# Patient Record
Sex: FEMALE | Race: WHITE | NOT HISPANIC OR LATINO | URBAN - METROPOLITAN AREA
[De-identification: names, ages, dates, MRNs, and addresses within clinical notes are randomized per-mention and may not be internally consistent; named-entity substitution may affect disease eponyms.]

---

## 2017-05-29 ENCOUNTER — INPATIENT (INPATIENT)
Facility: HOSPITAL | Age: 82
LOS: 4 days | Discharge: HOME HEALTH | End: 2017-06-03
Attending: SURGERY | Admitting: SURGERY
Payer: MEDICARE

## 2017-05-29 VITALS
SYSTOLIC BLOOD PRESSURE: 144 MMHG | HEART RATE: 85 BPM | TEMPERATURE: 98 F | OXYGEN SATURATION: 97 % | DIASTOLIC BLOOD PRESSURE: 51 MMHG | RESPIRATION RATE: 20 BRPM

## 2017-05-29 DIAGNOSIS — Z98.890 OTHER SPECIFIED POSTPROCEDURAL STATES: Chronic | ICD-10-CM

## 2017-05-29 LAB
ALBUMIN SERPL ELPH-MCNC: 3 G/DL — LOW (ref 3.3–5)
ALP SERPL-CCNC: 59 U/L — SIGNIFICANT CHANGE UP (ref 40–120)
ALT FLD-CCNC: 22 U/L — SIGNIFICANT CHANGE UP (ref 4–33)
AST SERPL-CCNC: 45 U/L — HIGH (ref 4–32)
BASE EXCESS BLDV CALC-SCNC: 2.3 MMOL/L — SIGNIFICANT CHANGE UP
BASOPHILS # BLD AUTO: 0.02 K/UL — SIGNIFICANT CHANGE UP (ref 0–0.2)
BASOPHILS NFR BLD AUTO: 0.3 % — SIGNIFICANT CHANGE UP (ref 0–2)
BILIRUB SERPL-MCNC: 0.4 MG/DL — SIGNIFICANT CHANGE UP (ref 0.2–1.2)
BLOOD GAS VENOUS - CREATININE: 0.91 MG/DL — SIGNIFICANT CHANGE UP (ref 0.5–1.3)
BUN SERPL-MCNC: 24 MG/DL — HIGH (ref 7–23)
CALCIUM SERPL-MCNC: 9.9 MG/DL — SIGNIFICANT CHANGE UP (ref 8.4–10.5)
CHLORIDE BLDV-SCNC: 101 MMOL/L — SIGNIFICANT CHANGE UP (ref 96–108)
CHLORIDE SERPL-SCNC: 95 MMOL/L — LOW (ref 98–107)
CO2 SERPL-SCNC: 18 MMOL/L — LOW (ref 22–31)
CREAT SERPL-MCNC: 0.91 MG/DL — SIGNIFICANT CHANGE UP (ref 0.5–1.3)
EOSINOPHIL # BLD AUTO: 0.07 K/UL — SIGNIFICANT CHANGE UP (ref 0–0.5)
EOSINOPHIL NFR BLD AUTO: 0.9 % — SIGNIFICANT CHANGE UP (ref 0–6)
GAS PNL BLDV: 130 MMOL/L — LOW (ref 136–146)
GLUCOSE BLDV-MCNC: 165 — HIGH (ref 70–99)
GLUCOSE SERPL-MCNC: 169 MG/DL — HIGH (ref 70–99)
HCO3 BLDV-SCNC: 26 MMOL/L — SIGNIFICANT CHANGE UP (ref 20–27)
HCT VFR BLD CALC: 33.4 % — LOW (ref 34.5–45)
HCT VFR BLDV CALC: 35.1 % — SIGNIFICANT CHANGE UP (ref 34.5–45)
HGB BLD-MCNC: 10.9 G/DL — LOW (ref 11.5–15.5)
HGB BLDV-MCNC: 11.4 G/DL — LOW (ref 11.5–15.5)
IMM GRANULOCYTES NFR BLD AUTO: 0.3 % — SIGNIFICANT CHANGE UP (ref 0–1.5)
LACTATE BLDV-MCNC: 1.6 MMOL/L — SIGNIFICANT CHANGE UP (ref 0.5–2)
LIDOCAIN IGE QN: 19.1 U/L — SIGNIFICANT CHANGE UP (ref 7–60)
LYMPHOCYTES # BLD AUTO: 1.9 K/UL — SIGNIFICANT CHANGE UP (ref 1–3.3)
LYMPHOCYTES # BLD AUTO: 23.9 % — SIGNIFICANT CHANGE UP (ref 13–44)
MCHC RBC-ENTMCNC: 29 PG — SIGNIFICANT CHANGE UP (ref 27–34)
MCHC RBC-ENTMCNC: 32.6 % — SIGNIFICANT CHANGE UP (ref 32–36)
MCV RBC AUTO: 88.8 FL — SIGNIFICANT CHANGE UP (ref 80–100)
MONOCYTES # BLD AUTO: 0.6 K/UL — SIGNIFICANT CHANGE UP (ref 0–0.9)
MONOCYTES NFR BLD AUTO: 7.5 % — SIGNIFICANT CHANGE UP (ref 2–14)
NEUTROPHILS # BLD AUTO: 5.34 K/UL — SIGNIFICANT CHANGE UP (ref 1.8–7.4)
NEUTROPHILS NFR BLD AUTO: 67.1 % — SIGNIFICANT CHANGE UP (ref 43–77)
PCO2 BLDV: 42 MMHG — SIGNIFICANT CHANGE UP (ref 41–51)
PH BLDV: 7.41 PH — SIGNIFICANT CHANGE UP (ref 7.32–7.43)
PLATELET # BLD AUTO: 307 K/UL — SIGNIFICANT CHANGE UP (ref 150–400)
PMV BLD: 10.2 FL — SIGNIFICANT CHANGE UP (ref 7–13)
PO2 BLDV: 53 MMHG — HIGH (ref 35–40)
POTASSIUM BLDV-SCNC: 4.2 MMOL/L — SIGNIFICANT CHANGE UP (ref 3.4–4.5)
POTASSIUM SERPL-MCNC: SIGNIFICANT CHANGE UP MMOL/L (ref 3.5–5.3)
POTASSIUM SERPL-SCNC: SIGNIFICANT CHANGE UP MMOL/L (ref 3.5–5.3)
PROT SERPL-MCNC: 6.4 G/DL — SIGNIFICANT CHANGE UP (ref 6–8.3)
RBC # BLD: 3.76 M/UL — LOW (ref 3.8–5.2)
RBC # FLD: 13.1 % — SIGNIFICANT CHANGE UP (ref 10.3–14.5)
SAO2 % BLDV: 85.4 % — HIGH (ref 60–85)
SODIUM SERPL-SCNC: 132 MMOL/L — LOW (ref 135–145)
WBC # BLD: 7.95 K/UL — SIGNIFICANT CHANGE UP (ref 3.8–10.5)
WBC # FLD AUTO: 7.95 K/UL — SIGNIFICANT CHANGE UP (ref 3.8–10.5)

## 2017-05-29 PROCEDURE — 74177 CT ABD & PELVIS W/CONTRAST: CPT | Mod: 26

## 2017-05-29 RX ORDER — SODIUM CHLORIDE 9 MG/ML
1000 INJECTION INTRAMUSCULAR; INTRAVENOUS; SUBCUTANEOUS ONCE
Qty: 0 | Refills: 0 | Status: COMPLETED | OUTPATIENT
Start: 2017-05-29 | End: 2017-05-29

## 2017-05-29 RX ORDER — MORPHINE SULFATE 50 MG/1
6 CAPSULE, EXTENDED RELEASE ORAL ONCE
Qty: 0 | Refills: 0 | Status: DISCONTINUED | OUTPATIENT
Start: 2017-05-29 | End: 2017-05-29

## 2017-05-29 RX ORDER — ONDANSETRON 8 MG/1
4 TABLET, FILM COATED ORAL ONCE
Qty: 0 | Refills: 0 | Status: COMPLETED | OUTPATIENT
Start: 2017-05-29 | End: 2017-05-29

## 2017-05-29 RX ADMIN — SODIUM CHLORIDE 1000 MILLILITER(S): 9 INJECTION INTRAMUSCULAR; INTRAVENOUS; SUBCUTANEOUS at 21:55

## 2017-05-29 RX ADMIN — MORPHINE SULFATE 6 MILLIGRAM(S): 50 CAPSULE, EXTENDED RELEASE ORAL at 21:55

## 2017-05-29 RX ADMIN — MORPHINE SULFATE 6 MILLIGRAM(S): 50 CAPSULE, EXTENDED RELEASE ORAL at 22:00

## 2017-05-29 RX ADMIN — ONDANSETRON 4 MILLIGRAM(S): 8 TABLET, FILM COATED ORAL at 21:55

## 2017-05-29 NOTE — ED PROVIDER NOTE - ABDOMINAL EXAM
BENJA drain in place with some sanguinous fluid; surgical site clean, no erythema/exudate; diminished bowel sounds/soft

## 2017-05-29 NOTE — ED ADULT TRIAGE NOTE - CHIEF COMPLAINT QUOTE
Pt c/o abdominal pain s/p hernial repair approx 8 days ago.  Pt states has not moved bowels for approx 2 days.  BENJA drain in place.  Pt endorses nausea but no vomiting.  Pt appears uncomfortable.  PMHx:  DM, HTN, CHF, bowel obstruction, hernia

## 2017-05-29 NOTE — ED PROVIDER NOTE - OBJECTIVE STATEMENT
82F with recent abd surgery for infection 8 days ago with BENJA in place p/w abd pain and nausea. Reports generalized abd pain with nausea, no BM x 2 days. Has not had this before. Denies vomiting, diarrhea, CP, SOB, fever.

## 2017-05-29 NOTE — ED PROVIDER NOTE - ATTENDING CONTRIBUTION TO CARE
Rod: 82 yof with double hernia surgery 8 days ago after 1 week hospitalization due to infection, discharged from outside hospital but now arrives with worsening abd pain, nausea, vomiting, constipation and lower ext edema. no fever, no cp, no sob.  pain 7/10 now over entire abdomen. On exam, pt is ill appearing appears uncomfortable, clear lungs, normal cardiac, abd large drain site on left mid abd with blood in drain, no surrounding erythema or edema or pus.  Incision site also appears clean, decreased BS, diffuse tenderness, 1-2+ pitting edema mainly over feet.  Labs, CT.  pain meds.

## 2017-05-29 NOTE — ED PROVIDER NOTE - MEDICAL DECISION MAKING DETAILS
Pt with recent surgery now with abd pain and nausea, decreased bowel sounds. Concern for SBO. Will obtain labs, pain meds, antiemetics, CT r/o SBO

## 2017-05-29 NOTE — ED ADULT NURSE NOTE - OBJECTIVE STATEMENT
pt received to room 2, AAOx3, [place] speaking with family at bedside to translate. pt is s/p hernia repair x8 days ago, c/o abdominal pain and nausea. BENJA drain in place to surgical site. pt has not had a BM in 2 days. VS as noted, pt in NAD, MD at bedside for eval, will continue to monitor pt.

## 2017-05-30 DIAGNOSIS — K56.69 OTHER INTESTINAL OBSTRUCTION: ICD-10-CM

## 2017-05-30 DIAGNOSIS — Z95.5 PRESENCE OF CORONARY ANGIOPLASTY IMPLANT AND GRAFT: Chronic | ICD-10-CM

## 2017-05-30 DIAGNOSIS — Z92.89 PERSONAL HISTORY OF OTHER MEDICAL TREATMENT: Chronic | ICD-10-CM

## 2017-05-30 LAB
BUN SERPL-MCNC: 22 MG/DL — SIGNIFICANT CHANGE UP (ref 7–23)
CALCIUM SERPL-MCNC: 9.4 MG/DL — SIGNIFICANT CHANGE UP (ref 8.4–10.5)
CHLORIDE SERPL-SCNC: 100 MMOL/L — SIGNIFICANT CHANGE UP (ref 98–107)
CO2 SERPL-SCNC: 24 MMOL/L — SIGNIFICANT CHANGE UP (ref 22–31)
CREAT SERPL-MCNC: 0.92 MG/DL — SIGNIFICANT CHANGE UP (ref 0.5–1.3)
GLUCOSE SERPL-MCNC: 67 MG/DL — LOW (ref 70–99)
HCT VFR BLD CALC: 34 % — LOW (ref 34.5–45)
HGB BLD-MCNC: 11.1 G/DL — LOW (ref 11.5–15.5)
MAGNESIUM SERPL-MCNC: 0.9 MG/DL — CRITICAL LOW (ref 1.6–2.6)
MCHC RBC-ENTMCNC: 29.1 PG — SIGNIFICANT CHANGE UP (ref 27–34)
MCHC RBC-ENTMCNC: 32.6 % — SIGNIFICANT CHANGE UP (ref 32–36)
MCV RBC AUTO: 89.2 FL — SIGNIFICANT CHANGE UP (ref 80–100)
PLATELET # BLD AUTO: 256 K/UL — SIGNIFICANT CHANGE UP (ref 150–400)
PMV BLD: 9.9 FL — SIGNIFICANT CHANGE UP (ref 7–13)
POTASSIUM SERPL-MCNC: 3.9 MMOL/L — SIGNIFICANT CHANGE UP (ref 3.5–5.3)
POTASSIUM SERPL-SCNC: 3.9 MMOL/L — SIGNIFICANT CHANGE UP (ref 3.5–5.3)
RBC # BLD: 3.81 M/UL — SIGNIFICANT CHANGE UP (ref 3.8–5.2)
RBC # FLD: 13.4 % — SIGNIFICANT CHANGE UP (ref 10.3–14.5)
SODIUM SERPL-SCNC: 139 MMOL/L — SIGNIFICANT CHANGE UP (ref 135–145)
WBC # BLD: 5.93 K/UL — SIGNIFICANT CHANGE UP (ref 3.8–10.5)
WBC # FLD AUTO: 5.93 K/UL — SIGNIFICANT CHANGE UP (ref 3.8–10.5)

## 2017-05-30 PROCEDURE — 99222 1ST HOSP IP/OBS MODERATE 55: CPT

## 2017-05-30 RX ORDER — INSULIN LISPRO 100/ML
VIAL (ML) SUBCUTANEOUS EVERY 6 HOURS
Qty: 0 | Refills: 0 | Status: DISCONTINUED | OUTPATIENT
Start: 2017-05-30 | End: 2017-06-03

## 2017-05-30 RX ORDER — MAGNESIUM SULFATE 500 MG/ML
2 VIAL (ML) INJECTION
Qty: 0 | Refills: 0 | Status: COMPLETED | OUTPATIENT
Start: 2017-05-30 | End: 2017-05-30

## 2017-05-30 RX ORDER — POTASSIUM CHLORIDE 20 MEQ
10 PACKET (EA) ORAL ONCE
Qty: 0 | Refills: 0 | Status: COMPLETED | OUTPATIENT
Start: 2017-05-30 | End: 2017-05-30

## 2017-05-30 RX ORDER — ENOXAPARIN SODIUM 100 MG/ML
40 INJECTION SUBCUTANEOUS EVERY 24 HOURS
Qty: 0 | Refills: 0 | Status: DISCONTINUED | OUTPATIENT
Start: 2017-05-30 | End: 2017-06-03

## 2017-05-30 RX ORDER — METOPROLOL TARTRATE 50 MG
5 TABLET ORAL EVERY 6 HOURS
Qty: 0 | Refills: 0 | Status: DISCONTINUED | OUTPATIENT
Start: 2017-05-30 | End: 2017-06-01

## 2017-05-30 RX ORDER — ISOSORBIDE MONONITRATE 60 MG/1
30 TABLET, EXTENDED RELEASE ORAL DAILY
Qty: 0 | Refills: 0 | Status: DISCONTINUED | OUTPATIENT
Start: 2017-05-30 | End: 2017-05-30

## 2017-05-30 RX ORDER — SODIUM CHLORIDE 9 MG/ML
1000 INJECTION INTRAMUSCULAR; INTRAVENOUS; SUBCUTANEOUS
Qty: 0 | Refills: 0 | Status: DISCONTINUED | OUTPATIENT
Start: 2017-05-30 | End: 2017-05-30

## 2017-05-30 RX ORDER — DEXTROSE MONOHYDRATE, SODIUM CHLORIDE, AND POTASSIUM CHLORIDE 50; .745; 4.5 G/1000ML; G/1000ML; G/1000ML
1000 INJECTION, SOLUTION INTRAVENOUS
Qty: 0 | Refills: 0 | Status: DISCONTINUED | OUTPATIENT
Start: 2017-05-30 | End: 2017-06-02

## 2017-05-30 RX ADMIN — Medication 5 MILLIGRAM(S): at 12:54

## 2017-05-30 RX ADMIN — Medication 1.25 MILLIGRAM(S): at 17:50

## 2017-05-30 RX ADMIN — Medication 50 GRAM(S): at 11:19

## 2017-05-30 RX ADMIN — Medication 100 MILLIEQUIVALENT(S): at 12:51

## 2017-05-30 RX ADMIN — DEXTROSE MONOHYDRATE, SODIUM CHLORIDE, AND POTASSIUM CHLORIDE 100 MILLILITER(S): 50; .745; 4.5 INJECTION, SOLUTION INTRAVENOUS at 21:40

## 2017-05-30 RX ADMIN — Medication 5 MILLIGRAM(S): at 19:02

## 2017-05-30 RX ADMIN — DEXTROSE MONOHYDRATE, SODIUM CHLORIDE, AND POTASSIUM CHLORIDE 100 MILLILITER(S): 50; .745; 4.5 INJECTION, SOLUTION INTRAVENOUS at 10:16

## 2017-05-30 RX ADMIN — Medication 50 GRAM(S): at 10:17

## 2017-05-30 RX ADMIN — SODIUM CHLORIDE 100 MILLILITER(S): 9 INJECTION INTRAMUSCULAR; INTRAVENOUS; SUBCUTANEOUS at 05:34

## 2017-05-30 RX ADMIN — ENOXAPARIN SODIUM 40 MILLIGRAM(S): 100 INJECTION SUBCUTANEOUS at 06:57

## 2017-05-30 NOTE — PROVIDER CONTACT NOTE (MEDICATION) - ACTION/TREATMENT ORDERED:
BRAD Figueredo notified. Pt. will remain on NS at 100cc/hr and will recheck FS at 1130 am. Will continue to monitor.

## 2017-05-30 NOTE — H&P ADULT - NSHPPHYSICALEXAM_GEN_ALL_CORE
General: well developed, obese, NAD  Neuro: alert and oriented, no focal deficits, moves all extremities spontaneously  HEENT: NCAT, EOMI, anicteric  Respiratory: airway patent, respirations unlabored  CVS: regular rate and rhythm  Abdomen: soft, obese, with large right sided ventral hernia, staples on left, with serosanguinous BENJA; hernia on R partially reducible, soft, nontender, with no overlying skin changes  Extremities: no edema, sensation and movement grossly intact  Skin: warm, dry, appropriate color

## 2017-05-30 NOTE — H&P ADULT - PROBLEM SELECTOR PLAN 1
-NPO  -IV fluid for resuscitation  -NG tube if patient vomits, deferring now due to PO tolerance earlier today    --[id number]

## 2017-05-30 NOTE — H&P ADULT - HISTORY OF PRESENT ILLNESS
CC: 82y old Female admitted with a chief complaint of abdominal pain, in setting of recent ventral hernia repair.    HPI: 81 yo F presenting on POD#8 s/p ventral hernia repair in [place], with abdominal pain, associated with anorexia, mild emesis (one day prior to admission), and decreased GI function. No bowel movements for 3 days. Patient had previously had return of GI function following her surgery and was discharged to home. She had constipation that started first with decreased flatus. When it worsened, she spoke to her surgeon, however she was advised to follow up in the office in three days, and she felt she could not wait, so she called EMS and the ambulance brought her to [facility] for further assessment.    PMHx: HTN, DM, CAD s/p stent, ?CHF, ?gout, prior SBOs (managed nonoperatively, and prior bowel resection)    PSHx: bowel resection for obstruction (2009), cardiac stent placement x 2 (2014), hernia repair 5/22/17    Medications (home): januvia, metformin, glyburide, allopurinol, lasix, aspirin, metoprolol, linzess, colace  Allergies: No Known Allergies  Social Hx: lives at home with , denies EtOH and tobacco use    Physical exam significant for large right sided ventral hernia, containing bowel, partially reducible but with some loss of domain, staples C/D/I, serosanguinous BENJA drain, no skin changes    Imaging and labs remarkable for SBO with likely transition point in ventral hernia, and post surgical changes

## 2017-05-31 LAB
ALBUMIN SERPL ELPH-MCNC: 3.2 G/DL — LOW (ref 3.3–5)
ALP SERPL-CCNC: 50 U/L — SIGNIFICANT CHANGE UP (ref 40–120)
ALT FLD-CCNC: 20 U/L — SIGNIFICANT CHANGE UP (ref 4–33)
APTT BLD: 30.2 SEC — SIGNIFICANT CHANGE UP (ref 27.5–37.4)
AST SERPL-CCNC: 19 U/L — SIGNIFICANT CHANGE UP (ref 4–32)
BILIRUB SERPL-MCNC: 0.3 MG/DL — SIGNIFICANT CHANGE UP (ref 0.2–1.2)
BLD GP AB SCN SERPL QL: NEGATIVE — SIGNIFICANT CHANGE UP
BUN SERPL-MCNC: 10 MG/DL — SIGNIFICANT CHANGE UP (ref 7–23)
CALCIUM SERPL-MCNC: 9.4 MG/DL — SIGNIFICANT CHANGE UP (ref 8.4–10.5)
CHLORIDE SERPL-SCNC: 103 MMOL/L — SIGNIFICANT CHANGE UP (ref 98–107)
CO2 SERPL-SCNC: 24 MMOL/L — SIGNIFICANT CHANGE UP (ref 22–31)
CREAT SERPL-MCNC: 0.72 MG/DL — SIGNIFICANT CHANGE UP (ref 0.5–1.3)
GLUCOSE SERPL-MCNC: 129 MG/DL — HIGH (ref 70–99)
HBA1C BLD-MCNC: 7.5 % — HIGH (ref 4–5.6)
HCT VFR BLD CALC: 31.3 % — LOW (ref 34.5–45)
HGB BLD-MCNC: 10.1 G/DL — LOW (ref 11.5–15.5)
INR BLD: 1.04 — SIGNIFICANT CHANGE UP (ref 0.88–1.17)
MAGNESIUM SERPL-MCNC: 1.7 MG/DL — SIGNIFICANT CHANGE UP (ref 1.6–2.6)
MCHC RBC-ENTMCNC: 29 PG — SIGNIFICANT CHANGE UP (ref 27–34)
MCHC RBC-ENTMCNC: 32.3 % — SIGNIFICANT CHANGE UP (ref 32–36)
MCV RBC AUTO: 89.9 FL — SIGNIFICANT CHANGE UP (ref 80–100)
PHOSPHATE SERPL-MCNC: 2.4 MG/DL — LOW (ref 2.5–4.5)
PLATELET # BLD AUTO: 264 K/UL — SIGNIFICANT CHANGE UP (ref 150–400)
PMV BLD: 9.9 FL — SIGNIFICANT CHANGE UP (ref 7–13)
POTASSIUM SERPL-MCNC: 4.2 MMOL/L — SIGNIFICANT CHANGE UP (ref 3.5–5.3)
POTASSIUM SERPL-SCNC: 4.2 MMOL/L — SIGNIFICANT CHANGE UP (ref 3.5–5.3)
PROT SERPL-MCNC: 5.9 G/DL — LOW (ref 6–8.3)
PROTHROM AB SERPL-ACNC: 11.7 SEC — SIGNIFICANT CHANGE UP (ref 9.8–13.1)
RBC # BLD: 3.48 M/UL — LOW (ref 3.8–5.2)
RBC # FLD: 13.4 % — SIGNIFICANT CHANGE UP (ref 10.3–14.5)
RH IG SCN BLD-IMP: POSITIVE — SIGNIFICANT CHANGE UP
SODIUM SERPL-SCNC: 140 MMOL/L — SIGNIFICANT CHANGE UP (ref 135–145)
WBC # BLD: 3.93 K/UL — SIGNIFICANT CHANGE UP (ref 3.8–10.5)
WBC # FLD AUTO: 3.93 K/UL — SIGNIFICANT CHANGE UP (ref 3.8–10.5)

## 2017-05-31 PROCEDURE — 99231 SBSQ HOSP IP/OBS SF/LOW 25: CPT | Mod: GC

## 2017-05-31 RX ORDER — MAGNESIUM SULFATE 500 MG/ML
2 VIAL (ML) INJECTION ONCE
Qty: 0 | Refills: 0 | Status: COMPLETED | OUTPATIENT
Start: 2017-05-31 | End: 2017-05-31

## 2017-05-31 RX ADMIN — ENOXAPARIN SODIUM 40 MILLIGRAM(S): 100 INJECTION SUBCUTANEOUS at 05:51

## 2017-05-31 RX ADMIN — Medication 5 MILLIGRAM(S): at 05:46

## 2017-05-31 RX ADMIN — Medication 5 MILLIGRAM(S): at 00:15

## 2017-05-31 RX ADMIN — Medication 1.25 MILLIGRAM(S): at 23:12

## 2017-05-31 RX ADMIN — Medication 5 MILLIGRAM(S): at 12:26

## 2017-05-31 RX ADMIN — Medication 1.25 MILLIGRAM(S): at 00:15

## 2017-05-31 RX ADMIN — Medication 2: at 23:12

## 2017-05-31 RX ADMIN — Medication 63.75 MILLIMOLE(S): at 12:31

## 2017-05-31 RX ADMIN — Medication 1.25 MILLIGRAM(S): at 12:27

## 2017-05-31 RX ADMIN — Medication 1.25 MILLIGRAM(S): at 18:19

## 2017-05-31 RX ADMIN — Medication 1.25 MILLIGRAM(S): at 05:46

## 2017-05-31 RX ADMIN — Medication 50 GRAM(S): at 12:19

## 2017-05-31 RX ADMIN — Medication 5 MILLIGRAM(S): at 23:12

## 2017-05-31 RX ADMIN — Medication 5 MILLIGRAM(S): at 18:19

## 2017-05-31 RX ADMIN — Medication 4: at 12:10

## 2017-05-31 NOTE — CHART NOTE - NSCHARTNOTEFT_GEN_A_CORE
Spoke to patient's surgeon from [facility], Dr. Jamie Hernandez. He was made aware that the pt. is inpatient here at [facility]. He said that he was aware of large R ventral hernia, but did not intend to operate on it at the time of her initial hernia repair. Now that she is here inpatient with us, he has expressed that he is okay with our team continuing the patient's care.

## 2017-05-31 NOTE — PROGRESS NOTE ADULT - ATTENDING COMMENTS
Complicated giant ventral incisional hernia repaired in part by surgeon at OSH.  Early post-operative obstruction versus baseline constipation.  Improving with conservative management, patient endorses passage of flatus.  Will try oral diet challenge followed by bowel regimen for laxation.    Ideally, she should f/u with incident surgeon or another surgeon of family's choosing once acute issue of pSBO has resolved to further management of remaining ventral hernia.

## 2017-06-01 ENCOUNTER — OUTPATIENT (OUTPATIENT)
Dept: OUTPATIENT SERVICES | Facility: HOSPITAL | Age: 82
LOS: 1 days | End: 2017-06-01
Payer: MEDICAID

## 2017-06-01 DIAGNOSIS — Z95.5 PRESENCE OF CORONARY ANGIOPLASTY IMPLANT AND GRAFT: Chronic | ICD-10-CM

## 2017-06-01 DIAGNOSIS — Z98.890 OTHER SPECIFIED POSTPROCEDURAL STATES: Chronic | ICD-10-CM

## 2017-06-01 DIAGNOSIS — Z92.89 PERSONAL HISTORY OF OTHER MEDICAL TREATMENT: Chronic | ICD-10-CM

## 2017-06-01 LAB
BUN SERPL-MCNC: 4 MG/DL — LOW (ref 7–23)
CALCIUM SERPL-MCNC: 9.5 MG/DL — SIGNIFICANT CHANGE UP (ref 8.4–10.5)
CHLORIDE SERPL-SCNC: 103 MMOL/L — SIGNIFICANT CHANGE UP (ref 98–107)
CO2 SERPL-SCNC: 24 MMOL/L — SIGNIFICANT CHANGE UP (ref 22–31)
CREAT SERPL-MCNC: 0.68 MG/DL — SIGNIFICANT CHANGE UP (ref 0.5–1.3)
GLUCOSE SERPL-MCNC: 190 MG/DL — HIGH (ref 70–99)
MAGNESIUM SERPL-MCNC: 1.5 MG/DL — LOW (ref 1.6–2.6)
MAGNESIUM SERPL-MCNC: 1.9 MG/DL — SIGNIFICANT CHANGE UP (ref 1.6–2.6)
PHOSPHATE SERPL-MCNC: 2.8 MG/DL — SIGNIFICANT CHANGE UP (ref 2.5–4.5)
POTASSIUM SERPL-MCNC: 4.2 MMOL/L — SIGNIFICANT CHANGE UP (ref 3.5–5.3)
POTASSIUM SERPL-SCNC: 4.2 MMOL/L — SIGNIFICANT CHANGE UP (ref 3.5–5.3)
SODIUM SERPL-SCNC: 141 MMOL/L — SIGNIFICANT CHANGE UP (ref 135–145)

## 2017-06-01 PROCEDURE — 74250 X-RAY XM SM INT 1CNTRST STD: CPT | Mod: 26

## 2017-06-01 RX ORDER — MAGNESIUM SULFATE 500 MG/ML
2 VIAL (ML) INJECTION ONCE
Qty: 0 | Refills: 0 | Status: COMPLETED | OUTPATIENT
Start: 2017-06-01 | End: 2017-06-01

## 2017-06-01 RX ORDER — METOPROLOL TARTRATE 50 MG
25 TABLET ORAL
Qty: 0 | Refills: 0 | Status: DISCONTINUED | OUTPATIENT
Start: 2017-06-01 | End: 2017-06-03

## 2017-06-01 RX ORDER — SENNA PLUS 8.6 MG/1
2 TABLET ORAL AT BEDTIME
Qty: 0 | Refills: 0 | Status: DISCONTINUED | OUTPATIENT
Start: 2017-06-01 | End: 2017-06-03

## 2017-06-01 RX ORDER — DOCUSATE SODIUM 100 MG
100 CAPSULE ORAL
Qty: 0 | Refills: 0 | Status: DISCONTINUED | OUTPATIENT
Start: 2017-06-01 | End: 2017-06-03

## 2017-06-01 RX ADMIN — DEXTROSE MONOHYDRATE, SODIUM CHLORIDE, AND POTASSIUM CHLORIDE 100 MILLILITER(S): 50; .745; 4.5 INJECTION, SOLUTION INTRAVENOUS at 22:12

## 2017-06-01 RX ADMIN — Medication 2: at 12:07

## 2017-06-01 RX ADMIN — Medication 5 MILLIGRAM(S): at 05:12

## 2017-06-01 RX ADMIN — Medication 50 GRAM(S): at 09:17

## 2017-06-01 RX ADMIN — Medication 100 MILLIGRAM(S): at 18:04

## 2017-06-01 RX ADMIN — Medication 2: at 17:28

## 2017-06-01 RX ADMIN — Medication 1.25 MILLIGRAM(S): at 05:12

## 2017-06-01 RX ADMIN — Medication 63.75 MILLIMOLE(S): at 18:04

## 2017-06-01 RX ADMIN — Medication: at 07:39

## 2017-06-01 RX ADMIN — ENOXAPARIN SODIUM 40 MILLIGRAM(S): 100 INJECTION SUBCUTANEOUS at 07:38

## 2017-06-01 RX ADMIN — SENNA PLUS 2 TABLET(S): 8.6 TABLET ORAL at 22:11

## 2017-06-01 RX ADMIN — DEXTROSE MONOHYDRATE, SODIUM CHLORIDE, AND POTASSIUM CHLORIDE 100 MILLILITER(S): 50; .745; 4.5 INJECTION, SOLUTION INTRAVENOUS at 05:13

## 2017-06-01 RX ADMIN — Medication 20 MILLIGRAM(S): at 18:04

## 2017-06-01 RX ADMIN — Medication 50 MILLIGRAM(S): at 22:12

## 2017-06-01 RX ADMIN — Medication 25 MILLIGRAM(S): at 18:04

## 2017-06-01 RX ADMIN — Medication 50 MILLIGRAM(S): at 14:35

## 2017-06-01 RX ADMIN — Medication 50 GRAM(S): at 22:11

## 2017-06-01 NOTE — PROGRESS NOTE ADULT - ASSESSMENT
82F with SBO, resolving  - judicious pain control  - f/u GI fxn  - CLD as jah 82F with SBO, resolving  - judicious pain control  - f/u GI fxn  - CLD as jah  - will get small bowel series

## 2017-06-01 NOTE — PROGRESS NOTE ADULT - ATTENDING COMMENTS
Complex ventral incisional hernias s/p partial surgical repair at OSH now with unclear pSBO versus constipation.  Agree with SBFT for further evaluation.  Ideally, would prefer not to re-operate on patient so soon after recent surgical intervention at OSH.

## 2017-06-02 ENCOUNTER — TRANSCRIPTION ENCOUNTER (OUTPATIENT)
Age: 82
End: 2017-06-02

## 2017-06-02 LAB
BUN SERPL-MCNC: 5 MG/DL — LOW (ref 7–23)
CALCIUM SERPL-MCNC: 9.5 MG/DL — SIGNIFICANT CHANGE UP (ref 8.4–10.5)
CHLORIDE SERPL-SCNC: 105 MMOL/L — SIGNIFICANT CHANGE UP (ref 98–107)
CO2 SERPL-SCNC: 24 MMOL/L — SIGNIFICANT CHANGE UP (ref 22–31)
CREAT SERPL-MCNC: 0.71 MG/DL — SIGNIFICANT CHANGE UP (ref 0.5–1.3)
GLUCOSE SERPL-MCNC: 181 MG/DL — HIGH (ref 70–99)
MAGNESIUM SERPL-MCNC: 2 MG/DL — SIGNIFICANT CHANGE UP (ref 1.6–2.6)
PHOSPHATE SERPL-MCNC: 3.5 MG/DL — SIGNIFICANT CHANGE UP (ref 2.5–4.5)
POTASSIUM SERPL-MCNC: 4 MMOL/L — SIGNIFICANT CHANGE UP (ref 3.5–5.3)
POTASSIUM SERPL-SCNC: 4 MMOL/L — SIGNIFICANT CHANGE UP (ref 3.5–5.3)
SODIUM SERPL-SCNC: 143 MMOL/L — SIGNIFICANT CHANGE UP (ref 135–145)

## 2017-06-02 RX ORDER — ACETAMINOPHEN 500 MG
650 TABLET ORAL EVERY 6 HOURS
Qty: 0 | Refills: 0 | Status: DISCONTINUED | OUTPATIENT
Start: 2017-06-02 | End: 2017-06-03

## 2017-06-02 RX ORDER — ACETAMINOPHEN 500 MG
650 TABLET ORAL EVERY 6 HOURS
Qty: 0 | Refills: 0 | Status: DISCONTINUED | OUTPATIENT
Start: 2017-06-02 | End: 2017-06-02

## 2017-06-02 RX ADMIN — Medication 50 MILLIGRAM(S): at 05:27

## 2017-06-02 RX ADMIN — Medication 50 MILLIGRAM(S): at 13:14

## 2017-06-02 RX ADMIN — Medication 650 MILLIGRAM(S): at 12:30

## 2017-06-02 RX ADMIN — Medication 100 MILLIGRAM(S): at 17:08

## 2017-06-02 RX ADMIN — Medication 25 MILLIGRAM(S): at 17:08

## 2017-06-02 RX ADMIN — Medication 2: at 17:08

## 2017-06-02 RX ADMIN — Medication 2: at 00:02

## 2017-06-02 RX ADMIN — SENNA PLUS 2 TABLET(S): 8.6 TABLET ORAL at 21:40

## 2017-06-02 RX ADMIN — Medication 650 MILLIGRAM(S): at 11:51

## 2017-06-02 RX ADMIN — Medication 25 MILLIGRAM(S): at 05:27

## 2017-06-02 RX ADMIN — Medication 20 MILLIGRAM(S): at 17:08

## 2017-06-02 RX ADMIN — Medication 50 MILLIGRAM(S): at 23:11

## 2017-06-02 RX ADMIN — Medication 4: at 07:07

## 2017-06-02 RX ADMIN — Medication 100 MILLIGRAM(S): at 05:27

## 2017-06-02 RX ADMIN — ENOXAPARIN SODIUM 40 MILLIGRAM(S): 100 INJECTION SUBCUTANEOUS at 05:28

## 2017-06-02 RX ADMIN — Medication 20 MILLIGRAM(S): at 06:57

## 2017-06-02 NOTE — DISCHARGE NOTE ADULT - HOSPITAL COURSE
HPI: 81 yo F presenting on POD#8 s/p ventral hernia repair in [place], with abdominal pain, associated with anorexia, mild emesis x1 day, and decreased GI function. No bowel movements for 3 days. Patient had previously had return of GI function following her surgery and was discharged to home. She had constipation that started first with decreased flatus. When it worsened, she spoke to her surgeon, however she was advised to follow up in the office in three days, and she felt she could not wait, so she called EMS and the ambulance brought her to [facility] for further assessment.  Physical exam significant for large right sided ventral hernia, containing bowel, partially reducible but with some loss of domain, staples C/D/I, serosanguinous BENJA drain, no skin changes    PMHx: HTN, DM, CAD s/p stent, ?CHF, ?gout, prior SBOs (managed nonoperatively, and prior bowel resection)  PSHx: bowel resection for obstruction (2009), cardiac stent placement x 2 (2014), hernia repair 5/22/17  Medications (home): januvia, metformin, glyburide, allopurinol, lasix, aspirin, metoprolol, linzess, colace  Allergies: No Known Allergies    CT A/P remarkable for SBO with likely transition point in ventral hernia, and post surgical changes.  Pt admitted to surgical service, NGT placed.  Pt's surgeon  Dr. Jamie Hernandez was notified of her hospitalization.  On HD2 small bowel series showed contrast in rectum after 4 hours.  NGT removed and diet advanced.  BENJA drain from ventral hernia repair at outside hospital was removed.  Pt discharged in stable condition.

## 2017-06-02 NOTE — PROGRESS NOTE ADULT - SUBJECTIVE AND OBJECTIVE BOX
[facility] [place]AL [place]GERY DAILY PROGRESS NOTE:     Subjective:  Endorses some abdominal pain. Has had chronic constipation. Has not had BM in 3+ days. Passing gas.          Objective:    MEDICATIONS  (STANDING):  enoxaparin Injectable 40milliGRAM(s) SubCutaneous every 24 hours  insulin lispro (HumaLOG) corrective regimen sliding scale  SubCutaneous every 6 hours  metoprolol Injectable 5milliGRAM(s) IV Push every 6 hours  enalaprilat Injectable 1.25milliGRAM(s) IV Push every 6 hours  dextrose 5% + sodium chloride 0.45% with potassium chloride 20 mEq/L 1000milliLiter(s) IV Continuous <Continuous>  magnesium sulfate  IVPB 2Gram(s) IV Intermittent once  sodium phosphate IVPB 15milliMole(s) IV Intermittent once    MEDICATIONS  (PRN):      Vital Signs Last 24 Hrs  T(C): 36.4, Max: 36.8 ( @ 21:36)  T(F): 97.5, Max: 98.3 ( @ 21:36)  HR: 68 (66 - 75)  BP: 139/52 (117/50 - 142/59)  BP(mean): --  RR: 18 (16 - 20)  SpO2: 98% (94% - 99%)    I&O's Detail  I & Os for 24h ending 31 May 2017 07:00  =============================================  IN:    dextrose 5% + sodium chloride 0.45% with potassium chloride 20 mEq/L: 2300 ml    IV PiggyBack: 200 ml    sodium chloride 0.9%: 100 ml    Total IN: 2600 ml  ---------------------------------------------  OUT:    Voided: 1450 ml    Bulb: 30 ml    Total OUT: 1480 ml  ---------------------------------------------  Total NET: 1120 ml    I & Os for current day (as of 31 May 2017 11:25)  =============================================  IN:    Total IN: 0 ml  ---------------------------------------------  OUT:    Voided: 400 ml    Bulb: 15 ml    Total OUT: 415 ml  ---------------------------------------------  Total NET: -415 ml      Daily     Daily Weight in k (31 May 2017 01:12)    LABS:                        10.1   3.93  )-----------( 264      ( 31 May 2017 06:00 )             31.3     05-    140  |  103  |  10  ----------------------------<  129<H>  4.2   |  24  |  0.72    Ca    9.4      31 May 2017 06:00  Phos  2.4       Mg     1.7         TPro  5.9<L>  /  Alb  3.2<L>  /  TBili  0.3  /  DBili  x   /  AST  19  /  ALT  20  /  AlkPhos  50  05-31    PT/INR - ( 31 May 2017 06:00 )   PT: 11.7 SEC;   INR: 1.04          PTT - ( 31 May 2017 06:00 )  PTT:30.2 SEC    Physical Exam:  NAD  Resp nonlabored  Abd with loss of domain  Minimally tender in upper quadrants  BENJA serosanguinous
GENERAL SURGERY DAILY PROGRESS NOTE:     Subjective:  Endorses some abdominal pain. Still passing gas, but has not had a BM. Tolerating sips w/o n/v, but burping some.            Objective:    MEDICATIONS  (STANDING):  enoxaparin Injectable 40milliGRAM(s) SubCutaneous every 24 hours  insulin lispro (HumaLOG) corrective regimen sliding scale  SubCutaneous every 6 hours  metoprolol Injectable 5milliGRAM(s) IV Push every 6 hours  enalaprilat Injectable 1.25milliGRAM(s) IV Push every 6 hours  dextrose 5% + sodium chloride 0.45% with potassium chloride 20 mEq/L 1000milliLiter(s) IV Continuous <Continuous>    MEDICATIONS  (PRN):      Vital Signs Last 24 Hrs  T(C): 36.8, Max: 36.8 ( @ 05:11)  T(F): 98.3, Max: 98.3 ( @ 05:11)  HR: 65 (64 - 71)  BP: 143/65 (135/95 - 152/61)  BP(mean): --  RR: 17 (17 - 18)  SpO2: 99% (97% - 100%)    I&O's Detail  I & Os for 24h ending 31 May 2017 07:00  =============================================  IN:    dextrose 5% + sodium chloride 0.45% with potassium chloride 20 mEq/L: 2300 ml    IV PiggyBack: 200 ml    sodium chloride 0.9%: 100 ml    Total IN: 2600 ml  ---------------------------------------------  OUT:    Voided: 1450 ml    Bulb: 30 ml    Total OUT: 1480 ml  ---------------------------------------------  Total NET: 1120 ml    I & Os for current day (as of 2017 06:54)  =============================================  IN:    dextrose 5% + sodium chloride 0.45% with potassium chloride 20 mEq/L: 2000 ml    Total IN: 2000 ml  ---------------------------------------------  OUT:    Voided: 1600 ml    Bulb: 55 ml    Total OUT: 1655 ml  ---------------------------------------------  Total NET: 345 ml      Daily     Daily Weight in k (2017 00:41)    LABS:                        10.1   3.93  )-----------( 264      ( 31 May 2017 06:00 )             31.3     05-31    140  |  103  |  10  ----------------------------<  129<H>  4.2   |  24  |  0.72    Ca    9.4      31 May 2017 06:00  Phos  2.4     -  Mg     1.7         TPro  5.9<L>  /  Alb  3.2<L>  /  TBili  0.3  /  DBili  x   /  AST  19  /  ALT  20  /  AlkPhos  50  05-31    PT/INR - ( 31 May 2017 06:00 )   PT: 11.7 SEC;   INR: 1.04          PTT - ( 31 May 2017 06:00 )  PTT:30.2 SEC    Physical Exam:  NAD, awake and alert  Resp nonlabored  Abd soft, large right ventral hernia, min tender  No guarding or rebound
SUBJECTIVE: Patient seen and examined at bedside, patient without complaints.  FAVIO was used on  phone, patient without complaints, she denies nausea and vomiting, has been tolerating clears of water, has had 6 bowel movements yesterday and 1 this morning, has gas. Can ambulate to the bathroom.     Vital Signs Last 24 Hrs  T(C): 36.4, Max: 36.8 (06-01 @ 14:33)  T(F): 97.5, Max: 98.3 (06-01 @ 14:33)  HR: 62 (60 - 84)  BP: 142/63 (142/63 - 158/73)  BP(mean): --  RR: 18 (16 - 18)  SpO2: 97% (97% - 100%)  I&O's Detail    I & Os for current day (as of 02 Jun 2017 10:34)  =============================================  IN:    dextrose 5% + sodium chloride 0.45% with potassium chloride 20 mEq/L: 1150 ml    IV PiggyBack: 100 ml    Total IN: 1250 ml  ---------------------------------------------  OUT:    Voided: 1000 ml    Bulb: 30 ml    Total OUT: 1030 ml  ---------------------------------------------  Total NET: 220 ml    MEDICATIONS  (STANDING):  enoxaparin Injectable 40milliGRAM(s) SubCutaneous every 24 hours  insulin lispro (HumaLOG) corrective regimen sliding scale  SubCutaneous every 6 hours  enalapril 20milliGRAM(s) Oral two times a day  pregabalin 50milliGRAM(s) Oral three times a day  docusate sodium 100milliGRAM(s) Oral two times a day  senna 2Tablet(s) Oral at bedtime  metoprolol 25milliGRAM(s) Oral two times a day    MEDICATIONS  (PRN):      Physical Exam  General: A&Ox3, NAD  Abdominal: Obese, Binder in place, Dressing c/d/i, BENJA serosanguineous, soft nontender    LABS:    06-02    143  |  105  |  5<L>  ----------------------------<  181<H>  4.0   |  24  |  0.71    Ca    9.5      02 Jun 2017 04:00  Phos  3.5     06-02  Mg     2.0     06-02

## 2017-06-02 NOTE — DISCHARGE NOTE ADULT - CARE PROVIDER_API CALL
Kendrick Huddleston), DieticianNutrition; Surgery; Surgical Critical Care  [address] [place] [id number]  Phone: [phone]  Fax: [phone] Kendrick Huddleston), DieticianNutrition; Surgery; Surgical Critical Care  [address] [place] [address]  Phone: [phone]  Fax: [phone]

## 2017-06-02 NOTE — DISCHARGE NOTE ADULT - MEDICATION SUMMARY - MEDICATIONS TO TAKE
I will START or STAY ON the medications listed below when I get home from the hospital:    aspirin 81 mg oral delayed release tablet  -- 1 tab(s) by mouth once a day  -- Indication: For CAD (coronary artery disease)    Vasotec 20 mg oral tablet  -- 1 tab(s) by mouth 2 times a day  -- Indication: For HTN (hypertension)    isosorbide mononitrate 30 mg oral tablet, extended release  -- 1 tab(s) by mouth once a day (in the morning)  -- Indication: For HTN (hypertension)    Lyrica 25 mg oral capsule  -- 2 cap(s) by mouth 3 times a day  -- Indication: For Home medication    glyBURIDE 5 mg oral tablet  -- 1 tab(s) by mouth once a day  -- Indication: For Diabetes mellitus    metFORMIN 850 mg oral tablet  -- 1 tab(s) by mouth 2 times a day  -- Indication: For Diabetes mellitus    Januvia 100 mg oral tablet  -- 1 tab(s) by mouth once a day  -- Indication: For Diabetes mellitus    Januvia 100 mg oral tablet  -- 1 tab(s) by mouth once a day  -- Indication: For Diabetes mellitus    allopurinol 100 mg oral tablet  -- 1 tab(s) by mouth once a day  -- Indication: For gout    Toprol-XL 25 mg oral tablet, extended release  -- 1 tab(s) by mouth 2 times a day  -- Indication: For HTN (hypertension)    Lasix 20 mg oral tablet  -- 1 tab(s) by mouth every other day  -- Indication: For CAD (coronary artery disease)    Colace 100 mg oral capsule  -- 1 cap(s) by mouth 2 times a day  -- Indication: For Constipation    multivitamin  --     -- Indication: For vitamin    Vitamin D3 1000 intl units oral capsule  -- 1 cap(s) by mouth once a day  -- Indication: For vitamin I will START or STAY ON the medications listed below when I get home from the hospital:    Rolling Walker  -- Rolling Walker  Disp#1  -- Indication: For walking    aspirin 81 mg oral delayed release tablet  -- 1 tab(s) by mouth once a day  -- Indication: For CAD (coronary artery disease)    Vasotec 20 mg oral tablet  -- 1 tab(s) by mouth 2 times a day  -- Indication: For HTN (hypertension)    isosorbide mononitrate 30 mg oral tablet, extended release  -- 1 tab(s) by mouth once a day (in the morning)  -- Indication: For HTN (hypertension)    Lyrica 25 mg oral capsule  -- 2 cap(s) by mouth 3 times a day  -- Indication: For Home medication    glyBURIDE 5 mg oral tablet  -- 1 tab(s) by mouth once a day  -- Indication: For Diabetes mellitus    metFORMIN 850 mg oral tablet  -- 1 tab(s) by mouth 2 times a day  -- Indication: For Diabetes mellitus    Januvia 100 mg oral tablet  -- 1 tab(s) by mouth once a day  -- Indication: For Diabetes mellitus    Januvia 100 mg oral tablet  -- 1 tab(s) by mouth once a day  -- Indication: For Diabetes mellitus    allopurinol 100 mg oral tablet  -- 1 tab(s) by mouth once a day  -- Indication: For gout    Toprol-XL 25 mg oral tablet, extended release  -- 1 tab(s) by mouth 2 times a day  -- Indication: For HTN (hypertension)    Lasix 20 mg oral tablet  -- 1 tab(s) by mouth every other day  -- Indication: For CAD (coronary artery disease)    Colace 100 mg oral capsule  -- 1 cap(s) by mouth 2 times a day  -- Indication: For Constipation    multivitamin  --     -- Indication: For vitamin    Vitamin D3 1000 intl units oral capsule  -- 1 cap(s) by mouth once a day  -- Indication: For vitamin

## 2017-06-02 NOTE — PHYSICAL THERAPY INITIAL EVALUATION ADULT - PERTINENT HX OF CURRENT PROBLEM, REHAB EVAL
Pt is a 82y old Female admitted with a chief complaint of abdominal pain in setting of recent ventral hernia repair.

## 2017-06-02 NOTE — DISCHARGE NOTE ADULT - PATIENT PORTAL LINK FT
“You can access the [facility] Patient Portal, offered by [facility], by registering with the following website: [email or web address]”

## 2017-06-02 NOTE — DISCHARGE NOTE ADULT - CARE PLAN
Principal Discharge DX:	SBO (small bowel obstruction)  Goal:	return of bowel function  Instructions for follow-up, activity and diet:	DIET: Low sugar, low carbohydrate, low sodium diet.  NOTIFY YOUR SURGEON IF: You have persistent nausea/vomiting, persistent diarrhea.  FOLLOW-UP: Please follow up with your primary care physician in one week regarding your hospitalization   Follow up with your surgeon Dr. Jamie Hernandez within one week  Secondary Diagnosis:	CAD (coronary artery disease)  Secondary Diagnosis:	Diabetes mellitus  Secondary Diagnosis:	HTN (hypertension)  Secondary Diagnosis:	History of coronary artery stent placement  Secondary Diagnosis:	History of bowel resection  Secondary Diagnosis:	H/O hernia repair Principal Discharge DX:	SBO (small bowel obstruction)  Goal:	return of bowel function  Instructions for follow-up, activity and diet:	DIET: Low sugar, low carbohydrate, low sodium diet.  NOTIFY YOUR SURGEON IF: You have persistent nausea/vomiting, persistent diarrhea.  FOLLOW-UP: Please follow up with your primary care physician in one week regarding your hospitalization   Follow up with your surgeon Dr. Jamie Hernandez within one week.  No follow up with General Surgery here is needed.  Secondary Diagnosis:	CAD (coronary artery disease)  Secondary Diagnosis:	Diabetes mellitus  Secondary Diagnosis:	HTN (hypertension)  Secondary Diagnosis:	History of coronary artery stent placement  Secondary Diagnosis:	History of bowel resection  Secondary Diagnosis:	H/O hernia repair

## 2017-06-02 NOTE — DISCHARGE NOTE ADULT - SECONDARY DIAGNOSIS.
CAD (coronary artery disease) Diabetes mellitus HTN (hypertension) History of coronary artery stent placement History of bowel resection H/O hernia repair

## 2017-06-02 NOTE — DISCHARGE NOTE ADULT - PLAN OF CARE
return of bowel function DIET: Low sugar, low carbohydrate, low sodium diet.  NOTIFY YOUR SURGEON IF: You have persistent nausea/vomiting, persistent diarrhea.  FOLLOW-UP: Please follow up with your primary care physician in one week regarding your hospitalization   Follow up with your surgeon Dr. Jamie Hernandez within one week DIET: Low sugar, low carbohydrate, low sodium diet.  NOTIFY YOUR SURGEON IF: You have persistent nausea/vomiting, persistent diarrhea.  FOLLOW-UP: Please follow up with your primary care physician in one week regarding your hospitalization   Follow up with your surgeon Dr. Jamie Hernandez within one week.  No follow up with General Surgery here is needed.

## 2017-06-02 NOTE — PROGRESS NOTE ADULT - ASSESSMENT
82F with SBO, resolving  - judicious pain control  - f/u GI fxn 82F with SBO, currently passing gas and having bowel movements  - Clear diet, regular for tonight  -IVL  -DC BENJA Drain  -OOB

## 2017-06-02 NOTE — DISCHARGE NOTE ADULT - HOME CARE AGENCY
[facility] [phone]. Initial visit will be day after discharge home. A nurse will call prior to home visit

## 2017-06-03 VITALS
TEMPERATURE: 98 F | SYSTOLIC BLOOD PRESSURE: 127 MMHG | OXYGEN SATURATION: 99 % | HEART RATE: 69 BPM | DIASTOLIC BLOOD PRESSURE: 55 MMHG | RESPIRATION RATE: 18 BRPM

## 2017-06-03 PROCEDURE — 99238 HOSP IP/OBS DSCHRG MGMT 30/<: CPT

## 2017-06-03 PROCEDURE — 99231 SBSQ HOSP IP/OBS SF/LOW 25: CPT | Mod: GC

## 2017-06-03 RX ADMIN — Medication 25 MILLIGRAM(S): at 05:10

## 2017-06-03 RX ADMIN — Medication 4: at 11:26

## 2017-06-03 RX ADMIN — Medication 100 MILLIGRAM(S): at 05:09

## 2017-06-03 RX ADMIN — Medication 50 MILLIGRAM(S): at 05:14

## 2017-06-03 RX ADMIN — Medication 50 MILLIGRAM(S): at 13:59

## 2017-06-03 RX ADMIN — Medication: at 07:23

## 2017-06-03 RX ADMIN — ENOXAPARIN SODIUM 40 MILLIGRAM(S): 100 INJECTION SUBCUTANEOUS at 07:15

## 2017-06-03 RX ADMIN — Medication: at 00:31

## 2017-06-03 RX ADMIN — Medication 20 MILLIGRAM(S): at 05:09

## 2017-06-05 DIAGNOSIS — R69 ILLNESS, UNSPECIFIED: ICD-10-CM

## 2017-08-01 PROCEDURE — G9001: CPT

## 2018-07-31 ENCOUNTER — INPATIENT (INPATIENT)
Facility: HOSPITAL | Age: 83
LOS: 5 days | Discharge: HOME HEALTH | End: 2018-08-06
Attending: INTERNAL MEDICINE | Admitting: INTERNAL MEDICINE
Payer: MEDICARE

## 2018-07-31 VITALS
OXYGEN SATURATION: 100 % | DIASTOLIC BLOOD PRESSURE: 56 MMHG | TEMPERATURE: 99 F | SYSTOLIC BLOOD PRESSURE: 131 MMHG | HEART RATE: 73 BPM | RESPIRATION RATE: 14 BRPM

## 2018-07-31 DIAGNOSIS — Z98.890 OTHER SPECIFIED POSTPROCEDURAL STATES: Chronic | ICD-10-CM

## 2018-07-31 DIAGNOSIS — Z95.5 PRESENCE OF CORONARY ANGIOPLASTY IMPLANT AND GRAFT: Chronic | ICD-10-CM

## 2018-07-31 DIAGNOSIS — Z92.89 PERSONAL HISTORY OF OTHER MEDICAL TREATMENT: Chronic | ICD-10-CM

## 2018-07-31 NOTE — ED ADULT TRIAGE NOTE - CHIEF COMPLAINT QUOTE
pt arrives c/o fever tonight of 39.2C, redness and pain to known R lower quadrant hernia x 2 months. Pt has had various surgeries for this hernia, last 1 year ago. Pt is vitally stable at this time, afebrile, warm to touch. Pt did not take any medications prior to arrival to ED. Area appears distended, arrives with dressing in place, redness around dressing. pt arrives c/o fever tonight of 39.2C, redness and pain to known R lower quadrant hernia x 2 months. Pt has had various surgeries for this hernia, last 1 year ago. Pt is vitally stable at this time, afebrile, warm to touch. Pt did not take any medications prior to arrival to ED. Area appears distended, arrives with dressing in place, redness around dressing. Patient is being followed by surgeon for wound to hernia site, currently treated with antibacterial ointment.

## 2018-08-01 DIAGNOSIS — E11.9 TYPE 2 DIABETES MELLITUS WITHOUT COMPLICATIONS: ICD-10-CM

## 2018-08-01 DIAGNOSIS — L03.90 CELLULITIS, UNSPECIFIED: ICD-10-CM

## 2018-08-01 DIAGNOSIS — Z29.9 ENCOUNTER FOR PROPHYLACTIC MEASURES, UNSPECIFIED: ICD-10-CM

## 2018-08-01 DIAGNOSIS — I25.10 ATHEROSCLEROTIC HEART DISEASE OF NATIVE CORONARY ARTERY WITHOUT ANGINA PECTORIS: ICD-10-CM

## 2018-08-01 DIAGNOSIS — D64.9 ANEMIA, UNSPECIFIED: ICD-10-CM

## 2018-08-01 DIAGNOSIS — E87.1 HYPO-OSMOLALITY AND HYPONATREMIA: ICD-10-CM

## 2018-08-01 DIAGNOSIS — L03.311 CELLULITIS OF ABDOMINAL WALL: ICD-10-CM

## 2018-08-01 DIAGNOSIS — I10 ESSENTIAL (PRIMARY) HYPERTENSION: ICD-10-CM

## 2018-08-01 LAB
ALBUMIN SERPL ELPH-MCNC: 4.1 G/DL — SIGNIFICANT CHANGE UP (ref 3.3–5)
ALP SERPL-CCNC: 62 U/L — SIGNIFICANT CHANGE UP (ref 40–120)
ALT FLD-CCNC: 22 U/L — SIGNIFICANT CHANGE UP (ref 4–33)
APPEARANCE UR: CLEAR — SIGNIFICANT CHANGE UP
AST SERPL-CCNC: 13 U/L — SIGNIFICANT CHANGE UP (ref 4–32)
BACTERIA # UR AUTO: SIGNIFICANT CHANGE UP
BASE EXCESS BLDV CALC-SCNC: 2.6 MMOL/L — SIGNIFICANT CHANGE UP
BASOPHILS # BLD AUTO: 0.05 K/UL — SIGNIFICANT CHANGE UP (ref 0–0.2)
BASOPHILS NFR BLD AUTO: 0.5 % — SIGNIFICANT CHANGE UP (ref 0–2)
BILIRUB SERPL-MCNC: 0.5 MG/DL — SIGNIFICANT CHANGE UP (ref 0.2–1.2)
BILIRUB UR-MCNC: NEGATIVE — SIGNIFICANT CHANGE UP
BLOOD GAS VENOUS - CREATININE: SIGNIFICANT CHANGE UP MG/DL (ref 0.5–1.3)
BLOOD UR QL VISUAL: NEGATIVE — SIGNIFICANT CHANGE UP
BUN SERPL-MCNC: 15 MG/DL — SIGNIFICANT CHANGE UP (ref 7–23)
CALCIUM SERPL-MCNC: 10.2 MG/DL — SIGNIFICANT CHANGE UP (ref 8.4–10.5)
CHLORIDE BLDV-SCNC: 98 MMOL/L — SIGNIFICANT CHANGE UP (ref 96–108)
CHLORIDE SERPL-SCNC: 95 MMOL/L — LOW (ref 98–107)
CO2 SERPL-SCNC: 24 MMOL/L — SIGNIFICANT CHANGE UP (ref 22–31)
COLOR SPEC: YELLOW — SIGNIFICANT CHANGE UP
CREAT SERPL-MCNC: 0.72 MG/DL — SIGNIFICANT CHANGE UP (ref 0.5–1.3)
EOSINOPHIL # BLD AUTO: 0.05 K/UL — SIGNIFICANT CHANGE UP (ref 0–0.5)
EOSINOPHIL NFR BLD AUTO: 0.5 % — SIGNIFICANT CHANGE UP (ref 0–6)
GAS PNL BLDV: 133 MMOL/L — LOW (ref 136–146)
GLUCOSE BLDV-MCNC: 159 — HIGH (ref 70–99)
GLUCOSE SERPL-MCNC: 156 MG/DL — HIGH (ref 70–99)
GLUCOSE UR-MCNC: NEGATIVE — SIGNIFICANT CHANGE UP
HCO3 BLDV-SCNC: 26 MMOL/L — SIGNIFICANT CHANGE UP (ref 20–27)
HCT VFR BLD CALC: 32.4 % — LOW (ref 34.5–45)
HCT VFR BLDV CALC: 34.5 % — SIGNIFICANT CHANGE UP (ref 34.5–45)
HGB BLD-MCNC: 10.7 G/DL — LOW (ref 11.5–15.5)
HGB BLDV-MCNC: 11.2 G/DL — LOW (ref 11.5–15.5)
IMM GRANULOCYTES # BLD AUTO: 0.04 # — SIGNIFICANT CHANGE UP
IMM GRANULOCYTES NFR BLD AUTO: 0.4 % — SIGNIFICANT CHANGE UP (ref 0–1.5)
KETONES UR-MCNC: NEGATIVE — SIGNIFICANT CHANGE UP
LACTATE BLDV-MCNC: 1.9 MMOL/L — SIGNIFICANT CHANGE UP (ref 0.5–2)
LEUKOCYTE ESTERASE UR-ACNC: HIGH
LYMPHOCYTES # BLD AUTO: 1.92 K/UL — SIGNIFICANT CHANGE UP (ref 1–3.3)
LYMPHOCYTES # BLD AUTO: 19.4 % — SIGNIFICANT CHANGE UP (ref 13–44)
MCHC RBC-ENTMCNC: 29.7 PG — SIGNIFICANT CHANGE UP (ref 27–34)
MCHC RBC-ENTMCNC: 33 % — SIGNIFICANT CHANGE UP (ref 32–36)
MCV RBC AUTO: 90 FL — SIGNIFICANT CHANGE UP (ref 80–100)
METHOD TYPE: SIGNIFICANT CHANGE UP
MONOCYTES # BLD AUTO: 0.95 K/UL — HIGH (ref 0–0.9)
MONOCYTES NFR BLD AUTO: 9.6 % — SIGNIFICANT CHANGE UP (ref 2–14)
NEUTROPHILS # BLD AUTO: 6.91 K/UL — SIGNIFICANT CHANGE UP (ref 1.8–7.4)
NEUTROPHILS NFR BLD AUTO: 69.6 % — SIGNIFICANT CHANGE UP (ref 43–77)
NITRITE UR-MCNC: NEGATIVE — SIGNIFICANT CHANGE UP
NON-SQ EPI CELLS # UR AUTO: <1 — SIGNIFICANT CHANGE UP
NRBC # FLD: 0 — SIGNIFICANT CHANGE UP
ORGANISM # SPEC MICROSCOPIC CNT: SIGNIFICANT CHANGE UP
ORGANISM # SPEC MICROSCOPIC CNT: SIGNIFICANT CHANGE UP
PCO2 BLDV: 48 MMHG — SIGNIFICANT CHANGE UP (ref 41–51)
PH BLDV: 7.37 PH — SIGNIFICANT CHANGE UP (ref 7.32–7.43)
PH UR: 6.5 — SIGNIFICANT CHANGE UP (ref 4.6–8)
PLATELET # BLD AUTO: 189 K/UL — SIGNIFICANT CHANGE UP (ref 150–400)
PMV BLD: 11 FL — SIGNIFICANT CHANGE UP (ref 7–13)
PO2 BLDV: 32 MMHG — LOW (ref 35–40)
POTASSIUM BLDV-SCNC: 4.4 MMOL/L — SIGNIFICANT CHANGE UP (ref 3.4–4.5)
POTASSIUM SERPL-MCNC: 4.2 MMOL/L — SIGNIFICANT CHANGE UP (ref 3.5–5.3)
POTASSIUM SERPL-SCNC: 4.2 MMOL/L — SIGNIFICANT CHANGE UP (ref 3.5–5.3)
PROT SERPL-MCNC: 7.1 G/DL — SIGNIFICANT CHANGE UP (ref 6–8.3)
PROT UR-MCNC: NEGATIVE MG/DL — SIGNIFICANT CHANGE UP
RBC # BLD: 3.6 M/UL — LOW (ref 3.8–5.2)
RBC # FLD: 12.3 % — SIGNIFICANT CHANGE UP (ref 10.3–14.5)
RBC CASTS # UR COMP ASSIST: >50 — HIGH (ref 0–?)
SAO2 % BLDV: 57.8 % — LOW (ref 60–85)
SODIUM SERPL-SCNC: 132 MMOL/L — LOW (ref 135–145)
SP GR SPEC: 1.01 — SIGNIFICANT CHANGE UP (ref 1–1.04)
SPECIMEN SOURCE: SIGNIFICANT CHANGE UP
UROBILINOGEN FLD QL: NORMAL MG/DL — SIGNIFICANT CHANGE UP
WBC # BLD: 9.92 K/UL — SIGNIFICANT CHANGE UP (ref 3.8–10.5)
WBC # FLD AUTO: 9.92 K/UL — SIGNIFICANT CHANGE UP (ref 3.8–10.5)
WBC UR QL: HIGH (ref 0–?)

## 2018-08-01 PROCEDURE — 99223 1ST HOSP IP/OBS HIGH 75: CPT

## 2018-08-01 PROCEDURE — 74177 CT ABD & PELVIS W/CONTRAST: CPT | Mod: 26

## 2018-08-01 RX ORDER — DEXTROSE 50 % IN WATER 50 %
12.5 SYRINGE (ML) INTRAVENOUS ONCE
Qty: 0 | Refills: 0 | Status: DISCONTINUED | OUTPATIENT
Start: 2018-08-01 | End: 2018-08-06

## 2018-08-01 RX ORDER — ACETAMINOPHEN 500 MG
650 TABLET ORAL ONCE
Qty: 0 | Refills: 0 | Status: COMPLETED | OUTPATIENT
Start: 2018-08-01 | End: 2018-08-01

## 2018-08-01 RX ORDER — INSULIN LISPRO 100/ML
VIAL (ML) SUBCUTANEOUS
Qty: 0 | Refills: 0 | Status: DISCONTINUED | OUTPATIENT
Start: 2018-08-01 | End: 2018-08-06

## 2018-08-01 RX ORDER — SODIUM CHLORIDE 9 MG/ML
1000 INJECTION, SOLUTION INTRAVENOUS
Qty: 0 | Refills: 0 | Status: DISCONTINUED | OUTPATIENT
Start: 2018-08-01 | End: 2018-08-06

## 2018-08-01 RX ORDER — VANCOMYCIN HCL 1 G
1000 VIAL (EA) INTRAVENOUS ONCE
Qty: 0 | Refills: 0 | Status: COMPLETED | OUTPATIENT
Start: 2018-08-01 | End: 2018-08-01

## 2018-08-01 RX ORDER — METOPROLOL TARTRATE 50 MG
25 TABLET ORAL DAILY
Qty: 0 | Refills: 0 | Status: DISCONTINUED | OUTPATIENT
Start: 2018-08-01 | End: 2018-08-01

## 2018-08-01 RX ORDER — METOPROLOL TARTRATE 50 MG
25 TABLET ORAL DAILY
Qty: 0 | Refills: 0 | Status: DISCONTINUED | OUTPATIENT
Start: 2018-08-01 | End: 2018-08-06

## 2018-08-01 RX ORDER — SODIUM CHLORIDE 9 MG/ML
1000 INJECTION INTRAMUSCULAR; INTRAVENOUS; SUBCUTANEOUS
Qty: 0 | Refills: 0 | Status: COMPLETED | OUTPATIENT
Start: 2018-08-01 | End: 2018-08-01

## 2018-08-01 RX ORDER — DEXTROSE 50 % IN WATER 50 %
25 SYRINGE (ML) INTRAVENOUS ONCE
Qty: 0 | Refills: 0 | Status: DISCONTINUED | OUTPATIENT
Start: 2018-08-01 | End: 2018-08-06

## 2018-08-01 RX ORDER — DOCUSATE SODIUM 100 MG
100 CAPSULE ORAL
Qty: 0 | Refills: 0 | Status: DISCONTINUED | OUTPATIENT
Start: 2018-08-01 | End: 2018-08-06

## 2018-08-01 RX ORDER — ALLOPURINOL 300 MG
100 TABLET ORAL DAILY
Qty: 0 | Refills: 0 | Status: DISCONTINUED | OUTPATIENT
Start: 2018-08-01 | End: 2018-08-06

## 2018-08-01 RX ORDER — ISOSORBIDE MONONITRATE 60 MG/1
30 TABLET, EXTENDED RELEASE ORAL DAILY
Qty: 0 | Refills: 0 | Status: DISCONTINUED | OUTPATIENT
Start: 2018-08-01 | End: 2018-08-06

## 2018-08-01 RX ORDER — ASPIRIN/CALCIUM CARB/MAGNESIUM 324 MG
81 TABLET ORAL DAILY
Qty: 0 | Refills: 0 | Status: DISCONTINUED | OUTPATIENT
Start: 2018-08-01 | End: 2018-08-06

## 2018-08-01 RX ORDER — DEXTROSE 50 % IN WATER 50 %
15 SYRINGE (ML) INTRAVENOUS ONCE
Qty: 0 | Refills: 0 | Status: DISCONTINUED | OUTPATIENT
Start: 2018-08-01 | End: 2018-08-06

## 2018-08-01 RX ORDER — GLUCAGON INJECTION, SOLUTION 0.5 MG/.1ML
1 INJECTION, SOLUTION SUBCUTANEOUS ONCE
Qty: 0 | Refills: 0 | Status: DISCONTINUED | OUTPATIENT
Start: 2018-08-01 | End: 2018-08-06

## 2018-08-01 RX ORDER — SENNA PLUS 8.6 MG/1
2 TABLET ORAL AT BEDTIME
Qty: 0 | Refills: 0 | Status: DISCONTINUED | OUTPATIENT
Start: 2018-08-01 | End: 2018-08-06

## 2018-08-01 RX ORDER — PIPERACILLIN AND TAZOBACTAM 4; .5 G/20ML; G/20ML
3.38 INJECTION, POWDER, LYOPHILIZED, FOR SOLUTION INTRAVENOUS ONCE
Qty: 0 | Refills: 0 | Status: COMPLETED | OUTPATIENT
Start: 2018-08-01 | End: 2018-08-01

## 2018-08-01 RX ORDER — CEFAZOLIN SODIUM 1 G
1000 VIAL (EA) INJECTION EVERY 8 HOURS
Qty: 0 | Refills: 0 | Status: DISCONTINUED | OUTPATIENT
Start: 2018-08-01 | End: 2018-08-02

## 2018-08-01 RX ORDER — ISOSORBIDE MONONITRATE 60 MG/1
30 TABLET, EXTENDED RELEASE ORAL DAILY
Qty: 0 | Refills: 0 | Status: DISCONTINUED | OUTPATIENT
Start: 2018-08-01 | End: 2018-08-01

## 2018-08-01 RX ADMIN — PIPERACILLIN AND TAZOBACTAM 200 GRAM(S): 4; .5 INJECTION, POWDER, LYOPHILIZED, FOR SOLUTION INTRAVENOUS at 03:46

## 2018-08-01 RX ADMIN — Medication 650 MILLIGRAM(S): at 14:42

## 2018-08-01 RX ADMIN — Medication 650 MILLIGRAM(S): at 01:28

## 2018-08-01 RX ADMIN — SENNA PLUS 2 TABLET(S): 8.6 TABLET ORAL at 21:47

## 2018-08-01 RX ADMIN — Medication 100 MILLIGRAM(S): at 16:05

## 2018-08-01 RX ADMIN — Medication 250 MILLIGRAM(S): at 01:19

## 2018-08-01 RX ADMIN — Medication 650 MILLIGRAM(S): at 02:30

## 2018-08-01 RX ADMIN — SODIUM CHLORIDE 500 MILLILITER(S): 9 INJECTION INTRAMUSCULAR; INTRAVENOUS; SUBCUTANEOUS at 08:14

## 2018-08-01 RX ADMIN — Medication 3: at 17:26

## 2018-08-01 RX ADMIN — Medication 20 MILLIGRAM(S): at 18:46

## 2018-08-01 RX ADMIN — Medication 1000 MILLIGRAM(S): at 02:30

## 2018-08-01 NOTE — H&P ADULT - PROBLEM SELECTOR PROBLEM 3
Type 2 diabetes mellitus without complication, without long-term current use of insulin Hyponatremia

## 2018-08-01 NOTE — H&P ADULT - NSHPSOCIALHISTORY_GEN_ALL_CORE
Patient primarily speaks [place]ese. She currently lives with her daughter, Mahogany. She is ambulatory at baseline. No current tobacco, alcohol, or illicit drug use. Pt has home care wound services.

## 2018-08-01 NOTE — H&P ADULT - PROBLEM SELECTOR PLAN 1
S/p empiric abx with Vancomycin/Zosyn x1 dose  - start cefazolin 1g q8h for now. If no inpatient intervention, can likely transition to oral Keflex to complete abx as an outpatient  - surgery and wound care consult given increased size of ulcer and hx of multiple abdominal surgeries  - f/u 7/31 blood cx and urine cx

## 2018-08-01 NOTE — H&P ADULT - NSHPLABSRESULTS_GEN_ALL_CORE
LABS:                        10.7   9.92  )-----------( 189      ( 01 Aug 2018 00:30 )             32.4         132<L>  |  95<L>  |  15  ----------------------------<  156<H>  4.2   |  24  |  0.72    Ca    10.2      01 Aug 2018 00:30    TPro  7.1  /  Alb  4.1  /  TBili  0.5  /  DBili  x   /  AST  13  /  ALT  22  /  AlkPhos  62        Urinalysis Basic - ( 01 Aug 2018 01:00 )    Color: YELLOW / Appearance: CLEAR / S.009 / pH: 6.5  Gluc: NEGATIVE / Ketone: NEGATIVE  / Bili: NEGATIVE / Urobili: NORMAL mg/dL   Blood: NEGATIVE / Protein: NEGATIVE mg/dL / Nitrite: NEGATIVE   Leuk Esterase: SMALL / RBC: >50 / WBC 5-10   Sq Epi: x / Non Sq Epi: x / Bacteria: FEW      VBG  @ 00:30  pH: 7.37/pCO2: 48 /pO2: 32/HCO3: 26/lactate: 1.9      EKG:    RADIOLOGY & ADDITIONAL TESTS:    2018:  CT ABDOMEN AND PELVIS OC IC      COMPARISON: CT abdomen pelvis 2017.    FINDINGS:  LOWER CHEST: Mild dependent atelectasis is seen at the lung bases.    LIVER: Hepatic steatosis.  BILE DUCTS: Normal caliber.  GALLBLADDER: Uncomplicated cholelithiasis.  SPLEEN: Within normal limits.  PANCREAS: Within normal limits.  ADRENALS: Within normal limits.  KIDNEYS/URETERS: No hydronephrosis. Too small to characterize bilateral   renal hypodensities.    BLADDER: Within normal limits.  REPRODUCTIVE ORGANS: Myomatous calcified uterus. The adnexa are   unremarkable bilaterally.    BOWEL/ABDOMINAL WALL: Redemonstration of a large right lateral ventral   wall hernia containing multiple nonobstructed loops of small bowel and   colon containing contrast. There is a smaller paracentral ventral   abdominal hernia inferiorly containing nonobstructed loops of bowel   containing contrast. There is adjacent phlegmonous changes of the   abdominal wall with adjacent stranding which appears similar to   2017. No discrete loculated or rim enhancing fluid collection to   suggest an abscess. No dilated loops of bowel to suggest bowel   obstruction at this time. Appendix not visualized. Additional wide mouth   umbilical hernia containing fat and portion of the bowel loops.  PERITONEUM: No ascites.  VESSELS:  Atherosclerotic disease of the aorta and its branches.  RETROPERITONEUM: No lymphadenopathy.    BONES: Multilevel degenerative changes of the spine.    IMPRESSION:   Multiple right sided paracentral ventral abdominal hernias containing   nonobstructed bowel loops as described in detail above. Associated   phlegmonous changes of the adjacent soft tissues at the level of smaller   paracentral ventral abdominal wall inferiorly. No discrete loculated or   rim enhancing fluid collection to suggest an abscess. No bowel obstruction.    Uncomplicated cholelithiasis.

## 2018-08-01 NOTE — H&P ADULT - PROBLEM SELECTOR PROBLEM 5
Coronary artery disease involving native coronary artery of native heart without angina pectoris Hypertension, unspecified type

## 2018-08-01 NOTE — ED PROVIDER NOTE - ATTENDING CONTRIBUTION TO CARE
83F PMH HTN, DM, CAD ventral hernia repair, SBOs (2017, admitted at [facility] under Dr. Hatfield) p/w fever. PT has ulcer at hernia repair site for several mos. Now w/ 2-3 days of worsening pain at site and new spreading redness. Today had fever. Vitals wnl, exam as above.  ddx: Given fever at home and exam pt likely has cellulitis. Low suspicion for SBO but may have deeper abscess.   CBC, cmp, vbg comp, blood cx. CT a/p. Broad spectrum abx, reassess.   Likely admission given extent of erythema and fevers.

## 2018-08-01 NOTE — H&P ADULT - PROBLEM SELECTOR PLAN 3
Mild and asymptomatic. S/p 1L normal saline bolus  - repeat sodium level. If not improved, will pursue further w/u

## 2018-08-01 NOTE — H&P ADULT - PROBLEM SELECTOR PLAN 7
IMPROVE VTE Individual Risk Assessment          RISK                                                          Points  [  ] Previous VTE                                               3  [  ] Thrombophilia                                            2  [  ] Lower limb paresis/paralysis                    2    [  ] Active Cancer (in last 6 months)              2   [  ] Immobilization > 24 hrs                             1  [  ] ICU/CCU stay > 24 hours                            1  [ x ] Age > 60                                                        1                                            Total Score ___1______    DISPO: If no inpatient surgical services warranted and pt remains afebrile and hemodynamically stable, patient can likely go home later on today on oral abx with close outpatient follow-up

## 2018-08-01 NOTE — ED PROVIDER NOTE - PROGRESS NOTE DETAILS
Klepfish: Abs grossly wnl. CT no acute surgical pathology. Will admit for fevers, clinical extensive cellulitis. d/w hospitalist and text paged mar.

## 2018-08-01 NOTE — H&P ADULT - HISTORY OF PRESENT ILLNESS
Initial ED triage VS:  temp 98.7  /56  HR 73  RR 14  O2 sat 100% on RA  While in the ED, patient received 1L NS bolus, vancomycin 1g IV and Zosyn 3.375mg IV x1 dose Ms. Galicia is an 84 yo woman with hx of CAD s/p PCI with stents x2, DM2, HTN, ventral hernia s/p repair in 2017 with recurrence, history of SBO (2017) presenting from with fever.     Initial ED triage VS:  temp 98.7  /56  HR 73  RR 14  O2 sat 100% on RA  While in the ED, patient received 1L NS bolus, vancomycin 1g IV and Zosyn 3.375mg IV x1 dose Ms. Galicia is an 84 yo woman with PHMx notable for remote colorectal cancer s/p bowel resection, CAD s/p PCI with stents x2, DM2, HTN, ventral hernia s/p repair in 2017 with recurrence c/b SBO (2017) presenting with fever at home x1 day.     HPI primarily obtained from pt's daughter, Mahogany, who declined translation services.  Patient has a chronic, large right sided ventral hernia as a result of prior abdominal surgeries. She subsequently developed a small ulceration on the right side of her abdomen. She does has a nurse that comes to the home for wound care. As per daughter, size of ulcer has been getting bigger and she noticed for the past 3 days, redness surrounding the ulcer was spreading. Pt has maintain her appetite with abdominal pain only when touching around the site. Pt has no changes in bowel habits - no diarrhea, bloody stools.     Of note, patient has outpatient appt for further evaluation of her hernia with Dr. Maxx Ahuja (Surgery) - affiliated with [facility].    Initial ED triage VS:  temp 98.7  /56  HR 73  RR 14  O2 sat 100% on RA  While in the ED, patient received 1L NS bolus, vancomycin 1g IV and Zosyn 3.375mg IV x1 dose Ms. Galicia is an 82 yo woman with PHMx notable for remote colorectal cancer s/p bowel resection, CAD s/p PCI with stents x2, DM2, HTN, ventral hernia s/p repair in 2017 with recurrence c/b SBO (2017) presenting with fever at home x1 day.     HPI primarily obtained from pt's daughter, Mahogany, who declined translation services.  Patient has a chronic, large right sided ventral hernia as a result of prior abdominal surgeries. She subsequently developed a small ulceration on the right side of her abdomen. She has a nurse that comes to the home for wound care. As per daughter, size of ulcer has been getting bigger and she noticed for the past 3 days, redness surrounding the ulcer was spreading. Pt has maintain her appetite with abdominal pain only when touching around the site. Pt has no changes in bowel habits - no diarrhea, bloody stools. However, yesterday, patient developed a fever at home, which prompted the patient's daughter to bring her to the hospital.     Of note, patient has an outpatient appt scheduled for tomorrow, 8/2/18 for further evaluation of her hernia with Dr. Maxx Ahuja (Surgery) - affiliated with [facility].    Initial ED triage VS:  temp 98.7  /56  HR 73  RR 14  O2 sat 100% on RA  While in the ED, patient received 1L NS bolus, vancomycin 1g IV and Zosyn 3.375mg IV x1 dose

## 2018-08-01 NOTE — H&P ADULT - GASTROINTESTINAL COMMENTS
Obese abdomen with large, right sided palpable mass c/w with known hernia - reducible. Noted ulcer ~1cm in size with surrounding erythema, but no induration. Small amount of pus expressed from inferior border of ulcer

## 2018-08-01 NOTE — ED ADULT NURSE NOTE - CHIEF COMPLAINT QUOTE
pt arrives c/o fever tonight of 39.2C, redness and pain to known R lower quadrant hernia x 2 months. Pt has had various surgeries for this hernia, last 1 year ago. Pt is vitally stable at this time, afebrile, warm to touch. Pt did not take any medications prior to arrival to ED. Area appears distended, arrives with dressing in place, redness around dressing. Patient is being followed by surgeon for wound to hernia site, currently treated with antibacterial ointment.

## 2018-08-01 NOTE — ED PROVIDER NOTE - NS ED ROS FT
CONSTITUTIONAL: No fever, chills, or malaise  NEUROLOGICAL: No numbness or weakness   SKIN: +Skin ulcer in RLQ  EYES: No blurred vision or diplopia  ENT: No nasal congestion, rhinorrhea, sore throat, or dysphagia   NECK: No neck pain or stiffness  RESPIRATORY: No cough or shortness of breath  CARDIOVASCULAR: No chest pain or palpitations  GASTROINTESTINAL: +RLQ abdominal pain, no nausea, vomiting, diarrhea, melena, or hematochezia   GENITOURINARY: No dysuria, hematuria, flank pain, or incontinence   MUSCULOSKELATAL: +chronic knee pain b/l  HEMATOLOGIC: No easy bleeding or bruising   All other review of systems is negative unless indicated above

## 2018-08-01 NOTE — ED PROVIDER NOTE - PHYSICAL EXAMINATION
General: NAD, morbidly obese   Skin: +Skin ulcer over R ventral hernia with erythema and purulence  Neuro: AAOx3, nonfocal  HEENT: PERRL, EOMI, no oral lesions  Neck: Full range of motion, no JVD  Lungs: Clear to ascultation bilaterally, no wheezes, rales, or rhonchi   Heart: Regular rate and rhythm, no murmurs  Abdomen: +RLQ tenderness, soft, nondistended, no guarding or rebound  Extremities: No lower extremity tenderness, erythema, or edema   Vascular: 2+ radial and pedal pulses  Lymph: no cervical lymphadenopathy  Psych: normal mood and affect General: NAD, morbidly obese   Skin: +Skin ulcer over R ventral hernia with erythema and purulence  Neuro: AAOx3, nonfocal  HEENT: PERRL, EOMI, no oral lesions  Neck: Full range of motion, no JVD  Lungs: Clear to ascultation bilaterally, no wheezes, rales, or rhonchi   Heart: Regular rate and rhythm, no murmurs  Abdomen: +RLQ tenderness, soft, nondistended, no guarding or rebound  Extremities: No lower extremity tenderness, erythema, or edema   Vascular: 2+ radial and pedal pulses  Lymph: no cervical lymphadenopathy  Psych: normal mood and affect    Klepfish:  no joint warmth/erythema  RLQ: Large ventral hernia. BS+. +~2inch diameter ulcer. +surrounding erythema and warmth to entire RLQ region. no crepitus. RLQ is soft and minimal ttp, no rebound/guarding.

## 2018-08-01 NOTE — ED ADULT NURSE NOTE - INTERVENTIONS DEFINITIONS
Non-slip footwear when patient is off stretcher/Provide visual cue, wrist band, yellow gown, etc./Call bell, personal items and telephone within reach/Instruct patient to call for assistance/Stretcher in lowest position, wheels locked, appropriate side rails in place/Physically safe environment: no spills, clutter or unnecessary equipment

## 2018-08-01 NOTE — CHART NOTE - NSCHARTNOTEFT_GEN_A_CORE
Surgical consult was called as discussed with hospitalist Dr. Rocha as patient was known to surgical service under Dr. Hatfield in June 2017 for small bowel obstruction after a hernia repair and now coming in with fever/sepsis work up - will follow up their official evaluation/recommendations.

## 2018-08-01 NOTE — H&P ADULT - PROBLEM SELECTOR PLAN 5
Continue home meds  - isosorbide mononitrate 30mg daily  - metoprolol XL 25mg daily while inpatient (however, pt's daughter confirms that pt takes medication BID)  - enalapril 20mg BID

## 2018-08-01 NOTE — H&P ADULT - PROBLEM SELECTOR PLAN 4
Pt on oral diabetes meds at home. Will hold while inpatient  - check FSG in the AM  - sliding scale insulin  - hypoglycemia protocol  - hold metformin, sitagliptin, and glyburide while inpatient  - pt to hold metformin for the next 48 hours since pt was exposed to IV contrast

## 2018-08-01 NOTE — ED PROVIDER NOTE - OBJECTIVE STATEMENT
83F with HTN, HLD, OA, RLQ ventral hernia s/p repair in 2017 with recurrence, history of SBO presents with fever to 38.5C (101.3) tonight accompanied generalized malaise. Pt has a ulcer at the site of her ventral hernia repair which has worsened over the last 2 months with chronic pain at the site and purulent drainage. She is currently passing gas, normal BMs, no n/v, melena or hematochezia.       pt arrives c/o fever tonight of 39.2C, redness and pain to known R lower quadrant hernia x 2 months. Pt has had various surgeries for this hernia, last 1 year ago. Pt is vitally stable at this time, afebrile, warm to touch. Pt did not take any medications prior to arrival to ED. Area appears distended, arrives with dressing in place, redness around dressing. Patient is being followed by surgeon for wound to hernia site, currently treated with antibacterial ointment.    fever, hernia 83F with HTN, HLD, OA, RLQ ventral hernia s/p repair in 2017 with recurrence, history of SBO presents with fever to 38.5C (101.3) tonight accompanied generalized malaise. Pt has a ulcer at the site of her ventral hernia repair which has worsened over the last 2 months with chronic pain at the site and purulent drainage. She is currently passing gas, normal BMs, no n/v, melena or hematochezia. No dyspnea, orthopnea, or chest pain. 83F with HTN, HLD, OA, RLQ ventral hernia s/p repair in 2017 with recurrence, history of SBO presents with fever to 38.5C (101.3) tonight accompanied generalized malaise. Pt has a ulcer at the site of her ventral hernia repair which has worsened over the last 2 months with chronic pain at the site and purulent drainage. She is currently passing gas, normal BMs, no n/v, melena or hematochezia. No dyspnea, orthopnea, or chest pain.  Klepfish: Mauritanian, prefers daughter to translate. 83F PMH HTN, DM, CAD ventral hernia repair, SBOs (2017, admitted at [facility] under Dr. Hatfield) p/w fever. PT has ulcer at hernia repair site for several mos. Now w/ 2-3 days of worsening pain at site and new spreading redness. Today had fever. Denies HA, SOB/CP, URI symptoms, NVD, urinary complaints. Has normal PO and normal BMs. Chronic unchanged b/l knee pain  Surgeon: Hospital in [facility] - per pt he did not feel comfortable w/ any further surgical intervention so pt has appt w/ new surgeon at [place] in 2 days.

## 2018-08-01 NOTE — H&P ADULT - PROBLEM SELECTOR PLAN 2
Chronic and stable. Unclear etiology. No signs of active bleeding  - no need for inpatient w/u at this time  - pt to f/u with outpatient PMD

## 2018-08-01 NOTE — H&P ADULT - PMH
CAD (coronary artery disease)    Colorectal cancer  Remote hx; s/p colon resection  Diabetes mellitus    HTN (hypertension)    Hyperuricemia    Primary osteoarthritis of both knees

## 2018-08-01 NOTE — CONSULT NOTE ADULT - ASSESSMENT
83 year old female with history of multiple abdominal surgeries and large ventral hernia presenting with abdominal wall cellulitis. No collection on imaging.     - Superficial infection not likely to be source of bacteremia  - Continue antibiotics and fever work up  - Will follow  - Discussed with attending, Dr. Flaquito Fitzgerald PGY 2  B team surgery [id number]

## 2018-08-01 NOTE — H&P ADULT - ASSESSMENT
Ms. Galicia is an 84 yo woman with PHMx notable for remote colorectal cancer s/p bowel resection, CAD s/p PCI with stents x2, DM2, HTN, ventral hernia s/p repair in 2017 with recurrence c/b SBO (2017) presenting with fever at home x1 day with associated abdominal skin ulcer and erythema, admitted for localized abdominal cellulitis. Pt afebrile with normal WBC. CT abdomen/pelvis r/o underlying abscess or bowel obstruction.

## 2018-08-01 NOTE — CONSULT NOTE ADULT - SUBJECTIVE AND OBJECTIVE BOX
SURGERY CONSULT NOTE  --------------------------------------------------------------------------------------------    HPI: 83 year old female with a history of partial colectomy for colon cancer in , multiple SBOs treated nonoperatively and with small bowel resection in , ventral hernia repair in May 2017, and subsequent admission at [facility] for SBO in May 2017 managed conservatively. She now presents with fevers and pain, skin ulceration, and erythema at the hernia site. She first noted an ulcerated skin lesion in this area two months ago and has been receiving wound care by a visiting nurse. Three days ago she noticed erythema and some drainage of the wound. Today she was febrile at home and presented to the ED for further evaluation.    Today her WBC count is 9.9, she is afebrile, Ct shows soft tissue phlegmonous changes (similar to imaging in 2017) with no collection or obstruction.      PAST MEDICAL & SURGICAL HISTORY:  Hyperuricemia  Colorectal cancer: Remote hx; s/p colon resection  Primary osteoarthritis of both knees  CAD (coronary artery disease)  Diabetes mellitus  HTN (hypertension)  History of coronary artery stent placement  History of bowel resection  H/O hernia repair    FAMILY HISTORY:  No pertinent family history in first degree relatives    ALLERGIES: No Known Allergies      CURRENT MEDICATIONS  MEDICATIONS (STANDING): allopurinol 100 milliGRAM(s) Oral daily  aspirin enteric coated 81 milliGRAM(s) Oral daily  ceFAZolin   IVPB 1000 milliGRAM(s) IV Intermittent every 8 hours  dextrose 5%. 1000 milliLiter(s) IV Continuous <Continuous>  dextrose 50% Injectable 12.5 Gram(s) IV Push once  dextrose 50% Injectable 25 Gram(s) IV Push once  dextrose 50% Injectable 25 Gram(s) IV Push once  docusate sodium 100 milliGRAM(s) Oral two times a day  enalapril 20 milliGRAM(s) Oral two times a day  insulin lispro (HumaLOG) corrective regimen sliding scale   SubCutaneous three times a day before meals  isosorbide   mononitrate ER Tablet (IMDUR) 30 milliGRAM(s) Oral daily  metoprolol succinate ER 25 milliGRAM(s) Oral daily    MEDICATIONS (PRN):dextrose 40% Gel 15 Gram(s) Oral once PRN Blood Glucose LESS THAN 70 milliGRAM(s)/deciliter  glucagon  Injectable 1 milliGRAM(s) IntraMuscular once PRN Glucose LESS THAN 70 milligrams/deciliter    --------------------------------------------------------------------------------------------    Vitals:   T(C): 36.2 (18 @ 19:45), Max: 37.4 (18 @ 15:35)  HR: 73 (18 @ 19:45) (62 - 78)  BP: 115/58 (18 @ 19:45) (115/41 - 142/48)  RR: 17 (18 @ 19:45) (14 - 20)  SpO2: 100% (18 @ 19:45) (97% - 100%)  CAPILLARY BLOOD GLUCOSE    POCT Blood Glucose.: 275 mg/dL (01 Aug 2018 17:21)  POCT Blood Glucose.: 267 mg/dL (01 Aug 2018 13:14)  POCT Blood Glucose.: 166 mg/dL (2018 21:41)      PHYSICAL EXAM:  General: Alert, NAD  HEENT: NC/AT, no asymmetry, no scleral icterus  Cardio: RRR  Resp: Airway patent, unlabored breathing  GI/Abd: Soft, large ventral hernia with erythema and skin ulceration, wound probed - does not extend deep, minimal drainage  Ext: Warm, mild edema  --------------------------------------------------------------------------------------------    LABS  CBC ( @ 00:30)                              10.7<L>                         9.92    )----------------(  189        69.6  % Neutrophils, 19.4  % Lymphocytes, ANC: 6.91                                32.4<L>    BMP ( @ 00:30)             132<L>  |  95<L>   |  15    		Ca++ --      Ca 10.2               ---------------------------------( 156<H>		Mg --                 4.2     |  24      |  0.72  			Ph --        LFTs ( @ 00:30)      TPro 7.1 / Alb 4.1 / TBili 0.5 / DBili -- / AST 13 / ALT 22 / AlkPhos 62      VBG ( @ 00:30)     7.37 / 48 / 32<L> / 26 / 2.6 / 57.8<L>%     Lactate: 1.9    --------------------------------------------------------------------------------------------    MICROBIOLOGY  Urinalysis ( @ 01:00):     Color: YELLOW / Appearance: CLEAR / S.009 / pH: 6.5 / Gluc: NEGATIVE / Ketones: NEGATIVE / Bili: NEGATIVE / Urobili: NORMAL / Protein :NEGATIVE / Nitrites: NEGATIVE / Leuk.Est: SMALL<H> / RBC: >50<H> / WBC: 5-10<H> / Sq Epi:  / Non Sq Epi:  / Bacteria FEW       -> BLOOD VENOUS Culture ( @ 01:10)       RESULT CALLED TO / READ BACK: PATSY ALEJANDRE RN/Y  DATE / TIME CALLED: 18 [id number]  CALLED BY: VICKY HURD  GPR^Gram Positive Rods  AFTER: 12 HOURS INCUBATION  BOTTLE: AEROBIC BOTTLE    BLOOD CULTURE PCR  NG    --------------------------------------------------------------------------------------------    IMAGING    CT Abdomen and Pelvis w/ Oral Cont and w/ IV Cont (18 @ 04:31)  IMPRESSION:     Multiple right sided paracentral ventral abdominal hernias containing   nonobstructed bowel loops as described in detail above. Associated   phlegmonous changes of the adjacent soft tissues at the level of smaller   paracentral ventral abdominal wall inferiorly. No discrete loculated or   rim enhancing fluid collection to suggest an abscess. No bowel   obstruction.    Uncomplicated cholelithiasis.

## 2018-08-01 NOTE — ED ADULT NURSE NOTE - OBJECTIVE STATEMENT
rec'd pt in spot 26 with daughter and  at bedside. pt has poorly healing surgical wound to rlq from hernia surgery. wound has yellow discharge and exudate... tender to touch.  20 gauge inserted left arm. blood sent as ordered.  being eval by md

## 2018-08-02 LAB
-  CANDIDA ALBICANS: SIGNIFICANT CHANGE UP
-  CANDIDA GLABRATA: SIGNIFICANT CHANGE UP
-  CANDIDA KRUSEI: SIGNIFICANT CHANGE UP
-  CANDIDA PARAPSILOSIS: SIGNIFICANT CHANGE UP
-  CANDIDA TROPICALIS: SIGNIFICANT CHANGE UP
-  COAGULASE NEGATIVE STAPHYLOCOCCUS: SIGNIFICANT CHANGE UP
-  K. PNEUMONIAE GROUP: SIGNIFICANT CHANGE UP
-  KPC RESISTANCE GENE: SIGNIFICANT CHANGE UP
-  STREPTOCOCCUS SP. (NOT GRP A, B OR S PNEUMONIAE): SIGNIFICANT CHANGE UP
A BAUMANNII DNA SPEC QL NAA+PROBE: SIGNIFICANT CHANGE UP
ALBUMIN SERPL ELPH-MCNC: 3.3 G/DL — SIGNIFICANT CHANGE UP (ref 3.3–5)
ALP SERPL-CCNC: 65 U/L — SIGNIFICANT CHANGE UP (ref 40–120)
ALT FLD-CCNC: 17 U/L — SIGNIFICANT CHANGE UP (ref 4–33)
AST SERPL-CCNC: 12 U/L — SIGNIFICANT CHANGE UP (ref 4–32)
BACTERIA BLD CULT: SIGNIFICANT CHANGE UP
BACTERIA UR CULT: SIGNIFICANT CHANGE UP
BASOPHILS # BLD AUTO: 0.04 K/UL — SIGNIFICANT CHANGE UP (ref 0–0.2)
BASOPHILS NFR BLD AUTO: 0.5 % — SIGNIFICANT CHANGE UP (ref 0–2)
BILIRUB SERPL-MCNC: 0.3 MG/DL — SIGNIFICANT CHANGE UP (ref 0.2–1.2)
BUN SERPL-MCNC: 21 MG/DL — SIGNIFICANT CHANGE UP (ref 7–23)
CALCIUM SERPL-MCNC: 9.2 MG/DL — SIGNIFICANT CHANGE UP (ref 8.4–10.5)
CHLORIDE SERPL-SCNC: 97 MMOL/L — LOW (ref 98–107)
CO2 SERPL-SCNC: 21 MMOL/L — LOW (ref 22–31)
CREAT SERPL-MCNC: 0.78 MG/DL — SIGNIFICANT CHANGE UP (ref 0.5–1.3)
E CLOAC COMP DNA BLD POS QL NAA+PROBE: SIGNIFICANT CHANGE UP
E COLI DNA BLD POS QL NAA+NON-PROBE: SIGNIFICANT CHANGE UP
ENTEROCOC DNA BLD POS QL NAA+NON-PROBE: SIGNIFICANT CHANGE UP
ENTEROCOC DNA BLD POS QL NAA+NON-PROBE: SIGNIFICANT CHANGE UP
EOSINOPHIL # BLD AUTO: 0.09 K/UL — SIGNIFICANT CHANGE UP (ref 0–0.5)
EOSINOPHIL NFR BLD AUTO: 1.2 % — SIGNIFICANT CHANGE UP (ref 0–6)
GLUCOSE BLDC GLUCOMTR-MCNC: 291 MG/DL — HIGH (ref 70–99)
GLUCOSE BLDC GLUCOMTR-MCNC: 303 MG/DL — HIGH (ref 70–99)
GLUCOSE BLDC GLUCOMTR-MCNC: 375 MG/DL — HIGH (ref 70–99)
GLUCOSE SERPL-MCNC: 320 MG/DL — HIGH (ref 70–99)
GP B STREP DNA BLD POS QL NAA+NON-PROBE: SIGNIFICANT CHANGE UP
GRAM STN WND: SIGNIFICANT CHANGE UP
HAEM INFLU DNA BLD POS QL NAA+NON-PROBE: SIGNIFICANT CHANGE UP
HBA1C BLD-MCNC: 6.8 % — HIGH (ref 4–5.6)
HCT VFR BLD CALC: 28.6 % — LOW (ref 34.5–45)
HGB BLD-MCNC: 9.4 G/DL — LOW (ref 11.5–15.5)
IMM GRANULOCYTES # BLD AUTO: 0.05 # — SIGNIFICANT CHANGE UP
IMM GRANULOCYTES NFR BLD AUTO: 0.7 % — SIGNIFICANT CHANGE UP (ref 0–1.5)
K OXYTOCA DNA BLD POS QL NAA+NON-PROBE: SIGNIFICANT CHANGE UP
L MONOCYTOG DNA BLD POS QL NAA+NON-PROBE: SIGNIFICANT CHANGE UP
LYMPHOCYTES # BLD AUTO: 1.55 K/UL — SIGNIFICANT CHANGE UP (ref 1–3.3)
LYMPHOCYTES # BLD AUTO: 20.2 % — SIGNIFICANT CHANGE UP (ref 13–44)
MCHC RBC-ENTMCNC: 29.5 PG — SIGNIFICANT CHANGE UP (ref 27–34)
MCHC RBC-ENTMCNC: 32.9 % — SIGNIFICANT CHANGE UP (ref 32–36)
MCV RBC AUTO: 89.7 FL — SIGNIFICANT CHANGE UP (ref 80–100)
MONOCYTES # BLD AUTO: 0.68 K/UL — SIGNIFICANT CHANGE UP (ref 0–0.9)
MONOCYTES NFR BLD AUTO: 8.9 % — SIGNIFICANT CHANGE UP (ref 2–14)
MRSA SPEC QL CULT: SIGNIFICANT CHANGE UP
MSSA DNA SPEC QL NAA+PROBE: SIGNIFICANT CHANGE UP
N MEN ISLT CULT: SIGNIFICANT CHANGE UP
NEUTROPHILS # BLD AUTO: 5.27 K/UL — SIGNIFICANT CHANGE UP (ref 1.8–7.4)
NEUTROPHILS NFR BLD AUTO: 68.5 % — SIGNIFICANT CHANGE UP (ref 43–77)
NRBC # FLD: 0 — SIGNIFICANT CHANGE UP
ORGANISM # SPEC MICROSCOPIC CNT: SIGNIFICANT CHANGE UP
P AERUGINOSA DNA BLD POS NAA+NON-PROBE: SIGNIFICANT CHANGE UP
PLATELET # BLD AUTO: 175 K/UL — SIGNIFICANT CHANGE UP (ref 150–400)
PMV BLD: 10.8 FL — SIGNIFICANT CHANGE UP (ref 7–13)
POTASSIUM SERPL-MCNC: 4.2 MMOL/L — SIGNIFICANT CHANGE UP (ref 3.5–5.3)
POTASSIUM SERPL-SCNC: 4.2 MMOL/L — SIGNIFICANT CHANGE UP (ref 3.5–5.3)
PROT SERPL-MCNC: 6.3 G/DL — SIGNIFICANT CHANGE UP (ref 6–8.3)
RBC # BLD: 3.19 M/UL — LOW (ref 3.8–5.2)
RBC # FLD: 12 % — SIGNIFICANT CHANGE UP (ref 10.3–14.5)
S MARCESCENS DNA BLD POS NAA+NON-PROBE: SIGNIFICANT CHANGE UP
S PNEUM DNA BLD POS QL NAA+NON-PROBE: SIGNIFICANT CHANGE UP
S PYO DNA BLD POS QL NAA+NON-PROBE: SIGNIFICANT CHANGE UP
SODIUM SERPL-SCNC: 133 MMOL/L — LOW (ref 135–145)
SPECIMEN SOURCE: SIGNIFICANT CHANGE UP
WBC # BLD: 7.68 K/UL — SIGNIFICANT CHANGE UP (ref 3.8–10.5)
WBC # FLD AUTO: 7.68 K/UL — SIGNIFICANT CHANGE UP (ref 3.8–10.5)

## 2018-08-02 PROCEDURE — 99222 1ST HOSP IP/OBS MODERATE 55: CPT

## 2018-08-02 RX ORDER — ACETAMINOPHEN 500 MG
650 TABLET ORAL ONCE
Qty: 0 | Refills: 0 | Status: COMPLETED | OUTPATIENT
Start: 2018-08-02 | End: 2018-08-02

## 2018-08-02 RX ORDER — AMPICILLIN SODIUM AND SULBACTAM SODIUM 250; 125 MG/ML; MG/ML
3 INJECTION, POWDER, FOR SUSPENSION INTRAMUSCULAR; INTRAVENOUS EVERY 6 HOURS
Qty: 0 | Refills: 0 | Status: DISCONTINUED | OUTPATIENT
Start: 2018-08-02 | End: 2018-08-03

## 2018-08-02 RX ADMIN — Medication 650 MILLIGRAM(S): at 06:10

## 2018-08-02 RX ADMIN — Medication 100 MILLIGRAM(S): at 06:11

## 2018-08-02 RX ADMIN — Medication 20 MILLIGRAM(S): at 06:10

## 2018-08-02 RX ADMIN — Medication 4: at 08:10

## 2018-08-02 RX ADMIN — Medication 4: at 17:06

## 2018-08-02 RX ADMIN — Medication 100 MILLIGRAM(S): at 06:10

## 2018-08-02 RX ADMIN — AMPICILLIN SODIUM AND SULBACTAM SODIUM 200 GRAM(S): 250; 125 INJECTION, POWDER, FOR SUSPENSION INTRAMUSCULAR; INTRAVENOUS at 17:10

## 2018-08-02 RX ADMIN — Medication 5: at 11:58

## 2018-08-02 RX ADMIN — Medication 100 MILLIGRAM(S): at 11:54

## 2018-08-02 RX ADMIN — Medication 100 MILLIGRAM(S): at 13:34

## 2018-08-02 RX ADMIN — AMPICILLIN SODIUM AND SULBACTAM SODIUM 200 GRAM(S): 250; 125 INJECTION, POWDER, FOR SUSPENSION INTRAMUSCULAR; INTRAVENOUS at 23:18

## 2018-08-02 RX ADMIN — Medication 25 MILLIGRAM(S): at 06:11

## 2018-08-02 RX ADMIN — Medication 20 MILLIGRAM(S): at 17:24

## 2018-08-02 RX ADMIN — ISOSORBIDE MONONITRATE 30 MILLIGRAM(S): 60 TABLET, EXTENDED RELEASE ORAL at 11:53

## 2018-08-02 RX ADMIN — Medication 81 MILLIGRAM(S): at 11:54

## 2018-08-02 NOTE — PHYSICAL THERAPY INITIAL EVALUATION ADULT - ADDITIONAL COMMENTS
family bedside and provided interpretive services for therapy session as per pt. request. Pt. lives in a pvt apartment with their spouse. Pt. has  steps with HR x 1 to enter their home. Pt. was previously ambulating with a rolling walker independently. Pt. was previously independent with ADLs. Pt. returned to bed at end of therapy session with all lines and tubes intact, call bell in reach and in NAD.

## 2018-08-02 NOTE — PHYSICAL THERAPY INITIAL EVALUATION ADULT - PLANNED THERAPY INTERVENTIONS, PT EVAL
stair negotiation/bed mobility training/balance training/gait training/strengthening/transfer training

## 2018-08-02 NOTE — CONSULT NOTE ADULT - SUBJECTIVE AND OBJECTIVE BOX
HPI:  Ms. Galicia is an 84 yo woman with PHMx notable for remote colorectal cancer s/p bowel resection, CAD s/p PCI with stents x2, DM2, HTN, ventral hernia s/p repair in 2017 with recurrence c/b SBO (2017) presenting with fever at home x1 day.     HPI primarily obtained from pt's daughter, Mahogany, who declined translation services.  Patient has a chronic, large right sided ventral hernia as a result of prior abdominal surgeries. She subsequently developed a small ulceration on the right side of her abdomen. She has a nurse that comes to the home for wound care. As per daughter, size of ulcer has been getting bigger and she noticed for the past 3 days, redness surrounding the ulcer was spreading. Pt has maintain her appetite with abdominal pain only when touching around the site. Pt has no changes in bowel habits - no diarrhea, bloody stools. However, yesterday, patient developed a fever at home, which prompted the patient's daughter to bring her to the hospital.     Of note, patient has an outpatient appt scheduled for tomorrow, 18 for further evaluation of her hernia with Dr. Maxx Ahuja (Surgery) - affiliated with [facility].    Initial ED triage VS:  temp 98.7  /56  HR 73  RR 14  O2 sat 100% on RA  While in the ED, patient received 1L NS bolus, vancomycin 1g IV and Zosyn 3.375mg IV x1 dose (01 Aug 2018 11:49)      PAST MEDICAL & SURGICAL HISTORY:  Hyperuricemia  Colorectal cancer: Remote hx; s/p colon resection  Primary osteoarthritis of both knees  CAD (coronary artery disease)  Diabetes mellitus  HTN (hypertension)  History of coronary artery stent placement  History of bowel resection  H/O hernia repair      Allergies    No Known Allergies    Intolerances        ANTIMICROBIALS:  ceFAZolin   IVPB 1000 every 8 hours      OTHER MEDS:  allopurinol 100 milliGRAM(s) Oral daily  aspirin enteric coated 81 milliGRAM(s) Oral daily  dextrose 40% Gel 15 Gram(s) Oral once PRN  dextrose 5%. 1000 milliLiter(s) IV Continuous <Continuous>  dextrose 50% Injectable 12.5 Gram(s) IV Push once  dextrose 50% Injectable 25 Gram(s) IV Push once  dextrose 50% Injectable 25 Gram(s) IV Push once  docusate sodium 100 milliGRAM(s) Oral two times a day  enalapril 20 milliGRAM(s) Oral two times a day  glucagon  Injectable 1 milliGRAM(s) IntraMuscular once PRN  insulin lispro (HumaLOG) corrective regimen sliding scale   SubCutaneous three times a day before meals  isosorbide   mononitrate ER Tablet (IMDUR) 30 milliGRAM(s) Oral daily  metoprolol succinate ER 25 milliGRAM(s) Oral daily  senna 2 Tablet(s) Oral at bedtime      SOCIAL HISTORY:  no tobaco  no alcohol  no drug use      FAMILY HISTORY:  No pertinent family history in first degree relatives to presenting problem      REVIEW OF SYSTEMS  [  ] ROS unobtainable because:    [x  ] All other systems negative except as noted below:	    Constitutional:  [x ] fever [ ] chills  [ ] weight loss  [x ] weakness  Skin:  [ ] rash [ ] phlebitis	  Eyes: [ ] icterus [ ] pain  [ ] discharge	  ENMT: [ ] sore throat  [ ] thrush [ ] ulcers [ ] exudates  Respiratory: [ ] dyspnea [ ] hemoptysis [ ] cough [ ] sputum	  Cardiovascular:  [ ] chest pain [ ] palpitations [ ] edema	  Gastrointestinal:  [ ] nausea [ ] vomiting [ ] diarrhea [ ] constipation [x ] pain	  Genitourinary:  [ ] dysuria [ ] frequency [ ] hematuria [ ] discharge [ ] flank pain  [ ] incontinence  Musculoskeletal:  [ ] myalgias [ ] arthralgias [ ] arthritis  [ ] back pain  Neurological:  [ ] headache [ ] seizures  [ ] confusion/altered mental status  Psychiatric:  [ ] anxiety [ ] depression	  Hematology/Lymphatics:  [ ] lymphadenopathy  Endocrine:  [ ] adrenal [ ] thyroid  Allergic/Immunologic:	 [ ] transplant [ ] seasonal    PHYSICAL EXAM:  General: [ x] non-toxic  HEAD/EYES: [x ] PERRL [ ] white sclera [ ] icterus  ENT:  [ ] normal [x ] supple [ ] thrush [ ] pharyngeal exudate  Cardiovascular:   [ ] murmur [x ] normal [ ] PPM/AICD  Respiratory:  [x ] clear to ausculation bilaterally  GI:  [x ] soft, non-tender, normal bowel sounds  :  [ ] masterson [x ] no CVA tenderness   Musculoskeletal:  [x ] no synovitis  Neurologic:  [x ] non-focal exam   Skin:  [x ] right side of abdomen with 2 -3 cm circular wound, draining thick fluid from lower pole.   Surrounding rendess  Lymph: [x ] no lymphadenopathy  Psychiatric:  [x ] appropriate affect [ ] alert & oriented  Lines:  [ x] no phlebitis [ ] central line          Drug Dosing Weight  Height (cm): 157.5 (30 May 2017 03:26)  Weight (kg): 84.1 (30 May 2017 03:26)  BMI (kg/m2): 33.9 (30 May 2017 03:26)  BSA (m2): 1.85 (30 May 2017 03:26)    Vital Signs Last 24 Hrs  T(F): 97.8 (18 @ 13:14), Max: 99.3 (18 @ 15:35)    Vital Signs Last 24 Hrs  HR: 65 (18 @ 13:14) (59 - 87)  BP: 118/40 (18 @ 13:14) (115/58 - 143/54)  RR: 18 (18 @ 13:14)  SpO2: 95% (18 @ 13:14) (95% - 100%)  Wt(kg): --                          9.4    7.68  )-----------( 175      ( 02 Aug 2018 05:55 )             28.6       08    133<L>  |  97<L>  |  21  ----------------------------<  320<H>  4.2   |  21<L>  |  0.78    Ca    9.2      02 Aug 2018 05:55    TPro  6.3  /  Alb  3.3  /  TBili  0.3  /  DBili  x   /  AST  12  /  ALT  17  /  AlkPhos  65  08-02      Urinalysis Basic - ( 01 Aug 2018 01:00 )    Color: YELLOW / Appearance: CLEAR / S.009 / pH: 6.5  Gluc: NEGATIVE / Ketone: NEGATIVE  / Bili: NEGATIVE / Urobili: NORMAL mg/dL   Blood: NEGATIVE / Protein: NEGATIVE mg/dL / Nitrite: NEGATIVE   Leuk Esterase: SMALL / RBC: >50 / WBC 5-10   Sq Epi: x / Non Sq Epi: x / Bacteria: FEW        MICROBIOLOGY:    RADIOLOGY:

## 2018-08-02 NOTE — CONSULT NOTE ADULT - ASSESSMENT
83 year old with a history of rectal cancer and DM, prior abdominal surgery, presents with fever at home and increased pain to the abdominal wall..    She has a chronic abdominal wound that is draining thick fluid    Differential includes a soft tissue infection/ superficial abscess    Some concern for a fistula although not noted on CT scan    I send a culture swab of the fluid discharge    Await culture results.    Change to unasyn.

## 2018-08-02 NOTE — PHYSICAL THERAPY INITIAL EVALUATION ADULT - PERTINENT HX OF CURRENT PROBLEM, REHAB EVAL
Pt. is a 83 year old female admitted to [facility] secondary to cellulitis. PMH: colorectal cancer, CAD, Hyperuricemia.

## 2018-08-03 ENCOUNTER — TRANSCRIPTION ENCOUNTER (OUTPATIENT)
Age: 83
End: 2018-08-03

## 2018-08-03 DIAGNOSIS — I25.10 ATHEROSCLEROTIC HEART DISEASE OF NATIVE CORONARY ARTERY WITHOUT ANGINA PECTORIS: ICD-10-CM

## 2018-08-03 DIAGNOSIS — I10 ESSENTIAL (PRIMARY) HYPERTENSION: ICD-10-CM

## 2018-08-03 LAB
BASOPHILS # BLD AUTO: 0.03 K/UL — SIGNIFICANT CHANGE UP (ref 0–0.2)
BASOPHILS NFR BLD AUTO: 0.4 % — SIGNIFICANT CHANGE UP (ref 0–2)
BUN SERPL-MCNC: 23 MG/DL — SIGNIFICANT CHANGE UP (ref 7–23)
CALCIUM SERPL-MCNC: 9.6 MG/DL — SIGNIFICANT CHANGE UP (ref 8.4–10.5)
CHLORIDE SERPL-SCNC: 100 MMOL/L — SIGNIFICANT CHANGE UP (ref 98–107)
CO2 SERPL-SCNC: 22 MMOL/L — SIGNIFICANT CHANGE UP (ref 22–31)
CREAT SERPL-MCNC: 0.87 MG/DL — SIGNIFICANT CHANGE UP (ref 0.5–1.3)
EOSINOPHIL # BLD AUTO: 0.15 K/UL — SIGNIFICANT CHANGE UP (ref 0–0.5)
EOSINOPHIL NFR BLD AUTO: 2.2 % — SIGNIFICANT CHANGE UP (ref 0–6)
GLUCOSE BLDC GLUCOMTR-MCNC: 224 MG/DL — HIGH (ref 70–99)
GLUCOSE BLDC GLUCOMTR-MCNC: 271 MG/DL — HIGH (ref 70–99)
GLUCOSE BLDC GLUCOMTR-MCNC: 298 MG/DL — HIGH (ref 70–99)
GLUCOSE BLDC GLUCOMTR-MCNC: 85 MG/DL — SIGNIFICANT CHANGE UP (ref 70–99)
GLUCOSE SERPL-MCNC: 218 MG/DL — HIGH (ref 70–99)
HCT VFR BLD CALC: 28.8 % — LOW (ref 34.5–45)
HGB BLD-MCNC: 9.8 G/DL — LOW (ref 11.5–15.5)
IMM GRANULOCYTES # BLD AUTO: 0.03 # — SIGNIFICANT CHANGE UP
IMM GRANULOCYTES NFR BLD AUTO: 0.4 % — SIGNIFICANT CHANGE UP (ref 0–1.5)
LYMPHOCYTES # BLD AUTO: 1.72 K/UL — SIGNIFICANT CHANGE UP (ref 1–3.3)
LYMPHOCYTES # BLD AUTO: 25.5 % — SIGNIFICANT CHANGE UP (ref 13–44)
MCHC RBC-ENTMCNC: 30 PG — SIGNIFICANT CHANGE UP (ref 27–34)
MCHC RBC-ENTMCNC: 34 % — SIGNIFICANT CHANGE UP (ref 32–36)
MCV RBC AUTO: 88.1 FL — SIGNIFICANT CHANGE UP (ref 80–100)
MONOCYTES # BLD AUTO: 0.64 K/UL — SIGNIFICANT CHANGE UP (ref 0–0.9)
MONOCYTES NFR BLD AUTO: 9.5 % — SIGNIFICANT CHANGE UP (ref 2–14)
NEUTROPHILS # BLD AUTO: 4.18 K/UL — SIGNIFICANT CHANGE UP (ref 1.8–7.4)
NEUTROPHILS NFR BLD AUTO: 62 % — SIGNIFICANT CHANGE UP (ref 43–77)
NRBC # FLD: 0 — SIGNIFICANT CHANGE UP
PLATELET # BLD AUTO: 193 K/UL — SIGNIFICANT CHANGE UP (ref 150–400)
PMV BLD: 10.7 FL — SIGNIFICANT CHANGE UP (ref 7–13)
POTASSIUM SERPL-MCNC: 4.3 MMOL/L — SIGNIFICANT CHANGE UP (ref 3.5–5.3)
POTASSIUM SERPL-SCNC: 4.3 MMOL/L — SIGNIFICANT CHANGE UP (ref 3.5–5.3)
RBC # BLD: 3.27 M/UL — LOW (ref 3.8–5.2)
RBC # FLD: 12 % — SIGNIFICANT CHANGE UP (ref 10.3–14.5)
SODIUM SERPL-SCNC: 135 MMOL/L — SIGNIFICANT CHANGE UP (ref 135–145)
SPECIMEN SOURCE: SIGNIFICANT CHANGE UP
WBC # BLD: 6.75 K/UL — SIGNIFICANT CHANGE UP (ref 3.8–10.5)
WBC # FLD AUTO: 6.75 K/UL — SIGNIFICANT CHANGE UP (ref 3.8–10.5)

## 2018-08-03 PROCEDURE — 99232 SBSQ HOSP IP/OBS MODERATE 35: CPT

## 2018-08-03 RX ORDER — INSULIN LISPRO 100/ML
9 VIAL (ML) SUBCUTANEOUS
Qty: 0 | Refills: 0 | Status: DISCONTINUED | OUTPATIENT
Start: 2018-08-03 | End: 2018-08-04

## 2018-08-03 RX ORDER — DAPTOMYCIN 500 MG/10ML
INJECTION, POWDER, LYOPHILIZED, FOR SOLUTION INTRAVENOUS
Qty: 0 | Refills: 0 | Status: DISCONTINUED | OUTPATIENT
Start: 2018-08-03 | End: 2018-08-04

## 2018-08-03 RX ORDER — DAPTOMYCIN 500 MG/10ML
500 INJECTION, POWDER, LYOPHILIZED, FOR SOLUTION INTRAVENOUS ONCE
Qty: 0 | Refills: 0 | Status: COMPLETED | OUTPATIENT
Start: 2018-08-03 | End: 2018-08-03

## 2018-08-03 RX ORDER — DIPHENHYDRAMINE HCL 50 MG
25 CAPSULE ORAL ONCE
Qty: 0 | Refills: 0 | Status: COMPLETED | OUTPATIENT
Start: 2018-08-03 | End: 2018-08-03

## 2018-08-03 RX ORDER — VANCOMYCIN HCL 1 G
1000 VIAL (EA) INTRAVENOUS EVERY 12 HOURS
Qty: 0 | Refills: 0 | Status: DISCONTINUED | OUTPATIENT
Start: 2018-08-03 | End: 2018-08-03

## 2018-08-03 RX ORDER — INSULIN LISPRO 100/ML
7 VIAL (ML) SUBCUTANEOUS
Qty: 0 | Refills: 0 | Status: DISCONTINUED | OUTPATIENT
Start: 2018-08-03 | End: 2018-08-03

## 2018-08-03 RX ORDER — INSULIN GLARGINE 100 [IU]/ML
18 INJECTION, SOLUTION SUBCUTANEOUS AT BEDTIME
Qty: 0 | Refills: 0 | Status: DISCONTINUED | OUTPATIENT
Start: 2018-08-03 | End: 2018-08-04

## 2018-08-03 RX ORDER — DAPTOMYCIN 500 MG/10ML
500 INJECTION, POWDER, LYOPHILIZED, FOR SOLUTION INTRAVENOUS EVERY 24 HOURS
Qty: 0 | Refills: 0 | Status: DISCONTINUED | OUTPATIENT
Start: 2018-08-04 | End: 2018-08-04

## 2018-08-03 RX ORDER — INSULIN GLARGINE 100 [IU]/ML
12 INJECTION, SOLUTION SUBCUTANEOUS AT BEDTIME
Qty: 0 | Refills: 0 | Status: DISCONTINUED | OUTPATIENT
Start: 2018-08-03 | End: 2018-08-03

## 2018-08-03 RX ORDER — ERTAPENEM SODIUM 1 G/1
1000 INJECTION, POWDER, LYOPHILIZED, FOR SOLUTION INTRAMUSCULAR; INTRAVENOUS EVERY 24 HOURS
Qty: 0 | Refills: 0 | Status: DISCONTINUED | OUTPATIENT
Start: 2018-08-03 | End: 2018-08-06

## 2018-08-03 RX ADMIN — Medication 100 MILLIGRAM(S): at 06:32

## 2018-08-03 RX ADMIN — Medication 25 MILLIGRAM(S): at 06:32

## 2018-08-03 RX ADMIN — INSULIN GLARGINE 18 UNIT(S): 100 INJECTION, SOLUTION SUBCUTANEOUS at 22:55

## 2018-08-03 RX ADMIN — Medication 3: at 17:36

## 2018-08-03 RX ADMIN — Medication 100 MILLIGRAM(S): at 11:48

## 2018-08-03 RX ADMIN — ERTAPENEM SODIUM 120 MILLIGRAM(S): 1 INJECTION, POWDER, LYOPHILIZED, FOR SOLUTION INTRAMUSCULAR; INTRAVENOUS at 18:39

## 2018-08-03 RX ADMIN — Medication 81 MILLIGRAM(S): at 11:48

## 2018-08-03 RX ADMIN — Medication 25 MILLIGRAM(S): at 20:09

## 2018-08-03 RX ADMIN — Medication 2: at 08:05

## 2018-08-03 RX ADMIN — ISOSORBIDE MONONITRATE 30 MILLIGRAM(S): 60 TABLET, EXTENDED RELEASE ORAL at 12:43

## 2018-08-03 RX ADMIN — SENNA PLUS 2 TABLET(S): 8.6 TABLET ORAL at 22:21

## 2018-08-03 RX ADMIN — Medication 7 UNIT(S): at 11:50

## 2018-08-03 RX ADMIN — Medication 3: at 11:50

## 2018-08-03 RX ADMIN — Medication 20 MILLIGRAM(S): at 17:38

## 2018-08-03 RX ADMIN — AMPICILLIN SODIUM AND SULBACTAM SODIUM 200 GRAM(S): 250; 125 INJECTION, POWDER, FOR SUSPENSION INTRAMUSCULAR; INTRAVENOUS at 05:20

## 2018-08-03 RX ADMIN — AMPICILLIN SODIUM AND SULBACTAM SODIUM 200 GRAM(S): 250; 125 INJECTION, POWDER, FOR SUSPENSION INTRAMUSCULAR; INTRAVENOUS at 11:48

## 2018-08-03 RX ADMIN — Medication 20 MILLIGRAM(S): at 06:32

## 2018-08-03 RX ADMIN — Medication 7 UNIT(S): at 17:37

## 2018-08-03 RX ADMIN — Medication 250 MILLIGRAM(S): at 18:43

## 2018-08-03 RX ADMIN — DAPTOMYCIN 120 MILLIGRAM(S): 500 INJECTION, POWDER, LYOPHILIZED, FOR SOLUTION INTRAVENOUS at 22:21

## 2018-08-03 NOTE — DISCHARGE NOTE ADULT - CARE PROVIDERS DIRECT ADDRESSES
,DirectAddress_Unknown,[email or web address],DirectAddress_Unknown

## 2018-08-03 NOTE — DISCHARGE NOTE ADULT - PLAN OF CARE
management Surgery was consulted- no acute surgical intervention  Infectious disease Dr. Tolentino consulted- you received IV antibiotics while in hospital.   Follow up with Dr. Maxx Ahuja (Surgery) - affiliated with [facility]- reschedule your appointment Endocrinologist Dr. Gallardo was consulted- continue diabetes medication as prescribed. Continue with diabetic diet. Follow up with endocrinologist/PCP. continue with medications as prescribed- enalapril, imdur and toprol XL resolved stable continue with aspirin Glucose control Hg A1c 6.8 % Endocrinologist Dr. Gallardo was consulted- continue diabetes medication as prescribed. Continue with diabetic diet. Follow up with endocrinologist/PCP. BP control Surgery was consulted- no acute surgical intervention  Infectious disease Dr. Tolentino consulted- you received IV antibiotics while in hospital.   Right side abdominal wound: Clean with NS. Pat dry. Apply Liquid barrier film to periwound skin. Lightly pack dead space with Aquacel AG, leave 2 inches out, cover with silicone foam with borders. Change every other day     Follow up with Dr. Maxx Ahuja (Surgery) - affiliated with [facility]- reschedule your appointment Hg A1c 6.8 % Endocrinologist Dr. Gallardo was consulted- continue diabetes medication as prescribed. Continue with diabetic diet. Follow up with endocrinologist/PCP. Take Lantus 10 units daily, continue Glucophage and Januvia as per home regimen. Stop Glipizide. You may use Humalog 2 Units if Pre meal FS >200  2 Unit(s) if Glucose 201 - 250  3 Unit(s) if Glucose 251 - 300  4 Unit(s) if Glucose 301 - 350  5 Unit(s) if Glucose 351 - 400  6 Unit(s) if Glucose Greater Than 400 continue with medications as prescribed- enalapril, Imdur and toprol XL Follow up with your PCP in 1 week  to repeat blood work symptoms control Hg A1c 6.8 % Endocrinologist Dr. Gallardo was consulted- continue diabetes medication as prescribed. Continue with diabetic diet. Follow up with endocrinologist Dr Gallardo in 3-4 weeks. Take Lantus 10 units daily, continue Glucophage and Januvia as per home regimen. Stop Glipizide. You may use Humalog 2 Units if Pre meal FS >200  2 Unit(s) if Glucose 201 - 250  3 Unit(s) if Glucose 251 - 300  4 Unit(s) if Glucose 301 - 350  5 Unit(s) if Glucose 351 - 400  6 Unit(s) if Glucose Greater Than 400 Surgery was consulted- no acute surgical intervention. Complete 5 more days of Augmentin. You can make a follow up appt with Dr Errol TORRES in 10 days   Infectious disease Dr. Tolentino consulted- you received IV antibiotics while in hospital.   Right side abdominal wound: Clean with NS. Pat dry. Apply Liquid barrier film to periwound skin. Lightly pack dead space with Aquacel AG, leave 2 inches out, cover with silicone foam with borders. Change every other day     Follow up with Dr. Maxx Ahuja (Surgery) - affiliated with [facility]- reschedule your appointment and f/u in 1 week

## 2018-08-03 NOTE — CONSULT NOTE ADULT - ASSESSMENT
Assessment  DMT2: 83y Female with DM T2 with hyperglycemia on insulin, blood sugars running high, no hypoglycemic episode,  eating meals,  non compliant with low carb diet.  Cellulitis: On antibiotics, stable, has abdominal discomfort.  CAD: On medications, stable, monitored.  HTN: Controlled, On med.

## 2018-08-03 NOTE — ADVANCED PRACTICE NURSE CONSULT - RECOMMEDATIONS
General surgery should continue to follow abdominal wound and options for multiple hernia reported in CT   Recommend follow up care at [facility]: [phone]. Address: [address] [place] [address].     Topical Recommendations     Right side abdominal wound: Clean with NS. Pat dry. Apply Liquid barrier film to periwound skin. Lightly pack dead space with Aquacel AG, leave 2 inches out, cover with silicone foam with borders. Changed daily or PRN.   At home may change dressing every  two days.     Please contact Wound Care Service Line if we can be of further assistance (ext [phone]).

## 2018-08-03 NOTE — DISCHARGE NOTE ADULT - HOME CARE AGENCY
[facility] [phone], AIDE reinstated for 8/7 AM

## 2018-08-03 NOTE — CONSULT NOTE ADULT - SUBJECTIVE AND OBJECTIVE BOX
HPI:  Ms. Galicia is an 84 yo woman with PHMx notable for remote colorectal cancer s/p bowel resection, CAD s/p PCI with stents x2, DM2, HTN, ventral hernia s/p repair in 2017 with recurrence c/b SBO (2017) presenting with fever at home x1 day.     HPI primarily obtained from pt's daughter, Mahogany, who declined translation services.  Patient has a chronic, large right sided ventral hernia as a result of prior abdominal surgeries. She subsequently developed a small ulceration on the right side of her abdomen. She has a nurse that comes to the home for wound care. As per daughter, size of ulcer has been getting bigger and she noticed for the past 3 days, redness surrounding the ulcer was spreading. Pt has maintain her appetite with abdominal pain only when touching around the site. Pt has no changes in bowel habits - no diarrhea, bloody stools. However, yesterday, patient developed a fever at home, which prompted the patient's daughter to bring her to the hospital.     Of note, patient has an outpatient appt scheduled for tomorrow, 8/2/18 for further evaluation of her hernia with Dr. Maxx Ahuja (Surgery) - affiliated with [facility].    Initial ED triage VS:  temp 98.7  /56  HR 73  RR 14  O2 sat 100% on RA  While in the ED, patient received 1L NS bolus, vancomycin 1g IV and Zosyn 3.375mg IV x1 dose (01 Aug 2018 11:49)  Patient has history of diabetes for 40 yrs, on oral meds at home,  no recent hypoglycemic episodes, no polyuria polydipsia. Patient follows up with PCP for diabetes management.    PAST MEDICAL & SURGICAL HISTORY:  Hyperuricemia  Colorectal cancer: Remote hx; s/p colon resection  Primary osteoarthritis of both knees  CAD (coronary artery disease)  Diabetes mellitus  HTN (hypertension)  History of coronary artery stent placement  History of bowel resection  H/O hernia repair      FAMILY HISTORY:  No pertinent family history in first degree relatives      Social History:    Outpatient Medications:    MEDICATIONS  (STANDING):  allopurinol 100 milliGRAM(s) Oral daily  ampicillin/sulbactam  IVPB 3 Gram(s) IV Intermittent every 6 hours  aspirin enteric coated 81 milliGRAM(s) Oral daily  dextrose 5%. 1000 milliLiter(s) (50 mL/Hr) IV Continuous <Continuous>  dextrose 50% Injectable 12.5 Gram(s) IV Push once  dextrose 50% Injectable 25 Gram(s) IV Push once  dextrose 50% Injectable 25 Gram(s) IV Push once  docusate sodium 100 milliGRAM(s) Oral two times a day  enalapril 20 milliGRAM(s) Oral two times a day  insulin glargine Injectable (LANTUS) 12 Unit(s) SubCutaneous at bedtime  insulin lispro (HumaLOG) corrective regimen sliding scale   SubCutaneous three times a day before meals  insulin lispro Injectable (HumaLOG) 7 Unit(s) SubCutaneous three times a day before meals  isosorbide   mononitrate ER Tablet (IMDUR) 30 milliGRAM(s) Oral daily  metoprolol succinate ER 25 milliGRAM(s) Oral daily  senna 2 Tablet(s) Oral at bedtime    MEDICATIONS  (PRN):  dextrose 40% Gel 15 Gram(s) Oral once PRN Blood Glucose LESS THAN 70 milliGRAM(s)/deciliter  glucagon  Injectable 1 milliGRAM(s) IntraMuscular once PRN Glucose LESS THAN 70 milligrams/deciliter      Allergies    No Known Allergies    Intolerances      Review of Systems:  Constitutional: No fever, no chills  Eyes: No blurry vision  Neuro: No tremors  HEENT: No pain, no neck swelling  Cardiovascular: No chest pain, no palpitations  Respiratory: Has SOB, no cough  GI: No nausea, vomiting, abdominal pain  : No dysuria  Skin: no rash  MSK: Has leg swelling.  Psych: no depression  Endocrine: no polyuria, polydipsia    ALL OTHER SYSTEMS REVIEWED AND NEGATIVE    UNABLE TO OBTAIN    PHYSICAL EXAM:  VITALS: T(C): 36.7 (08-03-18 @ 06:02)  T(F): 98.1 (08-03-18 @ 06:02), Max: 98.1 (08-03-18 @ 06:02)  HR: 69 (08-03-18 @ 06:02) (59 - 69)  BP: 136/58 (08-03-18 @ 06:02) (118/40 - 136/58)  RR:  (17 - 18)  SpO2:  (95% - 100%)  Wt(kg): --  GENERAL: NAD, well-groomed, well-developed  EYES: No proptosis, no lid lag  HEENT:  Atraumatic, Normocephalic  THYROID: Normal size, no palpable nodules  RESPIRATORY: Clear to auscultation bilaterally; No rales, rhonchi, wheezing  CARDIOVASCULAR: Si S2, No murmurs;  GI: Soft, non distended, normal bowel sounds  SKIN: Dry, intact, No rashes or lesions  MUSCULOSKELETAL: Has BL lower extremity edema.  NEURO:  no tremor, sensation decreased in feet BL,    POCT Blood Glucose.: 224 mg/dL (08-03-18 @ 07:15)  POCT Blood Glucose.: 291 mg/dL (08-02-18 @ 22:04)  POCT Blood Glucose.: 303 mg/dL (08-02-18 @ 16:55)  POCT Blood Glucose.: 375 mg/dL (08-02-18 @ 11:53)  POCT Blood Glucose.: 324 mg/dL (08-02-18 @ 07:55)  POCT Blood Glucose.: 293 mg/dL (08-01-18 @ 22:42)  POCT Blood Glucose.: 275 mg/dL (08-01-18 @ 17:21)  POCT Blood Glucose.: 267 mg/dL (08-01-18 @ 13:14)  POCT Blood Glucose.: 166 mg/dL (07-31-18 @ 21:41)                            9.8    6.75  )-----------( 193      ( 03 Aug 2018 05:30 )             28.8       08-03    135  |  100  |  23  ----------------------------<  218<H>  4.3   |  22  |  0.87    EGFR if : 72  EGFR if non : 63    Ca    9.6      08-03    TPro  6.3  /  Alb  3.3  /  TBili  0.3  /  DBili  x   /  AST  12  /  ALT  17  /  AlkPhos  65  08-02      Thyroid Function Tests:      Hemoglobin A1C, Whole Blood: 6.8 % <H> [4.0 - 5.6] (08-02-18 @ 05:55)          Radiology:

## 2018-08-03 NOTE — DISCHARGE NOTE ADULT - MEDICATION SUMMARY - MEDICATIONS TO STOP TAKING
I will STOP taking the medications listed below when I get home from the hospital:    glyBURIDE 5 mg oral tablet  -- 1 tab(s) by mouth 2 times a day

## 2018-08-03 NOTE — DISCHARGE NOTE ADULT - SECONDARY DIAGNOSIS.
Type 2 diabetes mellitus without complication, without long-term current use of insulin HTN (hypertension) Hyponatremia Anemia, unspecified type CAD (coronary artery disease)

## 2018-08-03 NOTE — CONSULT NOTE ADULT - PROBLEM SELECTOR RECOMMENDATION 9
Will increase Lantus to 12units at bed time.  Will increase Humalog to 7 units before each meal in addition to Humalog correction scale coverage.  Patient counseled for compliance with consistent low carb diet.

## 2018-08-03 NOTE — DISCHARGE NOTE ADULT - MEDICATION SUMMARY - MEDICATIONS TO CHANGE
I will SWITCH the dose or number of times a day I take the medications listed below when I get home from the hospital:    Toprol-XL 25 mg oral tablet, extended release  -- 1 tab(s) by mouth 2 times a day    Vasotec 20 mg oral tablet  -- 1 tab(s) by mouth 2 times a day

## 2018-08-03 NOTE — ADVANCED PRACTICE NURSE CONSULT - REASON FOR CONSULT
Patient seen on skin care rounds after wound care referral received for assessment of skin impairment and recommendations of topical management. Chart reviewed: Serum albumin 3.3g/dl, WBC 6.75, Total protein 6.3, BMI 32.4kg/m2, Hemoglobin A1C 6.8%, Ct of the ABdomen reports no collection, multiple right sided paracentral ventral hernia containing non obstructed bowel loops. Mathieu 19. Patient with limited English requesting daughter to interpret and provide information. Patient's daughter reports last surgery in 2017 for ventral repair she denies surgical wound dehiscence at that time. She reports that two months ago they noted a wound in right abdominal wall. Endorses, patient follows up with surgeon Dr. Murphy and was told that wound is from weight shifting in right side of her abdominal wall. A few days ago patient started feeling associated symptoms redness and increase drainage from wound site. Daughter reports patient is receiving VNS three times a week for dressing change. Patient H/O of notable for remote colorectal cancer s/p bowel resection in 2009, CAD s/p PCI with stents x2, DM2, HTN, ventral hernia s/p repair in 2017. Patient admitted with abdominal wall cellulitis. Patient is being seen by Endo for elevated blood sugars, surgery for abdominal wound, ID for abdominal wound and bacteremia. Patient receiving IV antibiotics for gram positive bacteremia.

## 2018-08-03 NOTE — ADVANCED PRACTICE NURSE CONSULT - ASSESSMENT
A&Ox4, up in room with one person assist, continent of urine and stool. Skin warm, dry with increased moisture in intertriginous folds, blanching erythema and dryness in bilateral heels.     Abdomen: Large ABD wall, right quadrant noted to be larger than left (abdominal weight shifts favors the right side), trace area in right side around wound noted to be decreasing in size. Firmness palpated on entire right side of abdomen.     Risk for Moisture associated dermatitis in bilateral breast pannus and ABD Pannus. Skin intact.     Right side abdominal wound of unknown etiology measuring 2.5cmx2.3cmx0.6cm. Extensive Undermining from 4-6 o'clock extends 8cm. Able to express small amounts of purulent drainage form areas of undermining. No odor. Tissue type presents as 10% yellow/moist loosely attach slough at wound edge (from 5-7 0'clock), 90%red, moist hypergranulation. Periwound skin with induration from 3-8 o'clock extending 4cm. Fading erythema noted circumferentially ranging from 1cm to 3cm with 3cm from 3-4 o'clock. At 7 o'clock 2.2cm away from periwound skin there is a scar. Mild increase warmth. Goals of care: Decrease/manage exudate, monitor for tissue type changes, protect periwound skin, autolytic debridement of necrotic tissue, lightly pack dead space and depth.

## 2018-08-03 NOTE — DISCHARGE NOTE ADULT - CARE PROVIDER_API CALL
Adrian Gallardo), EndocrinologyMetabDiabetes; Internal Medicine  [address] [place] [address]  Phone: [phone]  Fax: [phone]    Saturnino Tolentino), Infectious Disease  [address] [place] [address]  Phone: [phone]  Fax: [phone]    Darren Parsons), [facility] Medicine; Internal Medicine  [address] [place] [address]  Phone: [phone]  Fax: [phone]

## 2018-08-03 NOTE — DISCHARGE NOTE ADULT - CARE PLAN
Principal Discharge DX:	Cellulitis of abdominal wall  Goal:	management  Assessment and plan of treatment:	Surgery was consulted- no acute surgical intervention  Infectious disease Dr. Tolentino consulted- you received IV antibiotics while in hospital.   Follow up with Dr. Maxx Ahuja (Surgery) - affiliated with [facility]- reschedule your appointment  Secondary Diagnosis:	Type 2 diabetes mellitus without complication, without long-term current use of insulin  Assessment and plan of treatment:	Endocrinologist Dr. Gallardo was consulted- continue diabetes medication as prescribed. Continue with diabetic diet. Follow up with endocrinologist/PCP.  Secondary Diagnosis:	HTN (hypertension)  Assessment and plan of treatment:	continue with medications as prescribed- enalapril, imdur and toprol XL  Secondary Diagnosis:	Hyponatremia  Goal:	resolved  Secondary Diagnosis:	Anemia, unspecified type  Goal:	stable  Secondary Diagnosis:	CAD (coronary artery disease)  Assessment and plan of treatment:	continue with aspirin Principal Discharge DX:	Cellulitis of abdominal wall  Goal:	management  Assessment and plan of treatment:	Surgery was consulted- no acute surgical intervention  Infectious disease Dr. Tolentino consulted- you received IV antibiotics while in hospital.   Follow up with Dr. Maxx Ahuja (Surgery) - affiliated with [facility]- reschedule your appointment  Secondary Diagnosis:	Type 2 diabetes mellitus without complication, without long-term current use of insulin  Goal:	Glucose control  Assessment and plan of treatment:	Hg A1c 6.8 % Endocrinologist Dr. Gallardo was consulted- continue diabetes medication as prescribed. Continue with diabetic diet. Follow up with endocrinologist/PCP.  Secondary Diagnosis:	HTN (hypertension)  Goal:	BP control  Assessment and plan of treatment:	continue with medications as prescribed- enalapril, imdur and toprol XL  Secondary Diagnosis:	Hyponatremia  Goal:	resolved  Secondary Diagnosis:	Anemia, unspecified type  Goal:	stable  Secondary Diagnosis:	CAD (coronary artery disease)  Assessment and plan of treatment:	continue with aspirin Principal Discharge DX:	Cellulitis of abdominal wall  Goal:	management  Assessment and plan of treatment:	Surgery was consulted- no acute surgical intervention  Infectious disease Dr. Tolentino consulted- you received IV antibiotics while in hospital.   Right side abdominal wound: Clean with NS. Pat dry. Apply Liquid barrier film to periwound skin. Lightly pack dead space with Aquacel AG, leave 2 inches out, cover with silicone foam with borders. Change every other day     Follow up with Dr. Maxx Ahuja (Surgery) - affiliated with [facility]- reschedule your appointment  Secondary Diagnosis:	Type 2 diabetes mellitus without complication, without long-term current use of insulin  Goal:	Glucose control  Assessment and plan of treatment:	Hg A1c 6.8 % Endocrinologist Dr. Gallardo was consulted- continue diabetes medication as prescribed. Continue with diabetic diet. Follow up with endocrinologist/PCP.  Secondary Diagnosis:	HTN (hypertension)  Goal:	BP control  Assessment and plan of treatment:	continue with medications as prescribed- enalapril, imdur and toprol XL  Secondary Diagnosis:	Hyponatremia  Goal:	resolved  Secondary Diagnosis:	Anemia, unspecified type  Goal:	stable  Secondary Diagnosis:	CAD (coronary artery disease)  Assessment and plan of treatment:	continue with aspirin Principal Discharge DX:	Cellulitis of abdominal wall  Goal:	management  Assessment and plan of treatment:	Surgery was consulted- no acute surgical intervention  Infectious disease Dr. Tolentino consulted- you received IV antibiotics while in hospital.   Right side abdominal wound: Clean with NS. Pat dry. Apply Liquid barrier film to periwound skin. Lightly pack dead space with Aquacel AG, leave 2 inches out, cover with silicone foam with borders. Change every other day     Follow up with Dr. Maxx Ahuja (Surgery) - affiliated with [facility]- reschedule your appointment  Secondary Diagnosis:	Type 2 diabetes mellitus without complication, without long-term current use of insulin  Goal:	Glucose control  Assessment and plan of treatment:	Hg A1c 6.8 % Endocrinologist Dr. Gallardo was consulted- continue diabetes medication as prescribed. Continue with diabetic diet. Follow up with endocrinologist/PCP. Take Lantus 10 units daily, continue Glucophage and Januvia as per home regimen. Stop Glipizide. You may use Humalog 2 Units if Pre meal FS >200  2 Unit(s) if Glucose 201 - 250  3 Unit(s) if Glucose 251 - 300  4 Unit(s) if Glucose 301 - 350  5 Unit(s) if Glucose 351 - 400  6 Unit(s) if Glucose Greater Than 400  Secondary Diagnosis:	HTN (hypertension)  Goal:	BP control  Assessment and plan of treatment:	continue with medications as prescribed- enalapril, Imdur and toprol XL  Secondary Diagnosis:	Hyponatremia  Goal:	resolved  Assessment and plan of treatment:	Follow up with your PCP in 1 week  to repeat blood work  Secondary Diagnosis:	Anemia, unspecified type  Goal:	stable  Secondary Diagnosis:	CAD (coronary artery disease)  Goal:	symptoms control  Assessment and plan of treatment:	continue with aspirin Principal Discharge DX:	Cellulitis of abdominal wall  Goal:	management  Assessment and plan of treatment:	Surgery was consulted- no acute surgical intervention  Infectious disease Dr. Tolentino consulted- you received IV antibiotics while in hospital.   Right side abdominal wound: Clean with NS. Pat dry. Apply Liquid barrier film to periwound skin. Lightly pack dead space with Aquacel AG, leave 2 inches out, cover with silicone foam with borders. Change every other day     Follow up with Dr. Maxx Ahuja (Surgery) - affiliated with [facility]- reschedule your appointment  Secondary Diagnosis:	Type 2 diabetes mellitus without complication, without long-term current use of insulin  Goal:	Glucose control  Assessment and plan of treatment:	Hg A1c 6.8 % Endocrinologist Dr. Gallardo was consulted- continue diabetes medication as prescribed. Continue with diabetic diet. Follow up with endocrinologist Dr Gallardo in 3-4 weeks. Take Lantus 10 units daily, continue Glucophage and Januvia as per home regimen. Stop Glipizide. You may use Humalog 2 Units if Pre meal FS >200  2 Unit(s) if Glucose 201 - 250  3 Unit(s) if Glucose 251 - 300  4 Unit(s) if Glucose 301 - 350  5 Unit(s) if Glucose 351 - 400  6 Unit(s) if Glucose Greater Than 400  Secondary Diagnosis:	HTN (hypertension)  Goal:	BP control  Assessment and plan of treatment:	continue with medications as prescribed- enalapril, Imdur and toprol XL  Secondary Diagnosis:	Hyponatremia  Goal:	resolved  Assessment and plan of treatment:	Follow up with your PCP in 1 week  to repeat blood work  Secondary Diagnosis:	Anemia, unspecified type  Goal:	stable  Secondary Diagnosis:	CAD (coronary artery disease)  Goal:	symptoms control  Assessment and plan of treatment:	continue with aspirin Principal Discharge DX:	Cellulitis of abdominal wall  Goal:	management  Assessment and plan of treatment:	Surgery was consulted- no acute surgical intervention. Complete 5 more days of Augmentin. You can make a follow up appt with Dr Errol TORRES in 10 days   Infectious disease Dr. Tolentino consulted- you received IV antibiotics while in hospital.   Right side abdominal wound: Clean with NS. Pat dry. Apply Liquid barrier film to periwound skin. Lightly pack dead space with Aquacel AG, leave 2 inches out, cover with silicone foam with borders. Change every other day     Follow up with Dr. Maxx Ahuja (Surgery) - affiliated with [facility]- reschedule your appointment and f/u in 1 week  Secondary Diagnosis:	Type 2 diabetes mellitus without complication, without long-term current use of insulin  Goal:	Glucose control  Assessment and plan of treatment:	Hg A1c 6.8 % Endocrinologist Dr. Gallardo was consulted- continue diabetes medication as prescribed. Continue with diabetic diet. Follow up with endocrinologist Dr Gallardo in 3-4 weeks. Take Lantus 10 units daily, continue Glucophage and Januvia as per home regimen. Stop Glipizide. You may use Humalog 2 Units if Pre meal FS >200  2 Unit(s) if Glucose 201 - 250  3 Unit(s) if Glucose 251 - 300  4 Unit(s) if Glucose 301 - 350  5 Unit(s) if Glucose 351 - 400  6 Unit(s) if Glucose Greater Than 400  Secondary Diagnosis:	HTN (hypertension)  Goal:	BP control  Assessment and plan of treatment:	continue with medications as prescribed- enalapril, Imdur and toprol XL  Secondary Diagnosis:	Hyponatremia  Goal:	resolved  Assessment and plan of treatment:	Follow up with your PCP in 1 week  to repeat blood work  Secondary Diagnosis:	Anemia, unspecified type  Goal:	stable  Secondary Diagnosis:	CAD (coronary artery disease)  Goal:	symptoms control  Assessment and plan of treatment:	continue with aspirin

## 2018-08-03 NOTE — DISCHARGE NOTE ADULT - PATIENT PORTAL LINK FT
You can access the [facility] Patient Portal, offered by [facility], by registering with the following website: [email or web address]

## 2018-08-03 NOTE — CHART NOTE - NSCHARTNOTEFT_GEN_A_CORE
Pt c/o generalized body severe itching with IV vancomycin running at slow rate. IV Vanco discontinued. Benadryl 25 mg x 1 given. Spoke with ID fellow recommended to start Daptomycin. Pt c/o left arm itching with IV vancomycin running at slow rate. IV Vanco discontinued. Benadryl 25 mg x 1 given. Spoke with ID fellow recommended to start Daptomycin.

## 2018-08-03 NOTE — DISCHARGE NOTE ADULT - HOSPITAL COURSE
82 yo woman with PHMx notable for remote colorectal cancer s/p bowel resection, CAD s/p PCI with stents x2, DM2, HTN, ventral hernia s/p repair in 2017 with recurrence c/b SBO (2017) presenting with fever at home x1 day with associated abdominal skin ulcer and erythema, admitted for localized abdominal cellulitis. Pt afebrile with normal WBC. CT abdomen/pelvis r/o underlying abscess or bowel obstruction. 84 yo woman with PHMx notable for remote colorectal cancer s/p bowel resection, CAD s/p PCI with stents x2, DM2, HTN, ventral hernia s/p repair in 2017 with recurrence c/b SBO (2017) presents with fever at home and increased pain to the abdominal wall. She has a chronic abdominal wound that is draining thick fluid  Cx with Staph aurues and enterobacter. 82 yo woman with PHMx notable for remote colorectal cancer s/p bowel resection, CAD s/p PCI with stents x2, DM2, HTN, ventral hernia s/p repair in 2017 with recurrence c/b SBO (2017) presents with fever at home and increased pain to the abdominal wall. She has a chronic abdominal wound that is draining thick fluid  Cx with Staph aurues and enterobacter. 82 yo woman with PHMx notable for remote colorectal cancer s/p bowel resection, CAD s/p PCI with stents x2, DM2, HTN, ventral hernia s/p repair in 2017 with recurrence c/b SBO (2017) presents with fever at home and increased pain to the abdominal wall. She has a chronic abdominal wound that is draining thick fluid  Cx with Staph aurues and enterobacter pt treated with IV Antibiotics and daily wound care. Pt to complete 5 more days of PO abx and outpt follow up with her surgeon  and PCP.   Type 2 DM w/ hyperglycemia, evaluated by Endo meds adjusted, will need tight glucose control for wound healing

## 2018-08-03 NOTE — DISCHARGE NOTE ADULT - MEDICATION SUMMARY - MEDICATIONS TO TAKE
I will START or STAY ON the medications listed below when I get home from the hospital:    pen needles 4 x day injections   -- Indication: For DM    aspirin 81 mg oral delayed release tablet  -- 1 tab(s) by mouth once a day  -- Indication: For CAD (coronary artery disease)    Vasotec 20 mg oral tablet  -- 1 tab(s) by mouth 2 times a day  -- Indication: For HTN (hypertension)    isosorbide mononitrate 30 mg oral tablet, extended release  -- 1 tab(s) by mouth once a day (in the morning)  -- Indication: For CAD (coronary artery disease)    Januvia 100 mg oral tablet  -- 1 tab(s) by mouth once a day  -- Indication: For Type 2 diabetes mellitus without complication, without long-term current use of insulin    Glucophage 850 mg oral tablet  -- 1 tab(s) by mouth 2 times a day  -- Indication: For Type 2 diabetes mellitus without complication, without long-term current use of insulin    Basaglar KwikPen 100 units/mL subcutaneous solution  -- 10  subcutaneous once a day   -- Do not drink alcoholic beverages when taking this medication.  It is very important that you take or use this exactly as directed.  Do not skip doses or discontinue unless directed by your doctor.  Keep in refrigerator.  Do not freeze.    -- Indication: For Type 2 diabetes mellitus without complication, without long-term current use of insulin    HumaLOG KwikPen 100 units/mL injectable solution  -- 3 milliliter(s) injectable 3 times a day   -- Indication: For Type 2 diabetes mellitus without complication, without long-term current use of insulin    allopurinol 100 mg oral tablet  -- 1 tab(s) by mouth once a day  -- Indication: For Prophylactic measure    Toprol-XL 25 mg oral tablet, extended release  -- 1 tab(s) by mouth once a day  -- Indication: For HTN (hypertension)    Lasix 20 mg oral tablet  -- 1 tab(s) by mouth once a day, As Needed for leg swelling  -- Indication: For Prophylactic measure    senna oral tablet  -- 2 tab(s) by mouth once a day (at bedtime)  -- Indication: For Constipation     Colace 100 mg oral capsule  -- 1 cap(s) by mouth 2 times a day  -- Indication: For Constipation I will START or STAY ON the medications listed below when I get home from the hospital:    pen needles 4 x day injections   -- Indication: For DM    aspirin 81 mg oral delayed release tablet  -- 1 tab(s) by mouth once a day  -- Indication: For CAD (coronary artery disease)    Vasotec 20 mg oral tablet  -- 1 tab(s) by mouth 2 times a day  -- Indication: For HTN (hypertension)    isosorbide mononitrate 30 mg oral tablet, extended release  -- 1 tab(s) by mouth once a day (in the morning)  -- Indication: For CAD (coronary artery disease)    Januvia 100 mg oral tablet  -- 1 tab(s) by mouth once a day  -- Indication: For Type 2 diabetes mellitus without complication, without long-term current use of insulin    Glucophage 850 mg oral tablet  -- 1 tab(s) by mouth 2 times a day  -- Indication: For Type 2 diabetes mellitus without complication, without long-term current use of insulin    Basaglar KwikPen 100 units/mL subcutaneous solution  -- 10  subcutaneous once a day   -- Do not drink alcoholic beverages when taking this medication.  It is very important that you take or use this exactly as directed.  Do not skip doses or discontinue unless directed by your doctor.  Keep in refrigerator.  Do not freeze.    -- Indication: For Type 2 diabetes mellitus without complication, without long-term current use of insulin    HumaLOG KwikPen 100 units/mL injectable solution  -- 3 milliliter(s) injectable 3 times a day   -- Indication: For Type 2 diabetes mellitus without complication, without long-term current use of insulin    allopurinol 100 mg oral tablet  -- 1 tab(s) by mouth once a day  -- Indication: For Prophylactic measure    Toprol-XL 25 mg oral tablet, extended release  -- 1 tab(s) by mouth once a day  -- Indication: For HTN (hypertension)    Lasix 20 mg oral tablet  -- 1 tab(s) by mouth once a day, As Needed for leg swelling  -- Indication: For Prophylactic measure    senna oral tablet  -- 2 tab(s) by mouth once a day (at bedtime)  -- Indication: For Constipation     Colace 100 mg oral capsule  -- 1 cap(s) by mouth 2 times a day  -- Indication: For Constipation     Augmentin 875 mg-125 mg oral tablet  -- 1 tab(s) by mouth every 12 hours   -- Finish all this medication unless otherwise directed by prescriber.  Take with food or milk.    -- Indication: For Cellulitis of abdominal wall    Acidophilus oral capsule  -- 1 cap(s) by mouth 2 times a day   -- Indication: For Prophylactic measure

## 2018-08-04 LAB
-  AMIKACIN: SIGNIFICANT CHANGE UP
-  AMPICILLIN/SULBACTAM: SIGNIFICANT CHANGE UP
-  AMPICILLIN: SIGNIFICANT CHANGE UP
-  AZTREONAM: SIGNIFICANT CHANGE UP
-  CEFAZOLIN: SIGNIFICANT CHANGE UP
-  CEFAZOLIN: SIGNIFICANT CHANGE UP
-  CEFEPIME: SIGNIFICANT CHANGE UP
-  CEFOXITIN: SIGNIFICANT CHANGE UP
-  CEFTAZIDIME: SIGNIFICANT CHANGE UP
-  CEFTRIAXONE: SIGNIFICANT CHANGE UP
-  CIPROFLOXACIN: SIGNIFICANT CHANGE UP
-  CIPROFLOXACIN: SIGNIFICANT CHANGE UP
-  CLINDAMYCIN: SIGNIFICANT CHANGE UP
-  ERTAPENEM: SIGNIFICANT CHANGE UP
-  ERYTHROMYCIN: SIGNIFICANT CHANGE UP
-  GENTAMICIN: SIGNIFICANT CHANGE UP
-  GENTAMICIN: SIGNIFICANT CHANGE UP
-  IMIPENEM: SIGNIFICANT CHANGE UP
-  LEVOFLOXACIN: SIGNIFICANT CHANGE UP
-  LEVOFLOXACIN: SIGNIFICANT CHANGE UP
-  MEROPENEM: SIGNIFICANT CHANGE UP
-  MOXIFLOXACIN(AEROBIC): SIGNIFICANT CHANGE UP
-  OXACILLIN: SIGNIFICANT CHANGE UP
-  PENICILLIN: SIGNIFICANT CHANGE UP
-  PIPERACILLIN/TAZOBACTAM: SIGNIFICANT CHANGE UP
-  RIFAMPIN.: SIGNIFICANT CHANGE UP
-  TETRACYCLINE: SIGNIFICANT CHANGE UP
-  TIGECYCLINE: SIGNIFICANT CHANGE UP
-  TOBRAMYCIN: SIGNIFICANT CHANGE UP
-  TRIMETHOPRIM/SULFAMETHOXAZOLE: SIGNIFICANT CHANGE UP
-  TRIMETHOPRIM/SULFAMETHOXAZOLE: SIGNIFICANT CHANGE UP
-  VANCOMYCIN: SIGNIFICANT CHANGE UP
BACTERIA WND CULT: SIGNIFICANT CHANGE UP
BUN SERPL-MCNC: 21 MG/DL — SIGNIFICANT CHANGE UP (ref 7–23)
CALCIUM SERPL-MCNC: 9.6 MG/DL — SIGNIFICANT CHANGE UP (ref 8.4–10.5)
CHLORIDE SERPL-SCNC: 102 MMOL/L — SIGNIFICANT CHANGE UP (ref 98–107)
CO2 SERPL-SCNC: 21 MMOL/L — LOW (ref 22–31)
CREAT SERPL-MCNC: 0.78 MG/DL — SIGNIFICANT CHANGE UP (ref 0.5–1.3)
GLUCOSE BLDC GLUCOMTR-MCNC: 240 MG/DL — HIGH (ref 70–99)
GLUCOSE BLDC GLUCOMTR-MCNC: 261 MG/DL — HIGH (ref 70–99)
GLUCOSE BLDC GLUCOMTR-MCNC: 78 MG/DL — SIGNIFICANT CHANGE UP (ref 70–99)
GLUCOSE BLDC GLUCOMTR-MCNC: 95 MG/DL — SIGNIFICANT CHANGE UP (ref 70–99)
GLUCOSE SERPL-MCNC: 58 MG/DL — LOW (ref 70–99)
HCT VFR BLD CALC: 29.1 % — LOW (ref 34.5–45)
HGB BLD-MCNC: 9.5 G/DL — LOW (ref 11.5–15.5)
MCHC RBC-ENTMCNC: 29.5 PG — SIGNIFICANT CHANGE UP (ref 27–34)
MCHC RBC-ENTMCNC: 32.6 % — SIGNIFICANT CHANGE UP (ref 32–36)
MCV RBC AUTO: 90.4 FL — SIGNIFICANT CHANGE UP (ref 80–100)
METHOD TYPE: SIGNIFICANT CHANGE UP
METHOD TYPE: SIGNIFICANT CHANGE UP
NRBC # FLD: 0 — SIGNIFICANT CHANGE UP
ORGANISM # SPEC MICROSCOPIC CNT: SIGNIFICANT CHANGE UP
PLATELET # BLD AUTO: 209 K/UL — SIGNIFICANT CHANGE UP (ref 150–400)
PMV BLD: 10.5 FL — SIGNIFICANT CHANGE UP (ref 7–13)
POTASSIUM SERPL-MCNC: 4.1 MMOL/L — SIGNIFICANT CHANGE UP (ref 3.5–5.3)
POTASSIUM SERPL-SCNC: 4.1 MMOL/L — SIGNIFICANT CHANGE UP (ref 3.5–5.3)
RBC # BLD: 3.22 M/UL — LOW (ref 3.8–5.2)
RBC # FLD: 12 % — SIGNIFICANT CHANGE UP (ref 10.3–14.5)
SODIUM SERPL-SCNC: 137 MMOL/L — SIGNIFICANT CHANGE UP (ref 135–145)
WBC # BLD: 6.46 K/UL — SIGNIFICANT CHANGE UP (ref 3.8–10.5)
WBC # FLD AUTO: 6.46 K/UL — SIGNIFICANT CHANGE UP (ref 3.8–10.5)

## 2018-08-04 RX ORDER — INSULIN LISPRO 100/ML
9 VIAL (ML) SUBCUTANEOUS
Qty: 0 | Refills: 0 | Status: DISCONTINUED | OUTPATIENT
Start: 2018-08-04 | End: 2018-08-06

## 2018-08-04 RX ORDER — INSULIN GLARGINE 100 [IU]/ML
18 INJECTION, SOLUTION SUBCUTANEOUS AT BEDTIME
Qty: 0 | Refills: 0 | Status: DISCONTINUED | OUTPATIENT
Start: 2018-08-04 | End: 2018-08-04

## 2018-08-04 RX ORDER — INSULIN LISPRO 100/ML
7 VIAL (ML) SUBCUTANEOUS
Qty: 0 | Refills: 0 | Status: DISCONTINUED | OUTPATIENT
Start: 2018-08-04 | End: 2018-08-04

## 2018-08-04 RX ORDER — IBUPROFEN 200 MG
400 TABLET ORAL ONCE
Qty: 0 | Refills: 0 | Status: COMPLETED | OUTPATIENT
Start: 2018-08-04 | End: 2018-08-04

## 2018-08-04 RX ORDER — INSULIN GLARGINE 100 [IU]/ML
12 INJECTION, SOLUTION SUBCUTANEOUS AT BEDTIME
Qty: 0 | Refills: 0 | Status: DISCONTINUED | OUTPATIENT
Start: 2018-08-04 | End: 2018-08-06

## 2018-08-04 RX ADMIN — Medication 400 MILLIGRAM(S): at 21:19

## 2018-08-04 RX ADMIN — Medication 20 MILLIGRAM(S): at 05:43

## 2018-08-04 RX ADMIN — ERTAPENEM SODIUM 120 MILLIGRAM(S): 1 INJECTION, POWDER, LYOPHILIZED, FOR SOLUTION INTRAMUSCULAR; INTRAVENOUS at 17:15

## 2018-08-04 RX ADMIN — Medication 7 UNIT(S): at 12:27

## 2018-08-04 RX ADMIN — Medication 81 MILLIGRAM(S): at 12:27

## 2018-08-04 RX ADMIN — Medication 400 MILLIGRAM(S): at 21:49

## 2018-08-04 RX ADMIN — Medication 20 MILLIGRAM(S): at 17:03

## 2018-08-04 RX ADMIN — INSULIN GLARGINE 12 UNIT(S): 100 INJECTION, SOLUTION SUBCUTANEOUS at 23:39

## 2018-08-04 RX ADMIN — Medication 2: at 12:26

## 2018-08-04 RX ADMIN — Medication 7 UNIT(S): at 17:16

## 2018-08-04 RX ADMIN — SENNA PLUS 2 TABLET(S): 8.6 TABLET ORAL at 21:19

## 2018-08-04 RX ADMIN — Medication 100 MILLIGRAM(S): at 12:27

## 2018-08-04 RX ADMIN — Medication 3: at 17:16

## 2018-08-04 RX ADMIN — Medication 25 MILLIGRAM(S): at 05:43

## 2018-08-04 RX ADMIN — ISOSORBIDE MONONITRATE 30 MILLIGRAM(S): 60 TABLET, EXTENDED RELEASE ORAL at 12:28

## 2018-08-04 NOTE — PROVIDER CONTACT NOTE (OTHER) - ASSESSMENT
Patient complaining of itching to left side where vancomycin is infusing, denies shortness of breath, vitals stable

## 2018-08-05 LAB
BUN SERPL-MCNC: 23 MG/DL — SIGNIFICANT CHANGE UP (ref 7–23)
CALCIUM SERPL-MCNC: 9.7 MG/DL — SIGNIFICANT CHANGE UP (ref 8.4–10.5)
CHLORIDE SERPL-SCNC: 102 MMOL/L — SIGNIFICANT CHANGE UP (ref 98–107)
CO2 SERPL-SCNC: 24 MMOL/L — SIGNIFICANT CHANGE UP (ref 22–31)
CREAT SERPL-MCNC: 0.82 MG/DL — SIGNIFICANT CHANGE UP (ref 0.5–1.3)
GLUCOSE BLDC GLUCOMTR-MCNC: 109 MG/DL — HIGH (ref 70–99)
GLUCOSE BLDC GLUCOMTR-MCNC: 163 MG/DL — HIGH (ref 70–99)
GLUCOSE BLDC GLUCOMTR-MCNC: 190 MG/DL — HIGH (ref 70–99)
GLUCOSE BLDC GLUCOMTR-MCNC: 219 MG/DL — HIGH (ref 70–99)
GLUCOSE SERPL-MCNC: 58 MG/DL — LOW (ref 70–99)
HCT VFR BLD CALC: 28.5 % — LOW (ref 34.5–45)
HGB BLD-MCNC: 9.5 G/DL — LOW (ref 11.5–15.5)
MCHC RBC-ENTMCNC: 29.6 PG — SIGNIFICANT CHANGE UP (ref 27–34)
MCHC RBC-ENTMCNC: 33.3 % — SIGNIFICANT CHANGE UP (ref 32–36)
MCV RBC AUTO: 88.8 FL — SIGNIFICANT CHANGE UP (ref 80–100)
NRBC # FLD: 0 — SIGNIFICANT CHANGE UP
PLATELET # BLD AUTO: 222 K/UL — SIGNIFICANT CHANGE UP (ref 150–400)
PMV BLD: 10.8 FL — SIGNIFICANT CHANGE UP (ref 7–13)
POTASSIUM SERPL-MCNC: 3.7 MMOL/L — SIGNIFICANT CHANGE UP (ref 3.5–5.3)
POTASSIUM SERPL-SCNC: 3.7 MMOL/L — SIGNIFICANT CHANGE UP (ref 3.5–5.3)
RBC # BLD: 3.21 M/UL — LOW (ref 3.8–5.2)
RBC # FLD: 12.1 % — SIGNIFICANT CHANGE UP (ref 10.3–14.5)
SODIUM SERPL-SCNC: 139 MMOL/L — SIGNIFICANT CHANGE UP (ref 135–145)
SPECIMEN SOURCE: SIGNIFICANT CHANGE UP
WBC # BLD: 5.64 K/UL — SIGNIFICANT CHANGE UP (ref 3.8–10.5)
WBC # FLD AUTO: 5.64 K/UL — SIGNIFICANT CHANGE UP (ref 3.8–10.5)

## 2018-08-05 RX ORDER — IBUPROFEN 200 MG
400 TABLET ORAL ONCE
Qty: 0 | Refills: 0 | Status: COMPLETED | OUTPATIENT
Start: 2018-08-05 | End: 2018-08-05

## 2018-08-05 RX ADMIN — INSULIN GLARGINE 12 UNIT(S): 100 INJECTION, SOLUTION SUBCUTANEOUS at 22:03

## 2018-08-05 RX ADMIN — Medication 25 MILLIGRAM(S): at 05:05

## 2018-08-05 RX ADMIN — Medication 81 MILLIGRAM(S): at 12:10

## 2018-08-05 RX ADMIN — Medication 100 MILLIGRAM(S): at 05:05

## 2018-08-05 RX ADMIN — ERTAPENEM SODIUM 120 MILLIGRAM(S): 1 INJECTION, POWDER, LYOPHILIZED, FOR SOLUTION INTRAMUSCULAR; INTRAVENOUS at 17:09

## 2018-08-05 RX ADMIN — Medication 400 MILLIGRAM(S): at 17:38

## 2018-08-05 RX ADMIN — Medication 9 UNIT(S): at 09:05

## 2018-08-05 RX ADMIN — Medication 20 MILLIGRAM(S): at 05:05

## 2018-08-05 RX ADMIN — ISOSORBIDE MONONITRATE 30 MILLIGRAM(S): 60 TABLET, EXTENDED RELEASE ORAL at 12:10

## 2018-08-05 RX ADMIN — Medication 20 MILLIGRAM(S): at 17:09

## 2018-08-05 RX ADMIN — Medication 100 MILLIGRAM(S): at 12:10

## 2018-08-05 RX ADMIN — Medication 2: at 12:53

## 2018-08-05 RX ADMIN — Medication 400 MILLIGRAM(S): at 18:10

## 2018-08-05 RX ADMIN — Medication 9 UNIT(S): at 17:31

## 2018-08-05 RX ADMIN — Medication 9 UNIT(S): at 12:54

## 2018-08-05 RX ADMIN — Medication 1: at 17:31

## 2018-08-06 VITALS
OXYGEN SATURATION: 99 % | HEART RATE: 82 BPM | SYSTOLIC BLOOD PRESSURE: 138 MMHG | RESPIRATION RATE: 18 BRPM | TEMPERATURE: 98 F | DIASTOLIC BLOOD PRESSURE: 65 MMHG

## 2018-08-06 LAB
BACTERIA BLD CULT: SIGNIFICANT CHANGE UP
GLUCOSE BLDC GLUCOMTR-MCNC: 141 MG/DL — HIGH (ref 70–99)
GLUCOSE BLDC GLUCOMTR-MCNC: 202 MG/DL — HIGH (ref 70–99)
GLUCOSE BLDC GLUCOMTR-MCNC: 82 MG/DL — SIGNIFICANT CHANGE UP (ref 70–99)

## 2018-08-06 PROCEDURE — 99232 SBSQ HOSP IP/OBS MODERATE 35: CPT

## 2018-08-06 RX ORDER — INSULIN GLARGINE 100 [IU]/ML
10 INJECTION, SOLUTION SUBCUTANEOUS
Qty: 1 | Refills: 0
Start: 2018-08-06 | End: 2018-09-04

## 2018-08-06 RX ORDER — INSULIN LISPRO 100/ML
3 VIAL (ML) SUBCUTANEOUS
Qty: 1 | Refills: 0
Start: 2018-08-06

## 2018-08-06 RX ORDER — SENNA PLUS 8.6 MG/1
2 TABLET ORAL
Qty: 0 | Refills: 0 | COMMUNITY
Start: 2018-08-06

## 2018-08-06 RX ORDER — LACTOBACILLUS ACIDOPHILUS 100MM CELL
1 CAPSULE ORAL
Qty: 20 | Refills: 0
Start: 2018-08-06 | End: 2018-08-15

## 2018-08-06 RX ADMIN — Medication 9 UNIT(S): at 08:42

## 2018-08-06 RX ADMIN — Medication 25 MILLIGRAM(S): at 06:22

## 2018-08-06 RX ADMIN — Medication 9 UNIT(S): at 17:53

## 2018-08-06 RX ADMIN — Medication 81 MILLIGRAM(S): at 12:17

## 2018-08-06 RX ADMIN — Medication 2: at 12:18

## 2018-08-06 RX ADMIN — Medication 20 MILLIGRAM(S): at 06:22

## 2018-08-06 RX ADMIN — Medication 100 MILLIGRAM(S): at 12:17

## 2018-08-06 RX ADMIN — Medication 9 UNIT(S): at 12:18

## 2018-08-06 RX ADMIN — Medication 100 MILLIGRAM(S): at 06:22

## 2018-08-06 RX ADMIN — Medication 20 MILLIGRAM(S): at 18:07

## 2018-08-06 RX ADMIN — ISOSORBIDE MONONITRATE 30 MILLIGRAM(S): 60 TABLET, EXTENDED RELEASE ORAL at 14:35

## 2018-08-06 NOTE — PROGRESS NOTE ADULT - PROBLEM SELECTOR PLAN 1
Will increase Lantus to 12 units at bed time.  Will increase Humalog to 9 units before each meal in addition to Humalog correction scale coverage.  Patient counseled for compliance with consistent low carb diet.
Will continue current insulin regimen for now. Will continue monitoring FS, log, will Follow up.  DC home on home DM meds, refusing insulin injections.  Patient counseled for compliance with consistent low carb diet.
Will continue current insulin regimen for now. Will continue monitoring FS, log, will Follow up.  Patient counseled for compliance with consistent low carb diet.

## 2018-08-06 NOTE — PROGRESS NOTE ADULT - PROVIDER SPECIALTY LIST ADULT
Endocrinology
Endocrinology
Hospitalist
Infectious Disease
Infectious Disease
Surgery
Hospitalist
Endocrinology

## 2018-08-06 NOTE — PROGRESS NOTE ADULT - ASSESSMENT
Assessment  DMT2: 83y Female with DM T2 with hyperglycemia on basal bolus insulin, dose adjusted, blood sugars still running high, no hypoglycemic episode,  eating meals,  non compliant with low carb diet.  Cellulitis: On antibiotics, stable, has abdominal discomfort.  CAD: On medications, stable, monitored.  HTN: Controlled, On med.
Problem/Plan - 1:  ·  Problem: Cellulitis of abdominal wall. Plan: S/p empiric abx with Vancomycin/Zosyn x1 dose  - start cefazolin 1g q8h for now. If no inpatient intervention, can likely transition to oral Keflex to complete abx as an outpatient  - surgery and wound care consult given increased size of ulcer and hx of multiple abdominal surgeries  - f/u 7/31 blood cx and urine cx.     Problem/Plan - 2:  ·  Problem: Anemia, unspecified type. Plan: Chronic and stable. Unclear etiology. No signs of active bleeding  - no need for inpatient w/u at this time  - pt to f/u with outpatient PMD.     Problem/Plan - 3:  ·  Problem: Hyponatremia. Plan: Mild and asymptomatic. S/p 1L normal saline bolus  - repeat sodium level. If not improved, will pursue further w/u.     Problem/Plan - 4:  ·  Problem: Type 2 diabetes mellitus without complication, without long-term current use of insulin. Plan: Pt on oral diabetes meds at home. Will hold while inpatient  - check FSG in the AM  - sliding scale insulin  - hypoglycemia protocol  - hold metformin, sitagliptin, and glyburide while inpatient- pt to hold metformin for the next 48 hours since pt was exposed to IV contrast.     Problem/Plan - 5:  ·  Problem: Hypertension, unspecified type. Plan: Continue home meds  - isosorbide mononitrate 30mg daily  - metoprolol XL 25mg daily while inpatient (however, pt's daughter confirms that pt takes medication BID)  - enalapril 20mg BID.     Problem/Plan - 6:  Problem: Coronary artery disease involving native coronary artery of native heart without angina pectoris. Plan: - continue aspirin.
83 year old female with history of multiple abdominal surgeries and large ventral hernia presenting with abdominal wall cellulitis and gram positive mando bacteremia. No collection on imaging.     - Continue unasyn  - Will follow up ID recommendations  - Change dressing daily and monitor erythema    DANIELLE Fitzgerald PGY 2  B team surgery [id number]
83 year old with a history of rectal cancer and DM, prior abdominal surgery, presents with fever at home and increased pain to the abdominal wall..    She has a chronic abdominal wound that is draining thick fluid    Cx with Staph aurues and enterobacter.    Can chnage to augmentin 875 mg po q 12 for 5 more days  Wound care.   If the wound continues to drain , i would re-eval for a fistula (non noted on initial CT). Outpt follow up with surgery planned.    Can follow up with me [phone]  Plan dicsussed with NP.
83 year old with a history of rectal cancer and DM, prior abdominal surgery, presents with fever at home and increased pain to the abdominal wall..    She has a chronic abdominal wound that is draining thick fluid    Cx with Staph aurues and enterobacter.    Change to vancomycin (follow drug through levels) and ertapenem  Wound care.
Assessment  DMT2: 83y Female with DM T2 with hyperglycemia on basal bolus insulin, dose adjusted, blood sugars fluctuating, no hypoglycemic episode,  eating meals,  non compliant with low carb diet.  Cellulitis: On antibiotics, stable, has abdominal discomfort.  CAD: On medications, stable, monitored.  HTN: Controlled, On med.
Assessment  DMT2: 83y Female with DM T2 with hyperglycemia on basal bolus insulin, dose adjusted, blood sugars fluctuating, no hypoglycemic episode,  eating meals,  non compliant with low carb diet.  Cellulitis: On antibiotics, stable, has abdominal discomfort.  CAD: On medications, stable, monitored.  HTN: Controlled, On med.
Problem/Plan - 1:  ·  Problem: Cellulitis of abdominal wall. Plan: Scw antibiotics  ID fu  fu cultures    Problem/Plan - 2:  ·  Problem: Anemia, unspecified type. Plan: Chronic and stable. Unclear etiology. No signs of active bleeding  -monitor cbc    Problem/Plan - 3:  ·  Problem: Hyponatremia. Plan: Mild and asymptomatic. S/p 1L normal saline bolus  - monitor bmp    Problem/Plan - 4:  ·  Problem: Type 2 diabetes mellitus without complication, without long-term current use of insulin. Plan: Pt on oral diabetes meds at home. Will hold while inpatient  - sliding scale insulin
Problem/Plan - 1:  ·  Problem: Cellulitis of abdominal wall. Plan: Scw antibiotics  ID fu  fu cultures    Problem/Plan - 2:  ·  Problem: Anemia, unspecified type. Plan: Chronic and stable. Unclear etiology. No signs of active bleeding  -monitor cbc    Problem/Plan - 3:  ·  Problem: Hyponatremia. Plan: Mild and asymptomatic. S/p 1L normal saline bolus  - monitor bmp    Problem/Plan - 4:  ·  Problem: Type 2 diabetes mellitus without complication, without long-term current use of insulin. Plan: Pt on oral diabetes meds at home. Will hold while inpatient  - sliding scale insulin
·  Problem: Cellulitis of abdominal wall. Plan: Scw antibiotics  ID fu  fu cultures    Anemia, unspecified type. Plan: Chronic and stable. Unclear etiology. No signs of active bleeding  -monitor cbc     Hyponatremia. Plan: Mild and asymptomatic. S/p 1L normal saline bolus  - monitor bmp     Type 2 diabetes mellitus without complication, without long-term current use of insulin. Plan: Pt on oral diabetes meds at home. Will hold while inpatient  - sliding scale insulin

## 2018-08-06 NOTE — PROGRESS NOTE ADULT - PROBLEM SELECTOR PLAN 3
On multiple medications, monitored and followed up by primary/cardiology team

## 2018-08-06 NOTE — PROGRESS NOTE ADULT - SUBJECTIVE AND OBJECTIVE BOX
Chief complaint  Patient is a 83y old  Female who presents with a chief complaint of Abdominal cellulitis (03 Aug 2018 12:19)   Review of systems  Patient in bed, looks comfortable, no fever, c/o abdominal pain, no hypoglycemia.    Labs and Fingersticks  CAPILLARY BLOOD GLUCOSE      POCT Blood Glucose.: 261 mg/dL (04 Aug 2018 17:12)  POCT Blood Glucose.: 240 mg/dL (04 Aug 2018 11:45)  POCT Blood Glucose.: 78 mg/dL (04 Aug 2018 09:00)  POCT Blood Glucose.: 85 mg/dL (03 Aug 2018 22:19)          Calcium, Total Serum: 9.6 (08-04 @ 05:35)  Calcium, Total Serum: 9.6 (08-03 @ 05:30)          08-04    137  |  102  |  21  ----------------------------<  58<L>  4.1   |  21<L>  |  0.78    Ca    9.6      04 Aug 2018 05:35                          9.5    6.46  )-----------( 209      ( 04 Aug 2018 05:35 )             29.1     Medications  MEDICATIONS  (STANDING):  allopurinol 100 milliGRAM(s) Oral daily  aspirin enteric coated 81 milliGRAM(s) Oral daily  dextrose 5%. 1000 milliLiter(s) (50 mL/Hr) IV Continuous <Continuous>  dextrose 50% Injectable 12.5 Gram(s) IV Push once  dextrose 50% Injectable 25 Gram(s) IV Push once  dextrose 50% Injectable 25 Gram(s) IV Push once  docusate sodium 100 milliGRAM(s) Oral two times a day  enalapril 20 milliGRAM(s) Oral two times a day  ertapenem  IVPB 1000 milliGRAM(s) IV Intermittent every 24 hours  insulin glargine Injectable (LANTUS) 12 Unit(s) SubCutaneous at bedtime  insulin lispro (HumaLOG) corrective regimen sliding scale   SubCutaneous three times a day before meals  insulin lispro Injectable (HumaLOG) 9 Unit(s) SubCutaneous three times a day before meals  isosorbide   mononitrate ER Tablet (IMDUR) 30 milliGRAM(s) Oral daily  metoprolol succinate ER 25 milliGRAM(s) Oral daily  senna 2 Tablet(s) Oral at bedtime      Physical Exam  General: Patient comfortable in bed  Vital Signs Last 12 Hrs  T(F): 98.2 (08-04-18 @ 12:48), Max: 98.2 (08-04-18 @ 12:48)  HR: 75 (08-04-18 @ 17:04) (75 - 80)  BP: 126/70 (08-04-18 @ 17:04) (126/70 - 141/62)  BP(mean): --  RR: 18 (08-04-18 @ 12:48) (18 - 18)  SpO2: 99% (08-04-18 @ 12:48) (99% - 99%)  Neck: No palpable thyroid nodules.  CVS: S1S2, No murmurs  Respiratory: No wheezing, no crepitations  GI: Abdomen soft, bowel sounds positive  Musculoskeletal:  edema lower extremities.   Skin: No skin rashes, no ecchymosis    Diagnostics
Patient is a 83y old  Female who presents with a chief complaint of Abdominal cellulitis (01 Aug 2018 11:49)      INTERVAL HPI/OVERNIGHT EVENTS:  T(C): 36.6 (18 @ 13:14), Max: 37 (18 @ 01:15)  HR: 68 (18 @ 17:25) (59 - 87)  BP: 133/56 (18 @ 17:25) (115/58 - 143/54)  RR: 18 (18 @ 17:25) (17 - 18)  SpO2: 100% (18 @ 17:25) (95% - 100%)  Wt(kg): --  I&O's Summary      LABS:                        9.4    7.68  )-----------( 175      ( 02 Aug 2018 05:55 )             28.6         133<L>  |  97<L>  |  21  ----------------------------<  320<H>  4.2   |  21<L>  |  0.78    Ca    9.2      02 Aug 2018 05:55    TPro  6.3  /  Alb  3.3  /  TBili  0.3  /  DBili  x   /  AST  12  /  ALT  17  /  AlkPhos  65  08-02      Urinalysis Basic - ( 01 Aug 2018 01:00 )    Color: YELLOW / Appearance: CLEAR / S.009 / pH: 6.5  Gluc: NEGATIVE / Ketone: NEGATIVE  / Bili: NEGATIVE / Urobili: NORMAL mg/dL   Blood: NEGATIVE / Protein: NEGATIVE mg/dL / Nitrite: NEGATIVE   Leuk Esterase: SMALL / RBC: >50 / WBC 5-10   Sq Epi: x / Non Sq Epi: x / Bacteria: FEW      CAPILLARY BLOOD GLUCOSE      POCT Blood Glucose.: 303 mg/dL (02 Aug 2018 16:55)  POCT Blood Glucose.: 375 mg/dL (02 Aug 2018 11:53)  POCT Blood Glucose.: 324 mg/dL (02 Aug 2018 07:55)  POCT Blood Glucose.: 293 mg/dL (01 Aug 2018 22:42)        Urinalysis Basic - ( 01 Aug 2018 01:00 )    Color: YELLOW / Appearance: CLEAR / S.009 / pH: 6.5  Gluc: NEGATIVE / Ketone: NEGATIVE  / Bili: NEGATIVE / Urobili: NORMAL mg/dL   Blood: NEGATIVE / Protein: NEGATIVE mg/dL / Nitrite: NEGATIVE   Leuk Esterase: SMALL / RBC: >50 / WBC 5-10   Sq Epi: x / Non Sq Epi: x / Bacteria: FEW        MEDICATIONS  (STANDING):  allopurinol 100 milliGRAM(s) Oral daily  ampicillin/sulbactam  IVPB 3 Gram(s) IV Intermittent every 6 hours  aspirin enteric coated 81 milliGRAM(s) Oral daily  dextrose 5%. 1000 milliLiter(s) (50 mL/Hr) IV Continuous <Continuous>  dextrose 50% Injectable 12.5 Gram(s) IV Push once  dextrose 50% Injectable 25 Gram(s) IV Push once  dextrose 50% Injectable 25 Gram(s) IV Push once  docusate sodium 100 milliGRAM(s) Oral two times a day  enalapril 20 milliGRAM(s) Oral two times a day  insulin lispro (HumaLOG) corrective regimen sliding scale   SubCutaneous three times a day before meals  isosorbide   mononitrate ER Tablet (IMDUR) 30 milliGRAM(s) Oral daily  metoprolol succinate ER 25 milliGRAM(s) Oral daily  senna 2 Tablet(s) Oral at bedtime    MEDICATIONS  (PRN):  dextrose 40% Gel 15 Gram(s) Oral once PRN Blood Glucose LESS THAN 70 milliGRAM(s)/deciliter  glucagon  Injectable 1 milliGRAM(s) IntraMuscular once PRN Glucose LESS THAN 70 milligrams/deciliter          PHYSICAL EXAM:  GENERAL: NAD, well-groomed, well-developed  HEAD:  Atraumatic, Normocephalic  CHEST/LUNG: Clear to percussion bilaterally; No rales, rhonchi, wheezing, or rubs  HEART: Regular rate and rhythm; No murmurs, rubs, or gallops  ABDOMEN: Soft, Nontender, Nondistended; Bowel sounds present  EXTREMITIES:  2+ Peripheral Pulses, No clubbing, cyanosis, or edema  LYMPH: No lymphadenopathy noted  SKIN: No rashes or lesions    Care Discussed with Consultants/Other Providers [ ] YES  [ ] NO
Chief complaint  Patient is a 83y old  Female who presents with a chief complaint of Abdominal cellulitis (03 Aug 2018 12:19)   Review of systems  Patient in bed, looks comfortable, no fever, no hypoglycemia.    Labs and Fingersticks  CAPILLARY BLOOD GLUCOSE      POCT Blood Glucose.: 141 mg/dL (06 Aug 2018 08:29)  POCT Blood Glucose.: 190 mg/dL (05 Aug 2018 21:57)  POCT Blood Glucose.: 163 mg/dL (05 Aug 2018 17:14)  POCT Blood Glucose.: 219 mg/dL (05 Aug 2018 12:28)          Calcium, Total Serum: 9.7 (08-05 @ 05:30)          08-05    139  |  102  |  23  ----------------------------<  58<L>  3.7   |  24  |  0.82    Ca    9.7      05 Aug 2018 05:30                          9.5    5.64  )-----------( 222      ( 05 Aug 2018 05:30 )             28.5     Medications  MEDICATIONS  (STANDING):  allopurinol 100 milliGRAM(s) Oral daily  aspirin enteric coated 81 milliGRAM(s) Oral daily  dextrose 5%. 1000 milliLiter(s) (50 mL/Hr) IV Continuous <Continuous>  dextrose 50% Injectable 12.5 Gram(s) IV Push once  dextrose 50% Injectable 25 Gram(s) IV Push once  dextrose 50% Injectable 25 Gram(s) IV Push once  docusate sodium 100 milliGRAM(s) Oral two times a day  enalapril 20 milliGRAM(s) Oral two times a day  ertapenem  IVPB 1000 milliGRAM(s) IV Intermittent every 24 hours  insulin glargine Injectable (LANTUS) 12 Unit(s) SubCutaneous at bedtime  insulin lispro (HumaLOG) corrective regimen sliding scale   SubCutaneous three times a day before meals  insulin lispro Injectable (HumaLOG) 9 Unit(s) SubCutaneous three times a day before meals  isosorbide   mononitrate ER Tablet (IMDUR) 30 milliGRAM(s) Oral daily  metoprolol succinate ER 25 milliGRAM(s) Oral daily  senna 2 Tablet(s) Oral at bedtime      Physical Exam  General: Patient comfortable in bed  Vital Signs Last 12 Hrs  T(F): 97.6 (08-06-18 @ 06:19), Max: 97.8 (08-05-18 @ 22:25)  HR: 60 (08-06-18 @ 06:19) (60 - 72)  BP: 123/52 (08-06-18 @ 06:19) (123/52 - 137/57)  BP(mean): --  RR: 18 (08-06-18 @ 06:19) (18 - 18)  SpO2: 100% (08-06-18 @ 06:19) (96% - 100%)  Neck: No palpable thyroid nodules.  CVS: S1S2, No murmurs  Respiratory: No wheezing, no crepitations  GI: Abdomen soft, bowel sounds positive  Musculoskeletal:  edema lower extremities.   Skin: No skin rashes, no ecchymosis    Diagnostics
Chief complaint  Patient is a 83y old  Female who presents with a chief complaint of Abdominal cellulitis (03 Aug 2018 12:19)   Review of systems  Patient in bed, looks comfortable, no fever, no hypoglycemia.    Labs and Fingersticks  CAPILLARY BLOOD GLUCOSE      POCT Blood Glucose.: 219 mg/dL (05 Aug 2018 12:28)  POCT Blood Glucose.: 109 mg/dL (05 Aug 2018 08:43)  POCT Blood Glucose.: 95 mg/dL (04 Aug 2018 22:18)          Calcium, Total Serum: 9.7 (08-05 @ 05:30)  Calcium, Total Serum: 9.6 (08-04 @ 05:35)          08-05    139  |  102  |  23  ----------------------------<  58<L>  3.7   |  24  |  0.82    Ca    9.7      05 Aug 2018 05:30                          9.5    5.64  )-----------( 222      ( 05 Aug 2018 05:30 )             28.5     Medications  MEDICATIONS  (STANDING):  allopurinol 100 milliGRAM(s) Oral daily  aspirin enteric coated 81 milliGRAM(s) Oral daily  dextrose 5%. 1000 milliLiter(s) (50 mL/Hr) IV Continuous <Continuous>  dextrose 50% Injectable 12.5 Gram(s) IV Push once  dextrose 50% Injectable 25 Gram(s) IV Push once  dextrose 50% Injectable 25 Gram(s) IV Push once  docusate sodium 100 milliGRAM(s) Oral two times a day  enalapril 20 milliGRAM(s) Oral two times a day  ertapenem  IVPB 1000 milliGRAM(s) IV Intermittent every 24 hours  insulin glargine Injectable (LANTUS) 12 Unit(s) SubCutaneous at bedtime  insulin lispro (HumaLOG) corrective regimen sliding scale   SubCutaneous three times a day before meals  insulin lispro Injectable (HumaLOG) 9 Unit(s) SubCutaneous three times a day before meals  isosorbide   mononitrate ER Tablet (IMDUR) 30 milliGRAM(s) Oral daily  metoprolol succinate ER 25 milliGRAM(s) Oral daily  senna 2 Tablet(s) Oral at bedtime      Physical Exam  General: Patient comfortable in bed  Vital Signs Last 12 Hrs  T(F): 98 (08-05-18 @ 12:08), Max: 98 (08-05-18 @ 12:08)  HR: 69 (08-05-18 @ 16:59) (69 - 70)  BP: 120/58 (08-05-18 @ 16:59) (120/58 - 130/56)  BP(mean): --  RR: 18 (08-05-18 @ 12:08) (18 - 18)  SpO2: 98% (08-05-18 @ 12:08) (98% - 98%)  Neck: No palpable thyroid nodules.  CVS: S1S2, No murmurs  Respiratory: No wheezing, no crepitations  GI: Abdomen soft, bowel sounds positive  Musculoskeletal:  edema lower extremities.   Skin: No skin rashes, no ecchymosis    Diagnostics
Follow Up:      Inverval History/ROS:Patient is a 83y old  Female who presents with a chief complaint of Abdominal cellulitis (03 Aug 2018 12:19)      Allergies    No Known Allergies    Intolerances        ANTIMICROBIALS:  ertapenem  IVPB 1000 every 24 hours  vancomycin  IVPB 1000 every 12 hours      OTHER MEDS:  allopurinol 100 milliGRAM(s) Oral daily  aspirin enteric coated 81 milliGRAM(s) Oral daily  dextrose 40% Gel 15 Gram(s) Oral once PRN  dextrose 5%. 1000 milliLiter(s) IV Continuous <Continuous>  dextrose 50% Injectable 12.5 Gram(s) IV Push once  dextrose 50% Injectable 25 Gram(s) IV Push once  dextrose 50% Injectable 25 Gram(s) IV Push once  docusate sodium 100 milliGRAM(s) Oral two times a day  enalapril 20 milliGRAM(s) Oral two times a day  glucagon  Injectable 1 milliGRAM(s) IntraMuscular once PRN  insulin glargine Injectable (LANTUS) 12 Unit(s) SubCutaneous at bedtime  insulin lispro (HumaLOG) corrective regimen sliding scale   SubCutaneous three times a day before meals  insulin lispro Injectable (HumaLOG) 7 Unit(s) SubCutaneous three times a day before meals  isosorbide   mononitrate ER Tablet (IMDUR) 30 milliGRAM(s) Oral daily  metoprolol succinate ER 25 milliGRAM(s) Oral daily  senna 2 Tablet(s) Oral at bedtime      Vital Signs Last 24 Hrs  T(C): 37 (03 Aug 2018 13:56), Max: 37 (03 Aug 2018 13:56)  T(F): 98.6 (03 Aug 2018 13:56), Max: 98.6 (03 Aug 2018 13:56)  HR: 69 (03 Aug 2018 13:56) (68 - 69)  BP: 114/47 (03 Aug 2018 13:56) (114/47 - 136/58)  BP(mean): --  RR: 18 (03 Aug 2018 13:56) (17 - 18)  SpO2: 98% (03 Aug 2018 13:56) (98% - 100%)    PHYSICAL EXAM:  General: [x ] non-toxic  HEAD/EYES: [ ] PERRL [ x] white sclera [ ] icterus  ENT:  [ ] normal [x ] supple [ ] thrush [ ] pharyngeal exudate  Cardiovascular:   [ ] murmur [x ] normal [ ] PPM/AICD  Respiratory:  [x ] clear to ausculation bilaterally  GI:  [ ] soft, non-tender, normal bowel sounds  right lateral abd wall-   :  [ ] masterson [ ] no CVA tenderness   Musculoskeletal:  [ ] no synovitis  Neurologic:  [ ] non-focal exam   Skin:  [x ] redness around right sided abdominal wound  Lymph: [ ] no lymphadenopathy  Psychiatric:  [x ] appropriate affect [ ] alert & oriented  Lines:  [ x] no phlebitis [ ] central line                                9.8    6.75  )-----------( 193      ( 03 Aug 2018 05:30 )             28.8       08-03    135  |  100  |  23  ----------------------------<  218<H>  4.3   |  22  |  0.87    Ca    9.6      03 Aug 2018 05:30    TPro  6.3  /  Alb  3.3  /  TBili  0.3  /  DBili  x   /  AST  12  /  ALT  17  /  AlkPhos  65  08-02          MICROBIOLOGY:  Cx: staph aurues/ enterobacter    RADIOLOGY:
Follow Up:      Inverval History/ROS:Patient is a 83y old  Female who presents with a chief complaint of Abdominal cellulitis (03 Aug 2018 12:19)  No pain .  No fever.       Allergies    No Known Allergies    Intolerances        ANTIMICROBIALS:  ertapenem  IVPB 1000 every 24 hours      OTHER MEDS:  allopurinol 100 milliGRAM(s) Oral daily  aspirin enteric coated 81 milliGRAM(s) Oral daily  dextrose 40% Gel 15 Gram(s) Oral once PRN  dextrose 5%. 1000 milliLiter(s) IV Continuous <Continuous>  dextrose 50% Injectable 12.5 Gram(s) IV Push once  dextrose 50% Injectable 25 Gram(s) IV Push once  dextrose 50% Injectable 25 Gram(s) IV Push once  docusate sodium 100 milliGRAM(s) Oral two times a day  enalapril 20 milliGRAM(s) Oral two times a day  glucagon  Injectable 1 milliGRAM(s) IntraMuscular once PRN  insulin glargine Injectable (LANTUS) 12 Unit(s) SubCutaneous at bedtime  insulin lispro (HumaLOG) corrective regimen sliding scale   SubCutaneous three times a day before meals  insulin lispro Injectable (HumaLOG) 9 Unit(s) SubCutaneous three times a day before meals  isosorbide   mononitrate ER Tablet (IMDUR) 30 milliGRAM(s) Oral daily  metoprolol succinate ER 25 milliGRAM(s) Oral daily  senna 2 Tablet(s) Oral at bedtime      Vital Signs Last 24 Hrs  T(C): 36.5 (06 Aug 2018 12:40), Max: 36.6 (05 Aug 2018 22:25)  T(F): 97.7 (06 Aug 2018 12:40), Max: 97.8 (05 Aug 2018 22:25)  HR: 69 (06 Aug 2018 12:40) (60 - 72)  BP: 139/58 (06 Aug 2018 12:40) (120/58 - 139/58)  BP(mean): --  RR: 17 (06 Aug 2018 12:40) (17 - 18)  SpO2: 98% (06 Aug 2018 12:40) (96% - 100%)    PHYSICAL EXAM:  General: [x] non-toxic  HEAD/EYES: [ ] PERRL [ ] white sclera [ ] icterus  ENT:  [ ] normal [x ] supple [ ] thrush [ ] pharyngeal exudate  Cardiovascular:   [ ] murmur [ x] normal [ ] PPM/AICD  Respiratory:  [x ] clear to ausculation bilaterally  GI:  [ ] soft, non-tender, normal bowel sounds  right sided adb wound: less red, less indurated  Still some drainage  :  [ ] masterson [ ] no CVA tenderness   Musculoskeletal:  [x ] no synovitis  Neurologic:  [x ] non-focal exam   Skin:  [x ] no rash  Lymph: [ ] no lymphadenopathy  Psychiatric:  [ ] appropriate affect x[ ] alert & oriented  Lines:  [ x] no phlebitis [ ] central line                                9.5    5.64  )-----------( 222      ( 05 Aug 2018 05:30 )             28.5       08-05    139  |  102  |  23  ----------------------------<  58<L>  3.7   |  24  |  0.82    Ca    9.7      05 Aug 2018 05:30            MICROBIOLOGY:    RADIOLOGY:
Patient is a 83y old  Female who presents with a chief complaint of Abdominal cellulitis (03 Aug 2018 12:19)      INTERVAL HPI/OVERNIGHT EVENTS:  T(C): 36.7 (08-05-18 @ 12:08), Max: 36.9 (08-04-18 @ 21:11)  HR: 69 (08-05-18 @ 16:59) (61 - 70)  BP: 120/58 (08-05-18 @ 16:59) (120/41 - 130/56)  RR: 18 (08-05-18 @ 12:08) (18 - 18)  SpO2: 98% (08-05-18 @ 12:08) (97% - 98%)  Wt(kg): --  I&O's Summary      LABS:                        9.5    5.64  )-----------( 222      ( 05 Aug 2018 05:30 )             28.5     08-05    139  |  102  |  23  ----------------------------<  58<L>  3.7   |  24  |  0.82    Ca    9.7      05 Aug 2018 05:30          CAPILLARY BLOOD GLUCOSE      POCT Blood Glucose.: 163 mg/dL (05 Aug 2018 17:14)  POCT Blood Glucose.: 219 mg/dL (05 Aug 2018 12:28)  POCT Blood Glucose.: 109 mg/dL (05 Aug 2018 08:43)  POCT Blood Glucose.: 95 mg/dL (04 Aug 2018 22:18)            MEDICATIONS  (STANDING):  allopurinol 100 milliGRAM(s) Oral daily  aspirin enteric coated 81 milliGRAM(s) Oral daily  dextrose 5%. 1000 milliLiter(s) (50 mL/Hr) IV Continuous <Continuous>  dextrose 50% Injectable 12.5 Gram(s) IV Push once  dextrose 50% Injectable 25 Gram(s) IV Push once  dextrose 50% Injectable 25 Gram(s) IV Push once  docusate sodium 100 milliGRAM(s) Oral two times a day  enalapril 20 milliGRAM(s) Oral two times a day  ertapenem  IVPB 1000 milliGRAM(s) IV Intermittent every 24 hours  insulin glargine Injectable (LANTUS) 12 Unit(s) SubCutaneous at bedtime  insulin lispro (HumaLOG) corrective regimen sliding scale   SubCutaneous three times a day before meals  insulin lispro Injectable (HumaLOG) 9 Unit(s) SubCutaneous three times a day before meals  isosorbide   mononitrate ER Tablet (IMDUR) 30 milliGRAM(s) Oral daily  metoprolol succinate ER 25 milliGRAM(s) Oral daily  senna 2 Tablet(s) Oral at bedtime    MEDICATIONS  (PRN):  dextrose 40% Gel 15 Gram(s) Oral once PRN Blood Glucose LESS THAN 70 milliGRAM(s)/deciliter  glucagon  Injectable 1 milliGRAM(s) IntraMuscular once PRN Glucose LESS THAN 70 milligrams/deciliter          PHYSICAL EXAM:  GENERAL: NAD, well-groomed, well-developed  HEAD:  Atraumatic, Normocephalic  CHEST/LUNG: Clear to percussion bilaterally; No rales, rhonchi, wheezing, or rubs  HEART: Regular rate and rhythm; No murmurs, rubs, or gallops  ABDOMEN: Soft, Nontender, Nondistended; Bowel sounds present  EXTREMITIES:  2+ Peripheral Pulses, No clubbing, cyanosis, or edema  LYMPH: No lymphadenopathy noted  SKIN: No rashes or lesions    Care Discussed with Consultants/Other Providers [* ] YES  [ ] NO
Patient is a 83y old  Female who presents with a chief complaint of Abdominal cellulitis (03 Aug 2018 12:19)      INTERVAL HPI/OVERNIGHT EVENTS:  T(C): 36.9 (08-04-18 @ 21:11), Max: 36.9 (08-04-18 @ 05:42)  HR: 66 (08-04-18 @ 21:11) (66 - 86)  BP: 120/41 (08-04-18 @ 21:11) (120/41 - 141/62)  RR: 18 (08-04-18 @ 21:11) (18 - 18)  SpO2: 97% (08-04-18 @ 21:11) (97% - 99%)  Wt(kg): --  I&O's Summary      LABS:                        9.5    6.46  )-----------( 209      ( 04 Aug 2018 05:35 )             29.1     08-04    137  |  102  |  21  ----------------------------<  58<L>  4.1   |  21<L>  |  0.78    Ca    9.6      04 Aug 2018 05:35          CAPILLARY BLOOD GLUCOSE      POCT Blood Glucose.: 261 mg/dL (04 Aug 2018 17:12)  POCT Blood Glucose.: 240 mg/dL (04 Aug 2018 11:45)  POCT Blood Glucose.: 78 mg/dL (04 Aug 2018 09:00)  POCT Blood Glucose.: 85 mg/dL (03 Aug 2018 22:19)            MEDICATIONS  (STANDING):  allopurinol 100 milliGRAM(s) Oral daily  aspirin enteric coated 81 milliGRAM(s) Oral daily  dextrose 5%. 1000 milliLiter(s) (50 mL/Hr) IV Continuous <Continuous>  dextrose 50% Injectable 12.5 Gram(s) IV Push once  dextrose 50% Injectable 25 Gram(s) IV Push once  dextrose 50% Injectable 25 Gram(s) IV Push once  docusate sodium 100 milliGRAM(s) Oral two times a day  enalapril 20 milliGRAM(s) Oral two times a day  ertapenem  IVPB 1000 milliGRAM(s) IV Intermittent every 24 hours  insulin glargine Injectable (LANTUS) 12 Unit(s) SubCutaneous at bedtime  insulin lispro (HumaLOG) corrective regimen sliding scale   SubCutaneous three times a day before meals  insulin lispro Injectable (HumaLOG) 9 Unit(s) SubCutaneous three times a day before meals  isosorbide   mononitrate ER Tablet (IMDUR) 30 milliGRAM(s) Oral daily  metoprolol succinate ER 25 milliGRAM(s) Oral daily  senna 2 Tablet(s) Oral at bedtime    MEDICATIONS  (PRN):  dextrose 40% Gel 15 Gram(s) Oral once PRN Blood Glucose LESS THAN 70 milliGRAM(s)/deciliter  glucagon  Injectable 1 milliGRAM(s) IntraMuscular once PRN Glucose LESS THAN 70 milligrams/deciliter          PHYSICAL EXAM:  GENERAL: NAD, well-groomed, well-developed  HEAD:  Atraumatic, Normocephalic  CHEST/LUNG: Clear to percussion bilaterally; No rales, rhonchi, wheezing, or rubs  HEART: Regular rate and rhythm; No murmurs, rubs, or gallops  ABDOMEN: Soft, Nontender, Nondistended; Bowel sounds present  EXTREMITIES:  2+ Peripheral Pulses, No clubbing, cyanosis, or edema  LYMPH: No lymphadenopathy noted  SKIN: No rashes or lesions    Care Discussed with Consultants/Other Providers [ ] YES  [ ] NO
Patient is a 83y old  Female who presents with a chief complaint of Abdominal cellulitis (03 Aug 2018 12:19)      INTERVAL HPI/OVERNIGHT EVENTS:  T(C): 37 (08-03-18 @ 13:56), Max: 37 (08-03-18 @ 13:56)  HR: 74 (08-03-18 @ 17:40) (69 - 74)  BP: 149/62 (08-03-18 @ 17:40) (114/47 - 149/62)  RR: 18 (08-03-18 @ 13:56) (17 - 18)  SpO2: 98% (08-03-18 @ 13:56) (98% - 99%)  Wt(kg): --  I&O's Summary      LABS:                        9.8    6.75  )-----------( 193      ( 03 Aug 2018 05:30 )             28.8     08-03    135  |  100  |  23  ----------------------------<  218<H>  4.3   |  22  |  0.87    Ca    9.6      03 Aug 2018 05:30    TPro  6.3  /  Alb  3.3  /  TBili  0.3  /  DBili  x   /  AST  12  /  ALT  17  /  AlkPhos  65  08-02        CAPILLARY BLOOD GLUCOSE      POCT Blood Glucose.: 271 mg/dL (03 Aug 2018 17:34)  POCT Blood Glucose.: 298 mg/dL (03 Aug 2018 11:47)  POCT Blood Glucose.: 224 mg/dL (03 Aug 2018 07:15)  POCT Blood Glucose.: 291 mg/dL (02 Aug 2018 22:04)            MEDICATIONS  (STANDING):  allopurinol 100 milliGRAM(s) Oral daily  aspirin enteric coated 81 milliGRAM(s) Oral daily  dextrose 5%. 1000 milliLiter(s) (50 mL/Hr) IV Continuous <Continuous>  dextrose 50% Injectable 12.5 Gram(s) IV Push once  dextrose 50% Injectable 25 Gram(s) IV Push once  dextrose 50% Injectable 25 Gram(s) IV Push once  docusate sodium 100 milliGRAM(s) Oral two times a day  enalapril 20 milliGRAM(s) Oral two times a day  ertapenem  IVPB 1000 milliGRAM(s) IV Intermittent every 24 hours  insulin glargine Injectable (LANTUS) 12 Unit(s) SubCutaneous at bedtime  insulin lispro (HumaLOG) corrective regimen sliding scale   SubCutaneous three times a day before meals  insulin lispro Injectable (HumaLOG) 7 Unit(s) SubCutaneous three times a day before meals  isosorbide   mononitrate ER Tablet (IMDUR) 30 milliGRAM(s) Oral daily  metoprolol succinate ER 25 milliGRAM(s) Oral daily  senna 2 Tablet(s) Oral at bedtime  vancomycin  IVPB 1000 milliGRAM(s) IV Intermittent every 12 hours    MEDICATIONS  (PRN):  dextrose 40% Gel 15 Gram(s) Oral once PRN Blood Glucose LESS THAN 70 milliGRAM(s)/deciliter  glucagon  Injectable 1 milliGRAM(s) IntraMuscular once PRN Glucose LESS THAN 70 milligrams/deciliter          PHYSICAL EXAM:  GENERAL: NAD, well-groomed, well-developed  HEAD:  Atraumatic, Normocephalic  CHEST/LUNG: Clear to percussion bilaterally; No rales, rhonchi, wheezing, or rubs  HEART: Regular rate and rhythm; No murmurs, rubs, or gallops  ABDOMEN: Soft, Nontender, Nondistended; Bowel sounds present  EXTREMITIES:  2+ Peripheral Pulses, No clubbing, cyanosis, or edema  LYMPH: No lymphadenopathy noted  SKIN: No rashes or lesions    Care Discussed with Consultants/Other Providers [ ] YES  [ ] NO
Surgery Progress Note      SUBJECTIVE: Patient seen and examined on morning rounds. Complaining of some pain at abdominal wound. Has been afebrile since admission with normal WBC count.      OBJECTIVE:     ** Vital signs / I&O's **    Vital Signs Last 24 Hrs  T(C): 36.7 (03 Aug 2018 06:02), Max: 36.7 (03 Aug 2018 06:02)  T(F): 98.1 (03 Aug 2018 06:02), Max: 98.1 (03 Aug 2018 06:02)  HR: 69 (03 Aug 2018 06:02) (59 - 69)  BP: 136/58 (03 Aug 2018 06:02) (118/40 - 136/58)  BP(mean): --  RR: 17 (03 Aug 2018 06:02) (17 - 18)  SpO2: 99% (03 Aug 2018 06:02) (95% - 100%)        ** Physical Exam **  Gen: Alert, NAD  Abd: Soft, erythema slightly decreased    ** Labs **                          9.8    6.75  )-----------( 193      ( 03 Aug 2018 05:30 )             28.8     03 Aug 2018 05:30    135    |  100    |  23     ----------------------------<  218    4.3     |  22     |  0.87     Ca    9.6        03 Aug 2018 05:30    TPro  6.3    /  Alb  3.3    /  TBili  0.3    /  DBili  x      /  AST  12     /  ALT  17     /  [facility]  67     02 Aug 2018 05:55      CAPILLARY BLOOD GLUCOSE      POCT Blood Glucose.: 224 mg/dL (03 Aug 2018 07:15)  POCT Blood Glucose.: 291 mg/dL (02 Aug 2018 22:04)  POCT Blood Glucose.: 303 mg/dL (02 Aug 2018 16:55)  POCT Blood Glucose.: 375 mg/dL (02 Aug 2018 11:53)        LIVER FUNCTIONS - ( 02 Aug 2018 05:55 )  Alb: 3.3 g/dL / Pro: 6.3 g/dL / ALK PHOS: 65 u/L / ALT: 17 u/L / AST: 12 u/L / GGT: x               MEDICATIONS  (STANDING):  allopurinol 100 milliGRAM(s) Oral daily  ampicillin/sulbactam  IVPB 3 Gram(s) IV Intermittent every 6 hours  aspirin enteric coated 81 milliGRAM(s) Oral daily  dextrose 5%. 1000 milliLiter(s) (50 mL/Hr) IV Continuous <Continuous>  dextrose 50% Injectable 12.5 Gram(s) IV Push once  dextrose 50% Injectable 25 Gram(s) IV Push once  dextrose 50% Injectable 25 Gram(s) IV Push once  docusate sodium 100 milliGRAM(s) Oral two times a day  enalapril 20 milliGRAM(s) Oral two times a day  insulin glargine Injectable (LANTUS) 12 Unit(s) SubCutaneous at bedtime  insulin lispro (HumaLOG) corrective regimen sliding scale   SubCutaneous three times a day before meals  insulin lispro Injectable (HumaLOG) 7 Unit(s) SubCutaneous three times a day before meals  isosorbide   mononitrate ER Tablet (IMDUR) 30 milliGRAM(s) Oral daily  metoprolol succinate ER 25 milliGRAM(s) Oral daily  senna 2 Tablet(s) Oral at bedtime    MEDICATIONS  (PRN):  dextrose 40% Gel 15 Gram(s) Oral once PRN Blood Glucose LESS THAN 70 milliGRAM(s)/deciliter  glucagon  Injectable 1 milliGRAM(s) IntraMuscular once PRN Glucose LESS THAN 70 milligrams/deciliter

## 2018-08-06 NOTE — PROGRESS NOTE ADULT - PROBLEM SELECTOR PLAN 4
On meds primary team following up

## 2018-08-06 NOTE — PROGRESS NOTE ADULT - PROBLEM SELECTOR PLAN 2
Continue antibiotics, wound care, FU ID Sx team.

## 2018-08-09 LAB — BACTERIA BLD CULT: SIGNIFICANT CHANGE UP

## 2018-08-11 RX ORDER — METFORMIN HYDROCHLORIDE 850 MG/1
1 TABLET ORAL
Qty: 0 | Refills: 0 | COMMUNITY

## 2018-08-11 RX ORDER — METOPROLOL TARTRATE 50 MG
1 TABLET ORAL
Qty: 0 | Refills: 0 | COMMUNITY

## 2018-08-11 RX ORDER — FUROSEMIDE 40 MG
1 TABLET ORAL
Qty: 0 | Refills: 0 | COMMUNITY

## 2018-08-11 RX ORDER — TRAZODONE HCL 50 MG
1 TABLET ORAL
Qty: 0 | Refills: 0 | COMMUNITY

## 2018-08-11 RX ORDER — DOCUSATE SODIUM 100 MG
1 CAPSULE ORAL
Qty: 0 | Refills: 0 | COMMUNITY

## 2018-08-11 RX ORDER — SITAGLIPTIN 50 MG/1
1 TABLET, FILM COATED ORAL
Qty: 0 | Refills: 0 | COMMUNITY

## 2018-08-11 RX ORDER — GLYBURIDE 5 MG
1 TABLET ORAL
Qty: 0 | Refills: 0 | COMMUNITY

## 2018-08-11 RX ORDER — CHOLECALCIFEROL (VITAMIN D3) 125 MCG
1 CAPSULE ORAL
Qty: 0 | Refills: 0 | COMMUNITY

## 2018-08-11 RX ORDER — VENLAFAXINE HCL 75 MG
1 CAPSULE, EXT RELEASE 24 HR ORAL
Qty: 0 | Refills: 0 | COMMUNITY

## 2018-08-11 RX ORDER — ALLOPURINOL 300 MG
1 TABLET ORAL
Qty: 0 | Refills: 0 | COMMUNITY

## 2018-08-11 RX ORDER — EZETIMIBE 10 MG/1
1 TABLET ORAL
Qty: 0 | Refills: 0 | COMMUNITY

## 2018-08-17 ENCOUNTER — APPOINTMENT (OUTPATIENT)
Dept: INFECTIOUS DISEASE | Facility: CLINIC | Age: 83
End: 2018-08-17
Payer: MEDICARE

## 2018-08-17 VITALS
BODY MASS INDEX: 30.49 KG/M2 | OXYGEN SATURATION: 99 % | HEIGHT: 65 IN | HEART RATE: 85 BPM | SYSTOLIC BLOOD PRESSURE: 143 MMHG | DIASTOLIC BLOOD PRESSURE: 72 MMHG | TEMPERATURE: 97 F | WEIGHT: 183 LBS

## 2018-08-17 PROCEDURE — 99214 OFFICE O/P EST MOD 30 MIN: CPT

## 2018-08-17 RX ORDER — AMOXICILLIN AND CLAVULANATE POTASSIUM 875; 125 MG/1; MG/1
875-125 TABLET, COATED ORAL
Qty: 20 | Refills: 0 | Status: DISCONTINUED | COMMUNITY
Start: 2018-08-17 | End: 2018-08-17

## 2018-09-06 ENCOUNTER — APPOINTMENT (OUTPATIENT)
Dept: WOUND CARE | Facility: CLINIC | Age: 83
End: 2018-09-06
Payer: MEDICARE

## 2018-09-06 VITALS — BODY MASS INDEX: 29.69 KG/M2 | WEIGHT: 178.42 LBS

## 2018-09-06 PROCEDURE — 99214 OFFICE O/P EST MOD 30 MIN: CPT

## 2018-09-06 PROCEDURE — 99204 OFFICE O/P NEW MOD 45 MIN: CPT

## 2018-09-06 RX ORDER — FLUCONAZOLE 150 MG/1
150 TABLET ORAL
Qty: 2 | Refills: 0 | Status: COMPLETED | COMMUNITY
Start: 2018-08-17 | End: 2018-09-06

## 2018-09-06 RX ORDER — LEVOFLOXACIN 500 MG/1
500 TABLET, FILM COATED ORAL
Qty: 7 | Refills: 0 | Status: COMPLETED | COMMUNITY
Start: 2018-08-17 | End: 2018-09-06

## 2018-09-07 PROBLEM — E79.0 HYPERURICEMIA WITHOUT SIGNS OF INFLAMMATORY ARTHRITIS AND TOPHACEOUS DISEASE: Chronic | Status: ACTIVE | Noted: 2018-08-01

## 2018-09-07 PROBLEM — M17.0 BILATERAL PRIMARY OSTEOARTHRITIS OF KNEE: Chronic | Status: ACTIVE | Noted: 2018-08-01

## 2018-09-07 PROBLEM — C19 MALIGNANT NEOPLASM OF RECTOSIGMOID JUNCTION: Chronic | Status: ACTIVE | Noted: 2018-08-01

## 2018-09-07 PROBLEM — E11.9 TYPE 2 DIABETES MELLITUS WITHOUT COMPLICATIONS: Chronic | Status: ACTIVE | Noted: 2017-05-30

## 2018-09-07 PROBLEM — I25.10 ATHEROSCLEROTIC HEART DISEASE OF NATIVE CORONARY ARTERY WITHOUT ANGINA PECTORIS: Chronic | Status: ACTIVE | Noted: 2017-05-30

## 2018-09-07 PROBLEM — I10 ESSENTIAL (PRIMARY) HYPERTENSION: Chronic | Status: ACTIVE | Noted: 2017-05-29

## 2018-09-27 ENCOUNTER — APPOINTMENT (OUTPATIENT)
Dept: WOUND CARE | Facility: CLINIC | Age: 83
End: 2018-09-27
Payer: MEDICARE

## 2018-09-27 VITALS
WEIGHT: 178 LBS | HEIGHT: 65 IN | DIASTOLIC BLOOD PRESSURE: 79 MMHG | SYSTOLIC BLOOD PRESSURE: 168 MMHG | BODY MASS INDEX: 29.66 KG/M2 | HEART RATE: 76 BPM

## 2018-09-27 PROCEDURE — 99214 OFFICE O/P EST MOD 30 MIN: CPT

## 2018-10-12 ENCOUNTER — APPOINTMENT (OUTPATIENT)
Dept: WOUND CARE | Facility: CLINIC | Age: 83
End: 2018-10-12
Payer: MEDICARE

## 2018-10-12 VITALS
HEART RATE: 85 BPM | TEMPERATURE: 98.1 F | DIASTOLIC BLOOD PRESSURE: 73 MMHG | HEIGHT: 65 IN | BODY MASS INDEX: 29.66 KG/M2 | SYSTOLIC BLOOD PRESSURE: 135 MMHG | WEIGHT: 178 LBS

## 2018-10-12 PROCEDURE — 99213 OFFICE O/P EST LOW 20 MIN: CPT

## 2018-11-06 ENCOUNTER — APPOINTMENT (OUTPATIENT)
Dept: WOUND CARE | Facility: CLINIC | Age: 83
End: 2018-11-06
Payer: MEDICARE

## 2018-11-06 PROCEDURE — 99213 OFFICE O/P EST LOW 20 MIN: CPT

## 2018-11-20 ENCOUNTER — APPOINTMENT (OUTPATIENT)
Dept: WOUND CARE | Facility: CLINIC | Age: 83
End: 2018-11-20
Payer: MEDICARE

## 2018-11-20 VITALS — HEART RATE: 67 BPM | TEMPERATURE: 98 F | DIASTOLIC BLOOD PRESSURE: 69 MMHG | SYSTOLIC BLOOD PRESSURE: 132 MMHG

## 2018-11-20 PROCEDURE — 99213 OFFICE O/P EST LOW 20 MIN: CPT

## 2018-12-11 ENCOUNTER — APPOINTMENT (OUTPATIENT)
Dept: WOUND CARE | Facility: CLINIC | Age: 83
End: 2018-12-11
Payer: MEDICARE

## 2018-12-11 PROCEDURE — 15271 SKIN SUB GRAFT TRNK/ARM/LEG: CPT

## 2018-12-20 ENCOUNTER — APPOINTMENT (OUTPATIENT)
Dept: WOUND CARE | Facility: CLINIC | Age: 83
End: 2018-12-20
Payer: MEDICARE

## 2018-12-20 PROCEDURE — 99213 OFFICE O/P EST LOW 20 MIN: CPT

## 2019-01-08 ENCOUNTER — APPOINTMENT (OUTPATIENT)
Dept: WOUND CARE | Facility: CLINIC | Age: 84
End: 2019-01-08
Payer: MEDICARE

## 2019-01-08 PROCEDURE — 15271 SKIN SUB GRAFT TRNK/ARM/LEG: CPT

## 2019-01-22 ENCOUNTER — APPOINTMENT (OUTPATIENT)
Dept: WOUND CARE | Facility: CLINIC | Age: 84
End: 2019-01-22
Payer: MEDICARE

## 2019-01-22 VITALS
DIASTOLIC BLOOD PRESSURE: 73 MMHG | SYSTOLIC BLOOD PRESSURE: 149 MMHG | BODY MASS INDEX: 29.66 KG/M2 | WEIGHT: 178 LBS | TEMPERATURE: 97.8 F | HEIGHT: 65 IN | HEART RATE: 79 BPM

## 2019-01-22 PROCEDURE — 15271 SKIN SUB GRAFT TRNK/ARM/LEG: CPT

## 2019-02-12 ENCOUNTER — APPOINTMENT (OUTPATIENT)
Dept: WOUND CARE | Facility: CLINIC | Age: 84
End: 2019-02-12
Payer: MEDICARE

## 2019-02-12 PROCEDURE — G0179 MD RECERTIFICATION HHA PT: CPT

## 2019-02-12 PROCEDURE — 99213 OFFICE O/P EST LOW 20 MIN: CPT

## 2019-02-14 RX ORDER — DICLOFENAC SODIUM 10 MG/G
1 GEL TOPICAL
Qty: 100 | Refills: 0 | Status: ACTIVE | COMMUNITY
Start: 2018-08-17

## 2019-02-14 RX ORDER — ROSUVASTATIN CALCIUM 20 MG/1
20 TABLET, FILM COATED ORAL
Qty: 30 | Refills: 0 | Status: ACTIVE | COMMUNITY
Start: 2018-08-18

## 2019-02-14 RX ORDER — BUDESONIDE AND FORMOTEROL FUMARATE DIHYDRATE 80; 4.5 UG/1; UG/1
80-4.5 AEROSOL RESPIRATORY (INHALATION)
Qty: 10 | Refills: 0 | Status: ACTIVE | COMMUNITY
Start: 2018-08-18

## 2019-02-14 RX ORDER — METOPROLOL SUCCINATE 25 MG/1
25 TABLET, EXTENDED RELEASE ORAL
Qty: 60 | Refills: 0 | Status: ACTIVE | COMMUNITY
Start: 2018-08-18

## 2019-02-14 RX ORDER — BLOOD-GLUCOSE METER
70 EACH MISCELLANEOUS
Qty: 100 | Refills: 0 | Status: ACTIVE | COMMUNITY
Start: 2018-08-18

## 2019-02-14 RX ORDER — SITAGLIPTIN 100 MG/1
100 TABLET, FILM COATED ORAL
Qty: 30 | Refills: 0 | Status: ACTIVE | COMMUNITY
Start: 2018-08-18

## 2019-02-14 RX ORDER — PREGABALIN 25 MG/1
25 CAPSULE ORAL
Qty: 90 | Refills: 0 | Status: ACTIVE | COMMUNITY
Start: 2018-08-18

## 2019-02-14 RX ORDER — HYDROCORTISONE 1 %
12 CREAM (GRAM) TOPICAL
Qty: 400 | Refills: 0 | Status: ACTIVE | COMMUNITY
Start: 2018-08-17

## 2019-02-14 RX ORDER — METFORMIN HYDROCHLORIDE 850 MG/1
850 TABLET, FILM COATED ORAL
Qty: 60 | Refills: 0 | Status: ACTIVE | COMMUNITY
Start: 2018-08-18

## 2019-02-14 RX ORDER — EZETIMIBE 10 MG/1
10 TABLET ORAL
Qty: 30 | Refills: 0 | Status: ACTIVE | COMMUNITY
Start: 2018-08-18

## 2019-02-14 RX ORDER — ISOSORBIDE MONONITRATE 30 MG/1
30 TABLET, EXTENDED RELEASE ORAL
Qty: 30 | Refills: 0 | Status: ACTIVE | COMMUNITY
Start: 2018-08-18

## 2019-02-26 ENCOUNTER — APPOINTMENT (OUTPATIENT)
Dept: WOUND CARE | Facility: CLINIC | Age: 84
End: 2019-02-26
Payer: MEDICARE

## 2019-02-26 VITALS
DIASTOLIC BLOOD PRESSURE: 69 MMHG | HEART RATE: 69 BPM | TEMPERATURE: 97.6 F | SYSTOLIC BLOOD PRESSURE: 147 MMHG | RESPIRATION RATE: 16 BRPM

## 2019-02-26 DIAGNOSIS — Z00.00 ENCOUNTER FOR GENERAL ADULT MEDICAL EXAMINATION W/OUT ABNORMAL FINDINGS: ICD-10-CM

## 2019-02-26 PROCEDURE — 15271 SKIN SUB GRAFT TRNK/ARM/LEG: CPT

## 2019-03-01 NOTE — ASSESSMENT
[FreeTextEntry1] : 83 yr old s/p sbresection,  h/o sbo's  from adhesions in past CAD, DM, stent, htn,\par  s/p kerry from ventral hernia repair , grafix pl placed again today 2/26\par colorectal cancer with open abdominal wall ulcer for a year, no fistula on ct,\par   doesn’t want to do open hernia repair, though the ulcer would be removedt, is frightened because of risk with age and prior surgeries\par /discussed how with reduction of hernia, wound will close, no tension on skin, plus old ulcer will be removed, patient nervous about surgery\par high risk for complication/ MO age, previous recurrence/Discussed\par \par Intrepreter # [id number]\par \par Grafix number 1 applied today to patient’s abdomen.   Wound bed debrided of bioburden and prepped for product application.  1  piece and original size of product obtained.  Total product usage was12 sq. cm), Total waste 0 sq. cm due to wound size.  Product secured with steri strips and bolster dressing.\par Patient to f/u in 1 week.\par \par \par \par

## 2019-03-01 NOTE — PHYSICAL EXAM
[Normal Rate and Rhythm] : normal rate and rhythm [JVD] : no jugular venous distention  [de-identified] : nad [de-identified] : deisy [de-identified] : ulcer/midline scar; recurrent hernia reducible [de-identified] : skin tear from tape at 8 o clock  [FreeTextEntry1] : abdominal hernia with ulcer [FreeTextEntry2] : 4.3 [FreeTextEntry3] : 2.5 [FreeTextEntry4] : .1 cm [de-identified] : chay pl [de-identified] : carinafix 2/26

## 2019-03-01 NOTE — REASON FOR VISIT
[Consultation] : a consultation visit [Family Member] : family member [FreeTextEntry1] : right side abdomen ulcer

## 2019-03-01 NOTE — HISTORY OF PRESENT ILLNESS
[FreeTextEntry1] : .Pt is an 84 yo woman with PHMx of DM and CAD has right lower abdominal wound,\par here for skin replacement had grafix  dec 11/ jan8 , jan22 and for today, Has been using aquacel over wound.\par  speaks only Vincentian/  mitali vicky interpretor [id number]\par  wound she has on her right side of the abdomen.old scar with incisional hernia\par  Pt had this wound for months pt was admitted to the hospital on July 31, 2018 until  Aug 6 2018. Pt had been getting treated by an ID doctor\par  She has not had any fever.  She is frustrated that the wound is not healing. She is receiving wound care at home every other day. \par   duoderm to periwound/ wears a hernia abd binder to reduce it\par  hernia (2 areas ) is large spreading skin open, is partly reducible\par \par Of Note, she:\par - Had colorectal cancer s/p bowel resection\par - Has ventral abdominal hernias - S/P repair in 2017\par -history for SBO.\par \par She was admitted to [facility] with subjective fever and pain over a right sided abdominal wall ulcer.  The patient states that the right sided abdominal wall ulcer has been present for over a year. \par At that time, her right lateral abdominal wall was red.  She had drainage from the ulceration to the abdominal wall- from the lower pole of the wound. \par She had a CT that showed some inflammatory change in  the abdominal wall but no evidence of abscess or fistula.\par s/p mssa and enterbacter, treated with augmentin/ levaquin and fluconazole for vaginal candidiasis\par    She was treated with appropriate abx intravenously for 5 days and sent home on augmentin.  During her hospitalization, the redness to the abdominal wall improved but the ulcerated lesion continued to drain. \par She was discharged on Augmentin with a plan for wound care. \par \par  . \par \par \par

## 2019-03-05 ENCOUNTER — APPOINTMENT (OUTPATIENT)
Dept: WOUND CARE | Facility: CLINIC | Age: 84
End: 2019-03-05
Payer: MEDICARE

## 2019-03-05 VITALS — HEART RATE: 86 BPM | SYSTOLIC BLOOD PRESSURE: 143 MMHG | TEMPERATURE: 97.5 F | DIASTOLIC BLOOD PRESSURE: 73 MMHG

## 2019-03-05 PROCEDURE — 99213 OFFICE O/P EST LOW 20 MIN: CPT

## 2019-03-07 NOTE — ASSESSMENT
[FreeTextEntry1] : 83 yr old s/p sbresection,  h/o sbo's  from adhesions in past CAD, DM, stent, htn,\par  s/p kerry from ventral hernia repair , \par s/pgrafix pl placed  2/26\par colorectal cancer with open abdominal wall ulcer for a year, no fistula on ct,\par   doesn’t want to do open hernia repair, though the ulcer would be removedt, is frightened because of risk with age and prior surgeries\par /discussed how with reduction of hernia, wound will close, no tension on skin, plus old ulcer will be removed, patient nervous about surgery\par high risk for complication/ MO age, previous recurrence/Discussed\par Intrepreter # [id number]\par  \par Plan  for re application of  skin sub-grafix or appligraft.\par Wound has shown improvement through (increased granulation and or decreased size).\par Wound is free from infection drainage and necrotic tissue. \par  Patient is on comprehensive diabetic management program with a Hg A1c of () from (date).\par \par Wound has been treated with standards of care for (many) weeks including (compression, offloading, debridement).\par \par \par

## 2019-03-07 NOTE — ASSESSMENT
[FreeTextEntry1] : 83 yr old s/p sbresection,  h/o sbo's  from adhesions in past CAD, DM, stent, htn,\par  s/p kerry from ventral hernia repair , grafix pl placed again today 1/8/19\par colorectal cancer with open abdominal wall ulcer for a year, no fistula on ct,\par   doesn’t want to do it, is frightened because of risk with age and prior surgeries\par /discussed how with reduction of hernia, wound will close, no tension on skin, plus old ulcer will be removed, patient nervous about surgery\par high risk for complication/ MO age, previous recurrence/Discussed\par \par skin substitute number 2/grafix pl applied today to patient's (anatomical site abdominal wound). wound bed debrided of bioburded and prepped for product application. (#1 of pieces and original size of [id number]) obtained. Total product usage was 6x sq. cm, Total waste (0x sq. cm) due to wound size. Product secured with (steri strips and bolster dressing).\par Patient to f/u in 1 week.

## 2019-03-07 NOTE — HISTORY OF PRESENT ILLNESS
[FreeTextEntry1] : .Pt is an 84 yo woman with PHMx of DM and CAD has right lower abdominal wound,\par here for skin replacement\par  speaks only [place]\par  wound she has on her right side of the abdomen.old scar with incisional hernia\par Wound has increased in size, family reports that nurse has not been to see pt. to change Aquacel for a week and a half, dressing was soaked when removed with copious drainage noted.\par  Pt had this wound for months pt was admitted to the hospital on July 31, 2018 until  Aug 6 2018. Pt had been getting treated by an ID doctor\par  She has not had any fever.  She is frustrated that the wound is not healing. She is receiving wound care at home every other day. \par using endoform and DuoDERM to periwound/ wears a hernia abd binder to reduce it\par  hernia (2 areas ) is large spreading skin open, is partly reducible\par \par Of Note, she:\par - Had colorectal cancer s/p bowel resection\par - Has ventral abdominal hernias - S/P repair in 2017\par -history for SBO.\par \par She was admitted to [facility] with subjective fever and pain over a right sided abdominal wall ulcer.  The patient states that the right sided abdominal wall ulcer has been present for over a year. \par At that time, her right lateral abdominal wall was red.  She had drainage from the ulceration to the abdominal wall- from the lower pole of the wound. \par She had a CT that showed some inflammatory change in  the abdominal wall but no evidence of abscess or fistula.\par s/p mssa and enterbacter, treated with Augmentin/ Levaquin and fluconazole for vaginal candidiasis\par    She was treated with appropriate abx intravenously for 5 days and sent home on Augmentin.  During her hospitalization, the redness to the abdominal wall improved but the ulcerated lesion continued to drain. \par She was discharged on Augmentin with a plan for wound care. \par \par  .

## 2019-03-07 NOTE — PHYSICAL EXAM
[Normal Rate and Rhythm] : normal rate and rhythm [JVD] : no jugular venous distention  [de-identified] : nad [de-identified] : deisy [de-identified] : ulcer/midline scar; recurrent hernia reducible [de-identified] : skin tear from tape at O'clock \par 3/5/2019 TRANSLATER # [id number]  [FreeTextEntry1] : abdominal hernia with ulcer [FreeTextEntry2] : 5.5 [FreeTextEntry3] : 3 [FreeTextEntry4] : .1 [de-identified] : grafix pl applied 1/8

## 2019-03-07 NOTE — HISTORY OF PRESENT ILLNESS
[FreeTextEntry1] : .Pt is an 84 yo woman with PHMx of DM and CAD has right lower abdominal wound,\par had grafix last visit\par  had grafix  dec 11/ jan8 , jan22 and for today, Has been aquacel\par  speaks only Mozambican/  mitali vicky interpretor [id number]\par  wound she has on her right side of the abdomen.old scar with incisional hernia\par  Pt had this wound for months pt was admitted to the hospital on July 31, 2018 until  Aug 6 2018. Pt had been getting treated by an ID doctor\par  She has not had any fever.  She is frustrated that the wound is not healing. She is receiving wound care at home every other day. \par   duoderm to periwound/ wears a hernia abd binder to reduce it\par  hernia (2 areas ) is large spreading skin open, is partly reducible\par \par Of Note, she:\par - Had colorectal cancer s/p bowel resection\par - Has ventral abdominal hernias - S/P repair in 2017\par -history for SBO.\par \par She was admitted to [facility] with subjective fever and pain over a right sided abdominal wall ulcer.  The patient states that the right sided abdominal wall ulcer has been present for over a year. \par At that time, her right lateral abdominal wall was red.  She had drainage from the ulceration to the abdominal wall- from the lower pole of the wound. \par She had a CT that showed some inflammatory change in  the abdominal wall but no evidence of abscess or fistula.\par s/p mssa and enterbacter, treated with augmentin/ levaquin and fluconazole for vaginal candidiasis\par    She was treated with appropriate abx intravenously for 5 days and sent home on augmentin.  During her hospitalization, the redness to the abdominal wall improved but the ulcerated lesion continued to drain. \par She was discharged on Augmentin with a plan for wound care. \par \par  . \par \par \par

## 2019-03-07 NOTE — PHYSICAL EXAM
[Normal Rate and Rhythm] : normal rate and rhythm [JVD] : no jugular venous distention  [de-identified] : nad [de-identified] : deisy [de-identified] : ulcer/midline scar; recurrent hernia reducible [de-identified] : skin tear from tape at 8 o clock  [FreeTextEntry1] : abdominal hernia with ulcer [FreeTextEntry2] : 4.1 cm [FreeTextEntry3] : 2.4 cm  [FreeTextEntry4] : .1 cm [de-identified] : chay 2/26, aquacel

## 2019-03-11 NOTE — HISTORY OF PRESENT ILLNESS
[FreeTextEntry1] : .Pt is an 82 yo woman with PHMx of DM and CAD has right lower abdominal wound,\par here for skin replacement\par  speaks only Botswanan/  mitali perry interpretor [id number]\par  wound she has on her right side of the abdomen.old scar with incisional hernia\par  Pt had this wound for months pt was admitted to the hospital on July 31, 2018 until  Aug 6 2018. Pt had been getting treated by an ID doctor\par  She has not had any fever.  She is frustrated that the wound is not healing. She is receiving wound care at home every other day. \par   duoderm to periwound/ wears a hernia abd binder to reduce it\par  hernia (2 areas ) is large spreading skin open, is partly reducible\par \par Of Note, she:\par - Had colorectal cancer s/p bowel resection\par - Has ventral abdominal hernias - S/P repair in 2017\par -history for SBO.\par \par She was admitted to [facility] with subjective fever and pain over a right sided abdominal wall ulcer.  The patient states that the right sided abdominal wall ulcer has been present for over a year. \par At that time, her right lateral abdominal wall was red.  She had drainage from the ulceration to the abdominal wall- from the lower pole of the wound. \par She had a CT that showed some inflammatory change in  the abdominal wall but no evidence of abscess or fistula.\par s/p mssa and enterbacter, treated with augmentin/ levaquin and fluconazole for vaginal candidiasis\par    She was treated with appropriate abx intravenously for 5 days and sent home on augmentin.  During her hospitalization, the redness to the abdominal wall improved but the ulcerated lesion continued to drain. \par She was discharged on Augmentin with a plan for wound care. \par \par  . \par \par \par

## 2019-03-11 NOTE — ASSESSMENT
[FreeTextEntry1] : 83 yr old s/p sb resection,  h/o sbo's  from adhesions in past CAD, DM, stent, htn,\par  s/p kerry from ventral hernia repair , grafix pl placed again today 1/22/19\par colorectal cancer with open abdominal wall ulcer for a year, no fistula on ct,\par   doesn’t want to do it, is frightened because of risk with age and prior surgeries\par /discussed how with reduction of hernia, wound will close, no tension on skin, plus old ulcer will be removed, patient nervous about surgery\par high risk for complication/ MO age, previous recurrence/Discussed\par \par grafix(skin substitute number (#3 -12/11, 1/8, 1/22) applied today to patient's (anatomical site right mid abdominal wall). wound bed debrided of bioburded and prepped for product application. (#1 of pieces and [id number] size of product) obtained. Total product usage was (x 12sq. cm), Total waste (x0 sq. cm) due to wound size. Product secured with (steri strips and bolster dressing).\par Patient to f/u in 1-2 week.\par Wound has increased in size,\par  family reports that nurse has not been to see pt. to change foam \par

## 2019-03-12 ENCOUNTER — APPOINTMENT (OUTPATIENT)
Dept: WOUND CARE | Facility: CLINIC | Age: 84
End: 2019-03-12
Payer: MEDICARE

## 2019-03-12 PROCEDURE — 99213 OFFICE O/P EST LOW 20 MIN: CPT | Mod: 25

## 2019-03-12 PROCEDURE — 17250 CHEM CAUT OF GRANLTJ TISSUE: CPT

## 2019-03-14 NOTE — HISTORY OF PRESENT ILLNESS
[FreeTextEntry1] : 83 yo woman with PHMx of DM and CAD has right lower abdominal wound,\par had grafix last visit\par  had grafix  dec 11/ jan8 , jan22 and for today, Has been aquacel\par  speaks only Tajik/  mitali vicky interpretor [id number]\par  wound she has on her right side of the abdomen.old scar with incisional hernia\par  Pt had this wound for months pt was admitted to the hospital on July 31, 2018 until  Aug 6 2018. Pt had been getting treated by an ID doctor\par  She has not had any fever.  She is frustrated that the wound is not healing. She is receiving wound care at home every other day. \par   duoderm to periwound/ wears a hernia abd binder to reduce it\par  hernia (2 areas ) is large spreading skin open, is partly reducible\par \par Of Note, she:\par - Had colorectal cancer s/p bowel resection\par - Has ventral abdominal hernias - S/P repair in 2017\par -history for SBO.\par \par She was admitted to [facility] with subjective fever and pain over a right sided abdominal wall ulcer.  The patient states that the right sided abdominal wall ulcer has been present for over a year. \par At that time, her right lateral abdominal wall was red.  She had drainage from the ulceration to the abdominal wall- from the lower pole of the wound. \par She had a CT that showed some inflammatory change in  the abdominal wall but no evidence of abscess or fistula.\par s/p mssa and enterbacter, treated with augmentin/ levaquin and fluconazole for vaginal candidiasis\par    She was treated with appropriate abx intravenously for 5 days and sent home on augmentin.  During her hospitalization, the redness to the abdominal wall improved but the ulcerated lesion continued to drain. \par She was discharged on Augmentin with a plan for wound care. \par \par  . \par \par \par

## 2019-03-14 NOTE — PHYSICAL EXAM
[Normal Rate and Rhythm] : normal rate and rhythm [4 x 4] : 4 x 4  [Abdominal Pad] : Abdominal Pad [JVD] : no jugular venous distention  [de-identified] : nad [de-identified] : deisy [de-identified] : ulcer/midline scar; recurrent hernia reducible [de-identified] : skin tear from tape at 8 o clock  [FreeTextEntry1] : abdominal hernia with ulcer [FreeTextEntry2] : 5 [FreeTextEntry3] : 2. [FreeTextEntry4] : .1 cm [de-identified] : ag nitrate/adaptic/foam [de-identified] : 3x week [de-identified] : Foam

## 2019-03-14 NOTE — ASSESSMENT
[FreeTextEntry1] : 84 yr old s/p sbresection,  h/o sbo's  from adhesions in past CAD, DM, stent, htn,\par  s/p kerry from ventral hernia repair , \par s/pgrafix pl placed  2/26, wound longer, narrow\par ag nitrate placed today/will reorder grafix\par colorectal cancer with open abdominal wall ulcer for a year, no fistula on ct,\par   doesn’t want to do open hernia repair, though the ulcer would be removed, is frightened because of risk with age and prior surgeries\par /discussed how with reduction of hernia, wound will close, no tension on skin, plus old ulcer will be removed, patient nervous about surgery, discussed possible skin biopsy\par high risk for complication/ MO age, previous recurrence/Discussed\par Intrepreter # [id number]\par  \par Plan  for re application of  skin sub-grafix or appligraft.\par Wound has shown improvement through (increased granulation and or decreased size).\par Wound is free from infection drainage and necrotic tissue. \par  Patient is on comprehensive diabetic management program with a Hg A1c \par \par Wound has been treated with standards of care for (many) weeks including (compression, offloading, debridement).\par \par \par

## 2019-03-14 NOTE — DATA REVIEWED
[FreeTextEntry1] : large ventral hernia no collections\par  Radha #[id number]\par \par 6.8 in august hga1c

## 2019-03-19 ENCOUNTER — APPOINTMENT (OUTPATIENT)
Dept: WOUND CARE | Facility: CLINIC | Age: 84
End: 2019-03-19
Payer: MEDICARE

## 2019-03-19 PROCEDURE — 17250 CHEM CAUT OF GRANLTJ TISSUE: CPT

## 2019-03-19 PROCEDURE — 99213 OFFICE O/P EST LOW 20 MIN: CPT | Mod: 25

## 2019-03-21 NOTE — PHYSICAL EXAM
[Normal Rate and Rhythm] : normal rate and rhythm [JVD] : no jugular venous distention  [Abdomen Tenderness] : ~T ~M No abdominal tenderness [Skin Ulcer] : ulcer [Alert] : alert [Oriented to Person] : oriented to person [Oriented to Place] : oriented to place [Oriented to Time] : oriented to time [Calm] : calm [de-identified] : nad [de-identified] : deisy [de-identified] : ulcer/midline scar; recurrent hernia reducible [de-identified] : ROM INTACT [de-identified] : skin tear from tape at 8 o clock  [FreeTextEntry1] : abdominal hernia with ulcer [FreeTextEntry2] : 5.3 [FreeTextEntry3] : 2.9 [FreeTextEntry4] : .1 cm [de-identified] : grafix pl applied 1/8

## 2019-03-21 NOTE — ASSESSMENT
[FreeTextEntry1] : 83 yr old s/p sbresection,  h/o sbo's  from adhesions in past CAD, DM, stent, htn,\par  s/p kerry from ventral hernia repair , grafix pl placed again today 1/22/19\par colorectal cancer with open abdominal wall ulcer for a year, no fistula on ct,\par   doesn’t want to do it, is frightened because of risk with age and prior surgeries\par /discussed how with reduction of hernia, wound will close, no tension on skin, plus old ulcer will be removed, patient nervous about surgery\par high risk for complication/ MO age, previous recurrence/Discussed\par \par  phone used # [id number]\par wound is clean and pink\par skin subsitute ordered\par \par New orders for nurse, aquacel AG to be applied\par F/U 2 weeks\par Plan for  re application of GRAFIX\par \par Wound has shown improvement through (increased granulation and or decreased size).\par \par Wound is free from infection drainage and necrotic tissue. \par \par  Patient is on comprehensive diabetic management program with a Hg A1c  \par \par Wound has been treated with standards of care for MANY weeks including (compression, offloading, debridement).\par \par \par

## 2019-03-25 ENCOUNTER — OUTPATIENT (OUTPATIENT)
Dept: OUTPATIENT SERVICES | Facility: HOSPITAL | Age: 84
LOS: 1 days | Discharge: HOME | End: 2019-03-25
Payer: MEDICARE

## 2019-03-25 DIAGNOSIS — Z92.89 PERSONAL HISTORY OF OTHER MEDICAL TREATMENT: Chronic | ICD-10-CM

## 2019-03-25 DIAGNOSIS — Z98.890 OTHER SPECIFIED POSTPROCEDURAL STATES: Chronic | ICD-10-CM

## 2019-03-25 DIAGNOSIS — Z95.5 PRESENCE OF CORONARY ANGIOPLASTY IMPLANT AND GRAFT: Chronic | ICD-10-CM

## 2019-03-25 PROCEDURE — 93970 EXTREMITY STUDY: CPT | Mod: 26

## 2019-03-26 ENCOUNTER — APPOINTMENT (OUTPATIENT)
Dept: WOUND CARE | Facility: CLINIC | Age: 84
End: 2019-03-26
Payer: MEDICARE

## 2019-03-26 PROCEDURE — 17250 CHEM CAUT OF GRANLTJ TISSUE: CPT

## 2019-03-26 PROCEDURE — 99213 OFFICE O/P EST LOW 20 MIN: CPT | Mod: 25

## 2019-04-01 DIAGNOSIS — M79.89 OTHER SPECIFIED SOFT TISSUE DISORDERS: ICD-10-CM

## 2019-04-09 ENCOUNTER — APPOINTMENT (OUTPATIENT)
Dept: WOUND CARE | Facility: CLINIC | Age: 84
End: 2019-04-09
Payer: MEDICARE

## 2019-04-09 PROCEDURE — 15271 SKIN SUB GRAFT TRNK/ARM/LEG: CPT

## 2019-04-09 NOTE — REASON FOR VISIT
[Consultation] : a consultation visit [Spouse] : spouse [Family Member] : family member [FreeTextEntry1] : right side abdomen ulcer

## 2019-04-09 NOTE — DATA REVIEWED
[FreeTextEntry1] : large ventral hernia no collections\par  Radha #[id number]\par \par 6.8 in august hga1c

## 2019-04-09 NOTE — ASSESSMENT
[FreeTextEntry1] : 84 yr old s/p sbresection,  h/o sbo's  from adhesions in past CAD, DM, stent, htn,\par  s/p kerry from ventral hernia repair , \par s/pgrafix pl placed  2/26, wound longer, narrow\par ag nitrate placed today/will reorder grafix\par colorectal cancer with open abdominal wall ulcer for a year, no fistula on ct,\par   doesn’t want to do open hernia repair, though the ulcer would be removed, is frightened because of risk with age and prior surgeries\par /discussed how with reduction of hernia, wound will close, no tension on skin, plus old ulcer will be removed, patient nervous about surgery, discussed possible skin biopsy\par high risk for complication/ MO age, previous recurrence/Discussed\par \par Daughter providing translation via phone\par Wound is smaller, silver nitrate applied, aquacel, foam over, spoke to daughter about purchasing hydrofera blue to apply over wound when obtained in the interim use aquacel and foam\par skin sub ordered\par script for left knee x ray due to c/o pain\par script for doppler right calf due to pain/swelling\par new nurse orders given\par \par \par \par

## 2019-04-09 NOTE — PHYSICAL EXAM
[Normal Rate and Rhythm] : normal rate and rhythm [4 x 4] : 4 x 4  [Abdominal Pad] : Abdominal Pad [de-identified] : nad [JVD] : no jugular venous distention  [de-identified] : ulcer/midline scar; recurrent hernia reducible [de-identified] : skin tear from tape at 8 o clock  [FreeTextEntry1] : right upper abdominal   [FreeTextEntry2] : 4.8 cm [FreeTextEntry3] : 2.4 cm [FreeTextEntry4] : 0.1 cm [de-identified] : 3x week [de-identified] : right ankle 25\par left ankle 24 [de-identified] : ag nitrate/adaptic/foam [de-identified] : 0.1 [FreeTextEntry7] : right lower abdominal  [FreeTextEntry8] : .8 cm [FreeTextEntry9] : .9 cm [de-identified] : silver nitrate /

## 2019-04-09 NOTE — HISTORY OF PRESENT ILLNESS
[FreeTextEntry1] : 85 yo woman with PHMx of DM and CAD has right lower abdominal wound, used silver nitrate 3/12, 3/19, 3/26;  had grafix  dec 11/ jan8 , jan22 and feb 26 with minimal improvement, Has been aquacel/ foam\par  speaks only Tajik/  mitali perry interpretor [id number]\par  wound she has on her right side of the abdomen.old scar with incisional hernia\par  Pt had this wound for months pt was admitted to the hospital on July 31, 2018 until  Aug 6 2018. Pt had been getting treated by an ID doctor\par  She has not had any fever.  She is frustrated that the wound is not healing. She is receiving wound care at home every other day. \par   duoderm to periwound/ wears a hernia abd binder to reduce it\par  hernia (2 areas ) is large spreading skin open, is partly reducible\par \par Of Note, she:\par - Had colorectal cancer s/p bowel resection\par - Has ventral abdominal hernias - S/P repair in 2017\par -history for SBO.\par \par She was admitted to [facility] with subjective fever and pain over a right sided abdominal wall ulcer.  The patient states that the right sided abdominal wall ulcer has been present for over a year. \par At that time, her right lateral abdominal wall was red.  She had drainage from the ulceration to the abdominal wall- from the lower pole of the wound. \par She had a CT that showed some inflammatory change in  the abdominal wall but no evidence of abscess or fistula.\par s/p mssa and enterbacter, treated with augmentin/ levaquin and fluconazole for vaginal candidiasis\par    She was treated with appropriate abx intravenously for 5 days and sent home on augmentin.  During her hospitalization, the redness to the abdominal wall improved but the ulcerated lesion continued to drain. \par She was discharged on Augmentin with a plan for wound care. \par \par  . \par \par \par

## 2019-04-09 NOTE — DATA REVIEWED
[FreeTextEntry1] : large ventral hernia no collections\par  Radha #[id number]\par \par 6.8 in august hga1c

## 2019-04-09 NOTE — ASSESSMENT
[FreeTextEntry1] : 84 yr old s/p sbresection,  h/o sbo's  from adhesions in past CAD, DM, stent, htn,\par  s/p kerry from ventral hernia repair , s/pgrafix pl placed  2/26, wound longer, narrow\par 7% smaller than march 12, slow progress\par colorectal cancer with open abdominal wall ulcer for a year, no fistula on ct,\par   doesn’t want to do open hernia repair, though the ulcer would be removed,  patient proceeding with plan to fix\par is frightened because of risk with age and prior surgeries Pt. had initial abdominal surgery 1997, then 2017, has appt. in April with a surgeon from [facility], to follow up after appt. at wound center\par \par /discussed how with reduction of hernia, wound will close, no tension on skin, plus old ulcer will be removed, patient nervous about surgery, discussed possible skin biopsy\par high risk for complication/ MO age, previous recurrence/Discussed\par \par Intrepreter providing translation via phone, number is [phone] Shital\par Wounds improved, silver nitrate applied again, aquacel, foam over\par Orders for nurse given\par

## 2019-04-09 NOTE — PHYSICAL EXAM
[Normal Rate and Rhythm] : normal rate and rhythm [4 x 4] : 4 x 4  [Abdominal Pad] : Abdominal Pad [de-identified] : nad [JVD] : no jugular venous distention  [de-identified] : skin tear from tape at 8 o clock  [de-identified] : ulcer/midline scar; recurrent hernia reducible [FreeTextEntry1] : right upper abdominal   [FreeTextEntry2] : 4.2 cm [FreeTextEntry3] : 2.2 cm [FreeTextEntry4] : 0.1 cm [de-identified] : aquacel/foam [de-identified] : 3x week [FreeTextEntry7] : right lower abdominal  [FreeTextEntry8] : 1 cm [de-identified] : right ankle 25\par left ankle 24 [FreeTextEntry9] : 1.2cm [de-identified] : 0.1 [de-identified] : silver nitrate / aquacel/foam

## 2019-04-09 NOTE — HISTORY OF PRESENT ILLNESS
[FreeTextEntry1] : 85 yo woman with PHMx of DM and CAD has right lower abdominal wound, frustrated it still is open\par had grafix x  dec 11/ jan8 , jan22 and feb26, last visit used ag nitrate, hernia persists\par  discussed biopsy, Has been using aquacel\par  speaks only German/  mitali perry interpretor [id number]\par  wound she has on her right side of the abdomen.old scar with incisional hernia\par  Pt had this wound for months pt was admitted to the hospital on July 31, 2018 until  Aug 6 2018. Pt had been getting treated by an ID doctor\par  She has not had any fever.  She is frustrated that the wound is not healing. She is receiving wound care at home every other day. \par   duoderm to periwound/ wears a hernia abd binder to reduce it\par  hernia (2 areas ) is large spreading skin open, is partly reducible\par \par Of Note, she:\par - Had colorectal cancer s/p bowel resection\par - Has ventral abdominal hernias - S/P repair in 2017\par -history for SBO.\par \par She was admitted to [facility] with subjective fever and pain over a right sided abdominal wall ulcer.  The patient states that the right sided abdominal wall ulcer has been present for over a year. \par At that time, her right lateral abdominal wall was red.  She had drainage from the ulceration to the abdominal wall- from the lower pole of the wound. \par She had a CT that showed some inflammatory change in  the abdominal wall but no evidence of abscess or fistula.\par s/p mssa and enterbacter, treated with augmentin/ levaquin and fluconazole for vaginal candidiasis\par    She was treated with appropriate abx intravenously for 5 days and sent home on augmentin.  During her hospitalization, the redness to the abdominal wall improved but the ulcerated lesion continued to drain. \par She was discharged on Augmentin with a plan for wound care. \par \par  . \par \par \par

## 2019-04-11 NOTE — PATIENT PROFILE ADULT. - PURPOSEFUL PROACTIVE ROUNDING
Patient called to reschedule today's coumadin clinic appointment, it was rescheduled to 4/15/19   Support Person/Patient

## 2019-04-22 NOTE — DATA REVIEWED
[FreeTextEntry1] : large ventral hernia no collections\par  Radha #[id number]\par \par 6.8 in august hga1c

## 2019-04-22 NOTE — PHYSICAL EXAM
[Normal Rate and Rhythm] : normal rate and rhythm [4 x 4] : 4 x 4  [Abdominal Pad] : Abdominal Pad [JVD] : no jugular venous distention  [de-identified] : nad [de-identified] : ulcer/midline scar; recurrent hernia reducible [de-identified] : skin tear from tape at 8 o clock  [FreeTextEntry1] : right upper abdominal   [FreeTextEntry2] : 3.7 [FreeTextEntry4] : 0.1 cm [FreeTextEntry3] : 3.5 [de-identified] : PURAPLY PLACED 4/9 aquacel/foam [FreeTextEntry5] : PURAPLY PLACED [FreeTextEntry7] : right lower abdominal  [de-identified] : right ankle 25\par left ankle 24\par PREVIOUS 4.2/2.2/0.1 [FreeTextEntry8] : 0.7 [FreeTextEntry9] : 1.4 [de-identified] : 0.1 [de-identified] : PREVIOUS1/1.2/0.1

## 2019-04-22 NOTE — HISTORY OF PRESENT ILLNESS
[FreeTextEntry1] : 83 yo woman with PHMx of DM and CAD has right lower abdominal wound, used silver nitrate 3/12, 3/19, 3/26;  had grafix  dec 11/ jan8 , jan22 and feb 26 with minimal improvement, Has been aquacel/ foam\par  speaks only Faroese/  mitali perry interpretor [id number]\par  wound she has on her right side of the abdomen.old scar with incisional hernia\par  Pt had this wound for months pt was admitted to the hospital on July 31, 2018 until  Aug 6 2018. Pt had been getting treated by an ID doctor\par  She has not had any fever.  She is frustrated that the wound is not healing. She is receiving wound care at home every other day. \par   duoderm to periwound/ wears a hernia abd binder to reduce it\par  hernia (2 areas ) is large spreading skin open, is partly reducible\par \par Of Note, she:\par - Had colorectal cancer s/p bowel resection\par - Has ventral abdominal hernias - S/P repair in 2017\par -history for SBO.\par \par She was admitted to [facility] with subjective fever and pain over a right sided abdominal wall ulcer.  The patient states that the right sided abdominal wall ulcer has been present for over a year. \par At that time, her right lateral abdominal wall was red.  She had drainage from the ulceration to the abdominal wall- from the lower pole of the wound. \par She had a CT that showed some inflammatory change in  the abdominal wall but no evidence of abscess or fistula.\par s/p mssa and enterbacter, treated with augmentin/ levaquin and fluconazole for vaginal candidiasis\par    She was treated with appropriate abx intravenously for 5 days and sent home on augmentin.  During her hospitalization, the redness to the abdominal wall improved but the ulcerated lesion continued to drain. \par She was discharged on Augmentin with a plan for wound care. \par \par  . \par \par \par

## 2019-04-22 NOTE — REASON FOR VISIT
[Spouse] : spouse [Consultation] : a consultation visit [Family Member] : family member [FreeTextEntry1] : right side abdomen ulcer

## 2019-04-23 ENCOUNTER — APPOINTMENT (OUTPATIENT)
Dept: WOUND CARE | Facility: CLINIC | Age: 84
End: 2019-04-23
Payer: MEDICARE

## 2019-04-23 PROCEDURE — 99214 OFFICE O/P EST MOD 30 MIN: CPT

## 2019-04-25 NOTE — PHYSICAL EXAM
[Normal Rate and Rhythm] : normal rate and rhythm [Abdominal Pad] : Abdominal Pad [4 x 4] : 4 x 4  [JVD] : no jugular venous distention  [de-identified] : ulcer/midline scar; recurrent hernia reducible [de-identified] : nad [FreeTextEntry2] : 4.3 [FreeTextEntry1] : right upper abdominal   [de-identified] : skin tear from tape at 8 o clock  [FreeTextEntry4] : 0.1 cm [FreeTextEntry5] : gauze [FreeTextEntry3] : 3.7 [FreeTextEntry7] : right lower abdominal  [de-identified] : right ankle 25\par left ankle 24\par PREVIOUS 4.2/2.2/0.1\par \par \par 3.7/3.5/0.1 [de-identified] : alginate/duodermPURAPLY PLACED 4/9 aquacel/foam [FreeTextEntry9] : 0.7 [FreeTextEntry8] : 0.7 [de-identified] : 0.1 [de-identified] : puraply [de-identified] : PREVIOUS1/1.2/0.1\par \par 0.7/0.1/0.1

## 2019-04-25 NOTE — ASSESSMENT
[FreeTextEntry1] : 84 yr old  with abdominal wall ulcer , s/p puraply last visit\par s/p sbresection,  h/o sbo's  from adhesions in past CAD, DM, stent, htn,\par  s/p kerry from ventral hernia repair , s/pgrafix pl placed  2/26, wound longer, narrow\par  slow progress;  lower wound closing \par colorectal cancer with open abdominal wall ulcer for a year, no fistula on ct,\par  doesn’t want to do open hernia repair, though the ulcer would be removed,\par   patient proceeding with plan to fix/is frightened because of risk with age and prior surgeries \par  used today\par duoderm and alginate placed\par /discussed how with reduction of hernia, wound will close, no tension on skin, plus old ulcer will be removed, patient nervous about surgery \par high risk for complication/ MO age, previous recurrence/Discussed\par \par \par Surgery scheduled for July 21 for hernia repair per  pt. is refusing to have surgery at this time\par [place] intrepreter # \par  Plan - Aquacel over wound\par Orders for nurse given\par F/U 1 week \par Plan for  re application of  puraply.\par Wound has shown improvement through (increased granulation and or decreased size).\par Wound is free from infection drainage and necrotic tissue. \par Patient is on comprehensive diabetic management program with a Hg A1c of () from (date).\par \par Wound has been treated with standards of care for () weeks including (compression, offloading, debridement).

## 2019-04-25 NOTE — HISTORY OF PRESENT ILLNESS
[FreeTextEntry1] : 85 yo woman with PHMx of DM and CAD has right lower abdominal wound, used silver nitrate 3/12, 3/19, 3/26;  had grafix  dec 11/ jan8 , jan22 and feb 26 with minimal improvement,\par  Has been using aquacel/ foam, april 9 had a puraply placed, has had only top dressing changed since then\par  speaks only Tanzanian/   pacific interpretor [id number]\par  wound she has on her right side of the abdomen.old scar with incisional hernia\par  Pt had this wound for months pt was admitted to the hospital on July 31, 2018 until  Aug 6 2018. Pt had been getting treated by an ID doctor , had initial abdominal surgery 1997, then 2017, has appt. in April with a surgeon from [facility] \par  She has not had any fever.  She is frustrated that the wound is not healing. She is receiving wound care at home every other day.   duoderm to periwound/ wears a hernia abd binder to reduce it\par  hernia (2 areas ) is large spreading skin open, is partly reducible\par \par Of Note, she:\par - Had colorectal cancer s/p bowel resection\par - Has ventral abdominal hernias - S/P repair in 2017\par -history for SBO.\par \par She was admitted to [facility] with subjective fever and pain over a right sided abdominal wall ulcer.  The patient states that the right sided abdominal wall ulcer has been present for over a year. \par   \par She had a CT that showed some inflammatory change in  the abdominal wall but no evidence of abscess or fistula.\par s/p mssa and enterbacter, treated with augmentin/ levaquin and fluconazole for vaginal candidiasis\par    She was treated with appropriate abx intravenously for 5 days and sent home on augmentin.  During her hospitalization, the redness to the abdominal wall improved but the ulcerated lesion continued to drain. She was discharged on Augmentin previously\par \par  . \par \par \par

## 2019-04-25 NOTE — DISCUSSION/SUMMARY
[FreeTextEntry1] : Re-certification for medicare -covered home health services under a home health plan of care, including contacts with [facility] and review of reports of patient status required to affirm the initial implementation of the plan of care (POC) that meets the patient's needs this certification period ( dates3/9/19-5/7/19)\par Eligibility requirements: \par A.. Homebound- confined to home- \par                                                            #1.because of illness or injury, needs aid of supportive devices( cane,wheelchair,walker); \par                                                                   special transportation, or assist of another person to leave their residence and\par                                                            #2 There is a normal inability to leave home and requires considerable and taxing effort ( indicate : cardiovascular stress, cad                                                                pulmonary stress-lack of oxygen, poor mobility, pain requiring narcotics, safety concerns\par \par B.Skilled need- continues to need a nurse with special judgement, knowledge and skill on intermittent /part time basis due to wound on : \par                                                                              ( specify wound/ site abdominal wall ventral hernia scar)  due to (  DM,  s/p procedure ( ventral hernia, colectomy))\par C Face to face - date \par \par I certify that this patient is confined to his/her home and needs intermittent skilled nursing care, PT, OT and/or  speech therapy\par This patient is under my care and I have authorized the services on this plan of care, and will periodically review the plan.\par I further certify that the patient  had a face to face encountermar5, mar19,2019 ( within 30 days)\par  by a MD or Medicare allowed non physician practitioner that was related to the primary reason the patient requires home health services

## 2019-04-25 NOTE — DATA REVIEWED
[FreeTextEntry1] : large ventral hernia no collections\par  Radha #[id number]\par \par 6.8 in august hga1c

## 2019-05-07 ENCOUNTER — APPOINTMENT (OUTPATIENT)
Dept: WOUND CARE | Facility: CLINIC | Age: 84
End: 2019-05-07
Payer: MEDICARE

## 2019-05-07 PROCEDURE — 15271 SKIN SUB GRAFT TRNK/ARM/LEG: CPT

## 2019-05-14 ENCOUNTER — APPOINTMENT (OUTPATIENT)
Dept: WOUND CARE | Facility: CLINIC | Age: 84
End: 2019-05-14
Payer: MEDICARE

## 2019-05-14 PROCEDURE — 99213 OFFICE O/P EST LOW 20 MIN: CPT

## 2019-05-21 NOTE — HISTORY OF PRESENT ILLNESS
[FreeTextEntry1] : 85 yo woman with PHMx of DM and CAD has right lower abdominal wound, here for sub placement\par \par  used silver nitrate 3/12, 3/19, 3/26;  had grafix  dec 11/ jan8 , jan22 and feb 26 with minimal improvement,\par  Has been using aquacel/ foam, april 9 had a puraply placed, has had only top dressing changed since then\par  speaks only Cambodian/   pacific interpretor [id number]\par  wound she has on her right side of the abdomen.old scar with incisional hernia\par  Pt had this wound for months pt was admitted to the hospital on July 31, 2018 until  Aug 6 2018. Pt had been getting treated by an ID doctor , had initial abdominal surgery 1997, then 2017, has appt. in April with a surgeon from [facility] \par  She has not had any fever.  She is frustrated that the wound is not healing. She is receiving wound care at home every other day.   duoderm to periwound/ wears a hernia abd binder to reduce it\par  hernia (2 areas ) is large spreading skin open, is partly reducible\par \par Of Note, she:\par - Had colorectal cancer s/p bowel resection\par - Has ventral abdominal hernias - S/P repair in 2017\par -history for SBO.\par \par She was admitted to [facility] with subjective fever and pain over a right sided abdominal wall ulcer.  The patient states that the right sided abdominal wall ulcer has been present for over a year. \par   \par She had a CT that showed some inflammatory change in  the abdominal wall but no evidence of abscess or fistula.\par s/p mssa and enterbacter, treated with augmentin/ levaquin and fluconazole for vaginal candidiasis\par    She was treated with appropriate abx intravenously for 5 days and sent home on augmentin.  During her hospitalization, the redness to the abdominal wall improved but the ulcerated lesion continued to drain. She was discharged on Augmentin previously\par \par  . \par \par \par

## 2019-05-21 NOTE — HISTORY OF PRESENT ILLNESS
[FreeTextEntry1] : 85 yo woman with PHMx of DM and CAD has right lower abdominal wound, had puraply may 7\par  used silver nitrate 3/12, 3/19, 3/26;  had grafix  dec 11/ jan8 , jan22 and feb 26 with minimal improvement,\par  Has been using aquacel/ foam, april 9 had a puraply placed, has had only top dressing changed since then\par  speaks only French/   pacific interpretor [id number]\par  wound she has on her right side of the abdomen.old scar with incisional hernia\par  Pt had this wound for months pt was admitted to the hospital on July 31, 2018 until  Aug 6 2018. Pt had been getting treated by an ID doctor , had initial abdominal surgery 1997, then 2017, has appt. in April with a surgeon from [facility] [place] \par  She has not had any fever.  She is frustrated that the wound is not healing. She is receiving wound care at home every other day.   duoderm to periwound/ wears a hernia abd binder to reduce it\par  hernia (2 areas ) is large spreading skin open, is partly reducible\par \par Of Note, she:\par - Had colorectal cancer s/p bowel resection\par - Has ventral abdominal hernias - S/P repair in 2017\par -history for SBO.\par \par She was admitted to [facility] with subjective fever and pain over a right sided abdominal wall ulcer.  The patient states that the right sided abdominal wall ulcer has been present for over a year. \par   \par She had a CT that showed some inflammatory change in  the abdominal wall but no evidence of abscess or fistula.\par s/p mssa and enterbacter, treated with augmentin/ levaquin and fluconazole for vaginal candidiasis\par    She was treated with appropriate abx intravenously for 5 days and sent home on augmentin.  During her hospitalization, the redness to the abdominal wall improved but the ulcerated lesion continued to drain. She was discharged on Augmentin previously\par \par  . \par \par \par

## 2019-05-21 NOTE — PHYSICAL EXAM
[Normal Rate and Rhythm] : normal rate and rhythm [4 x 4] : 4 x 4  [Abdominal Pad] : Abdominal Pad [JVD] : no jugular venous distention  [de-identified] : nad [de-identified] : ulcer/midline scar; recurrent hernia reducible [de-identified] : skin tear from tape at 8 o clock  [FreeTextEntry1] : right upper abdominal   [FreeTextEntry2] : 3.5 [FreeTextEntry3] : 3 [FreeTextEntry4] : 0.1 cm [FreeTextEntry5] : gauze [de-identified] : alginate/duodermPURAPLY 5/7PLACED  aquacel/foam [de-identified] : right ankle 25\par left ankle 24\par PREVIOUS 4.2/2.2/0.1\par \par \par 3.7/3.5/0.1\par 4.3/3.7/0.1 [FreeTextEntry7] : right lower abdominal / cluster [FreeTextEntry8] : 3.2 [FreeTextEntry9] : 3 [de-identified] : 0.1 [de-identified] : puraply [de-identified] : PREVIOUS1/1.2/0.1\par \par 0.7/0.1/0.1

## 2019-05-21 NOTE — DATA REVIEWED
[FreeTextEntry1] : large ventral hernia no collections\par  Radha #[id number]\par \par 6.8 in august hga1c

## 2019-05-21 NOTE — ASSESSMENT
[FreeTextEntry1] : 84 yr old  with abdominal wall ulcer , s/p puraply last visit may and apr 9\par s/p sbresection,  h/o sbo's  from adhesions in past CAD, DM, stent, htn,\par  s/p kerry from ventral hernia repair , s/pgrafix pl placed  2/26, wound longer, narrow\par  slow progress;  lower wound closing \par colorectal cancer with open abdominal wall ulcer for a year, no fistula on ct,\par  doesn’t want to d\par \par Puraply applied last week \par Surgery scheduled for July 19 th  for hernia repair\par distal wounds slightly smaller, upper larger wound is slightly larger, will use aquacel, border with duoderm, cover with non adherent foam\par \par [place] intrepreter # [id number] Martine\par \par Orders for nurse given\par \par \par \par

## 2019-05-21 NOTE — DATA REVIEWED
[FreeTextEntry1] : large ventral hernia no collections\par  Radha #[id number]\par \par 6.8 in august hga1c

## 2019-05-21 NOTE — ASSESSMENT
[FreeTextEntry1] : 84 yr old  with abdominal wall ulcer , s/p puraply last visit\par s/p sbresection,  h/o sbo's  from adhesions in past CAD, DM, stent, htn,\par  s/p kerry from ventral hernia repair , s/pgrafix pl placed  2/26, wound longer, narrow\par  slow progress;  lower wound closing \par colorectal cancer with open abdominal wall ulcer for a year, no fistula on ct,\par  doesn’t want to do open hernia repair, though the ulcer would be removed,\par   patient proceeding with plan to fix/is frightened because of risk with age and prior surgeries \par  used today\par duoderm and alginate placed\par /discussed how with reduction of hernia, wound will close, no tension on skin, plus old ulcer will be removed, patient nervous about surgery \par high risk for complication/ MO age, previous recurrence/Discussed\par \par \par Surgery scheduled for July ? will let us know exact date\par \par [facility] # [id number] [facility]\par \par Orders for nurse given\par 5/7/19 Puraply number 1 applied today to patient’s abdomen.   Wound bed debrided of bioburden and prepped for product application.  1  piece and original size of product obtained.  Total product usage was 20.1 sq. cm, Total waste 4.9 sq. cm due to wound size.  Product secured with (steri strips and bolster dressing).\par Patient to f/u in 1 week\par \par \par

## 2019-05-21 NOTE — PHYSICAL EXAM
[Normal Rate and Rhythm] : normal rate and rhythm [4 x 4] : 4 x 4  [Abdominal Pad] : Abdominal Pad [JVD] : no jugular venous distention  [de-identified] : nad [de-identified] : ulcer/midline scar; recurrent hernia reducible [de-identified] : skin tear from tape at 8 o clock  [FreeTextEntry1] : upper abdominal   [FreeTextEntry2] : 3.8 [FreeTextEntry3] : 3.9 [FreeTextEntry4] : 0.1 cm [FreeTextEntry5] : gauze [de-identified] : aquacel [de-identified] : right ankle 25\par left ankle 24\par PREVIOUS 4.2/2.2/0.1\par \par \par 3.7/3.5/0.1\par 4.3/3.7/0.1\par 3.5/3 [FreeTextEntry7] : right lower abdominal / cluster [FreeTextEntry8] : 2.1 [FreeTextEntry9] : 3 [de-identified] : 0.1 [de-identified] : aquacel [de-identified] : PREVIOUS1/1.2/0.1\par \par 0.7/0.1/0.1

## 2019-05-30 ENCOUNTER — APPOINTMENT (OUTPATIENT)
Dept: WOUND CARE | Facility: CLINIC | Age: 84
End: 2019-05-30
Payer: MEDICARE

## 2019-05-30 PROCEDURE — 15271 SKIN SUB GRAFT TRNK/ARM/LEG: CPT

## 2019-06-13 ENCOUNTER — APPOINTMENT (OUTPATIENT)
Dept: WOUND CARE | Facility: CLINIC | Age: 84
End: 2019-06-13
Payer: MEDICARE

## 2019-06-13 PROCEDURE — 15271 SKIN SUB GRAFT TRNK/ARM/LEG: CPT

## 2019-06-20 ENCOUNTER — APPOINTMENT (OUTPATIENT)
Dept: WOUND CARE | Facility: CLINIC | Age: 84
End: 2019-06-20
Payer: MEDICARE

## 2019-06-20 PROCEDURE — 99213 OFFICE O/P EST LOW 20 MIN: CPT

## 2019-06-20 NOTE — PHYSICAL EXAM
[Normal Rate and Rhythm] : normal rate and rhythm [Abdominal Pad] : Abdominal Pad [4 x 4] : 4 x 4  [JVD] : no jugular venous distention  [de-identified] : ulcer/midline scar; recurrent hernia reducible [de-identified] : nad [FreeTextEntry1] : upper abdominal   [de-identified] : skin tear from tape at 8 o clock  [FreeTextEntry2] : 4.6 [FreeTextEntry3] : 3.8 [FreeTextEntry5] : gauze [FreeTextEntry4] : 0.1 cm [de-identified] : puraply today 615 [FreeTextEntry7] : right lower abdominal / cluster [de-identified] : right ankle 25\par left ankle 24\par PREVIOUS 4.2/2.2/0.1\par \par \par   [de-identified] : 0.1 [FreeTextEntry8] : 1.2 [FreeTextEntry9] : 1.5 [de-identified] : PREVIOUS1/1.2/0.1\par \par   [de-identified] : puraply today

## 2019-06-20 NOTE — HISTORY OF PRESENT ILLNESS
[FreeTextEntry1] : 85 yo woman with PHMx of DM and CAD has right lower abdominal wound, had puraply may 7, here for a new one\par no fevers , still draining, uses binder\par  used silver nitrate 3/12, 3/19, 3/26;  had grafix  dec 11/ jan8 , jan22 and feb 26 with minimal improvement,\par  Has been using aquacel/ foam, april 9 had a puraply placed, has had only top dressing changed since then\par  speaks only Canadian/   pacific interpretor  today\par  wound she has on her right side of the abdomen.old scar with incisional hernia\par  Pt had this wound for months pt was admitted to the hospital on July 31, 2018 until  Aug 6 2018. Pt had been getting treated by an ID doctor , had initial abdominal surgery 1997, then 2017, has appt. in April with a surgeon from [facility] \par  She has not had any fever.  She is frustrated that the wound is not healing. She is receiving wound care at home every other day.   duoderm to periwound/ wears a hernia abd binder to reduce it\par  hernia (2 areas ) is large spreading skin open, is partly reducible\par \par Of Note, she:\par - Had colorectal cancer s/p bowel resection\par - Has ventral abdominal hernias - S/P repair in 2017\par -history for SBO.\par \par She was admitted to [facility] with subjective fever and pain over a right sided abdominal wall ulcer.  The patient states that the right sided abdominal wall ulcer has been present for over a year. \par   \par She had a CT that showed some inflammatory change in  the abdominal wall but no evidence of abscess or fistula.\par s/p mssa and enterbacter, treated with augmentin/ levaquin and fluconazole for vaginal candidiasis\par    She was treated with appropriate abx intravenously for 5 days and sent home on augmentin.  During her hospitalization, the redness to the abdominal wall improved but the ulcerated lesion continued to drain. She was discharged on Augmentin previously\par \par  . \par \par \par

## 2019-06-20 NOTE — ASSESSMENT
[FreeTextEntry1] : 84 yr old  with abdominal wall ulcer , s/p puraply last visit may30 and today 6/13\par s/p sbresection,  h/o sbo's  from adhesions in past CAD, DM, stent, htn,\par  s/p kerry from ventral hernia repair , s/pgrafix pl placed  2/26, wound longer, narrow\par  slow progress;  lower wound closing \par colorectal cancer with open abdominal wall ulcer for a year, no fistula on ct,\par  zinc paste periwound, keep foam small crisp to size of wound\par Puraply applied   today to patient's (abd wall anatomical site). wound bed debrided of bioburded and prepped for product application. (# 1of pieces and original size of [id number]) obtained. Total product usage was (19.3 sq. cm), Total waste (5.7 sq. cm) due to wound size. Product secured with (steri strips and bolster dressing).\par Patient to f/u in 1 week.\par Surgery scheduled for July 19 th  for hernia repair\par distal wounds slightly smaller, upper larger wound is slightly larger, will use aquacel, border with duoderm, cover with non adherent foam\par \par Palauan intrepreter # [id number] Fatou\par \par Orders for nurse given\par \par \par \par

## 2019-06-20 NOTE — ASSESSMENT
[FreeTextEntry1] : 84 yr old  with abdominal wall ulcer , s/p puraply last visit may and apr 9 today may 30\par s/p sbresection,  h/o sbo's  from adhesions in past CAD, DM, stent, htn,\par  s/p kerry from ventral hernia repair , s/pgrafix pl placed  2/26, wound longer, narrow\par  slow progress;  lower wound closing \par colorectal cancer with open abdominal wall ulcer for a year, no fistula on ct,\par  doesn’t want to d\par \par Puraply applied may 30  applied today to patient's (abd wall anatomical site). wound bed debrided of bioburded and prepped for product application. (# 1of pieces and original size of [id number]) obtained. Total product usage was (x sq. cm5x5), Total waste (0x sq. cm) due to wound size. Product secured with (steri strips and bolster dressing).\par Patient to f/u in 1 week.\par Surgery scheduled for July 19 th  for hernia repair\par distal wounds slightly smaller, upper larger wound is slightly larger, will use aquacel, border with duoderm, cover with non adherent foam\par \par Slovenian intrepreter # [id number] Martine\par \par Orders for nurse given\par \par \par \par

## 2019-06-20 NOTE — HISTORY OF PRESENT ILLNESS
[FreeTextEntry1] : 85 yo woman with PHMx of DM and CAD has right lower abdominal wound, had puraply april 9 may 7, may 30 \par still with drainage, no cellulitis, foam placed caused irritation (big square)\par  used silver nitrate 3/12, 3/19, 3/26;  had grafix  dec 11/ jan8 , jan22 and feb 26 with minimal improvement,\par  Has been using aquacel/ foam,  \par  speaks only Cuban/   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 Pt had been getting treated by an ID doctor , had initial abdominal surgery 1997, then 2017, has appt. in April with a surgeon from [facility] \par  She has not had any fever.  She is frustrated that the wound is not healing. She is receiving wound care at home every other day.   duoderm to periwound/ wears a hernia abd binder to reduce it\par  hernia (2 areas ) is large spreading skin open, is partly reducible\par \par Of Note, she:\par - Had colorectal cancer s/p bowel resection\par - Has ventral abdominal hernias - S/P repair in 2017\par -history for SBO.\par \par She was admitted to [facility] with subjective fever and pain over a right sided abdominal wall ulcer.  The patient states that the right sided abdominal wall ulcer has been present for over a year. \par   \par She had a CT that showed some inflammatory change in  the abdominal wall but no evidence of abscess or fistula.\par s/p mssa and enterbacter, treated with augmentin/ levaquin and fluconazole for vaginal candidiasis\par    She was treated with appropriate abx intravenously for 5 days and sent home on augmentin.  During her hospitalization, the redness to the abdominal wall improved but the ulcerated lesion continued to drain. She was discharged on Augmentin previously\par \par  . \par \par \par

## 2019-06-20 NOTE — DATA REVIEWED
[FreeTextEntry1] : large ventral hernia no collections\par  Radha #[id number]\par \par 6.8 in august hga1c

## 2019-06-20 NOTE — DATA REVIEWED
[FreeTextEntry1] : large ventral hernia no collections\par  Radha #[id number]\par \par 6.8 in august hga1c

## 2019-06-20 NOTE — REASON FOR VISIT
[Family Member] : family member [Spouse] : spouse [Consultation] : a consultation visit [FreeTextEntry1] : right side abdomen ulcer

## 2019-06-20 NOTE — PHYSICAL EXAM
[Normal Rate and Rhythm] : normal rate and rhythm [4 x 4] : 4 x 4  [Abdominal Pad] : Abdominal Pad [JVD] : no jugular venous distention  [de-identified] : ulcer/midline scar; recurrent hernia reducible [de-identified] : nad [de-identified] : skin tear from tape at 8 o clock  [FreeTextEntry2] : 3.7 [FreeTextEntry1] : upper abdominal   [FreeTextEntry3] : 2.7 [FreeTextEntry4] : 0.1 cm [FreeTextEntry5] : gauze [de-identified] : right ankle 25\par left ankle 24\par PREVIOUS 4.2/2.2/0.1\par \par \par previous3.7/3.5/0.1,4.3/3.7/0.1,3.5/3 [de-identified] : puraply today 5/30 [FreeTextEntry7] : right lower abdominal / cluster [FreeTextEntry8] : .6cm [FreeTextEntry9] : .8cm [de-identified] : puraply today [de-identified] : 0.1 [de-identified] : PREVIOUS1/1.2/0.1\par \par 0.7/0.1/0.1

## 2019-06-26 NOTE — ED ADULT NURSE NOTE - ASSIGNED BED LOCATION
Subjective   Patient: Aicha Kerr  MRN: [id number]   : 1942     REASON FOR VISIT:  Chief Complaint   Patient presents with   • Follow-up     [facility] Dr. [place] pt.       HISTORY OF PRESENT ILLNESS:  Aicha Kerr is a 76 year old female with history of NICM, PVD. EF 3/19 25%.  Has AICD.  To be checked in August.  Participating in cardiac rehab.  No chest pain, dyspnea, or claudication.  Recent LE dopplers reviewed.  Has moderate disease, possible left pop occlusion- asymptomatic.  Sees podiatrist every six weeks      Past Medical History:   Diagnosis Date   • Atrial fibrillation, permanent (CMS/HCC)    • Biventricular ICD (implantable cardioverter-defibrillator) in place    • Chronic systolic heart failure (CMS/HCC) 2016    EF=46%   • Dyslipidemia    • Hypertensive heart disease        Past Surgical History:   Procedure Laterality Date   • Icd implant  2017    Upgrade to CRT   • Icd implant         Social History     Socioeconomic History   • Marital status:      Spouse name: Not on file   • Number of children: Not on file   • Years of education: Not on file   • Highest education level: Not on file   Occupational History   • Not on file   Social Needs   • Financial resource strain: Not on file   • Food insecurity:     Worry: Not on file     Inability: Not on file   • Transportation needs:     Medical: Not on file     Non-medical: Not on file   Tobacco Use   • Smoking status: Never Smoker   • Smokeless tobacco: Never Used   Substance and Sexual Activity   • Alcohol use: No     Frequency: Never   • Drug use: Not on file   • Sexual activity: Not on file   Lifestyle   • Physical activity:     Days per week: Not on file     Minutes per session: Not on file   • Stress: Not on file   Relationships   • Social connections:     Talks on phone: Not on file     Gets together: Not on file     Attends Hinduism service: Not on file     Active member of club or organization: Not on file     Attends  meetings of clubs or organizations: Not on file     Relationship status: Not on file   • Intimate partner violence:     Fear of current or ex partner: Not on file     Emotionally abused: Not on file     Physically abused: Not on file     Forced sexual activity: Not on file   Other Topics Concern   • Not on file   Social History Narrative   • Not on file       Family History   Problem Relation Age of Onset   • Myocardial Infarction Father        Current Outpatient Medications   Medication Sig Dispense Refill   • spironolactone (ALDACTONE) 25 MG tablet Take 25 mg by mouth 2 times daily.     • furosemide (LASIX) 20 MG tablet Take 20 mg by mouth 2 times daily.     • carvedilol (COREG) 25 MG tablet Take 1 tablet by mouth 2 times daily (with meals). 180 tablet 3   • digoxin (LANOXIN) 0.125 MG tablet TAKE ONE TABLET BY MOUTH EVERY OTHER DAY  45 tablet 0   • apixaban (ELIQUIS) 5 MG Tab      • carbamide peroxide (DEBROX) 6.5 % otic solution Place 5 drops into both ears 2 times daily. 15 mL 0   • atorvastatin (LIPITOR) 40 MG tablet TAKE ONE TABLET BY MOUTH AT BEDTIME     • losartan (COZAAR) 50 MG tablet take one tablet by mouth one time daily       No current facility-administered medications for this visit.        ALLERGIES:  No Known Allergies    Review of Systems   Constitution: Negative for chills, diaphoresis, fever, malaise/fatigue, weight gain and weight loss.   HENT: Negative for congestion, nosebleeds, sore throat, stridor and tinnitus.    Eyes: Negative for blurred vision, double vision, vision loss in left eye, vision loss in right eye and visual disturbance.   Cardiovascular: Negative for chest pain, claudication, cyanosis, dyspnea on exertion, irregular heartbeat, leg swelling, near-syncope, orthopnea, palpitations, paroxysmal nocturnal dyspnea and syncope.   Endocrine: Negative for cold intolerance, heat intolerance, polydipsia, polyphagia and polyuria.   Hematologic/Lymphatic: Negative for bleeding problem.  Does not bruise/bleed easily.   Musculoskeletal: Negative for arthritis, back pain, falls, muscle weakness and myalgias.   Gastrointestinal: Negative for bloating, abdominal pain, anorexia, change in bowel habit, dysphagia, heartburn, hematemesis, hematochezia, melena and nausea.   Genitourinary: Negative for bladder incontinence, decreased libido, dysuria, flank pain, frequency and hematuria.   Neurological: Negative for brief paralysis, disturbances in coordination, excessive daytime sleepiness, dizziness, focal weakness, headaches, light-headedness, loss of balance, numbness, seizures, sensory change, tremors, vertigo and weakness.   Psychiatric/Behavioral: Negative for altered mental status, depression and memory loss. The patient does not have insomnia and is not nervous/anxious.    Allergic/Immunologic: Negative for environmental allergies, hives and persistent infections.       Objective     Visit Vitals  /76   Pulse 76   Ht 5' 10.5\" (1.791 m)   Wt 83.5 kg (184 lb)   BMI 26.03 kg/m²     Physical Exam   Constitutional: She is oriented to person, place, and time. She appears well-developed and well-nourished. No distress.   HENT:   Head: Normocephalic and atraumatic.   Nose: Nose normal.   Mouth/Throat: Oropharynx is clear and moist.   Eyes: Pupils are equal, round, and reactive to light. Conjunctivae and EOM are normal.   Neck: Normal range of motion. Neck supple. No JVD present. No tracheal deviation present. No thyromegaly present.   Cardiovascular: Normal rate, regular rhythm, normal heart sounds and intact distal pulses. Exam reveals no gallop and no friction rub.   No murmur heard.  Pulmonary/Chest: Effort normal and breath sounds normal. No stridor. No respiratory distress. She has no wheezes. She has no rales. She exhibits no tenderness.   Abdominal: Soft. Bowel sounds are normal. She exhibits no distension and no mass. There is no tenderness. There is no rebound and no guarding. No hernia.    Musculoskeletal: Normal range of motion. She exhibits no edema, tenderness or deformity.   Lymphadenopathy:     She has no cervical adenopathy.   Neurological: She is alert and oriented to person, place, and time. She displays normal reflexes. No cranial nerve deficit or sensory deficit. She exhibits normal muscle tone. Coordination normal.   Skin: Skin is warm and dry. Capillary refill takes less than 2 seconds. No rash noted. She is not diaphoretic. No erythema. No pallor.   Psychiatric: She has a normal mood and affect. Her behavior is normal. Judgment and thought content normal.     Component      Latest Ref Rng & Units 3/16/2019 3/26/2019   Fasting Status      hrs 12    Sodium      135 - 145 mmol/L 140    Potassium      3.4 - 5.1 mmol/L 4.0    Chloride      98 - 107 mmol/L 106    CO2      21 - 32 mmol/L 26    ANION GAP      10 - 20 mmol/L 12    Glucose      65 - 99 mg/dL 108 (H)    BUN      6 - 20 mg/dL 40 (H)    Creatinine      0.51 - 0.95 mg/dL 1.32 (H)    GFR Estimate,        46    GFR Estimate, Non        40    BUN/CREATININE RATIO      7 - 25 30 (H)    CALCIUM      8.4 - 10.2 mg/dL 9.1    TOTAL BILIRUBIN      0.2 - 1.0 mg/dL 0.8    AST/SGOT      <38 Units/L 17    ALT/SGPT      <64 Units/L 18    ALK PHOSPHATASE      45 - 117 Units/L 72    TOTAL PROTEIN      6.4 - 8.2 g/dL 7.0    Albumin      3.6 - 5.1 g/dL 4.1    GLOBULIN      2.0 - 4.0 g/dL 2.9    A/G Ratio, Serum      1.0 - 2.4 1.4    MICROALBUMIN,UA (TTL)      mg/dL 2.22    CREATININE, URINE (TOTAL)      mg/dL 107.00    MICROALBUMIN/CREAT      <30 mg/g 20.7    GLYCOHEMOGLOBIN A1C      4.5 - 5.6 % 5.8 (H)    DIGOXIN      0.8 - 2.1 ng/mL  <0.2 (L)   FASTING STATUS      hrs 12    CHOLESTEROL      <200 mg/dL 197    CALCULATED LDL      <130 mg/dL 104    HDL      >49 mg/dL 79    TRIGLYCERIDE      <150 mg/dL 71    CALCULATED NON HDL      mg/dL 118    CHOL/HDL      <4.5 2.5    Hemoglobin 5.8    ASSESSMENT/PLAN:  Problem List  Items Addressed This Visit        Circulatory    Benign essential hypertension - Primary    Biventricular ICD (implantable cardioverter-defibrillator) in place    Nonischemic dilated cardiomyopathy (CMS/HCC)       Endocrine    Pure hypercholesterolemia          Cardiac status stable, Appears compensated at prent time.  CPT, will review dopplers with a peripheral interventionalist.  F/u 4 months- sooner as needed      Ana Lilia Reynaga, DO  6/26/2019   590w

## 2019-07-09 ENCOUNTER — APPOINTMENT (OUTPATIENT)
Dept: WOUND CARE | Facility: CLINIC | Age: 84
End: 2019-07-09
Payer: MEDICARE

## 2019-07-09 PROCEDURE — 99213 OFFICE O/P EST LOW 20 MIN: CPT

## 2019-07-09 NOTE — ASSESSMENT
[FreeTextEntry1] : 84 yr old  with abdominal wall ulcer , s/p puraply last visit may30, 6/13\par still open,\par s/p sbresection,  h/o sbo's  from adhesions in past CAD, DM, stent, htn,\par  s/p kerry from ventral hernia repair , s/pgrafix   2/26, wound longer, narrow\par  minimal progresswill need to have abdominal hernia repaired\par colorectal cancer with open abdominal wall ulcer for a year, no fistula on ct,\par \par  phone used, #[id number], ANNETTE\par Plan - génesis 3x/ week\par Surgery for hernia repair 7/19\par Orders for nurse

## 2019-07-09 NOTE — HISTORY OF PRESENT ILLNESS
[FreeTextEntry1] : 85 yo woman with PHMx of DM and CAD has right lower abdominal wound, had puraply april 9 may 7, may 30  jose m 13, no fevers\par still with drainage, no cellulitis, foam placed caused irritation (big square)\par  used silver nitrate 3/12, 3/19, 3/26;  had grafix  dec 11/ jan8 , jan22 and feb 26 with minimal improvement,\par  Has been using aquacel/ foam,  \par  speaks only English/   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 Pt had been getting treated by an ID doctor , had initial abdominal surgery 1997, then 2017, has appt. in April with a surgeon from [facility] \par  She has not had any fever.  She is frustrated that the wound is not healing. She is receiving wound care at home every other day.   duoderm to periwound/ wears a hernia abd binder to reduce it\par  hernia (2 areas ) is large spreading skin open, is partly reducible\par \par Of Note, she:\par - Had colorectal cancer s/p bowel resection\par - Has ventral abdominal hernias - S/P repair in 2017\par -history for SBO.\par \par She was admitted to [facility] with subjective fever and pain over a right sided abdominal wall ulcer.  The patient states that the right sided abdominal wall ulcer has been present for over a year. \par   \par She had a CT that showed some inflammatory change in  the abdominal wall but no evidence of abscess or fistula.\par s/p mssa and enterbacter, treated with augmentin/ levaquin and fluconazole for vaginal candidiasis\par    She was treated with appropriate abx intravenously for 5 days and sent home on augmentin.  During her hospitalization, the redness to the abdominal wall improved but the ulcerated lesion continued to drain. She was discharged on Augmentin previously\par \par  . \par \par \par

## 2019-07-09 NOTE — PHYSICAL EXAM
[Normal Rate and Rhythm] : normal rate and rhythm [4 x 4] : 4 x 4  [Abdominal Pad] : Abdominal Pad [JVD] : no jugular venous distention  [de-identified] : nad [de-identified] : ulcer/midline scar; recurrent hernia reducible [FreeTextEntry1] : upper abdominal   [de-identified] : skin tear from tape at 8 o clock  [FreeTextEntry2] : 4.7 [FreeTextEntry5] : gauze [FreeTextEntry3] : 3.9 [FreeTextEntry4] : 0.1 [de-identified] : génesis [de-identified] : right ankle 25\par left ankle 24\par PREVIOUS 4.6/3.8/0.1\par \par \par   [FreeTextEntry7] : lower abdominal / cluster of 2 [FreeTextEntry8] : 3 [FreeTextEntry9] : 3.8 [de-identified] : PREVIOUS 1.2/1.5/0.1\par \par   [de-identified] : 0.1 [de-identified] : génesis

## 2019-07-09 NOTE — DATA REVIEWED
[FreeTextEntry1] : large ventral hernia no collections\par  Radha #[id number]\par \par 6.8 in august hga1c

## 2019-07-19 ENCOUNTER — APPOINTMENT (OUTPATIENT)
Dept: WOUND CARE | Facility: CLINIC | Age: 84
End: 2019-07-19
Payer: MEDICARE

## 2019-07-19 PROCEDURE — 15271 SKIN SUB GRAFT TRNK/ARM/LEG: CPT

## 2019-07-19 NOTE — DATA REVIEWED
[FreeTextEntry1] : large ventral hernia no collections\par  Radha #[id number]\par \par 6.8 in august hga1c

## 2019-07-19 NOTE — PHYSICAL EXAM
[Normal Rate and Rhythm] : normal rate and rhythm [4 x 4] : 4 x 4  [Abdominal Pad] : Abdominal Pad [JVD] : no jugular venous distention  [de-identified] : nad [de-identified] : ulcer/midline scar; recurrent hernia reducible [de-identified] : skin tear from tape at 8 o clock  [FreeTextEntry1] : upper abdominal   [FreeTextEntry2] : 5cm [FreeTextEntry3] : 3.5cm [FreeTextEntry4] : 0.1cm [FreeTextEntry5] : gauze [de-identified] : génesis [de-identified] : right ankle 25\par left ankle 24\par PREVIOUS 4.6/3.8/0.1\par \par \par   [FreeTextEntry7] : lower abdominal / cluster of 2 [FreeTextEntry8] : 3.2 [FreeTextEntry9] : 1cm [de-identified] : 0.1cm [de-identified] : génesis [de-identified] : PREVIOUS 1.2/1.5/0.1\par \par

## 2019-07-19 NOTE — ASSESSMENT
[FreeTextEntry1] : 84 yr old  with abdominal wall ulcer , s/p puraply last visit may30, 6/13\par still open, will use génesis for today\par asked for reapplication- afraid to do surgery because of risk\par s/p sbresection,  h/o sbo's  from adhesions in past CAD, DM, stent, htn,\par  s/p kerry from ventral hernia repair , s/pgrafix   2/26, wound longer, narrow\par  minimal progresswill need to have abdominal hernia repaired\par colorectal cancer with open abdominal wall ulcer for a year, no fistula on ct,\par \par  phone used, #[phone], ANNETTE\par Plan - génesis 3x/ week\par Surgery for hernia repair 7/19\par Orders for nurse\par nutrition consult\par Plan for  for  application of primatrix( s/p puraply and grafix).\par \par Wound has shown improvement through (increased granulation )\par \par Wound is free from infection drainage and necrotic tissue. \par  \par Wound has been treated with standards of care for weeks including (compression, offloading, debridement).

## 2019-07-19 NOTE — HISTORY OF PRESENT ILLNESS
[FreeTextEntry1] : 85 yo woman with PHMx of DM and CAD has right lower abdominal wound, had puraply april 9 may 7, may 30  jose m 13, no fevers\par hasd july 2  surgery  visit , because of long surgery  with risk, she is afraid to do it\par \par still with drainage, no cellulitis, foam placed caused irritation (big square)\par  used silver nitrate 3/12, 3/19, 3/26;  had grafix  dec 11/ jan8 , jan22 and feb 26 with minimal improvement,\par  Has been using aquacel/ foam,  \par  speaks only Fijian/   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 Pt had been getting treated by an ID doctor , had initial abdominal surgery 1997, then 2017, has appt. in April with a surgeon from [facility] \par  She has not had any fever.  She is frustrated that the wound is not healing. She is receiving wound care at home every other day.   duoderm to periwound/ wears a hernia abd binder to reduce it\par  hernia (2 areas ) is large spreading skin open, is partly reducible\par \par Of Note, she:\par - Had colorectal cancer s/p bowel resection\par - Has ventral abdominal hernias - S/P repair in 2017\par -history for SBO.\par \par She was admitted to [facility] with subjective fever and pain over a right sided abdominal wall ulcer.  The patient states that the right sided abdominal wall ulcer has been present for over a year. \par   \par She had a CT that showed some inflammatory change in  the abdominal wall but no evidence of abscess or fistula.\par s/p mssa and enterbacter, treated with augmentin/ levaquin and fluconazole for vaginal candidiasis\par    She was treated with appropriate abx intravenously for 5 days and sent home on augmentin.  During her hospitalization, the redness to the abdominal wall improved but the ulcerated lesion continued to drain. She was discharged on Augmentin previously\par \par  . \par \par \par

## 2019-08-08 ENCOUNTER — APPOINTMENT (OUTPATIENT)
Dept: WOUND CARE | Facility: CLINIC | Age: 84
End: 2019-08-08
Payer: MEDICARE

## 2019-08-08 VITALS — TEMPERATURE: 98.1 F | DIASTOLIC BLOOD PRESSURE: 65 MMHG | HEART RATE: 75 BPM | SYSTOLIC BLOOD PRESSURE: 114 MMHG

## 2019-08-08 PROCEDURE — 15271 SKIN SUB GRAFT TRNK/ARM/LEG: CPT

## 2019-08-08 NOTE — DATA REVIEWED
[FreeTextEntry1] : large ventral hernia no collections\par  Radha #[id number]\par \par 6.8 in august hga1c

## 2019-08-08 NOTE — ASSESSMENT
[FreeTextEntry1] : 84 yr old  with abdominal wall ulcer , s/p puraply last visit may30, 6/13 and july 19 today\par  \par asked for reapplication- afraid to do surgery because of risk\par s/p sbresection,  h/o sbo's  from adhesions in past CAD, DM, stent, htn,\par  s/p kerry from ventral hernia repair , s/pgrafix   2/26, wound longer, narrow\par  minimal progresswill need to have abdominal hernia repaired\par colorectal cancer with open abdominal wall ulcer for a year, no fistula on ct,\par \par 7/19/19\par  phone used, # [phone] \par Plan - Puraply applied today, orders for nurse given, aquacel and foam over skin subsitute\par Skin sub. ordered for next visit - Primatrix\par Cancelled the surgery for today - was afraid\par \par Puraply number 1 applied today to patient’s abdomen.   Wound bed debrided of bioburden and prepped for product application.  1 of pieces and original size of product obtained.  Total product usage was 17.39 sq. cm), Total waste 7.61 sq. cm) due to wound size.  Product secured with (steri strips and bolster dressing).\par Patient to f/u in 1 week.\par \par \par \par \par \par \par Orders for nurse\par nutrition consult\par Plan for  for  application of primatrix( s/p puraply and grafix).\par \par Wound has shown improvement through (increased granulation )\par \par Wound is free from infection drainage and necrotic tissue. \par  \par Wound has been treated with standards of care for weeks including (compression, offloading, debridement).

## 2019-08-08 NOTE — HISTORY OF PRESENT ILLNESS
[FreeTextEntry1] : 83 yo woman with PHMx of DM and CAD has right lower abdominal wound, had puraply april 9 may 7, may 30  jose m 13 and puraply july 19, no fevers\par  here for reapplication of skin sub-  primatrix is here\par \par had july 2  surgery  visit , because of long surgery  with risk, she is afraid to do it\par \par still with drainage, no cellulitis, foam placed caused irritation (big square)\par  used silver nitrate 3/12, 3/19, 3/26;  had grafix  dec 11/ jan8 , jan22 and feb 26 with minimal improvement,\par  Has been using aquacel/ foam,  \par  speaks only Slovenian/   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 Pt had been getting treated by an ID doctor , had initial abdominal surgery 1997, then 2017, has appt. in April with a surgeon from [facility] \par  She has not had any fever.  She is frustrated that the wound is not healing. She is receiving wound care at home every other day.   duoderm to periwound/ wears a hernia abd binder to reduce it\par  hernia (2 areas ) is large spreading skin open, is partly reducible\par \par Of Note, she:\par - Had colorectal cancer s/p bowel resection\par - Has ventral abdominal hernias - S/P repair in 2017\par -history for SBO.\par \par She was admitted to [facility] with subjective fever and pain over a right sided abdominal wall ulcer.  The patient states that the right sided abdominal wall ulcer has been present for over a year. \par   \par She had a CT that showed some inflammatory change in  the abdominal wall but no evidence of abscess or fistula.\par s/p mssa and enterbacter, treated with augmentin/ levaquin and fluconazole for vaginal candidiasis\par    She was treated with appropriate abx intravenously for 5 days and sent home on augmentin.  During her hospitalization, the redness to the abdominal wall improved but the ulcerated lesion continued to drain. She was discharged on Augmentin previously\par \par  . \par \par \par

## 2019-08-08 NOTE — DATA REVIEWED
[FreeTextEntry1] : large ventral hernia no collections\par  Radha #[id number]\par \par 6.8 in august hga1c

## 2019-08-08 NOTE — HISTORY OF PRESENT ILLNESS
[FreeTextEntry1] : 83 yo woman with PHMx of DM and CAD has right lower abdominal wound, had puraply april 9 may 7, may 30  jose m 13, no fevers here for reapplication of skin sub- puraply\par \par had july 2  surgery  visit , because of long surgery  with risk, she is afraid to do it\par \par still with drainage, no cellulitis, foam placed caused irritation (big square)\par  used silver nitrate 3/12, 3/19, 3/26;  had grafix  dec 11/ jan8 , jan22 and feb 26 with minimal improvement,\par  Has been using aquacel/ foam,  \par  speaks only Slovak/   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 Pt had been getting treated by an ID doctor , had initial abdominal surgery 1997, then 2017, has appt. in April with a surgeon from [facility] \par  She has not had any fever.  She is frustrated that the wound is not healing. She is receiving wound care at home every other day.   duoderm to periwound/ wears a hernia abd binder to reduce it\par  hernia (2 areas ) is large spreading skin open, is partly reducible\par \par Of Note, she:\par - Had colorectal cancer s/p bowel resection\par - Has ventral abdominal hernias - S/P repair in 2017\par -history for SBO.\par \par She was admitted to [facility] with subjective fever and pain over a right sided abdominal wall ulcer.  The patient states that the right sided abdominal wall ulcer has been present for over a year. \par   \par She had a CT that showed some inflammatory change in  the abdominal wall but no evidence of abscess or fistula.\par s/p mssa and enterbacter, treated with augmentin/ levaquin and fluconazole for vaginal candidiasis\par    She was treated with appropriate abx intravenously for 5 days and sent home on augmentin.  During her hospitalization, the redness to the abdominal wall improved but the ulcerated lesion continued to drain. She was discharged on Augmentin previously\par \par  . \par \par \par

## 2019-08-08 NOTE — PHYSICAL EXAM
[Normal Rate and Rhythm] : normal rate and rhythm [4 x 4] : 4 x 4  [Abdominal Pad] : Abdominal Pad [JVD] : no jugular venous distention  [de-identified] : nad [de-identified] : ulcer/midline scar; recurrent hernia reducible [de-identified] : skin tear from tape at 8 o clock  [FreeTextEntry1] : upper abdominal   [FreeTextEntry2] : 2.9 [FreeTextEntry3] : 2.5 [FreeTextEntry4] : 0.1cm [FreeTextEntry5] : gauze [de-identified] : tolerated well [de-identified] : primatrix [FreeTextEntry8] : 0.5 [de-identified] : right ankle 25\par left ankle 24\par PREVIOUS 3.9/2.6/0.1\par \par \par   [FreeTextEntry7] : lower abdominal  [FreeTextEntry9] : 0.2 [de-identified] : 0.1cm [de-identified] : primatrix 8/8 [de-identified] : PREVIOUS 2.5/2./9/0.1\par puraply 7/19\par \par

## 2019-08-08 NOTE — ASSESSMENT
[FreeTextEntry1] : 84 yr old  with abdominal wall ulcer , s/p puraply last visit may30, 6/13 and july 19 \par primatrix placed today\par  \par asked for reapplication- afraid to do surgery because of risk\par s/p sbresection,  h/o sbo's  from adhesions in past CAD, DM, stent, htn,\par  s/p kerry from ventral hernia repair , s/pgrafix   2/26, wound longer, narrow\par  minimal progresswill need to have abdominal hernia repaired\par colorectal cancer with open abdominal wall ulcer for a year, no fistula on ct,\par \par \par \par  phone used,#[phone], Paola [employer] \par \par Primatrix number 1 applied today to patient’s abdomen.   Wound bed debrided of bioburden and prepped for product application.  1 of pieces and original size of product obtained.  Total product usage was 14.6 sq. cm), Total waste 21.4 sq. cm due to wound size.  Product secured with (steri strips and bolster dressing).\par Patient to f/u in 2 week.\par \par \par \par Lower abdominal cluster was 2 wounds, now is 1 wound, upper wound is smaller\par \par \par \par \par \par \par \par

## 2019-08-08 NOTE — PHYSICAL EXAM
[Normal Rate and Rhythm] : normal rate and rhythm [4 x 4] : 4 x 4  [Abdominal Pad] : Abdominal Pad [JVD] : no jugular venous distention  [de-identified] : nad [de-identified] : ulcer/midline scar; recurrent hernia reducible [FreeTextEntry2] : 3.9 [FreeTextEntry1] : upper abdominal   [de-identified] : skin tear from tape at 8 o clock  [FreeTextEntry3] : 2.6 [FreeTextEntry5] : gauze [FreeTextEntry4] : 0.1cm [de-identified] : puraply jul 19 [FreeTextEntry7] : lower abdominal / cluster of 2 [de-identified] : right ankle 25\par left ankle 24\par PREVIOUS 5/3.5/0.1\par \par \par   [FreeTextEntry8] : 2.5 [FreeTextEntry9] : 2.9 [de-identified] : 0.1cm [de-identified] : puraply 7/19 [de-identified] : PREVIOUS 3.2/1/0.1\par \par

## 2019-08-30 ENCOUNTER — APPOINTMENT (OUTPATIENT)
Dept: WOUND CARE | Facility: CLINIC | Age: 84
End: 2019-08-30
Payer: MEDICARE

## 2019-08-30 PROCEDURE — 99213 OFFICE O/P EST LOW 20 MIN: CPT

## 2019-08-30 NOTE — HISTORY OF PRESENT ILLNESS
[FreeTextEntry1] : 83 yo woman with PHMx of DM and CAD has right lower abdominal wound, had puraply april 9 may 7, may 30  jose m 13 and puraply july 19, no fevers\par  here for reapplication of skin sub-  primatrix is here\par \par had july 2  surgery  visit , because of long surgery  with risk, she is afraid to do it\par \par still with drainage, no cellulitis, foam placed caused irritation (big square)\par  used silver nitrate 3/12, 3/19, 3/26;  had grafix  dec 11/ jan8 , jan22 and feb 26 with minimal improvement,\par  Has been using aquacel/ foam,  \par  speaks only Bulgarian/   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 Pt had been getting treated by an ID doctor , had initial abdominal surgery 1997, then 2017, has appt. in April with a surgeon from [facility] \par  She has not had any fever.  She is frustrated that the wound is not healing. She is receiving wound care at home every other day.   duoderm to periwound/ wears a hernia abd binder to reduce it\par  hernia (2 areas ) is large spreading skin open, is partly reducible\par \par Of Note, she:\par - Had colorectal cancer s/p bowel resection\par - Has ventral abdominal hernias - S/P repair in 2017\par -history for SBO.\par \par She was admitted to [facility] with subjective fever and pain over a right sided abdominal wall ulcer.  The patient states that the right sided abdominal wall ulcer has been present for over a year. \par   \par She had a CT that showed some inflammatory change in  the abdominal wall but no evidence of abscess or fistula.\par s/p mssa and enterbacter, treated with augmentin/ levaquin and fluconazole for vaginal candidiasis\par    She was treated with appropriate abx intravenously for 5 days and sent home on augmentin.  During her hospitalization, the redness to the abdominal wall improved but the ulcerated lesion continued to drain. She was discharged on Augmentin previously\par \par  . \par \par \par

## 2019-08-30 NOTE — PHYSICAL EXAM
[Normal Rate and Rhythm] : normal rate and rhythm [JVD] : no jugular venous distention  [de-identified] : nad [de-identified] : ulcer/midline scar; recurrent hernia reducible [4 x 4] : 4 x 4  [Abdominal Pad] : Abdominal Pad [de-identified] : skin tear from tape at 8 o clock  [FreeTextEntry2] : 2.5 cm [FreeTextEntry1] : upper abdominal   [FreeTextEntry4] : 0.1 cm [FreeTextEntry3] : 2.7 cm [de-identified] : ADAPTIC / AQUACEL  [FreeTextEntry5] : gauze [de-identified] : right ankle 25\par left ankle 24\par PREVIOUS 2.9/2.5\par \par \par   [FreeTextEntry7] : right lower abdomen [FreeTextEntry9] : .8 cm [FreeTextEntry8] : 1.7 cm [de-identified] : 0.1 cm [de-identified] : iODINE /ADAPTIC / AQUACELAQUACEL  [de-identified] : PREVIOUS 0.5/0.2\par puraply 7/19\par \par   [TWNoteComboBox1] : Right [TWNoteComboBox4] : Small [TWNoteComboBox6] : Surgical [TWNoteComboBox5] : No [TWNoteComboBox7] : Mechanical [de-identified] : Yes [TWNoteComboBox9] : Right [de-identified] : False [de-identified] : Small [de-identified] : No [de-identified] : Surgical [de-identified] : No [de-identified] : Normal [de-identified] : None [de-identified] : None [de-identified] : 100% [TWNoteComboBox8] : Mechanical

## 2019-08-30 NOTE — DATA REVIEWED
[FreeTextEntry1] : large ventral hernia no collections\par  Radha #[id number]\par \par 6.8 in august hga1c\par \par [place]  USED 8/30/19 I.D # [id number]

## 2019-08-30 NOTE — ASSESSMENT
[FreeTextEntry1] : 84 yr old  with abdominal wall ulcer , s/p puraply last visit may30, 6/13 and july 19 \par primatrix placed today\par  \par asked for reapplication- afraid to do surgery because of risk\par s/p sbresection,  h/o sbo's  from adhesions in past CAD, DM, stent, htn,\par  s/p kerry from ventral hernia repair , s/pgrafix   2/26, wound longer, narrow\par  minimal progresswill need to have abdominal hernia repaired\par colorectal cancer with open abdominal wall ulcer for a year, no fistula on ct,\par \par \par \par  phone used,#[phone], [place] [employer] \par \par Primatrix number 1 applied today to patient’s abdomen.   Wound bed debrided of bioburden and prepped for product application.  1 of pieces and original size of product obtained.  Total product usage was 14.6 sq. cm), Total waste 21.4 sq. cm due to wound size.  Product secured with (steri strips and bolster dressing).\par Patient to f/u in 2 week.\par \par \par \par Lower abdominal cluster was 2 wounds, now is 1 wound, upper wound is smaller\par \par \par \par \par \par \par \par

## 2019-09-12 ENCOUNTER — APPOINTMENT (OUTPATIENT)
Dept: WOUND CARE | Facility: CLINIC | Age: 84
End: 2019-09-12
Payer: MEDICARE

## 2019-09-12 PROCEDURE — 15271 SKIN SUB GRAFT TRNK/ARM/LEG: CPT

## 2019-10-01 ENCOUNTER — APPOINTMENT (OUTPATIENT)
Dept: WOUND CARE | Facility: CLINIC | Age: 84
End: 2019-10-01
Payer: MEDICARE

## 2019-10-01 PROCEDURE — 99213 OFFICE O/P EST LOW 20 MIN: CPT

## 2019-10-22 ENCOUNTER — APPOINTMENT (OUTPATIENT)
Dept: WOUND CARE | Facility: CLINIC | Age: 84
End: 2019-10-22
Payer: MEDICARE

## 2019-10-22 PROCEDURE — 15271 SKIN SUB GRAFT TRNK/ARM/LEG: CPT

## 2019-10-24 NOTE — ASSESSMENT
[FreeTextEntry1] : 84 yr old  with abdominal wall ulcer  doing well with primatrix will reorder\par , s/p puraply last visit may30, 6/13 and july 19 \par primatrix placed today\par  \par s/p sbresection,  h/o sbo's  from adhesions in past CAD, DM, stent, htn,\par  s/p kerry from ventral hernia repair , s/pgrafix   2/26, wound longer, narrow\par  minimal progresswill need to have abdominal hernia repaired\par colorectal cancer with open abdominal wall ulcer for a year, no fistula on c\par \par 10/1/19 TRANSLATER NAME PENNIE, # [id number]\par WOUNDS ARE CLEAN, SMALLER AND PINK\par Plan - adaptic/aquacel, foam, orders for nurse given, c/w abdominal binder\par           skin sub. ordered\par \par \par

## 2019-10-24 NOTE — ASSESSMENT
[FreeTextEntry1] : 84 yr old  with abdominal wall ulcer\par primatrix placed today 10/22 \par and sep12- and aug 30  and aug 8 doing well\par   , s/p puraply last visit may30, 6/13 and july 19 \par s/p sbresection,  h/o sbo's  from adhesions in past CAD, DM, stent, htn,\par  s/p kerry from ventral hernia repair , s/pgrafix   2/26, wound longer, narrow\par  minimal progresswill need to have abdominal hernia repaired\par colorectal cancer with open abdominal wall ulcer for a year, no fistula on c\par \par  (skin substitute number primatrix() applied today to patient's (anatomical site abdominal wall ulcers). \par wound bed debrided of bioburded and prepped for product application. (#1 of pieces and [id number] size of product) obtained. Total product usage was (6x sq. cm), Total waste (3x sq. cm) due to wound size. Product secured with (steri strips and bolster dressing).\par Patient to f/u in 1 week.\par WOUNDS ARE CLEAN, SMALLER AND PINK\par Plan -  foam veil \par  orders for nurse given, c/w abdominal binder\par    \par \par \par

## 2019-10-24 NOTE — HISTORY OF PRESENT ILLNESS
[FreeTextEntry1] : 83 yo woman with PHMx of DM and CAD has right lower abdominal wound, had puraply april 9 may 7, may 30  jose m 13 and puraply july 19, no fevers\par  here for reapplication of skin sub-  primatrix is here\par \par had july 2  surgery  visit , because of long surgery  with risk, she is afraid to do it\par \par still with drainage, no cellulitis, foam placed caused irritation (big square)\par  used silver nitrate 3/12, 3/19, 3/26;  had grafix  dec 11/ jan8 , jan22 and feb 26 with minimal improvement,\par  Has been using aquacel/ foam,  \par  speaks only Beninese/   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 Pt had been getting treated by an ID doctor , had initial abdominal surgery 1997, then 2017, has appt. in April with a surgeon from [facility] \par  She has not had any fever.  She is frustrated that the wound is not healing. She is receiving wound care at home every other day.   duoderm to periwound/ wears a hernia abd binder to reduce it\par  hernia (2 areas ) is large spreading skin open, is partly reducible\par \par Of Note, she:\par - Had colorectal cancer s/p bowel resection\par - Has ventral abdominal hernias - S/P repair in 2017\par -history for SBO.\par \par She was admitted to [facility] with subjective fever and pain over a right sided abdominal wall ulcer.  The patient states that the right sided abdominal wall ulcer has been present for over a year. \par   \par She had a CT that showed some inflammatory change in  the abdominal wall but no evidence of abscess or fistula.\par s/p mssa and enterbacter, treated with augmentin/ levaquin and fluconazole for vaginal candidiasis\par    She was treated with appropriate abx intravenously for 5 days and sent home on augmentin.  During her hospitalization, the redness to the abdominal wall improved but the ulcerated lesion continued to drain. She was discharged on Augmentin previously\par \par  . \par \par \par

## 2019-10-24 NOTE — HISTORY OF PRESENT ILLNESS
[FreeTextEntry1] : 83 yo woman with PHMx of DM and CAD has right lower abdominal wound, used primatrix last few skin subs last one sept 12\par had puraply april 9 may 7, may 30  jose m 13 and puraply july 19, no fevers\par  \par \par had july 2  surgery  visit , because of long surgery  with risk, she is afraid to do it\par \par still with drainage, no cellulitis, foam placed caused irritation (big square)\par  used silver nitrate 3/12, 3/19, 3/26;  had grafix  dec 11/ jan8 , jan22 and feb 26 with minimal improvement,\par  Has been using aquacel/ foam,  \par  speaks only Nigerien/   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 Pt had been getting treated by an ID doctor , had initial abdominal surgery 1997, then 2017, has appt. in April with a surgeon from [facility] \par  She has not had any fever.  She is frustrated that the wound is not healing. She is receiving wound care at home every other day.   duoderm to periwound/ wears a hernia abd binder to reduce it\par  hernia (2 areas ) is large spreading skin open, is partly reducible\par \par Of Note, she:\par - Had colorectal cancer s/p bowel resection\par - Has ventral abdominal hernias - S/P repair in 2017\par -history for SBO.\par \par She was admitted to [facility] with subjective fever and pain over a right sided abdominal wall ulcer.  The patient states that the right sided abdominal wall ulcer has been present for over a year. \par   \par She had a CT that showed some inflammatory change in  the abdominal wall but no evidence of abscess or fistula.\par s/p mssa and enterbacter, treated with augmentin/ levaquin and fluconazole for vaginal candidiasis\par    She was treated with appropriate abx intravenously for 5 days and sent home on augmentin.  During her hospitalization, the redness to the abdominal wall improved but the ulcerated lesion continued to drain. She was discharged on Augmentin previously\par \par  . \par \par \par

## 2019-10-24 NOTE — PHYSICAL EXAM
[Normal Rate and Rhythm] : normal rate and rhythm [Abdominal Pad] : Abdominal Pad [4 x 4] : 4 x 4  [JVD] : no jugular venous distention  [de-identified] : nad [de-identified] : ulcer/midline scar; recurrent hernia reducible [de-identified] : access# [id number] [FreeTextEntry2] : 3.2 cm [FreeTextEntry3] : 3.2 cm [FreeTextEntry1] : upper abdominal   [FreeTextEntry4] : 0.1 cm [de-identified] : primatrix [FreeTextEntry5] : gauze [FreeTextEntry8] : 3 cm [FreeTextEntry9] : 2 cm [FreeTextEntry7] : right lower abdomen cluster [de-identified] : right ankle 25\par left ankle 24\par PREVIOUS 2.5/2.7\par \par \par   [de-identified] : PREVIOUS 0.5/0.2\par puraply 7/19\par \par   [de-identified] : 0.1 cm [de-identified] : primatrix  [TWNoteComboBox5] : No [TWNoteComboBox1] : Right [TWNoteComboBox6] : Surgical [TWNoteComboBox4] : Small [TWNoteComboBox7] : Mechanical [TWNoteComboBox9] : Right [de-identified] : Yes [de-identified] : Surgical [de-identified] : Small [de-identified] : No [de-identified] : No [de-identified] : Normal [de-identified] : None [de-identified] : None [de-identified] : 100% [TWNoteComboBox8] : Mechanical [de-identified] : Primary Dressing

## 2019-10-24 NOTE — HISTORY OF PRESENT ILLNESS
[FreeTextEntry1] : 85 yo woman with PHMx of DM and CAD has right lower abdominal wound,\par here for primatrix 10/22\par  had puraply april 9 may 7, may 30  jose m 13 and puraply july 19, sept 12 no fevers\par  here for reapplication of skin sub-  primatrix is here\par \par had july 2  surgery  visit , because of long surgery  with risk, she is afraid to do it\par \par still with drainage, no cellulitis, foam placed caused irritation (big square)\par  used silver nitrate 3/12, 3/19, 3/26;  had grafix  dec 11/ jan8 , jan22 and feb 26 with minimal improvement,\par  Has been using aquacel/ foam,  \par  speaks only Luxembour[place]/   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 Pt had been getting treated by an ID doctor , had initial abdominal surgery 1997, then 2017, has appt. in April with a surgeon from [facility] \par  She has not had any fever.  She is frustrated that the wound is not healing. She is receiving wound care at home every other day.   duoderm to periwound/ wears a hernia abd binder to reduce it\par  hernia (2 areas ) is large spreading skin open, is partly reducible\par \par Of Note, she:\par - Had colorectal cancer s/p bowel resection\par - Has ventral abdominal hernias - S/P repair in 2017\par -history for SBO.\par \par She was admitted to [facility] with subjective fever and pain over a right sided abdominal wall ulcer.  The patient states that the right sided abdominal wall ulcer has been present for over a year. \par   \par She had a CT that showed some inflammatory change in  the abdominal wall but no evidence of abscess or fistula.\par s/p mssa and enterbacter, treated with augmentin/ levaquin and fluconazole for vaginal candidiasis\par    She was treated with appropriate abx intravenously for 5 days and sent home on augmentin.  During her hospitalization, the redness to the abdominal wall improved but the ulcerated lesion continued to drain. She was discharged on Augmentin previously\par \par  . \par \par \par

## 2019-10-24 NOTE — PHYSICAL EXAM
[Normal Rate and Rhythm] : normal rate and rhythm [4 x 4] : 4 x 4  [Abdominal Pad] : Abdominal Pad [JVD] : no jugular venous distention  [de-identified] : nad [FreeTextEntry1] : upper abdominal   [de-identified] : access# [id number] [FreeTextEntry4] : 0.1 cm [FreeTextEntry2] : 2cm [FreeTextEntry3] : 2.3 [de-identified] : adaptic/aquacel [de-identified] : right ankle 25\par left ankle 24\par PREVIOUS 3.2/3.2/0.1\par \par \par   [FreeTextEntry8] : 0.4 [FreeTextEntry7] : MIDDLE ABDOMEN [FreeTextEntry9] : .4 [de-identified] : 0.1 cm [de-identified] : PREVIOUS 3/2/0.1\par puraply 7/19\par \par   [de-identified] : adaptic/aquacel [de-identified] : LOWER ABDOMEN [de-identified] : 1cm [de-identified] : 1.2 [de-identified] : 0.2 [de-identified] : adaptic/aquacel [TWNoteComboBox5] : No [TWNoteComboBox4] : Small [TWNoteComboBox1] : Right [TWNoteComboBox7] : False [TWNoteComboBox6] : Surgical [de-identified] : No [de-identified] : Small [TWNoteComboBox9] : Right [de-identified] : No [de-identified] : Normal [de-identified] : No [de-identified] : Surgical [de-identified] : None [de-identified] : 100% [de-identified] : None [de-identified] : Right [TWNoteComboBox8] : False

## 2019-10-24 NOTE — ASSESSMENT
[FreeTextEntry1] : 84 yr old  with abdominal wall ulcer , s/p puraply last visit may30, 6/13 and july 19 \par primatrix placed today\par  \par s/p sbresection,  h/o sbo's  from adhesions in past CAD, DM, stent, htn,\par  s/p kerry from ventral hernia repair , s/pgrafix   2/26, wound longer, narrow\par  minimal progresswill need to have abdominal hernia repaired\par colorectal cancer with open abdominal wall ulcer for a year, no fistula on ct,\par \par asked for reapplication- afraid to do surgery because of risk(skin substitute number (primatrix) applied today to patient's (anatomical site). wound bed debrided of bioburded and prepped for product application. (# 1of pieces and original size of product 9 sq cm) obtained. Total product usage was (x100% sq. cm), Total waste (0%x sq. cm) due to wound size. Product secured with (steri strips and bolster dressing).\par Patient to f/u in 1 week.\par \par \par \par  phone used,#[id number], [employer] \par \par Primatrix number 1 applied today to patient’s abdomen.   Wound bed debrided of bioburden and prepped for product application.  1 of pieces and original size of product obtained.  Total product usage was 14.6 sq. cm), Total waste 21.4 sq. cm due to wound size.  Product secured with (steri strips and bolster dressing).\par Patient to f/u in 2 week.\par \par \par \par Lower abdominal cluster was 2 wounds, now is 1 wound, upper wound is smaller\par \par \par \par \par \par \par \par

## 2019-10-24 NOTE — DATA REVIEWED
[FreeTextEntry1] : large ventral hernia no collections\par  Radha #[id number]\par \par 6.8 in august hga1c\par \par Nicaraguan  USED 8/30/19 I.D # [id number]

## 2019-10-24 NOTE — DATA REVIEWED
[FreeTextEntry1] : large ventral hernia no collections\par  Radha #[id number]\par \par 6.8 in august hga1c\par \par Portuguese  USED 8/30/19 I.D # [id number]

## 2019-10-24 NOTE — PHYSICAL EXAM
[Normal Rate and Rhythm] : normal rate and rhythm [4 x 4] : 4 x 4  [Abdominal Pad] : Abdominal Pad [JVD] : no jugular venous distention  [de-identified] : nad [FreeTextEntry1] : upper abdominal   [de-identified] : access# [id number] [FreeTextEntry2] : 1.7 cm [FreeTextEntry3] : 1.8  cm [FreeTextEntry4] : 0.1 cm [de-identified] : primatrix [FreeTextEntry8] : 0.3 [de-identified] : right ankle 25\par left ankle 24\par \par PREVIOUS 2 x 3.2 x 0.1\par \par \par   [FreeTextEntry7] : MIDDLE ABDOMEN [FreeTextEntry9] : .3 [de-identified] : 0.1 cm [de-identified] : 1.2 cm [de-identified] : primatrix [de-identified] : LOWER ABDOMEN [de-identified] : PREVIOUS 3/2/0.1\par puraply 7/19\par \par   [de-identified] : 0.2 [de-identified] : 1 [de-identified] : primatrix [TWNoteComboBox5] : No [TWNoteComboBox4] : Small [de-identified] : No [TWNoteComboBox6] : Surgical [TWNoteComboBox7] : Mechanical [de-identified] : Small [TWNoteComboBox9] : Right [de-identified] : Application of skin substitute [de-identified] : No [de-identified] : No [de-identified] : Normal [de-identified] : Surgical [de-identified] : 100% [de-identified] : None [de-identified] : None [de-identified] : Right [de-identified] : Application of skin substitute [de-identified] : Mechanical [de-identified] : Application of skin substitute [TWNoteComboBox1] : Midline

## 2019-10-24 NOTE — DATA REVIEWED
[FreeTextEntry1] : large ventral hernia no collections\par  Radha #[id number]\par \par 6.8 in august hga1c\par \par Thai  USED 8/30/19 I.D # [id number]

## 2019-11-05 ENCOUNTER — APPOINTMENT (OUTPATIENT)
Dept: WOUND CARE | Facility: CLINIC | Age: 84
End: 2019-11-05
Payer: MEDICARE

## 2019-11-05 PROCEDURE — 99213 OFFICE O/P EST LOW 20 MIN: CPT

## 2019-11-07 NOTE — ASSESSMENT
[FreeTextEntry1] : 84 yr old  with abdominal wall ulcer\par ag alginate with foam/ primatrix placed  10/22 \par and sep12- and aug 30  and aug 8 doing well\par   , s/p puraply last visit may30, 6/13 and july 19 \par s/p sbresection,  h/o sbo's  from adhesions in past CAD, DM, stent, htn,\par  s/p kerry from ventral hernia repair , s/pgrafix   2/26, wound longer, narrow\par  minimal progresswill need to have abdominal hernia repaired\par colorectal cancer with open abdominal wall ulcer for a year, no fistula on c\par   Plan for re application  appligraft/grafix/puraply\par \par Wound has shown improvement through (increased granulation and or decreased size).\par \par Wound is free from infection drainage and necrotic tissue. \par  \par Patient is on comprehensive diabetic management program with a Hg A1c  \par \par Wound has been treated with standards of care for () weeks including (compression, offloading, debridement).\par Patient to f/u in 1 week.\par WOUNDS ARE CLEAN, SMALLER AND PINK\par Plan -  foam veil \par  orders for nurse given, c/w abdominal binder\par    \par \par \par

## 2019-11-07 NOTE — DATA REVIEWED
[FreeTextEntry1] : large ventral hernia no collections\par  Radha #[id number]\par \par 6.8 in august hga1c\par \par Nauruan  USED 8/30/19 I.D # [id number]

## 2019-11-07 NOTE — HISTORY OF PRESENT ILLNESS
[FreeTextEntry1] : 83 yo woman with PHMx of DM and CAD has right lower abdominal wound,\par here for primatrix 10/22- no new product today\par using , wound slightly larger, no fevers has vna 3 times a week\par \par  had puraply april 9 may 7, may 30  jose m 13 and puraply july 19, sept 12 no fevers\par  here for reapplication of skin sub-  primatrix is here\par \par had july 2  surgery  visit , because of long surgery  with risk, she is afraid to do it\par \par still with drainage, no cellulitis, foam placed caused irritation (big square)\par  used silver nitrate 3/12, 3/19, 3/26;  had grafix  dec 11/ jan8 , jan22 and feb 26 with minimal improvement,\par  Has been using aquacel/ foam,  \par  speaks only Dutch/   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 Pt had been getting treated by an ID doctor , had initial abdominal surgery 1997, then 2017, has appt. in April with a surgeon from [facility] \par  She has not had any fever.  She is frustrated that the wound is not healing. She is receiving wound care at home every other day.   duoderm to periwound/ wears a hernia abd binder to reduce it\par  hernia (2 areas ) is large spreading skin open, is partly reducible\par \par Of Note, she:\par - Had colorectal cancer s/p bowel resection\par - Has ventral abdominal hernias - S/P repair in 2017\par -history for SBO.\par \par She was admitted to [facility] with subjective fever and pain over a right sided abdominal wall ulcer.  The patient states that the right sided abdominal wall ulcer has been present for over a year. \par   \par She had a CT that showed some inflammatory change in  the abdominal wall but no evidence of abscess or fistula.\par s/p mssa and enterbacter, treated with augmentin/ levaquin and fluconazole for vaginal candidiasis\par    She was treated with appropriate abx intravenously for 5 days and sent home on augmentin.  During her hospitalization, the redness to the abdominal wall improved but the ulcerated lesion continued to drain. She was discharged on Augmentin previously\par \par  . \par \par \par

## 2019-11-07 NOTE — PHYSICAL EXAM
[Normal Rate and Rhythm] : normal rate and rhythm [4 x 4] : 4 x 4  [Abdominal Pad] : Abdominal Pad [JVD] : no jugular venous distention  [de-identified] : nad [FreeTextEntry1] : upper abdominal   [de-identified] : access# [id number] [FreeTextEntry2] : 3 cm [FreeTextEntry3] : 2  cm [FreeTextEntry4] : 0.1 cm [de-identified] : s/pprimatrix [de-identified] : right ankle 25\par left ankle 24\par \par Previous     .7/1.8/0.1\par \par \par   [FreeTextEntry7] : MIDDLE ABDOMEN [FreeTextEntry8] : 0 [de-identified] : 0 [FreeTextEntry9] : 0 [de-identified] : LOWER ABDOMEN [de-identified] : PREVIOUS 3/2/0.1\par puraply 7/19\par \par   [de-identified] : 1 cm [de-identified] : 1.5cm [de-identified] : 0.2 [de-identified] : s/p primatrix [TWNoteComboBox1] : Right [TWNoteComboBox4] : Small [TWNoteComboBox5] : No [de-identified] : No [TWNoteComboBox6] : Surgical [de-identified] : Normal [de-identified] : None [de-identified] : None [de-identified] : None [de-identified] : No [TWNoteComboBox7] : Mechanical [de-identified] : False [TWNoteComboBox9] : Right [de-identified] : Small [de-identified] : No [de-identified] : Normal [de-identified] : Surgical [de-identified] : No [de-identified] : 100% [de-identified] : None [de-identified] : None [TWNoteComboBox8] : Mechanical [de-identified] : False [de-identified] : Right [de-identified] : Mechanical [de-identified] : False

## 2019-11-07 NOTE — PHYSICAL THERAPY INITIAL EVALUATION ADULT - PATIENT/FAMILY/SIGNIFICANT OTHER GOALS STATEMENT, PT EVAL
None at this time Post-Care Instructions: POST OPERATIVE INSTRUCTIONS\\nFOR EXCISION / MOHS \\nAfter surgery keep the bandage on for 48 hours\\nWOUND CARE - You will need: Clean cloth, gauze pad or cotton-tipped applicator, Vaseline.\\nClean your surgical wound 2 times a day: Starting Two days after your surgery\\n5. Wash your hands with soap and water.\\n6. Clean wound with mild soap (Dove or Ivory) and warm water gently blot dry with a gauze pad, clean cloth.\\n7. Apply a thin layer of Vaseline or polysporin ointment to wound with applicator. Do not use NEOSPORIN \\n8. A dressing is not necessary after the original one is removed unless specified.\\nBLEEDING - Following surgery, bleeding is always possible.  This is usually prevented by proper wound dressing.  It is important to avoid exertion for at least 24 hours after surgery, as this is the time that bleeding is most apt to occur.  If surgery has been performed on the face, one should not bend or stoop unnecessarily.  Sleeping on an extra pillow to elevate the head would be wise for a night or two.\\nIf bleeding does occur, firm pressure on the bandage for 20 minutes without removing will frequently make it stop.  If it continues, we want to know immediately.  A discharge or some drainage may occur and this is normal.  Do not be concerned unless your operative site is actually bleeding, and firm constant pressure does not cause it to stop.\\nPAIN - Post-operative pain in generally slight and you may have been given medication for this.  If not we recommend extra strength Tylenol.\\nINFECTION - Infection is seldom a problem, but will be indicated by increased tenderness, swelling, pain, or discharge beginning several days after surgery.  If this happens, we want to know.\\nACTIVITY - Please limit any heavy lifting (in excess of 10 lbs.), excessive bending, or exercise for the next two weeks postoperatively.\\nMEDICATION - Do not take aspirin containing medication for 5 days after surgery.  Check with the doctor before taking medications for arthritis or rheumatism.  Also, some pain medications contain aspirin so check with us first.  Do not drink alcoholic beverages for 5 days after surgery.\\nSWELLING - Use an ice bag to reduce swelling.  Apply the ice bag for 20 minutes, 2-3 times for 3 days following surgery.  If you do not have an ice bag, crushed or cracked ice can be put into a well-sealed zip type bag, wrapped in a thin towel, and applied over wound dressing.\\nSCAR - There will be a scar and redness after surgery.  This will decrease as healing progresses, but redness should be expected as long as 6 months.  Everyone heals differently and the final scar appearance depends on the individual’s ability to heal.  In other words, some scars heal imperceptibly while other scars become thick, keloidal and/or tender.  Because of the unpredictability in wound healing, no guarantees can be implied or stated as to the final appearance of the scar.\\nI HAVE BEEN INSTRUCTED REGARDING THE ABOVE.\\nPre & post-operative individual courses of treatment may be prescribed by provider.\\nIf you have any questions or concerns, please do not hesitate to contact the office and speak with a member of our clinical staff.  In case of an emergency, page a dermatology nurse.\\n        \\nOffice Hours – 8am-5pm\\nEmergencies Only please call our after hours on call phone from 4:59pm-7:59am at - [phone] Post-Care Instructions: POST OPERATIVE INSTRUCTIONS\\nFOR EXCISION / MOHS \\nAfter surgery keep the bandage on for 48 hours\\nWOUND CARE - You will need: Clean cloth, gauze pad or cotton-tipped applicator, Vaseline.\\nClean your surgical wound 2 times a day: Starting Two days after your surgery\\n5. Wash your hands with soap and water.\\n6. Clean wound with mild soap (Dove or Ivory) and warm water gently blot dry with a gauze pad, clean cloth.\\n7. Apply a thin layer of Vaseline or polysporin ointment to wound with applicator. Do not use NEOSPORIN \\n8. A dressing is not necessary after the original one is removed unless specified.\\nBLEEDING - Following surgery, bleeding is always possible.  This is usually prevented by proper wound dressing.  It is important to avoid exertion for at least 24 hours after surgery, as this is the time that bleeding is most apt to occur.  If surgery has been performed on the face, one should not bend or stoop unnecessarily.  Sleeping on an extra pillow to elevate the head would be wise for a night or two.\\nIf bleeding does occur, firm pressure on the bandage for 20 minutes without removing will frequently make it stop.  If it continues, we want to know immediately.  A discharge or some drainage may occur and this is normal.  Do not be concerned unless your operative site is actually bleeding, and firm constant pressure does not cause it to stop.\\nPAIN - Post-operative pain in generally slight and you may have been given medication for this.  If not we recommend extra strength Tylenol.\\nINFECTION - Infection is seldom a problem, but will be indicated by increased tenderness, swelling, pain, or discharge beginning several days after surgery.  If this happens, we want to know.\\nACTIVITY - Please limit any heavy lifting (in excess of 10 lbs.), excessive bending, or exercise for the next two weeks postoperatively.\\nMEDICATION - Do not take aspirin containing medication for 5 days after surgery.  Check with the doctor before taking medications for arthritis or rheumatism.  Also, some pain medications contain aspirin so check with us first.  Do not drink alcoholic beverages for 5 days after surgery.\\nSWELLING - Use an ice bag to reduce swelling.  Apply the ice bag for 20 minutes, 2-3 times for 3 days following surgery.  If you do not have an ice bag, crushed or cracked ice can be put into a well-sealed zip type bag, wrapped in a thin towel, and applied over wound dressing.\\nSCAR - There will be a scar and redness after surgery.  This will decrease as healing progresses, but redness should be expected as long as 6 months.  Everyone heals differently and the final scar appearance depends on the individual’s ability to heal.  In other words, some scars heal imperceptibly while other scars become thick, keloidal and/or tender.  Because of the unpredictability in wound healing, no guarantees can be implied or stated as to the final appearance of the scar.\\nI HAVE BEEN INSTRUCTED REGARDING THE ABOVE.\\nPre & post-operative individual courses of treatment may be prescribed by provider.\\nIf you have any questions or concerns, please do not hesitate to contact the office and speak with a member of our clinical staff.  In case of an emergency, page a dermatology nurse.\\n        \\nOffice Hours – 8am-5pm\\nEmergencies Only please call our after hours on call phone from 4:59pm-7:59am at - [phone]

## 2019-11-19 ENCOUNTER — APPOINTMENT (OUTPATIENT)
Dept: WOUND CARE | Facility: CLINIC | Age: 84
End: 2019-11-19
Payer: MEDICARE

## 2019-11-19 PROCEDURE — 15271 SKIN SUB GRAFT TRNK/ARM/LEG: CPT

## 2019-11-19 NOTE — DATA REVIEWED
[FreeTextEntry1] : large ventral hernia no collections\par  Radha #[id number]\par \par 6.8 in august hga1c\par \par Pakistani  USED 8/30/19 I.D # [id number]

## 2019-11-19 NOTE — ASSESSMENT
[FreeTextEntry1] : 84 yr old  with abdominal wall ulcer\par ag alginate with foam/ primatrix placed  10/22 , sep12- aug 30 aug 8\par wound that had closed reopened in between\par  \par   , s/p puraply may/june/july \par s/p sbresection,  h/o sbo's  from adhesions in past CAD, DM, stent, htn,\par  s/p kerry from ventral hernia repair , s/pgrafix   2/26, wound longer, narrow\par  minimal progresswill need to have abdominal hernia repaired\par colorectal cancer with open abdominal wall ulcer for a year, no fistula on ct\par   Plan for re application   /grafix \par (skin substitute number grafix applied today to patient's (anatomical site abd wound sup/mid). \par wound bed debrided of bioburded and prepped for product application. (#1 of pieces and original size of [id number]) obtained. Total product usage was (25x sq. cm), Total waste (0x sq. cm) due to wound size. Product secured with (steri strips and bolster dressing).\par Patient to f/u in 1 week.\par \par  Wound is free from infection drainage and necrotic tissue. \par  Patient is on comprehensive diabetic management program with a Hg A1c  \par \par Wound has been treated with standards of care for many weeks including (compression, offloading, debridement).\par Patient to f/u in 1 week.\par  \par Plan -  foam veil \par  orders for nurse given, c/w abdominal binder\par    \par \par \par

## 2019-11-19 NOTE — PHYSICAL EXAM
[Normal Rate and Rhythm] : normal rate and rhythm [Abdominal Pad] : Abdominal Pad [4 x 4] : 4 x 4  [JVD] : no jugular venous distention  [de-identified] : access# [id number] [FreeTextEntry1] : upper abdominal   [FreeTextEntry2] : 3.4cm [FreeTextEntry3] : 4cm [FreeTextEntry4] : 0.1 cm [de-identified] : eliazarx [de-identified] : eliazarx 11/19 [FreeTextEntry7] : MIDDLE ABDOMEN [de-identified] : right ankle 25\par left ankle 24\par \par Previous    3/2/0.1\par \par \par   [FreeTextEntry8] : 2.2 [FreeTextEntry9] : 1.9 [de-identified] : grafix applied [de-identified] : 0.1 [de-identified] : \par   [de-identified] : eliazarx [de-identified] : 1 cm [de-identified] : LOWER ABDOMEN [de-identified] : 0.2 [de-identified] : 2cm [de-identified] : sub  q tissue [de-identified] : génesis [de-identified] : #3 curette [de-identified] : 1/1.5/ [TWNoteComboBox1] : Right [TWNoteComboBox4] : Small [TWNoteComboBox5] : No [TWNoteComboBox6] : Surgical [de-identified] : No [de-identified] : Normal [de-identified] : None [de-identified] : None [de-identified] : No [de-identified] : None [de-identified] : Application of skin substitute [TWNoteComboBox7] : Lauren [TWNoteComboBox9] : Right [de-identified] : Small [de-identified] : Surgical [de-identified] : No [de-identified] : Normal [de-identified] : No [de-identified] : None [de-identified] : None [de-identified] : 100% [TWNoteComboBox8] : Lauren [de-identified] : Debridement excisional [de-identified] : Right [de-identified] : Lauren [de-identified] : Cultures obtained

## 2019-11-19 NOTE — HISTORY OF PRESENT ILLNESS
[FreeTextEntry1] : 83 yo woman with PHMx of DM and CAD has right lower abdominal wound,\par here for primatrix 10/22- no new product today\par using , wound slightly larger, no fevers has vna 3 times a week\par \par  had puraply april 9 may 7, may 30  jose m 13 and puraply july 19, sept 12 no fevers\par  here for reapplication of skin sub-  primatrix is here\par \par had july 2  surgery  visit , because of long surgery  with risk, she is afraid to do it\par \par still with drainage, no cellulitis, foam placed caused irritation (big square)\par  used silver nitrate 3/12, 3/19, 3/26;  had grafix  dec 11/ jan8 , jan22 and feb 26 with minimal improvement,\par  Has been using aquacel/ foam,  \par  speaks only Mongolian/   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 Pt had been getting treated by an ID doctor , had initial abdominal surgery 1997, then 2017, has appt. in April with a surgeon from [facility] \par  She has not had any fever.  She is frustrated that the wound is not healing. She is receiving wound care at home every other day.   duoderm to periwound/ wears a hernia abd binder to reduce it\par  hernia (2 areas ) is large spreading skin open, is partly reducible\par \par Of Note, she:\par - Had colorectal cancer s/p bowel resection\par - Has ventral abdominal hernias - S/P repair in 2017\par -history for SBO.\par \par She was admitted to [facility] with subjective fever and pain over a right sided abdominal wall ulcer.  The patient states that the right sided abdominal wall ulcer has been present for over a year. \par   \par She had a CT that showed some inflammatory change in  the abdominal wall but no evidence of abscess or fistula.\par s/p mssa and enterbacter, treated with augmentin/ levaquin and fluconazole for vaginal candidiasis\par    She was treated with appropriate abx intravenously for 5 days and sent home on augmentin.  During her hospitalization, the redness to the abdominal wall improved but the ulcerated lesion continued to drain. She was discharged on Augmentin previously\par \par  . \par \par \par

## 2019-11-25 ENCOUNTER — RESULT REVIEW (OUTPATIENT)
Age: 84
End: 2019-11-25

## 2019-11-25 LAB — BACTERIA SPEC CULT: ABNORMAL

## 2019-12-05 ENCOUNTER — APPOINTMENT (OUTPATIENT)
Dept: WOUND CARE | Facility: CLINIC | Age: 84
End: 2019-12-05
Payer: MEDICARE

## 2019-12-05 PROCEDURE — 99213 OFFICE O/P EST LOW 20 MIN: CPT

## 2020-01-02 ENCOUNTER — APPOINTMENT (OUTPATIENT)
Dept: WOUND CARE | Facility: CLINIC | Age: 85
End: 2020-01-02
Payer: MEDICARE

## 2020-01-02 PROCEDURE — 99213 OFFICE O/P EST LOW 20 MIN: CPT

## 2020-01-03 NOTE — HISTORY OF PRESENT ILLNESS
[FreeTextEntry1] : 85 yo woman with PHMx of DM and CAD has right lower abdominal wound,\par here for primatrix 10/22- and nov 19, no new product today\par using , wound slightly larger, no fevers has vna 3 times a week\par \par  had puraply april 9 may 7, may 30  jose m 13 and puraply july 19, sept 12 no fevers\par  here for reapplication of skin sub-  primatrix is here\par \par had july 2  surgery  visit , because of long surgery  with risk, she is afraid to do it\par \par still with drainage, no cellulitis, foam placed caused irritation (big square)\par  used silver nitrate 3/12, 3/19, 3/26;  had grafix  dec 11/ jan8 , jan22 and feb 26 with minimal improvement,\par  Has been using aquacel/ foam,  \par  speaks only Kenyan/   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 Pt had been getting treated by an ID doctor , had initial abdominal surgery 1997, then 2017, has appt. in April with a surgeon from [facility] \par  She has not had any fever.  She is frustrated that the wound is not healing. She is receiving wound care at home every other day.   duoderm to periwound/ wears a hernia abd binder to reduce it\par  hernia (2 areas ) is large spreading skin open, is partly reducible\par \par Of Note, she:\par - Had colorectal cancer s/p bowel resection\par - Has ventral abdominal hernias - S/P repair in 2017\par -history for SBO.\par \par She was admitted to [facility] with subjective fever and pain over a right sided abdominal wall ulcer.  The patient states that the right sided abdominal wall ulcer has been present for over a year. \par   \par She had a CT that showed some inflammatory change in  the abdominal wall but no evidence of abscess or fistula.\par s/p mssa and enterbacter, treated with augmentin/ levaquin and fluconazole for vaginal candidiasis\par    She was treated with appropriate abx intravenously for 5 days and sent home on augmentin.  During her hospitalization, the redness to the abdominal wall improved but the ulcerated lesion continued to drain. She was discharged on Augmentin previously\par \par  . \par \par \par

## 2020-01-03 NOTE — ASSESSMENT
[FreeTextEntry1] : 84 yr old  with abdominal wall ulcer\par ag alginate with foam/ primatrix placed  10/22 , sep12- aug 30 aug 8, grafix placed nov 19\par wound that had closed reopened in between\par  \par  phone  # [phone], Kathya\par Wounds are smaller\par Plan -reapply skin sub.\par  adaptic/aquacel, foam drsg over

## 2020-01-03 NOTE — REASON FOR VISIT
[Consultation] : a consultation visit [Family Member] : family member [Spouse] : spouse [FreeTextEntry1] : right side abdomen ulcer

## 2020-01-03 NOTE — PHYSICAL EXAM
[Normal Rate and Rhythm] : normal rate and rhythm [Abdominal Pad] : Abdominal Pad [4 x 4] : 4 x 4  [JVD] : no jugular venous distention  [de-identified] : nad [de-identified] : access# [id number] [FreeTextEntry3] : 2.6 [FreeTextEntry1] : upper abdominal   [FreeTextEntry2] : 2.5 [FreeTextEntry4] : 0.1 cm [de-identified] : adaptic/aquacel [de-identified] : right ankle 25\par left ankle 24\par \par Previous    3.4/4/0.1\par \par \par   [FreeTextEntry7] : MIDDLE ABDOMEN [de-identified] : 0.1 [FreeTextEntry8] : 1.4 [FreeTextEntry9] : 1 [de-identified] : \par  2.2/1.0 [de-identified] : adaptic/aquacel [de-identified] : LOWER ABDOMEN [de-identified] : 1.4 [de-identified] : 0.5 [de-identified] : 0.2 [de-identified] : 1/2 [TWNoteComboBox1] : Right [de-identified] : adaptic/aquacel [TWNoteComboBox5] : No [TWNoteComboBox4] : Small [TWNoteComboBox6] : Surgical [de-identified] : No [de-identified] : Normal [de-identified] : None [de-identified] : No [de-identified] : None [de-identified] : None [de-identified] : False [TWNoteComboBox7] : False [de-identified] : Small [TWNoteComboBox9] : Right [de-identified] : No [de-identified] : Surgical [de-identified] : No [de-identified] : Normal [de-identified] : None [de-identified] : 100% [de-identified] : None [TWNoteComboBox8] : False [de-identified] : False [de-identified] : Right [de-identified] : False [de-identified] : False

## 2020-01-03 NOTE — DATA REVIEWED
[FreeTextEntry1] : large ventral hernia no collections\par  Radha #[id number]\par \par 6.8 in august hga1c\par \par Portuguese  USED 8/30/19 I.D # [id number]

## 2020-01-16 ENCOUNTER — APPOINTMENT (OUTPATIENT)
Dept: WOUND CARE | Facility: CLINIC | Age: 85
End: 2020-01-16
Payer: MEDICARE

## 2020-01-16 DIAGNOSIS — Z86.79 PERSONAL HISTORY OF OTHER DISEASES OF THE CIRCULATORY SYSTEM: ICD-10-CM

## 2020-01-16 DIAGNOSIS — Z85.038 PERSONAL HISTORY OF OTHER MALIGNANT NEOPLASM OF LARGE INTESTINE: ICD-10-CM

## 2020-01-16 PROCEDURE — 15271 SKIN SUB GRAFT TRNK/ARM/LEG: CPT

## 2020-01-30 ENCOUNTER — APPOINTMENT (OUTPATIENT)
Dept: WOUND CARE | Facility: CLINIC | Age: 85
End: 2020-01-30
Payer: MEDICARE

## 2020-01-30 PROCEDURE — 99213 OFFICE O/P EST LOW 20 MIN: CPT

## 2020-02-13 ENCOUNTER — APPOINTMENT (OUTPATIENT)
Dept: WOUND CARE | Facility: CLINIC | Age: 85
End: 2020-02-13
Payer: MEDICARE

## 2020-02-13 PROCEDURE — 99213 OFFICE O/P EST LOW 20 MIN: CPT

## 2020-02-24 PROBLEM — Z85.038 HISTORY OF COLORECTAL MALIGNANT NEOPLASM: Status: RESOLVED | Noted: 2018-08-17 | Resolved: 2020-02-24

## 2020-02-24 PROBLEM — Z86.79 HISTORY OF CORONARY ARTERY DISEASE: Status: RESOLVED | Noted: 2018-08-17 | Resolved: 2020-02-24

## 2020-02-24 NOTE — ASSESSMENT
[FreeTextEntry1] : 84 yr old  with abdominal wall ulcer\par ag alginate with foam/ primatrix placed  10/22 , sep12- aug 30 aug 8\par wound that had closed reopened in between\par  \par  1/16/20\par  ID DIANA # [id number]\par Wounds are improving, only remaining upper cluster of 2

## 2020-02-24 NOTE — DATA REVIEWED
[FreeTextEntry1] : large ventral hernia no collections\par  Radha #[id number]\par \par 6.8 in august hga1c\par \par Ecuadorean  USED 8/30/19 I.D # [id number]

## 2020-02-24 NOTE — PHYSICAL EXAM
[Normal Rate and Rhythm] : normal rate and rhythm [Abdominal Pad] : Abdominal Pad [4 x 4] : 4 x 4  [JVD] : no jugular venous distention  [de-identified] : nad [de-identified] : access# [id number] [FreeTextEntry1] : upper abdominal  cluster of 2 [FreeTextEntry3] : 2 [FreeTextEntry2] : 2.9 [de-identified] : carinafix [FreeTextEntry4] : 0.1 [FreeTextEntry8] : 0 [FreeTextEntry7] : MIDDLE ABDOMEN [de-identified] : right ankle 25\par left ankle 24\par \par Previous    2/2/0.1\par \par \par   [FreeTextEntry9] : 0 [de-identified] : 0 [de-identified] : adaptic/aquacel [de-identified] : closed\par  2.2/1.0 [de-identified] : 0 [de-identified] : LOWER ABDOMEN [de-identified] : 0 [de-identified] : 0 [de-identified] : 1/1.5/0.1 [TWNoteComboBox1] : Right [TWNoteComboBox5] : No [TWNoteComboBox4] : Small [TWNoteComboBox6] : Surgical [de-identified] : No [de-identified] : Normal [de-identified] : None [de-identified] : None [de-identified] : None [de-identified] : No [de-identified] : Application of skin substitute [TWNoteComboBox9] : Right [de-identified] : Small [de-identified] : No [de-identified] : Surgical [de-identified] : False [de-identified] : False [de-identified] : False [de-identified] : False [de-identified] : False [de-identified] : Right

## 2020-02-24 NOTE — HISTORY OF PRESENT ILLNESS
[FreeTextEntry1] : 83 yo woman with PHMx of DM and CAD has right lower abdominal wound,\par here for primatrix 10/22- no new product today\par using , wound slightly larger, no fevers has vna 3 times a week\par \par  had puraply april 9 may 7, may 30  jose m 13 and puraply july 19, sept 12 no fevers\par  here for reapplication of skin sub-  primatrix is here\par \par had july 2  surgery  visit , because of long surgery  with risk, she is afraid to do it\par \par still with drainage, no cellulitis, foam placed caused irritation (big square)\par  used silver nitrate 3/12, 3/19, 3/26;  had grafix  dec 11/ jan8 , jan22 and feb 26 with minimal improvement,\par  Has been using aquacel/ foam,  \par  speaks only Malian/   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 Pt had been getting treated by an ID doctor , had initial abdominal surgery 1997, then 2017, has appt. in April with a surgeon from [facility] \par  She has not had any fever.  She is frustrated that the wound is not healing. She is receiving wound care at home every other day.   duoderm to periwound/ wears a hernia abd binder to reduce it\par  hernia (2 areas ) is large spreading skin open, is partly reducible\par \par Of Note, she:\par - Had colorectal cancer s/p bowel resection\par - Has ventral abdominal hernias - S/P repair in 2017\par -history for SBO.\par \par She was admitted to [facility] with subjective fever and pain over a right sided abdominal wall ulcer.  The patient states that the right sided abdominal wall ulcer has been present for over a year. \par   \par She had a CT that showed some inflammatory change in  the abdominal wall but no evidence of abscess or fistula.\par s/p mssa and enterbacter, treated with augmentin/ levaquin and fluconazole for vaginal candidiasis\par    She was treated with appropriate abx intravenously for 5 days and sent home on augmentin.  During her hospitalization, the redness to the abdominal wall improved but the ulcerated lesion continued to drain. She was discharged on Augmentin previously\par \par  . \par \par \par

## 2020-02-24 NOTE — PLAN
[FreeTextEntry1] : 1/16/20\par Graffix number 1 applied today to patient’s left upper abdomen.  3x 4 square cm  size used\par  today  Wound bed debrided of bioburden and prepped for product application. \par  1   piece and original size of product obtained.\par   Total product usage was 5.8 sq. cm), Total waste 6.2 sq. cm) due to wound size.  \par Product secured with (steri strips and bolster dressing).\par Patient to f/u in 2 week.\par \par \par

## 2020-02-27 ENCOUNTER — APPOINTMENT (OUTPATIENT)
Dept: WOUND CARE | Facility: CLINIC | Age: 85
End: 2020-02-27
Payer: MEDICARE

## 2020-02-27 PROCEDURE — 99213 OFFICE O/P EST LOW 20 MIN: CPT

## 2020-03-02 NOTE — DATA REVIEWED
[FreeTextEntry1] : large ventral hernia no collections\par  Radha #[id number]\par \par 6.8 in august hga1c\par \par Portuguese  USED 8/30/19 I.D # [id number]

## 2020-03-02 NOTE — PLAN
[FreeTextEntry1] : 2/13/20\par Plan - c/w prontosan gel to wounds, adaptic over, 4x4, foam dressing, \par [facility]

## 2020-03-02 NOTE — PLAN
[FreeTextEntry1] : 1/30/20\par Plan - apply prontosan gel to wounds, adaptic over, gauze then foam dressing, c/w abdominal binder, orders for nurse given.\par

## 2020-03-02 NOTE — REASON FOR VISIT
[Follow-Up: _____] : a [unfilled] follow-up visit [Spouse] : spouse [[employer] ] : provided by [employer]   [Family Member] : family member [FreeTextEntry1] : right side abdomen ulcer [FreeTextEntry2] : Biaat [TWNoteComboBox1] : Venezuelan

## 2020-03-02 NOTE — PHYSICAL EXAM
[Normal Rate and Rhythm] : normal rate and rhythm [Abdominal Pad] : Abdominal Pad [4 x 4] : 4 x 4  [de-identified] : nad [JVD] : no jugular venous distention  [de-identified] : access# [id number] [FreeTextEntry1] : proximal [FreeTextEntry2] : 3.7 [de-identified] : adaptic/prontosan [FreeTextEntry3] : 1.8 [FreeTextEntry4] : .1 [FreeTextEntry8] : 0 [de-identified] : right ankle 25\par left ankle 24\par \par Previous    2.9/2/0.1\par \par \par   [FreeTextEntry7] : MIDDLE ABDOMEN [FreeTextEntry9] : 0 [de-identified] : previou .6x.4x.1\par \par  1.8/1.3/0.1 [de-identified] : LOWER ABDOMEN [de-identified] : 0 [de-identified] : 0.2 [de-identified] : 0.1 [de-identified] : adaptic/prontosan [de-identified] : 0.1 [TWNoteComboBox4] : Small [TWNoteComboBox1] : Right [de-identified] : 0.4/0.2/0.1 [TWNoteComboBox6] : Surgical [TWNoteComboBox5] : No [de-identified] : No [de-identified] : Normal [de-identified] : None [de-identified] : None [de-identified] : 100% [TWNoteComboBox9] : Right [de-identified] : No [de-identified] : 3x Weekly [de-identified] : False [de-identified] : Right [de-identified] : False [de-identified] : False [de-identified] : 3x Weekly

## 2020-03-02 NOTE — ASSESSMENT
[FreeTextEntry1] : 84 yr old  with abdominal wall ulcer\par ag alginate with foam/ primatrix placed  10/22 , sep12- aug 30 aug 8\par wound that had closed reopened in between\par  \par  1/30/20 \par  ID # [id number] Aura\par Previously closed middle and lower wounds have re-opened, clean, superficial\par  pt. has purchased prontosan gel\par complex-low moderate-lab, xr asha,test reviewed-onsider lab\par risk- low no sharp debridement today

## 2020-03-02 NOTE — PHYSICAL EXAM
[Normal Rate and Rhythm] : normal rate and rhythm [Abdominal Pad] : Abdominal Pad [4 x 4] : 4 x 4  [JVD] : no jugular venous distention  [de-identified] : nad [FreeTextEntry1] : upper abdominal  cluster of 2 [de-identified] : access# [id number] [FreeTextEntry2] : 2.5cm [FreeTextEntry3] : 2cm [FreeTextEntry4] : 0.1cm [de-identified] : adaptic/prontosan [de-identified] : right ankle 25\par left ankle 24\par \par Previous    2.9/2/0.1\par \par \par   [FreeTextEntry7] : MIDDLE ABDOMEN [FreeTextEntry8] : 1.8cm [de-identified] : 0.1cm [FreeTextEntry9] : 1.3cm [de-identified] : adaptic/prontosan [de-identified] : closed\par  2.2/1.0 [de-identified] : LOWER ABDOMEN [de-identified] : 0.4cm [de-identified] : 0.2cm [de-identified] : adaptic/prontosan [de-identified] : 0.1cm [de-identified] : 1/1.5/0.1 [TWNoteComboBox1] : Right [TWNoteComboBox4] : Small [TWNoteComboBox5] : No [TWNoteComboBox6] : Surgical [de-identified] : No [de-identified] : None [de-identified] : Normal [de-identified] : 100% [de-identified] : None [de-identified] : No [de-identified] : Application of skin substitute [de-identified] : Primary Dressing [TWNoteComboBox9] : Right [de-identified] : 3x Weekly [de-identified] : Small [de-identified] : No [de-identified] : Surgical [de-identified] : Right [de-identified] : Primary Dressing [de-identified] : 3x Weekly

## 2020-03-02 NOTE — PLAN
[FreeTextEntry1] : 2/27/20\par Plan - c/w prontosan gel, adaptic, 4x4, aquacel, foam dressing,\par  orders for nurse given.

## 2020-03-02 NOTE — DATA REVIEWED
[FreeTextEntry1] : large ventral hernia no collections\par  Radha #[id number]\par \par 6.8 in august hga1c\par \par Lebanese  USED 8/30/19 I.D # [id number]

## 2020-03-02 NOTE — REASON FOR VISIT
[Follow-Up: _____] : a [unfilled] follow-up visit [Family Member] : family member [Spouse] : spouse [[employer] ] : provided by [employer]   [FreeTextEntry1] : [id number] [FreeTextEntry2] : Biaat [TWNoteComboBox1] : [place] Sudanese

## 2020-03-02 NOTE — DATA REVIEWED
[FreeTextEntry1] : large ventral hernia no collections\par  Radha #[id number]\par \par 6.8 in august hga1c\par \par English  USED 8/30/19 I.D # [id number]

## 2020-03-02 NOTE — ASSESSMENT
[FreeTextEntry1] : 84 yr old  with abdominal wall ulcer\par Graphix placed in January, doing well\par ag alginate with foam/\par wound mid- smaller , lower reopened \par  \par  \par Using pacific  phone, hernia reduced today\par  has been using prontosan gel, adaptic, 4x4, foam dressing. \par complex-lab, xr asha,test\par risk- lowo sharp debridement

## 2020-03-02 NOTE — PHYSICAL EXAM
[Normal Rate and Rhythm] : normal rate and rhythm [Abdominal Pad] : Abdominal Pad [4 x 4] : 4 x 4  [de-identified] : nad [JVD] : no jugular venous distention  [de-identified] : access# [id number] [FreeTextEntry1] : proximal [FreeTextEntry2] : 2.9 [FreeTextEntry3] : 2 [FreeTextEntry4] : 0 [de-identified] : adaptic/prontosan [de-identified] : right ankle 25\par left ankle 24\par \par Previous    2.9/2/0.1\par \par \par   [FreeTextEntry7] : MIDDLE ABDOMEN [FreeTextEntry8] : 0.6 [FreeTextEntry9] : 0.4 [de-identified] : 0.1cm [de-identified] : adaptic/prontosan [de-identified] : 1.8/1.3/0.1 [de-identified] : LOWER ABDOMEN [de-identified] : 1.5 [de-identified] : 1 [de-identified] : 0.1 [de-identified] : adaptic/prontosan [de-identified] : 0.4/0.2/0.1 [TWNoteComboBox1] : Right [TWNoteComboBox4] : Small [TWNoteComboBox5] : No [TWNoteComboBox6] : Surgical [de-identified] : No [de-identified] : Normal [de-identified] : None [de-identified] : None [de-identified] : 100% [de-identified] : No [de-identified] : 3x Weekly [TWNoteComboBox9] : Right [de-identified] : Small [de-identified] : No [de-identified] : Surgical [de-identified] : 3x Weekly [de-identified] : Right

## 2020-03-02 NOTE — REASON FOR VISIT
[Spouse] : spouse [Family Member] : family member [Follow-Up: _____] : a [unfilled] follow-up visit [FreeTextEntry1] : right side abdomen ulcer

## 2020-03-02 NOTE — HISTORY OF PRESENT ILLNESS
[FreeTextEntry1] : 83 yo woman with PHMx of DM and CAD has right lower abdominal wound,\par here for primatrix 10/22- no new product today\par using , wound slightly larger, no fevers has vna 3 times a week\par \par  had puraply april 9 may 7, may 30  jose m 13 and puraply july 19, sept 12 no fevers\par  here for reapplication of skin sub-  primatrix is here\par \par had july 2  surgery  visit , because of long surgery  with risk, she is afraid to do it\par \par still with drainage, no cellulitis, foam placed caused irritation (big square)\par  used silver nitrate 3/12, 3/19, 3/26;  had grafix  dec 11/ jan8 , jan22 and feb 26 with minimal improvement,\par  Has been using aquacel/ foam,  \par  speaks only Syrian/   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 Pt had been getting treated by an ID doctor , had initial abdominal surgery 1997, then 2017, has appt. in April with a surgeon from [facility] \par  She has not had any fever.  She is frustrated that the wound is not healing. She is receiving wound care at home every other day.   duoderm to periwound/ wears a hernia abd binder to reduce it\par  hernia (2 areas ) is large spreading skin open, is partly reducible\par \par Of Note, she:\par - Had colorectal cancer s/p bowel resection\par - Has ventral abdominal hernias - S/P repair in 2017\par -history for SBO.\par \par She was admitted to [facility] with subjective fever and pain over a right sided abdominal wall ulcer.  The patient states that the right sided abdominal wall ulcer has been present for over a year. \par   \par She had a CT that showed some inflammatory change in  the abdominal wall but no evidence of abscess or fistula.\par s/p mssa and enterbacter, treated with augmentin/ levaquin and fluconazole for vaginal candidiasis\par    She was treated with appropriate abx intravenously for 5 days and sent home on augmentin.  During her hospitalization, the redness to the abdominal wall improved but the ulcerated lesion continued to drain. She was discharged on Augmentin previously\par \par  . \par \par \par

## 2020-03-02 NOTE — HISTORY OF PRESENT ILLNESS
[FreeTextEntry1] : 86 yo woman with PHMx of DM and CAD has right lower abdominal wound,\par  ast skin substitute in January\par using , no fevers has vna 3 times a week\par \par  had puraply april 9 may 7, may 30  jose m 13 and puraply july 19, sept 12 no fevers\par  here for reapplication of skin sub-  primatrix is here\par \par had july 2  surgery  visit , because of long surgery  with risk, she is afraid to do it\par \par still with drainage, no cellulitis, foam placed caused irritation (big square)\par  used silver nitrate 3/12, 3/19, 3/26;  had grafix  dec 11/ jan8 , jan22 and feb 26 with minimal improvement,\par  Has been using aquacel/ foam,  \par  speaks only Pakistani/   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 Pt had been getting treated by an ID doctor , had initial abdominal surgery 1997, then 2017, has appt. in April with a surgeon from [facility] \par  She has not had any fever.  She is frustrated that the wound is not healing. She is receiving wound care at home every other day.   duoderm to periwound/ wears a hernia abd binder to reduce it\par  hernia (2 areas ) is large spreading skin open, is partly reducible\par \par Of Note, she:\par - Had colorectal cancer s/p bowel resection\par - Has ventral abdominal hernias - S/P repair in 2017\par -history for SBO.\par \par She was admitted to [facility] with subjective fever and pain over a right sided abdominal wall ulcer.  The patient states that the right sided abdominal wall ulcer has been present for over a year. \par   \par She had a CT that showed some inflammatory change in  the abdominal wall but no evidence of abscess or fistula.\par s/p mssa and enterbacter, treated with augmentin/ levaquin and fluconazole for vaginal candidiasis\par    She was treated with appropriate abx intravenously for 5 days and sent home on augmentin.  During her hospitalization, the redness to the abdominal wall improved but the ulcerated lesion continued to drain. She was discharged on Augmentin previously\par \par  . \par \par \par

## 2020-03-02 NOTE — HISTORY OF PRESENT ILLNESS
[FreeTextEntry1] : 85 yo woman with PHMx of DM and CAD has right lower abdominal wound,\par status post skin substitute on January 16 Graphix PL time\par \par using , r, no fevers has vna 3 times a week\par \par  had puraply april 9 may 7, may 30  jose m 13 and puraply july 19, sept 12 no fevers\par  here for reapplication of skin sub-  primatrix  \par \par had july 2  surgery  visit , because of long surgery  with risk, she is afraid to do it\par \par still with drainage, no cellulitis, foam placed caused irritation (big square)\par  used silver nitrate 3/12, 3/19, 3/26;  had grafix  dec 11/ jan8 , jan22 and feb 26 with minimal improvement,\par  Has been using aquacel/ foam,  \par  speaks only Mongolian/   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 Pt had been getting treated by an ID doctor , had initial abdominal surgery 1997, then 2017, has appt. in April with a surgeon from [facility] \par  She has not had any fever.  She is frustrated that the wound is not healing. She is receiving wound care at home every other day.   duoderm to periwound/ wears a hernia abd binder to reduce it\par  hernia (2 areas ) is large spreading skin open, is partly reducible\par \par Of Note, she:\par - Had colorectal cancer s/p bowel resection\par - Has ventral abdominal hernias - S/P repair in 2017\par -history for SBO.\par \par She was admitted to [facility] with subjective fever and pain over a right sided abdominal wall ulcer.  The patient states that the right sided abdominal wall ulcer has been present for over a year. \par   \par She had a CT that showed some inflammatory change in  the abdominal wall but no evidence of abscess or fistula.\par s/p mssa and enterbacter, treated with augmentin/ levaquin and fluconazole for vaginal candidiasis\par    She was treated with appropriate abx intravenously for 5 days and sent home on augmentin.  During her hospitalization, the redness to the abdominal wall improved but the ulcerated lesion continued to drain. She was discharged on Augmentin previously\par \par  . \par \par \par

## 2020-03-02 NOTE — ASSESSMENT
[FreeTextEntry1] : 85 yr old  with abdominal wall ulcer- middle abdominal ulcer closed and improvement with the most distal ulcer\par ag alginate with foam/ \par grafix 1/16/20 primatrix placed  10/22 , sep12- aug 30 aug 8\par wound that had closed reopened  ,now making progress with prior to protogel\par Consider re reuse of Graphix\par  \par  2/27/20\par Using pacific  phone,  [employer] access number # [phone], middle wound is closed,\par  distal is open as a pin point, proximal wound is pink and clean, using prontosan gel.

## 2020-03-12 ENCOUNTER — APPOINTMENT (OUTPATIENT)
Dept: WOUND CARE | Facility: CLINIC | Age: 85
End: 2020-03-12
Payer: MEDICARE

## 2020-03-12 DIAGNOSIS — B37.9 CANDIDIASIS, UNSPECIFIED: ICD-10-CM

## 2020-03-12 PROCEDURE — 99213 OFFICE O/P EST LOW 20 MIN: CPT

## 2020-03-13 NOTE — DATA REVIEWED
[FreeTextEntry1] : large ventral hernia no collections\par  Radha #[id number]\par \par 6.8 in august hga1c\par \par Guamanian  USED 8/30/19 I.D # [id number]

## 2020-03-13 NOTE — ASSESSMENT
[FreeTextEntry1] : 84 yr old  with abdominal wall ulcer\par ag alginate with foam/ primatrix placed  10/22 , sep12- aug 30 aug 8\par wound that had closed reopened in between\par  \par  12/5/19\par  phone  # [phone] pavel\par Wounds are smaller\par Plan - skin sub. adaptic/aquacel, foam drsg over\par \par 1/2/20  [id number]\par order skin sub

## 2020-03-13 NOTE — PHYSICAL EXAM
[Normal Rate and Rhythm] : normal rate and rhythm [Abdominal Pad] : Abdominal Pad [4 x 4] : 4 x 4  [JVD] : no jugular venous distention  [de-identified] : nad [de-identified] : access# [id number] [FreeTextEntry1] : upper abdominal   [FreeTextEntry2] : 2 [FreeTextEntry3] : 2 [FreeTextEntry4] : 0.1 cm [de-identified] : small satelite 4ock .3x3, cluster 3x2.5 [de-identified] : adaptic/aquacel [de-identified] : right ankle 25\par left ankle 24\par \par Previous    3.4/4/0.1\par \par \par   [FreeTextEntry7] : MIDDLE ABDOMEN [FreeTextEntry8] : 0 [FreeTextEntry9] : 0 [de-identified] : 0.1 [de-identified] : adaptic/aquacel [de-identified] : closed\par  2.2/1.0 [de-identified] : LOWER ABDOMEN [de-identified] : 1 [de-identified] : 1.5 [de-identified] : 0.1 [de-identified] : adaptic/aquacel [TWNoteComboBox1] : Right [TWNoteComboBox4] : Small [TWNoteComboBox5] : No [TWNoteComboBox6] : Surgical [de-identified] : No [de-identified] : Normal [de-identified] : None [de-identified] : None [de-identified] : None [de-identified] : No [TWNoteComboBox9] : Right [de-identified] : Small [de-identified] : No [de-identified] : Surgical [de-identified] : No [de-identified] : Normal [de-identified] : None [de-identified] : None [de-identified] : 100% [de-identified] : Right

## 2020-03-13 NOTE — HISTORY OF PRESENT ILLNESS
[FreeTextEntry1] : 85 yo woman with PHMx of DM and CAD has right lower abdominal wound,\par here for primatrix 10/22- no new product today\par using , wound slightly larger, no fevers has vna 3 times a week\par \par  had puraply april 9 may 7, may 30  jose m 13 and puraply july 19, sept 12 no fevers\par  here for reapplication of skin sub-  primatrix is here\par \par had july 2  surgery  visit , because of long surgery  with risk, she is afraid to do it\par \par still with drainage, no cellulitis, foam placed caused irritation (big square)\par  used silver nitrate 3/12, 3/19, 3/26;  had grafix  dec 11/ jan8 , jan22 and feb 26 with minimal improvement,\par  Has been using aquacel/ foam,  \par  speaks only Central African/   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 Pt had been getting treated by an ID doctor , had initial abdominal surgery 1997, then 2017, has appt. in April with a surgeon from [facility] \par  She has not had any fever.  She is frustrated that the wound is not healing. She is receiving wound care at home every other day.   duoderm to periwound/ wears a hernia abd binder to reduce it\par  hernia (2 areas ) is large spreading skin open, is partly reducible\par \par Of Note, she:\par - Had colorectal cancer s/p bowel resection\par - Has ventral abdominal hernias - S/P repair in 2017\par -history for SBO.\par \par She was admitted to [facility] with subjective fever and pain over a right sided abdominal wall ulcer.  The patient states that the right sided abdominal wall ulcer has been present for over a year. \par   \par She had a CT that showed some inflammatory change in  the abdominal wall but no evidence of abscess or fistula.\par s/p mssa and enterbacter, treated with augmentin/ levaquin and fluconazole for vaginal candidiasis\par    She was treated with appropriate abx intravenously for 5 days and sent home on augmentin.  During her hospitalization, the redness to the abdominal wall improved but the ulcerated lesion continued to drain. She was discharged on Augmentin previously\par \par  . \par \par \par

## 2020-03-26 ENCOUNTER — APPOINTMENT (OUTPATIENT)
Dept: WOUND CARE | Facility: CLINIC | Age: 85
End: 2020-03-26
Payer: MEDICARE

## 2020-03-26 VITALS — WEIGHT: 178 LBS | HEIGHT: 65 IN | TEMPERATURE: 98.3 F | BODY MASS INDEX: 29.66 KG/M2

## 2020-03-26 PROCEDURE — 99213 OFFICE O/P EST LOW 20 MIN: CPT

## 2020-03-26 RX ORDER — NYSTATIN 100000 1/G
100000 POWDER TOPICAL 3 TIMES DAILY
Qty: 1 | Refills: 1 | Status: COMPLETED | COMMUNITY
Start: 2018-08-17 | End: 2020-03-26

## 2020-03-26 RX ORDER — DICLOXACILLIN SODIUM 500 MG/1
500 CAPSULE ORAL 4 TIMES DAILY
Qty: 28 | Refills: 0 | Status: COMPLETED | COMMUNITY
Start: 2019-11-25 | End: 2020-03-26

## 2020-03-26 NOTE — HISTORY OF PRESENT ILLNESS
[FreeTextEntry1] : 84 yo woman with PHMx of DM and CAD has right lower abdominal wound, used nystatin swish and swallow, doing better\par no fevers\par l ast skin substitute in January\par using , no fevers has vna 3 times a week\par \par  had puraply april 9 may 7, may 30  jose m 13 and puraply july 19, sept 12 no fevers\par  here for reapplication of skin sub-  primatrix is here\par \par had july 2  surgery  visit , because of long surgery  with risk, she is afraid to do it\par \par still with drainage, no cellulitis, foam placed caused irritation (big square)\par  used silver nitrate 3/12, 3/19, 3/26;  had grafix  dec 11/ jan8 , jan22 and feb 26 with minimal improvement,\par  Has been using aquacel/ foam,  \par  speaks only Togolese/   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 Pt had been getting treated by an ID doctor , had initial abdominal surgery 1997, then 2017, has appt. in April with a surgeon from [facility] \par  She has not had any fever.  She is frustrated that the wound is not healing. She is receiving wound care at home every other day.   duoderm to periwound/ wears a hernia abd binder to reduce it\par  hernia (2 areas ) is large spreading skin open, is partly reducible\par \par Of Note, she:\par - Had colorectal cancer s/p bowel resection\par - Has ventral abdominal hernias - S/P repair in 2017\par -history for SBO.\par \par She was admitted to [facility] with subjective fever and pain over a right sided abdominal wall ulcer.  The patient states that the right sided abdominal wall ulcer has been present for over a year. \par   \par She had a CT that showed some inflammatory change in  the abdominal wall but no evidence of abscess or fistula.\par s/p mssa and enterbacter, treated with augmentin/ levaquin and fluconazole for vaginal candidiasis\par    She was treated with appropriate abx intravenously for 5 days and sent home on augmentin.  During her hospitalization, the redness to the abdominal wall improved but the ulcerated lesion continued to drain. She was discharged on Augmentin previously\par \par  . \par \par \par

## 2020-03-26 NOTE — PHYSICAL EXAM
[JVD] : no jugular venous distention  [de-identified] : nad [de-identified] : access# [id number] [FreeTextEntry1] : proximal [FreeTextEntry2] : 0 [FreeTextEntry3] : 0 [FreeTextEntry4] : 0 [de-identified] : \par \par \par   [FreeTextEntry7] : MIDDLE ABDOMEN [FreeTextEntry8] : 0 [FreeTextEntry9] : 0 [de-identified] : 0 [de-identified] : \par \par  [de-identified] : LOWER ABDOMEN [de-identified] : 0 [de-identified] : 0 [de-identified] : 0 [de-identified] : last jan 16 grafix\par 0.4/0.2/0.1 [TWNoteComboBox1] : Right [TWNoteComboBox4] : False [TWNoteComboBox5] : False [TWNoteComboBox6] : False [de-identified] : False [de-identified] : False [de-identified] : False [de-identified] : False [de-identified] : False [de-identified] : False [de-identified] : False [TWNoteComboBox9] : Right [de-identified] : Right [de-identified] : False

## 2020-03-26 NOTE — PHYSICAL EXAM
[Normal Rate and Rhythm] : normal rate and rhythm [Abdominal Pad] : Abdominal Pad [4 x 4] : 4 x 4  [JVD] : no jugular venous distention  [de-identified] : nad [de-identified] : access# [id number] [FreeTextEntry1] : proximal [FreeTextEntry2] : 1.5 [FreeTextEntry3] : 0.8 [FreeTextEntry4] : .1 [de-identified] : adaptic/prontosan [de-identified] : right ankle 25\par left ankle 24\par \par Previous    2.9/2/0.1\par \par \par   [FreeTextEntry7] : MIDDLE ABDOMEN [FreeTextEntry8] : 0 [FreeTextEntry9] : 0 [de-identified] : 0 [de-identified] : previou .6x.4x.1\par \par  1.8/1.3/0.1 [de-identified] : LOWER ABDOMEN [de-identified] : 1 [de-identified] : 1 [de-identified] : 0.1 [de-identified] : adaptic/prontosan [de-identified] : last jan 16 grafix\par 0.4/0.2/0.1 [TWNoteComboBox1] : Right [TWNoteComboBox4] : Small [TWNoteComboBox5] : No [TWNoteComboBox6] : Surgical [de-identified] : No [de-identified] : Normal [de-identified] : None [de-identified] : None [de-identified] : 100% [de-identified] : No [de-identified] : 3x Weekly [TWNoteComboBox9] : Right [de-identified] : Right [de-identified] : 3x Weekly

## 2020-03-26 NOTE — PLAN
[FreeTextEntry1] :  \par Plan - c/w prontosan gel, adaptic, 4x4, aquacel, foam dressing,\par  orders for nurse given.

## 2020-03-26 NOTE — ASSESSMENT
[FreeTextEntry1] : 85 yr old  with abdominal wall ulcer- middle abdominal ulcer closed and improvement \par with the most distal ulcer\par adaptic/ betadine\par grafix 1/16/20 primatrix placed  10/22 , sep12- aug 30 aug 8\par woundsmaller ,now making progress with prior to protosan \par Consider re reuse of Graphix\par  Plan for re application of grafix\par \par Wound has shown improvement through (increased granulation and or decreased size).\par Wound is free from infection drainage and necrotic tissue. \par  \par \par Wound has been treated with standards of care for  weeks including (compression, offloading, debridement).\par  \par Using pacific  phone,  [employer] access number # [id number], middle wound is closed,\par  distal is open as a pin point, proximal wound is pink and clean, using prontosan gel.

## 2020-03-26 NOTE — ASSESSMENT
[FreeTextEntry1] : 85 yr old  with abdominal wall ulcer-  closed and improvement with the most distal ulcer\par adaptic/ prontosan\par \par woundsmaller ,now making progress with prior to protosan \par  grafix 1/16/20 primatrix placed  10/22 , sep12- aug 30 aug 8\par no reorder- wound closed\par discussed telehealth visit for next visit with nurse- patient consents\par \par Wound has shown improvement through (increased granulation and or decreased size).\par Wound is free from infection drainage and necrotic tissue. \par  Wound has been treated with standards of care for  weeks including (compression, offloading, debridement).\par  \par Using pacific  phone,  [employer] access number # [phone], middle wound is closed,\par  , using prontosan gel.

## 2020-03-26 NOTE — DATA REVIEWED
[FreeTextEntry1] : large ventral hernia no collections\par  Radha #[id number]\par \par 6.8 in august hga1c\par \par [place]  USED 8/30/19 I.D # [id number]

## 2020-03-26 NOTE — HISTORY OF PRESENT ILLNESS
[FreeTextEntry1] : 84 yo woman with PHMx of DM and CAD has right lower abdominal wound,\par  last skin substitute in January\par using , no fevers has vna 3 times a week, using adaptic and prontosan\par \par  had puraply april 9 may 7, may 30  jose m 13 and puraply july 19, sept 12 no fevers\par  here for reapplication of skin sub-  primatrix is here\par \par had july 2  surgery  visit , because of long surgery  with risk, she is afraid to do it\par \par still with drainage, no cellulitis, foam placed caused irritation (big square)\par  used silver nitrate 3/12, 3/19, 3/26;  had grafix  dec 11/ jan8 , jan22 and feb 26 with minimal improvement,\par  Has been using aquacel/ foam,  \par  speaks only [place]orran/   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 Pt had been getting treated by an ID doctor , had initial abdominal surgery 1997, then 2017, has appt. in April with a surgeon from [facility] \par  She has not had any fever.  She is frustrated that the wound is not healing. She is receiving wound care at home every other day.   duoderm to periwound/ wears a hernia abd binder to reduce it\par  hernia (2 areas ) is large spreading skin open, is partly reducible\par \par Of Note, she:\par - Had colorectal cancer s/p bowel resection\par - Has ventral abdominal hernias - S/P repair in 2017\par -history for SBO.\par \par She was admitted to [facility] with subjective fever and pain over a right sided abdominal wall ulcer.  The patient states that the right sided abdominal wall ulcer has been present for over a year. \par   \par She had a CT that showed some inflammatory change in  the abdominal wall but no evidence of abscess or fistula.\par s/p mssa and enterbacter, treated with augmentin/ levaquin and fluconazole for vaginal candidiasis\par    She was treated with appropriate abx intravenously for 5 days and sent home on augmentin.  During her hospitalization, the redness to the abdominal wall improved but the ulcerated lesion continued to drain. She was discharged on Augmentin previously\par \par  . \par \par \par

## 2020-03-26 NOTE — REASON FOR VISIT
[Follow-Up: _____] : a [unfilled] follow-up visit [Spouse] : spouse [Family Member] : family member [[employer] ] : provided by [employer]   [FreeTextEntry1] : [id number] [FreeTextEntry2] : shawn [TWNoteComboBox1] : American

## 2020-03-26 NOTE — DATA REVIEWED
[FreeTextEntry1] : large ventral hernia no collections\par  Radha #[id number]\par \par 6.8 in august hga1c\par \par Cook Islander  USED 8/30/19 I.D # [id number]

## 2020-04-17 ENCOUNTER — APPOINTMENT (OUTPATIENT)
Dept: WOUND CARE | Facility: CLINIC | Age: 85
End: 2020-04-17
Payer: MEDICARE

## 2020-04-17 PROCEDURE — 99214 OFFICE O/P EST MOD 30 MIN: CPT | Mod: 95

## 2020-04-17 NOTE — ASSESSMENT
[FreeTextEntry1] : 85 yr old  DM HTNwith abdominal wall ulcer-  reopened upper ulcer\par adaptic/ prontosan foam\par needs new binder\par \par  had closed/ progress with prior to protosan \par  grafix 1/16/20 primatrix placed  10/22 , sep12- aug 30 aug 8\par  \par discussed telehealth visit for next visit with nurse- patient consents\par  \par complexlow-lab, xr asha,test reviewed\par risk- low\par technical difficuty:-not on pt home #, low done on vna phone personal [phone]\par appoint for F-T-Fna\par virtual appointment 2 weeks\par referral to PCP na\par \par

## 2020-04-17 NOTE — PHYSICAL EXAM
[Normal Breath Sounds] : Normal breath sounds [JVD] : no jugular venous distention  [Skin Ulcer] : ulcer [Alert] : alert [Oriented to Person] : oriented to person [Oriented to Place] : oriented to place [Oriented to Time] : oriented to time [Calm] : calm [de-identified] : nad [de-identified] : access# [id number] [FreeTextEntry1] : proximal [FreeTextEntry2] : 0 [FreeTextEntry3] : 0 [FreeTextEntry4] : 0 [de-identified] : prontosan/adaptic foam [de-identified] : order another binder\par prontosan\par \par shear  2.5x1.5x.1 at telehealth visit\par \par \par   [FreeTextEntry7] : MIDDLE ABDOMEN [FreeTextEntry8] : 0 [FreeTextEntry9] : 0 [de-identified] : 0 [de-identified] : \par \par  [de-identified] : LOWER ABDOMEN [de-identified] : 0 [de-identified] : 0 [de-identified] : 0 [de-identified] : last jan 16 grafix\par 0.4/0.2/0.1 [TWNoteComboBox1] : Right [TWNoteComboBox9] : Right [de-identified] : Right

## 2020-04-17 NOTE — REASON FOR VISIT
[Follow-Up: _____] : a [unfilled] follow-up visit [Spouse] : spouse [Family Member] : family member [[employer] ] : provided by [employer]   [Formal Caregiver] : formal caregiver [FreeTextEntry1] : [id number] [FreeTextEntry2] : shawn [TWNoteComboBox1] : Georgian

## 2020-04-17 NOTE — DATA REVIEWED
[FreeTextEntry1] : large ventral hernia no collections\par  Radha #[id number]\par \par 6.8 in august hga1c\par \par Maldivian  USED 8/30/19 I.D # [id number]

## 2020-04-17 NOTE — HISTORY OF PRESENT ILLNESS
[Home] : at home, [unfilled] , at the time of the visit. [Other Location: e.g. Home (Enter Location, City,State)___] : at [unfilled] [Spouse] : spouse [Formal Caregiver] : formal caregiver [Patient] : the patient [Self] : self [FreeTextEntry4] : matthew mariano-[id number]/ [FreeTextEntry1] : 84 yo woman with PHMx of DM and CAD has right lower abdominal wound,\par was closed now reopened , has prontosan, andaptic and foam\par no fever- glucose ok, needs new binder\par prior used nystatin swish and swallow, doing better\par  \par l ast skin substitute in January\par using , no fevers has vna 3 times a week\par \par  had puraply april 9 may 7, may 30  jose m 13 and puraply july 19, sept 12 no fevers\par  here for reapplication of skin sub-  primatrix is here\par \par had july 2  surgery  visit , because of long surgery  with risk, she is afraid to do it\par \par still with drainage, no cellulitis, foam placed caused irritation (big square)\par  used silver nitrate 3/12, 3/19, 3/26;  had grafix  dec 11/ jan8 , jan22 and feb 26 with minimal improvement,\par  Has been using aquacel/ foam,  \par  speaks only Tongan/   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 Pt had been getting treated by an ID doctor , had initial abdominal surgery 1997, then 2017, has appt. in April with a surgeon from [facility] \par  She has not had any fever.  She is frustrated that the wound is not healing. She is receiving wound care at home every other day.   duoderm to periwound/ wears a hernia abd binder to reduce it\par  hernia (2 areas ) is large spreading skin open, is partly reducible\par \par Of Note, she:\par - Had colorectal cancer s/p bowel resection\par - Has ventral abdominal hernias - S/P repair in 2017\par -history for SBO.\par \par She was admitted to [facility] with subjective fever and pain over a right sided abdominal wall ulcer.  The patient states that the right sided abdominal wall ulcer has been present for over a year. \par   \par She had a CT that showed some inflammatory change in  the abdominal wall but no evidence of abscess or fistula.\par s/p mssa and enterbacter, treated with augmentin/ levaquin and fluconazole for vaginal candidiasis\par    She was treated with appropriate abx intravenously for 5 days and sent home on augmentin.  During her hospitalization, the redness to the abdominal wall improved but the ulcerated lesion continued to drain. She was discharged on Augmentin previously\par \par  . \par \par \par

## 2020-05-07 ENCOUNTER — APPOINTMENT (OUTPATIENT)
Dept: WOUND CARE | Facility: CLINIC | Age: 85
End: 2020-05-07

## 2020-05-14 ENCOUNTER — APPOINTMENT (OUTPATIENT)
Dept: WOUND CARE | Facility: CLINIC | Age: 85
End: 2020-05-14

## 2020-05-15 ENCOUNTER — APPOINTMENT (OUTPATIENT)
Dept: WOUND CARE | Facility: CLINIC | Age: 85
End: 2020-05-15
Payer: MEDICARE

## 2020-05-15 PROCEDURE — 99214 OFFICE O/P EST MOD 30 MIN: CPT | Mod: 95

## 2020-05-19 NOTE — PHYSICAL EXAM
[de-identified] : nad [JVD] : no jugular venous distention  [FreeTextEntry2] : 0 [de-identified] : access# [id number] [FreeTextEntry1] : proximal [de-identified] : 1x1 5/15/20 smaller\par order another binder\par prontosan\par \par shear  2.5x1.5x.1 at telehealth visit\par \par \par   [de-identified] : prontosan/adaptic foam [FreeTextEntry3] : 0 [FreeTextEntry4] : 0 [FreeTextEntry9] : 0 [de-identified] : 0 [FreeTextEntry8] : 0 [FreeTextEntry7] : MIDDLE ABDOMEN [de-identified] : closed\par \par  [de-identified] : 0 [de-identified] : LOWER ABDOMEN [de-identified] : 0 [de-identified] : 0 [de-identified] : cluster of 2 .3x.3 eachl\par \par ast jan 16 grafix\par 0.4/0.2/0.1 [TWNoteComboBox9] : Right [TWNoteComboBox1] : Right [de-identified] : Right

## 2020-05-19 NOTE — ASSESSMENT
[FreeTextEntry1] : 85 yr old  DM HTNwith abdominal wall ulcer-  reopened upper ulcer\par adaptic/ prontosan foam\par needs new binder- insurance wouldn’t cover\par discuss eo2 patch-\par \par  had closed/ progress with prior to protosan \par  grafix 1/16/20 primatrix placed  10/22 , sep12- aug 30 aug 8\par  \par discussed telehealth visit for next visit with nurse- patient consents\par  \par complexlow-lab, xr asha,test reviewed\par risk- low\par technical difficuty:-not on pt home #, low done on vna phone personal [phone]\par appoint for F-T-Fna\par virtual appointment 2 weeks\par referral to PCP na\par \par

## 2020-05-19 NOTE — HISTORY OF PRESENT ILLNESS
[FreeTextEntry1] : 84 yo woman with PHMx of DM and CAD has right lower abdominal wound,\par was closed now reopened , has prontosan, andaptic and foam  \par reopened upper ulcer- better this week\par adaptic/ prontosan foam\par needs new binder\par \par  had closed/ progress with prior to protosan \par  grafix 1/16/20 primatrix placed  10/22 , sep12- aug 30 aug 8\par  \par no fever- glucose ok, needs new binder\par prior used nystatin swish and swallow, doing better\par  \par l ast skin substitute in January\par using , no fevers has vna 3 times a week\par \par  had puraply april 9 may 7, may 30  jose m 13 and puraply july 19, sept 12 no fevers\par  here for reapplication of skin sub-  primatrix is here\par \par had july 2  surgery  visit , because of long surgery  with risk, she is afraid to do it\par \par still with drainage, no cellulitis, foam placed caused irritation (big square)\par  used silver nitrate 3/12, 3/19, 3/26;  had grafix  dec 11/ jan8 , jan22 and feb 26 with minimal improvement,\par  Has been using aquacel/ foam,  \par  speaks only Tongan/   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 Pt had been getting treated by an ID doctor , had initial abdominal surgery 1997, then 2017, has appt. in April with a surgeon from [facility] \par  She has not had any fever.  She is frustrated that the wound is not healing. She is receiving wound care at home every other day.   duoderm to periwound/ wears a hernia abd binder to reduce it\par  hernia (2 areas ) is large spreading skin open, is partly reducible\par \par Of Note, she:\par - Had colorectal cancer s/p bowel resection\par - Has ventral abdominal hernias - S/P repair in 2017\par -history for SBO.\par \par She was admitted to [facility] with subjective fever and pain over a right sided abdominal wall ulcer.  The patient states that the right sided abdominal wall ulcer has been present for over a year. \par   \par She had a CT that showed some inflammatory change in  the abdominal wall but no evidence of abscess or fistula.\par s/p mssa and enterbacter, treated with augmentin/ levaquin and fluconazole for vaginal candidiasis\par    She was treated with appropriate abx intravenously for 5 days and sent home on augmentin.  During her hospitalization, the redness to the abdominal wall improved but the ulcerated lesion continued to drain. She was discharged on Augmentin previously\par \par  . \par \par \par  [FreeTextEntry4] : matthew [id number]/

## 2020-05-19 NOTE — REASON FOR VISIT
[FreeTextEntry1] : urgent telehealth visit--right side abdomen ulcer [TWNoteComboBox1] : Honduran [FreeTextEntry2] : shawn

## 2020-05-19 NOTE — DATA REVIEWED
[FreeTextEntry1] : large ventral hernia no collections\par  Radha #[id number]\par \par 6.8 in august hga1c\par \par [place]  USED 8/30/19 I.D # [id number]

## 2020-08-12 ENCOUNTER — APPOINTMENT (OUTPATIENT)
Dept: CARDIOLOGY | Facility: CLINIC | Age: 85
End: 2020-08-12
Payer: MEDICARE

## 2020-08-12 ENCOUNTER — RESULT CHARGE (OUTPATIENT)
Age: 85
End: 2020-08-12

## 2020-08-12 VITALS
BODY MASS INDEX: 28.62 KG/M2 | WEIGHT: 172 LBS | SYSTOLIC BLOOD PRESSURE: 120 MMHG | HEART RATE: 79 BPM | DIASTOLIC BLOOD PRESSURE: 50 MMHG

## 2020-08-12 PROCEDURE — 93306 TTE W/DOPPLER COMPLETE: CPT

## 2020-08-12 PROCEDURE — 99214 OFFICE O/P EST MOD 30 MIN: CPT

## 2020-08-12 PROCEDURE — 93000 ELECTROCARDIOGRAM COMPLETE: CPT

## 2020-08-12 RX ORDER — METFORMIN HYDROCHLORIDE 850 MG/1
850 TABLET, COATED ORAL
Refills: 0 | Status: ACTIVE | COMMUNITY

## 2020-08-12 RX ORDER — CLONIDINE 0.2 MG/24H
0.2 PATCH, EXTENDED RELEASE TRANSDERMAL
Qty: 4 | Refills: 0 | Status: DISCONTINUED | COMMUNITY
Start: 2018-08-18 | End: 2020-08-12

## 2020-08-12 RX ORDER — GLIMEPIRIDE 4 MG/1
4 TABLET ORAL
Refills: 0 | Status: ACTIVE | COMMUNITY

## 2020-08-12 RX ORDER — ENALAPRIL MALEATE 10 MG/1
10 TABLET ORAL
Refills: 0 | Status: ACTIVE | COMMUNITY

## 2020-08-12 NOTE — HISTORY OF PRESENT ILLNESS
[FreeTextEntry1] : 84 y/o female with history of HTN, DL, DM type 2, obesity, hernia, diastolic CHF, presenting for f/u. No anginal pains, but has "sticking" sensation on the left chest. + dyspnea, + leg edema. No syncope. No palpitations. She reports compliance with medical therapy. Using Lasix twice a week. Trying to stop insulin due to weight gain. Reports her FS are controlled. has labs scheduled for September.

## 2020-08-12 NOTE — PHYSICAL EXAM
[Normal Appearance] : normal appearance [Well Groomed] : well groomed [No Deformities] : no deformities [General Appearance - In No Acute Distress] : no acute distress [Normal Conjunctiva] : the conjunctiva exhibited no abnormalities [Eyelids - No Xanthelasma] : the eyelids demonstrated no xanthelasmas [Heart Sounds] : normal S1 and S2 [Heart Rate And Rhythm] : heart rate and rhythm were normal [Murmurs] : no murmurs present [Respiration, Rhythm And Depth] : normal respiratory rhythm and effort [Auscultation Breath Sounds / Voice Sounds] : lungs were clear to auscultation bilaterally [Exaggerated Use Of Accessory Muscles For Inspiration] : no accessory muscle use [Bowel Sounds] : normal bowel sounds [Abdomen Soft] : soft [Abdomen Tenderness] : non-tender [Abnormal Walk] : normal gait [Nail Clubbing] : no clubbing of the fingernails [Cyanosis, Localized] : no localized cyanosis [Skin Color & Pigmentation] : normal skin color and pigmentation [Oriented To Time, Place, And Person] : oriented to person, place, and time [] : no rash [Affect] : the affect was normal [FreeTextEntry1] : walks with a cane

## 2020-08-12 NOTE — ASSESSMENT
[FreeTextEntry1] : Diastolic CHF\par HTN\par DL\par DM type 2\par Abdominal wound.\par \par Plan:\par \par Obtain 2D echo\par C/w BP control.\par C/w BB\par Diuretics as needed\par DM control. F/u with endocrine. Labs as scheduled.\par C/w statin.\par Wound care\par Weight loss.\par \par Add: Echo reviewed - normal LV function. Sclerotic AV, no AS.\par Change lasix to 20 mg 3 x week.\par F/u in 3 months.

## 2020-08-12 NOTE — REVIEW OF SYSTEMS
[Feeling Fatigued] : feeling fatigued [Nausea] : nausea [see HPI] : see HPI [Joint Pain] : joint pain [Negative] : Heme/Lymph

## 2020-08-20 ENCOUNTER — APPOINTMENT (OUTPATIENT)
Dept: WOUND CARE | Facility: CLINIC | Age: 85
End: 2020-08-20
Payer: MEDICARE

## 2020-08-20 PROCEDURE — 99213 OFFICE O/P EST LOW 20 MIN: CPT

## 2020-08-20 NOTE — HISTORY OF PRESENT ILLNESS
[Other Location: e.g. Home (Enter Location, City,State)___] : at [unfilled] [Spouse] : spouse [Formal Caregiver] : formal caregiver [Patient] : the patient [Self] : self [FreeTextEntry1] : 84 yo woman with PHMx of DM and CAD has right lower abdominal wound, using adaptic and foam\par was closed now reopened , had prontosan, andaptic and foam  \par reopened upper ulcer- better this week\par adaptic/ prontosan foam\par needs new binder\par \par  had closed/ progress with prior to protosan \par  grafix 1/16/20 primatrix placed  10/22 , sep12- aug 30 aug 8\par  \par no fever- glucose ok, needs new binder\par prior used nystatin swish and swallow, doing better\par  \par l ast skin substitute in January\par using , no fevers has vna 3 times a week\par \par  had puraply april 9 may 7, may 30  jose m 13 and puraply july 19, sept 12 no fevers\par  here for reapplication of skin sub-  primatrix is here\par \par had july 2  surgery  visit , because of long surgery  with risk, she is afraid to do it\par \par still with drainage, no cellulitis, foam placed caused irritation (big square)\par  used silver nitrate 3/12, 3/19, 3/26;  had grafix  dec 11/ jan8 , jan22 and feb 26 with minimal improvement,\par  Has been using aquacel/ foam,  \par  speaks only Angolan/   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 Pt had been getting treated by an ID doctor , had initial abdominal surgery 1997, then 2017, has appt. in April with a surgeon from [facility] \par  She has not had any fever.  She is frustrated that the wound is not healing. She is receiving wound care at home every other day.   duoderm to periwound/ wears a hernia abd binder to reduce it\par  hernia (2 areas ) is large spreading skin open, is partly reducible\par \par Of Note, she:\par - Had colorectal cancer s/p bowel resection\par - Has ventral abdominal hernias - S/P repair in 2017\par -history for SBO.\par \par She was admitted to [facility] with subjective fever and pain over a right sided abdominal wall ulcer.  The patient states that the right sided abdominal wall ulcer has been present for over a year. \par   \par She had a CT that showed some inflammatory change in  the abdominal wall but no evidence of abscess or fistula.\par s/p mssa and enterbacter, treated with augmentin/ levaquin and fluconazole for vaginal candidiasis\par    She was treated with appropriate abx intravenously for 5 days and sent home on augmentin.  During her hospitalization, the redness to the abdominal wall improved but the ulcerated lesion continued to drain. She was discharged on Augmentin previously\par \par  . \par \par \par

## 2020-08-20 NOTE — DATA REVIEWED
[FreeTextEntry1] : large ventral hernia no collections\par  Radha #[id number]\par \par 6.8 in august hga1c\par \par [place]  USED 8/30/19 I.D # [id number]

## 2020-08-20 NOTE — ASSESSMENT
[FreeTextEntry1] : 85 yr old  DM HTNwith abdominal wall ulcer-  reopened upper ulcer\par adaptic/ prontosan foam, need to do periwound dayana, use small foam , or abd\par needs new binder- insurance wouldn’t cover, stopped eo2\par   had closed/ progress with prior to protosan \par  grafix 1/16/20 primatrix placed  10/22 , sep12- aug 30 aug 8\par  \par discussed telehealth visit for next visit with nurse- patient consents 1 month\par  complexlow-lab, xr asha,test reviewed\par risk- low\par

## 2020-08-20 NOTE — REASON FOR VISIT
[Follow-Up: _____] : a [unfilled] follow-up visit [Spouse] : spouse [Formal Caregiver] : formal caregiver [Family Member] : family member [[employer] ] : provided by [employer]   [TWNoteComboBox1] : Mongolian [FreeTextEntry1] : [id number]

## 2020-08-20 NOTE — PLAN
[FreeTextEntry1] :  \par Plan - c/w prontosan gel, adaptic, 4x4 smaller foam  or abd dressing, dayana periwound\par  orders for nurse given.

## 2020-08-20 NOTE — PHYSICAL EXAM
[Normal Breath Sounds] : Normal breath sounds [Skin Ulcer] : ulcer [Alert] : alert [Oriented to Person] : oriented to person [Oriented to Place] : oriented to place [Calm] : calm [Oriented to Time] : oriented to time [JVD] : no jugular venous distention  [de-identified] : nad [Please See PDF for [employer] Analytics] : Please See PDF for [employer]. [de-identified] : access# [id number] [FreeTextEntry2] : 0 [FreeTextEntry1] : proximal [FreeTextEntry3] : 0 [de-identified] : 1x1 5/15/20 smaller\par order another binder\par prontosan\par \par shear  2.5x1.5x.1 at telehealth visit\par \par \par   [FreeTextEntry4] : 0 [de-identified] : prontosan/adaptic foam [FreeTextEntry7] : MIDDLE ABDOMEN [FreeTextEntry9] : 0 [FreeTextEntry8] : 0 [de-identified] : 0 [de-identified] : closed\par \par  [de-identified] : 0 [de-identified] : LOWER ABDOMEN [de-identified] : 0 [TWNoteComboBox1] : Right [de-identified] : 0 [de-identified] : cluster of 2 .3x.3 eachl\par \par ast jan 16 grafix\par 0.4/0.2/0.1 [de-identified] : Right [TWNoteComboBox9] : Right

## 2020-09-24 ENCOUNTER — APPOINTMENT (OUTPATIENT)
Dept: WOUND CARE | Facility: CLINIC | Age: 85
End: 2020-09-24
Payer: MEDICARE

## 2020-09-24 VITALS
DIASTOLIC BLOOD PRESSURE: 75 MMHG | HEART RATE: 73 BPM | SYSTOLIC BLOOD PRESSURE: 135 MMHG | BODY MASS INDEX: 28.66 KG/M2 | HEIGHT: 65 IN | TEMPERATURE: 97.8 F | WEIGHT: 172 LBS

## 2020-09-24 PROCEDURE — 99213 OFFICE O/P EST LOW 20 MIN: CPT

## 2020-09-28 NOTE — PHYSICAL EXAM
[Normal Breath Sounds] : Normal breath sounds [Skin Ulcer] : ulcer [Alert] : alert [Oriented to Person] : oriented to person [Oriented to Place] : oriented to place [Oriented to Time] : oriented to time [Calm] : calm [Please See PDF for Tissue Analytics] : Please See PDF for [employer] Analytics. [JVD] : no jugular venous distention  [de-identified] : nad [de-identified] : access# [id number] [FreeTextEntry1] : proximal [FreeTextEntry2] : 0 [FreeTextEntry3] : 0 [FreeTextEntry4] : 0 [de-identified] : prontosan/adaptic foam [de-identified] : 1x1 5/15/20 smaller\par order another binder\par prontosan\par \par shear  2.5x1.5x.1 at telehealth visit\par \par \par   [FreeTextEntry7] : MIDDLE ABDOMEN [FreeTextEntry8] : 0 [FreeTextEntry9] : 0 [de-identified] : 0 [de-identified] : closed\par \par  [de-identified] : LOWER ABDOMEN [de-identified] : 0 [de-identified] : 0 [de-identified] : 0 [de-identified] : cluster of 2 .3x.3 eachl\par \par ast jan 16 grafix\par 0.4/0.2/0.1 [TWNoteComboBox1] : Right [TWNoteComboBox9] : Right [de-identified] : Right

## 2020-09-28 NOTE — HISTORY OF PRESENT ILLNESS
[Other Location: e.g. Home (Enter Location, City,State)___] : at [unfilled] [Spouse] : spouse [Formal Caregiver] : formal caregiver [Patient] : the patient [Self] : self [FreeTextEntry1] : 84 yo woman with PHMx of DM and CAD has right lower abdominal wound, using adaptic and foam\par was closed now reopened , had prontosan, andaptic and foam  \par  passed away\par reopened upper ulcer- better this week\par adaptic/ prontosan foam\par needs new binder\par \par  had closed/ progress with prior to protosan \par  grafix 1/16/20 primatrix placed  10/22 , sep12- aug 30 aug 8\par  \par no fever- glucose ok, needs new binder\par prior used nystatin swish and swallow, doing better\par  \par l ast skin substitute in January\par using , no fevers has vna 3 times a week\par \par  had puraply april 9 may 7, may 30  jose m 13 and puraply july 19, sept 12 no fevers\par  here for reapplication of skin sub-  primatrix is here\par \par had july 2  surgery  visit , because of long surgery  with risk, she is afraid to do it\par \par still with drainage, no cellulitis, foam placed caused irritation (big square)\par  used silver nitrate 3/12, 3/19, 3/26;  had grafix  dec 11/ jan8 , jan22 and feb 26 with minimal improvement,\par  Has been using aquacel/ foam,  \par  speaks only Central African/   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 Pt had been getting treated by an ID doctor , had initial abdominal surgery 1997, then 2017, has appt. in April with a surgeon from [facility] \par  She has not had any fever.  She is frustrated that the wound is not healing. She is receiving wound care at home every other day.   duoderm to periwound/ wears a hernia abd binder to reduce it\par  hernia (2 areas ) is large spreading skin open, is partly reducible\par \par Of Note, she:\par - Had colorectal cancer s/p bowel resection\par - Has ventral abdominal hernias - S/P repair in 2017\par -history for SBO.\par \par She was admitted to [facility] with subjective fever and pain over a right sided abdominal wall ulcer.  The patient states that the right sided abdominal wall ulcer has been present for over a year. \par   \par She had a CT that showed some inflammatory change in  the abdominal wall but no evidence of abscess or fistula.\par s/p mssa and enterbacter, treated with augmentin/ levaquin and fluconazole for vaginal candidiasis\par    She was treated with appropriate abx intravenously for 5 days and sent home on augmentin.  During her hospitalization, the redness to the abdominal wall improved but the ulcerated lesion continued to drain. She was discharged on Augmentin previously\par \par  . \par \par \par

## 2020-09-28 NOTE — REASON FOR VISIT
[Follow-Up: _____] : a [unfilled] follow-up visit [Spouse] : spouse [Formal Caregiver] : formal caregiver [Family Member] : family member [[employer] ] : provided by [employer]   [FreeTextEntry1] : [id number] [TWNoteComboBox1] : Nigerien

## 2020-09-28 NOTE — ASSESSMENT
[FreeTextEntry1] : 85 yr old  DM HTNwith abdominal wall ulcer-  reopened upper ulcer, tiny at this time\par adaptic/ prontosan foam, need to do periwound dayana,  abd\par needs new binder-  , stopped eo2\par   had closed/ progress with prior to protosan - philip nue/ génesis\par  grafix 1/16/20 primatrix placed  10/22 , sep12- aug 30 aug 8\par  \par discussed telehealth visit for next visit with nurse-\par  patient consents 1 month\par  complexlow-lab, xr asha,test reviewed\par risk- low\par

## 2020-09-28 NOTE — DATA REVIEWED
[FreeTextEntry1] : large ventral hernia no collections\par  Radha #[id number]\par \par 6.8 in august hga1c\par \par Guatemalan  USED 8/30/19 I.D # [id number]

## 2020-09-28 NOTE — PLAN
[FreeTextEntry1] :  \par Plan - c/w prontosan gel, génesis \par  adaptic, 4x4 smaller foam  or abd dressing, binder\par dayana periwound\par  orders for nurse given.

## 2020-09-28 NOTE — HISTORY OF PRESENT ILLNESS
[Other Location: e.g. Home (Enter Location, City,State)___] : at [unfilled] [Spouse] : spouse [Formal Caregiver] : formal caregiver [Patient] : the patient [Self] : self [FreeTextEntry1] : 86 yo woman with PHMx of DM and CAD has right lower abdominal wound, using adaptic and foam\par was closed now reopened , had prontosan, andaptic and foam  \par  passed away\par reopened upper ulcer- better this week\par adaptic/ prontosan foam\par needs new binder\par \par  had closed/ progress with prior to protosan \par  grafix 1/16/20 primatrix placed  10/22 , sep12- aug 30 aug 8\par  \par no fever- glucose ok, needs new binder\par prior used nystatin swish and swallow, doing better\par  \par l ast skin substitute in January\par using , no fevers has vna 3 times a week\par \par  had puraply april 9 may 7, may 30  jose m 13 and puraply july 19, sept 12 no fevers\par  here for reapplication of skin sub-  primatrix is here\par \par had july 2  surgery  visit , because of long surgery  with risk, she is afraid to do it\par \par still with drainage, no cellulitis, foam placed caused irritation (big square)\par  used silver nitrate 3/12, 3/19, 3/26;  had grafix  dec 11/ jan8 , jan22 and feb 26 with minimal improvement,\par  Has been using aquacel/ foam,  \par  speaks only Mexican/   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 Pt had been getting treated by an ID doctor , had initial abdominal surgery 1997, then 2017, has appt. in April with a surgeon from [facility] \par  She has not had any fever.  She is frustrated that the wound is not healing. She is receiving wound care at home every other day.   duoderm to periwound/ wears a hernia abd binder to reduce it\par  hernia (2 areas ) is large spreading skin open, is partly reducible\par \par Of Note, she:\par - Had colorectal cancer s/p bowel resection\par - Has ventral abdominal hernias - S/P repair in 2017\par -history for SBO.\par \par She was admitted to [facility] with subjective fever and pain over a right sided abdominal wall ulcer.  The patient states that the right sided abdominal wall ulcer has been present for over a year. \par   \par She had a CT that showed some inflammatory change in  the abdominal wall but no evidence of abscess or fistula.\par s/p mssa and enterbacter, treated with augmentin/ levaquin and fluconazole for vaginal candidiasis\par    She was treated with appropriate abx intravenously for 5 days and sent home on augmentin.  During her hospitalization, the redness to the abdominal wall improved but the ulcerated lesion continued to drain. She was discharged on Augmentin previously\par \par  . \par \par \par

## 2020-09-28 NOTE — PHYSICAL EXAM
[Normal Breath Sounds] : Normal breath sounds [Skin Ulcer] : ulcer [Alert] : alert [Oriented to Person] : oriented to person [Oriented to Place] : oriented to place [Oriented to Time] : oriented to time [Calm] : calm [Please See PDF for [employer] Analytics] : Please See PDF for [employer]. [JVD] : no jugular venous distention  [de-identified] : nad [de-identified] : access# [id number] [FreeTextEntry1] : proximal [FreeTextEntry2] : 0 [FreeTextEntry3] : 0 [FreeTextEntry4] : 0 [de-identified] : prontosan/adaptic foam [de-identified] : 1x1 5/15/20 smaller\par order another binder\par prontosan\par \par shear  2.5x1.5x.1 at telehealth visit\par \par \par   [FreeTextEntry7] : MIDDLE ABDOMEN [FreeTextEntry8] : 0 [FreeTextEntry9] : 0 [de-identified] : 0 [de-identified] : closed\par \par  [de-identified] : LOWER ABDOMEN [de-identified] : 0 [de-identified] : 0 [de-identified] : 0 [de-identified] : cluster of 2 .3x.3 eachl\par \par ast jan 16 grafix\par 0.4/0.2/0.1 [TWNoteComboBox1] : Right [TWNoteComboBox9] : Right [de-identified] : Right

## 2020-09-28 NOTE — DATA REVIEWED
[FreeTextEntry1] : large ventral hernia no collections\par  Radha #[id number]\par \par 6.8 in august hga1c\par \par Citizen of the [place]  USED 8/30/19 I.D # [id number]

## 2020-09-28 NOTE — REASON FOR VISIT
[Follow-Up: _____] : a [unfilled] follow-up visit [Spouse] : spouse [Formal Caregiver] : formal caregiver [Family Member] : family member [[employer] ] : provided by [employer]   [FreeTextEntry1] : [id number] [TWNoteComboBox1] : [place]

## 2020-10-08 ENCOUNTER — APPOINTMENT (OUTPATIENT)
Dept: WOUND CARE | Facility: CLINIC | Age: 85
End: 2020-10-08
Payer: MEDICARE

## 2020-10-08 PROCEDURE — 99213 OFFICE O/P EST LOW 20 MIN: CPT

## 2020-10-08 RX ORDER — DOCUSATE SODIUM 100 MG/1
100 CAPSULE ORAL 3 TIMES DAILY
Qty: 90 | Refills: 2 | Status: ACTIVE | COMMUNITY
Start: 2018-09-06 | End: 1900-01-01

## 2020-10-27 ENCOUNTER — APPOINTMENT (OUTPATIENT)
Dept: WOUND CARE | Facility: CLINIC | Age: 85
End: 2020-10-27
Payer: MEDICARE

## 2020-10-27 VITALS
HEART RATE: 81 BPM | BODY MASS INDEX: 30.48 KG/M2 | WEIGHT: 172 LBS | HEIGHT: 63 IN | SYSTOLIC BLOOD PRESSURE: 125 MMHG | DIASTOLIC BLOOD PRESSURE: 69 MMHG | TEMPERATURE: 98.7 F

## 2020-10-27 PROCEDURE — 99213 OFFICE O/P EST LOW 20 MIN: CPT

## 2020-11-17 ENCOUNTER — APPOINTMENT (OUTPATIENT)
Dept: WOUND CARE | Facility: CLINIC | Age: 85
End: 2020-11-17
Payer: MEDICARE

## 2020-11-17 VITALS — BODY MASS INDEX: 30.48 KG/M2 | WEIGHT: 172 LBS | HEIGHT: 63 IN | TEMPERATURE: 97.6 F

## 2020-11-17 PROCEDURE — 99213 OFFICE O/P EST LOW 20 MIN: CPT

## 2020-11-25 NOTE — PHYSICAL EXAM
[Normal Breath Sounds] : Normal breath sounds [Skin Ulcer] : ulcer [Alert] : alert [Oriented to Person] : oriented to person [Oriented to Place] : oriented to place [Oriented to Time] : oriented to time [Calm] : calm [Please See PDF for [employer] Analytics] : Please See PDF for [employer]. [JVD] : no jugular venous distention  [de-identified] : nad [de-identified] : access# [id number] [FreeTextEntry1] : proximal [FreeTextEntry2] : 0 [FreeTextEntry3] : 0 [FreeTextEntry4] : 0 [de-identified] : prontosan/adaptic foam [de-identified] : 1x1 5/15/20 smaller\par order another binder\par prontosan\par \par shear  2.5x1.5x.1 at telehealth visit\par \par \par   [FreeTextEntry7] : MIDDLE ABDOMEN [FreeTextEntry8] : 0 [FreeTextEntry9] : 0 [de-identified] : 0 [de-identified] : closed\par \par  [de-identified] : LOWER ABDOMEN [de-identified] : 0 [de-identified] : 0 [de-identified] : 0 [de-identified] : cluster of 2 .3x.3 eachl\par \par ast jan 16 grafix\par 0.4/0.2/0.1 [TWNoteComboBox1] : Right [TWNoteComboBox9] : Right [de-identified] : Right

## 2020-11-25 NOTE — ASSESSMENT
[FreeTextEntry1] : 85 yr old  DM HTNwith abdominal wall ulcer-  reopened lower ulcer, friction \par adaptic/ prontosan foam,   use small foam , or abd prontosan,\par vna 3 x a week\par discussed diet\par will ordePlan for (). (or) Plan for re application of ().grafix or puraplyr oasis\par Wound has shown improvement through (increased granulation and or decreased size).\par Wound is free from infection drainage and necrotic tissue. \par  Patient is on comprehensive diabetic management program with a Hg A1c of () from (date).\par \par Wound has been treated with standards of care for () weeks including (compression, offloading, debridement).\par \par    binder- insurance wouldn’t cover, stopped eo2\par   had closed/ progress with prior to protosan \par  grafix 1/16/20 primatrix placed  10/22 , sep12- aug 30 aug 8\par  \par discussed telehealth visit for next visit with nurse-  \par  complexlow-lab, xr asha,test reviewed\par risk- low\par

## 2020-11-25 NOTE — DATA REVIEWED
[FreeTextEntry1] : large ventral hernia no collections\par  Radha #[id number]\par \par 6.8 in august hga1c\par \par [place]  USED 8/30/19 I.D # [id number]

## 2020-11-25 NOTE — PHYSICAL EXAM
[Normal Breath Sounds] : Normal breath sounds [Skin Ulcer] : ulcer [Alert] : alert [Oriented to Person] : oriented to person [Oriented to Place] : oriented to place [Oriented to Time] : oriented to time [Calm] : calm [Please See PDF for [employer] Analytics] : Please See PDF for [employer]. [JVD] : no jugular venous distention  [de-identified] : nad [de-identified] : deisy [de-identified] : ulcer/midline scar; recurrent hernia reducible [FreeTextEntry1] : abd wound

## 2020-11-25 NOTE — PLAN
[FreeTextEntry1] : 85 yr old  DM HTNwith abdominal wall ulcer-  reopened upper ulcer, \par adaptic/ prontosan foam,   use small foam , or abd prontosan,\par vna 3 x a week\par discussed diet\par \par  binder- insurance wouldn’t cover, stopped eo2\par  had closed/ progress with prior to protosan \par  grafix 1/16/20 primatrix placed  10/22 , sep12- aug 30 aug 8\par  \par \par 10/27/20\par \par Wound Assessment and Plan:\par \par The patient presents with a wound to the right abdomen.\par No clinical sign of infection\par Recommendation:\par \par Apply lidocaine or topical anesthetic if needed to reduce pain upon washing the wound.\par Wash wound with prontosan gel\par Apply génesis to wound bed, cavilon to periwound, adaptic\par Apply 4x4 gauze\par Change dressing  3 times per week.\par Encouraged ambulation or exercise.\par Optimization of nutrition.\par \par Homecare orders in place\par \par f/u in 3-4 weeks

## 2020-11-25 NOTE — REASON FOR VISIT
[Follow-Up: _____] : a [unfilled] follow-up visit [[employer] ] : provided by [employer]   [FreeTextEntry1] : [id number] [FreeTextEntry2] : Shruthi [TWNoteComboBox1] : Wallisian

## 2020-11-25 NOTE — REASON FOR VISIT
[Follow-Up: _____] : a [unfilled] follow-up visit [[employer] ] : provided by [employer]   [FreeTextEntry1] : [id number] [FreeTextEntry2] : Shruthi [TWNoteComboBox1] : Burmese

## 2020-11-25 NOTE — REASON FOR VISIT
[Follow-Up: _____] : a [unfilled] follow-up visit [[employer] ] : provided by [employer]   [FreeTextEntry1] : [id number] [FreeTextEntry2] : Shruthi [TWNoteComboBox1] : Filipino

## 2020-11-25 NOTE — HISTORY OF PRESENT ILLNESS
[Spouse] : spouse [FreeTextEntry1] : 86 yo woman with PHMx of DM and CAD has right lower abdominal wound, using adaptic and foam\par was closed now reopened , had prontosan, andaptic and foam  , had some bleeding\par reopened upper ulcer- better this week\par adaptic/ prontosan foam\par needs new binder\par \par  had closed/ progress with prior to protosan \par  grafix 1/16/20 primatrix placed  10/22 , sep12- aug 30 aug 8\par  \par no fever- glucose ok, needs new binder\par prior used nystatin swish and swallow, doing better\par  \par l ast skin substitute in January\par using , no fevers has vna 3 times a week\par \par  had puraply april 9 may 7, may 30  jose m 13 and puraply july 19, sept 12 no fevers\par  here for reapplication of skin sub-  primatrix is here\par \par had july 2  surgery  visit , because of long surgery  with risk, she is afraid to do it\par \par still with drainage, no cellulitis, foam placed caused irritation (big square)\par  used silver nitrate 3/12, 3/19, 3/26;  had grafix  dec 11/ jan8 , jan22 and feb 26 with minimal improvement,\par  Has been using aquacel/ foam,  \par  speaks only Polish/   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 Pt had been getting treated by an ID doctor , had initial abdominal surgery 1997, then 2017, has appt. in April with a surgeon from [facility] \par  She has not had any fever.  She is frustrated that the wound is not healing. She is receiving wound care at home every other day.   duoderm to periwound/ wears a hernia abd binder to reduce it\par  hernia (2 areas ) is large spreading skin open, is partly reducible\par \par Of Note, she:\par - Had colorectal cancer s/p bowel resection\par - Has ventral abdominal hernias - S/P repair in 2017\par -history for SBO.\par \par She was admitted to [facility] with subjective fever and pain over a right sided abdominal wall ulcer.  The patient states that the right sided abdominal wall ulcer has been present for over a year. \par   \par She had a CT that showed some inflammatory change in  the abdominal wall but no evidence of abscess or fistula.\par s/p mssa and enterbacter, treated with augmentin/ levaquin and fluconazole for vaginal candidiasis\par    She was treated with appropriate abx intravenously for 5 days and sent home on augmentin.  During her hospitalization, the redness to the abdominal wall improved but the ulcerated lesion continued to drain. She was discharged on Augmentin previously\par \par  . \par \par \par

## 2020-11-25 NOTE — ASSESSMENT
[FreeTextEntry1] : 85 yr old  DM HTNwith abdominal wall ulcer-  reopened upper ulcer, \par adaptic/ prontosan foam,   use small foam , or abd prontosan,\par vna 3 x a week\par discussed diet\par \par    binder-  stopped eo2  had closed/ progress with prior to protosan \par  grafix 1/16/20 primatrix placed  10/22 , sep12- aug 30 aug 8\par  \par discussed telehealth visit for next visit with nurse- patient consents 1 month\par  complexlow-lab, xr asha,test reviewed\par risk- low\par

## 2020-11-25 NOTE — HISTORY OF PRESENT ILLNESS
[FreeTextEntry1] : 84 yo woman with PHMx of DM and CAD has right lower abdominal wound,\par  using adaptic and foam, no fever\par was closed now reopened , had prontosan, andaptic and foam  , had some bleeding\par reopened upper ulcer- better this week\par adaptic/ prontosan foam\par needs new binder\par \par  had closed/ progress with prior to protosan \par  grafix 1/16/20 primatrix placed  10/22 , sep12- aug 30 aug 8\par  \par no fever- glucose ok, needs new binder\par prior used nystatin swish and swallow, doing better\par  \par l ast skin substitute in January\par using , no fevers has vna 3 times a week\par \par  had puraply april 9 may 7, may 30  jose m 13 and puraply july 19, sept 12 no fevers\par  here for reapplication of skin sub-  primatrix is here\par \par had july 2  surgery  visit , because of long surgery  with risk, she is afraid to do it\par \par still with drainage, no cellulitis, foam placed caused irritation (big square)\par  used silver nitrate 3/12, 3/19, 3/26;  had grafix  dec 11/ jan8 , jan22 and feb 26 with minimal improvement,\par  Has been using aquacel/ foam,  \par  speaks only Bermudian/   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 Pt had been getting treated by an ID doctor , had initial abdominal surgery 1997, then 2017, has appt. in April with a surgeon from [facility] \par  She has not had any fever.  She is frustrated that the wound is not healing. She is receiving wound care at home every other day.   duoderm to periwound/ wears a hernia abd binder to reduce it\par  hernia (2 areas ) is large spreading skin open, is partly reducible\par \par Of Note, she:\par - Had colorectal cancer s/p bowel resection\par - Has ventral abdominal hernias - S/P repair in 2017\par -history for SBO.\par \par She was admitted to [facility] with subjective fever and pain over a right sided abdominal wall ulcer.  The patient states that the right sided abdominal wall ulcer has been present for over a year. \par   \par She had a CT that showed some inflammatory change in  the abdominal wall but no evidence of abscess or fistula.\par s/p mssa and enterbacter, treated with augmentin/ levaquin and fluconazole for vaginal candidiasis\par    She was treated with appropriate abx intravenously for 5 days and sent home on augmentin.  During her hospitalization, the redness to the abdominal wall improved but the ulcerated lesion continued to drain. She was discharged on Augmentin previously\par \par  . \par \par \par

## 2020-11-25 NOTE — PLAN
[FreeTextEntry1] :  \par Plan - c/w prontosan gel, adaptic, 4x4  abd dressing, \par  betadine\par \par  orders for nurse given.

## 2020-12-08 ENCOUNTER — APPOINTMENT (OUTPATIENT)
Dept: WOUND CARE | Facility: CLINIC | Age: 85
End: 2020-12-08
Payer: MEDICARE

## 2020-12-08 VITALS
HEIGHT: 63 IN | SYSTOLIC BLOOD PRESSURE: 145 MMHG | DIASTOLIC BLOOD PRESSURE: 80 MMHG | TEMPERATURE: 94.7 F | HEART RATE: 75 BPM | BODY MASS INDEX: 30.48 KG/M2 | WEIGHT: 172 LBS

## 2020-12-08 PROCEDURE — 15271 SKIN SUB GRAFT TRNK/ARM/LEG: CPT

## 2020-12-09 NOTE — PLAN
[FreeTextEntry1] : 85 yr old  DM HTNwith abdominal wall ulcer-  reopened upper ulcer,  oasis placed today 12/8/2020\par adaptic/ prontosan foam,   use small foam , or abd prontosan,\par vna 3 x a week\par discussed diet\par \par  binder- , stopped eo2\par  had closed/ progress with prior to protosan \par  grafix 1/16/20 primatrix placed  10/22 , sep12- aug 30 aug 8\par  \par No clinical sign of infection\par Recommendation:\par \par  may change foam on top of veil\par Wash wound with saline only this week\par  \par Change dressing  3 times per week.\par Encouraged ambulation or exercise.\par Optimization of nutrition.\par \par Homecare orders in place\par \par f/u in 3-4 weeks

## 2020-12-09 NOTE — PHYSICAL EXAM
[Normal Breath Sounds] : Normal breath sounds [Skin Ulcer] : ulcer [Alert] : alert [Oriented to Person] : oriented to person [Oriented to Place] : oriented to place [Oriented to Time] : oriented to time [Calm] : calm [Please See PDF for [employer] Analytics] : Please See PDF for [employer]. [JVD] : no jugular venous distention  [de-identified] : nad [de-identified] : deisy [de-identified] : ulcer/midline scar; recurrent hernia reducible [FreeTextEntry1] : abd wound

## 2020-12-09 NOTE — REASON FOR VISIT
[Follow-Up: _____] : a [unfilled] follow-up visit [[employer] ] : provided by [employer]   [FreeTextEntry1] : [id number] [FreeTextEntry2] : Shruthi [TWNoteComboBox1] : Kazakh

## 2020-12-09 NOTE — HISTORY OF PRESENT ILLNESS
[FreeTextEntry1] : 86 yo woman with PHMx of DM and CAD has right lower abdominal wound,\par  using adaptic and foam, no fever\par hernia still reducible, here to have oasis for wound- \par \par was closed now reopened , had prontosan, andaptic and foam  , had some bleeding\par reopened upper ulcer- better this week\par adaptic/ prontosan foam\par needs new binder\par \par  had closed/ progress with prior to protosan \par  grafix 1/16/20 primatrix placed  10/22 , sep12- aug 30 aug 8\par  \par no fever- glucose ok, needs new binder\par prior used nystatin swish and swallow, doing better\par  \par l ast skin substitute in January\par using , no fevers has vna 3 times a week\par \par  had puraply april 9 may 7, may 30  jose m 13 and puraply july 19, sept 12 no fevers\par  here for reapplication of skin sub-  primatrix is here\par \par had july 2  surgery  visit , because of long surgery  with risk, she is afraid to do it\par \par still with drainage, no cellulitis, foam placed caused irritation (big square)\par  used silver nitrate 3/12, 3/19, 3/26;  had grafix  dec 11/ jan8 , jan22 and feb 26 with minimal improvement,\par  Has been using aquacel/ foam,  \par  speaks only Libyan/   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 Pt had been getting treated by an ID doctor , had initial abdominal surgery 1997, then 2017, has appt. in April with a surgeon from [facility] \par  She has not had any fever.  She is frustrated that the wound is not healing. She is receiving wound care at home every other day.   duoderm to periwound/ wears a hernia abd binder to reduce it\par  hernia (2 areas ) is large spreading skin open, is partly reducible\par \par Of Note, she:\par - Had colorectal cancer s/p bowel resection\par - Has ventral abdominal hernias - S/P repair in 2017\par -history for SBO.\par \par She was admitted to [facility] with subjective fever and pain over a right sided abdominal wall ulcer.  The patient states that the right sided abdominal wall ulcer has been present for over a year. \par   \par She had a CT that showed some inflammatory change in  the abdominal wall but no evidence of abscess or fistula.\par s/p mssa and enterbacter, treated with augmentin/ levaquin and fluconazole for vaginal candidiasis\par    She was treated with appropriate abx intravenously for 5 days and sent home on augmentin.  During her hospitalization, the redness to the abdominal wall improved but the ulcerated lesion continued to drain. She was discharged on Augmentin previously\par \par  . \par \par \par

## 2020-12-09 NOTE — ASSESSMENT
[FreeTextEntry1] : 85 yr old  DM HTNwith abdominal wall ulcer-  reopened lower ulcer, friction \par oasis placed today\par \par  \par vna 3 x a week\par discussed diet\par  \par Wound has shown improvement through (increased granulation and or decreased size).\par Wound is free from infection drainage and necrotic tissue. \par  Patient is on comprehensive diabetic management program with a Hg A1c of () from (date).\par \par Wound has been treated with standards of care for () weeks including (compression, offloading, debridement).\par skin substitute number oasis) applied today to patient's (abdominal wall anatomical site). wound bed debrided of bioburded and prepped for product application. (# 1of pieces and original size of product 3x3.5) obtained. Total product usage was (100%x sq. cm), Total waste (0- sq. cm) due to wound size. Product secured with (steri strips and bolster dressing).\par Patient to f/u in 1 week.\par    binder-    had closed/ progress with prior to protosan \par had responed to  grafix 1/16/20 primatrix placed  10/22 , sep12- aug 30 aug 8\par  discussed telehealth visit for next visit with nurse-  \par  complexlow-lab, xr asha,test reviewed\par risk- low\par

## 2020-12-31 ENCOUNTER — APPOINTMENT (OUTPATIENT)
Dept: WOUND CARE | Facility: CLINIC | Age: 85
End: 2020-12-31
Payer: MEDICARE

## 2020-12-31 PROCEDURE — 99213 OFFICE O/P EST LOW 20 MIN: CPT

## 2020-12-31 NOTE — PHYSICAL EXAM
[JVD] : no jugular venous distention  [Normal Breath Sounds] : Normal breath sounds [Skin Ulcer] : ulcer [Alert] : alert [Oriented to Person] : oriented to person [Oriented to Place] : oriented to place [Oriented to Time] : oriented to time [Calm] : calm [de-identified] : nad [de-identified] : deisy [de-identified] : ulcer/midline scar; recurrent hernia reducible [Please See PDF for [employer]] : Please See PDF for [employer]. [FreeTextEntry1] : abd wound

## 2020-12-31 NOTE — REASON FOR VISIT
[Follow-Up: _____] : a [unfilled] follow-up visit [[employer] ] : provided by [employer]   [FreeTextEntry1] : [id number] [FreeTextEntry2] : Shruthi [TWNoteComboBox1] : Congolese

## 2020-12-31 NOTE — HISTORY OF PRESENT ILLNESS
[FreeTextEntry1] : 86 yo woman with PHMx of DM and CAD has right lower abdominal wound, had oasis last visit\par no fevers\par  using adaptic and foam, no fever\par hernia still reducible, here to have oasis for wound- \par \par was closed now reopened , had prontosan, andaptic and foam  , had some bleeding\par reopened upper ulcer- better this week\par adaptic/ prontosan foam\par needs new binder\par \par  had closed/ progress with prior to protosan \par  grafix 1/16/20 primatrix placed  10/22 , sep12- aug 30 aug 8\par  \par no fever- glucose ok, needs new binder\par prior used nystatin swish and swallow, doing better\par  \par l ast skin substitute in January\par using , no fevers has vna 3 times a week\par \par  had puraply april 9 may 7, may 30  jose m 13 and puraply july 19, sept 12 no fevers\par  here for reapplication of skin sub-  primatrix is here\par \par had july 2  surgery  visit , because of long surgery  with risk, she is afraid to do it\par \par still with drainage, no cellulitis, foam placed caused irritation (big square)\par  used silver nitrate 3/12, 3/19, 3/26;  had grafix  dec 11/ jan8 , jan22 and feb 26 with minimal improvement,\par  Has been using aquacel/ foam,  \par  speaks only Finnish/   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 Pt had been getting treated by an ID doctor , had initial abdominal surgery 1997, then 2017, has appt. in April with a surgeon from [facility] \par  She has not had any fever.  She is frustrated that the wound is not healing. She is receiving wound care at home every other day.   duoderm to periwound/ wears a hernia abd binder to reduce it\par  hernia (2 areas ) is large spreading skin open, is partly reducible\par \par Of Note, she:\par - Had colorectal cancer s/p bowel resection\par - Has ventral abdominal hernias - S/P repair in 2017\par -history for SBO.\par \par She was admitted to [facility] with subjective fever and pain over a right sided abdominal wall ulcer.  The patient states that the right sided abdominal wall ulcer has been present for over a year. \par   \par She had a CT that showed some inflammatory change in  the abdominal wall but no evidence of abscess or fistula.\par s/p mssa and enterbacter, treated with augmentin/ levaquin and fluconazole for vaginal candidiasis\par    She was treated with appropriate abx intravenously for 5 days and sent home on augmentin.  During her hospitalization, the redness to the abdominal wall improved but the ulcerated lesion continued to drain. She was discharged on Augmentin previously\par \par  . \par \par \par

## 2021-01-09 ENCOUNTER — INPATIENT (INPATIENT)
Facility: HOSPITAL | Age: 86
LOS: 5 days | Discharge: HOME HEALTH | End: 2021-01-15
Attending: INTERNAL MEDICINE | Admitting: INTERNAL MEDICINE
Payer: MEDICARE

## 2021-01-09 VITALS
OXYGEN SATURATION: 97 % | HEIGHT: 61 IN | RESPIRATION RATE: 18 BRPM | TEMPERATURE: 97 F | HEART RATE: 76 BPM | DIASTOLIC BLOOD PRESSURE: 70 MMHG | SYSTOLIC BLOOD PRESSURE: 150 MMHG

## 2021-01-09 DIAGNOSIS — Z95.5 PRESENCE OF CORONARY ANGIOPLASTY IMPLANT AND GRAFT: Chronic | ICD-10-CM

## 2021-01-09 DIAGNOSIS — Z92.89 PERSONAL HISTORY OF OTHER MEDICAL TREATMENT: Chronic | ICD-10-CM

## 2021-01-09 DIAGNOSIS — Z98.890 OTHER SPECIFIED POSTPROCEDURAL STATES: Chronic | ICD-10-CM

## 2021-01-09 LAB
ALBUMIN SERPL ELPH-MCNC: 3.9 G/DL — SIGNIFICANT CHANGE UP (ref 3.3–5)
ALP SERPL-CCNC: 77 U/L — SIGNIFICANT CHANGE UP (ref 40–120)
ALT FLD-CCNC: 16 U/L — SIGNIFICANT CHANGE UP (ref 4–33)
ANION GAP SERPL CALC-SCNC: 16 MMOL/L — HIGH (ref 7–14)
APTT BLD: 34.2 SEC — SIGNIFICANT CHANGE UP (ref 27–36.3)
AST SERPL-CCNC: 16 U/L — SIGNIFICANT CHANGE UP (ref 4–32)
BASOPHILS # BLD AUTO: 0.06 K/UL — SIGNIFICANT CHANGE UP (ref 0–0.2)
BASOPHILS NFR BLD AUTO: 0.9 % — SIGNIFICANT CHANGE UP (ref 0–2)
BILIRUB SERPL-MCNC: <0.2 MG/DL — SIGNIFICANT CHANGE UP (ref 0.2–1.2)
BUN SERPL-MCNC: 14 MG/DL — SIGNIFICANT CHANGE UP (ref 7–23)
CALCIUM SERPL-MCNC: 10.4 MG/DL — SIGNIFICANT CHANGE UP (ref 8.4–10.5)
CHLORIDE SERPL-SCNC: 97 MMOL/L — LOW (ref 98–107)
CO2 SERPL-SCNC: 21 MMOL/L — LOW (ref 22–31)
CREAT SERPL-MCNC: 0.72 MG/DL — SIGNIFICANT CHANGE UP (ref 0.5–1.3)
EOSINOPHIL # BLD AUTO: 0.2 K/UL — SIGNIFICANT CHANGE UP (ref 0–0.5)
EOSINOPHIL NFR BLD AUTO: 3 % — SIGNIFICANT CHANGE UP (ref 0–6)
GLUCOSE SERPL-MCNC: 181 MG/DL — HIGH (ref 70–99)
HCT VFR BLD CALC: 33.6 % — LOW (ref 34.5–45)
HGB BLD-MCNC: 10.9 G/DL — LOW (ref 11.5–15.5)
IANC: 3.76 K/UL — SIGNIFICANT CHANGE UP (ref 1.5–8.5)
IMM GRANULOCYTES NFR BLD AUTO: 0.3 % — SIGNIFICANT CHANGE UP (ref 0–1.5)
INR BLD: 1.17 RATIO — HIGH (ref 0.88–1.16)
LYMPHOCYTES # BLD AUTO: 2.13 K/UL — SIGNIFICANT CHANGE UP (ref 1–3.3)
LYMPHOCYTES # BLD AUTO: 31.5 % — SIGNIFICANT CHANGE UP (ref 13–44)
MCHC RBC-ENTMCNC: 29.4 PG — SIGNIFICANT CHANGE UP (ref 27–34)
MCHC RBC-ENTMCNC: 32.4 GM/DL — SIGNIFICANT CHANGE UP (ref 32–36)
MCV RBC AUTO: 90.6 FL — SIGNIFICANT CHANGE UP (ref 80–100)
MONOCYTES # BLD AUTO: 0.6 K/UL — SIGNIFICANT CHANGE UP (ref 0–0.9)
MONOCYTES NFR BLD AUTO: 8.9 % — SIGNIFICANT CHANGE UP (ref 2–14)
NEUTROPHILS # BLD AUTO: 3.76 K/UL — SIGNIFICANT CHANGE UP (ref 1.8–7.4)
NEUTROPHILS NFR BLD AUTO: 55.4 % — SIGNIFICANT CHANGE UP (ref 43–77)
NRBC # BLD: 0 /100 WBCS — SIGNIFICANT CHANGE UP
NRBC # FLD: 0 K/UL — SIGNIFICANT CHANGE UP
PLATELET # BLD AUTO: 205 K/UL — SIGNIFICANT CHANGE UP (ref 150–400)
POTASSIUM SERPL-MCNC: 5 MMOL/L — SIGNIFICANT CHANGE UP (ref 3.5–5.3)
POTASSIUM SERPL-SCNC: 5 MMOL/L — SIGNIFICANT CHANGE UP (ref 3.5–5.3)
PROT SERPL-MCNC: 6.8 G/DL — SIGNIFICANT CHANGE UP (ref 6–8.3)
PROTHROM AB SERPL-ACNC: 13.2 SEC — SIGNIFICANT CHANGE UP (ref 10.6–13.6)
RBC # BLD: 3.71 M/UL — LOW (ref 3.8–5.2)
RBC # FLD: 12.4 % — SIGNIFICANT CHANGE UP (ref 10.3–14.5)
SODIUM SERPL-SCNC: 134 MMOL/L — LOW (ref 135–145)
TROPONIN T, HIGH SENSITIVITY RESULT: 21 NG/L — SIGNIFICANT CHANGE UP
WBC # BLD: 6.77 K/UL — SIGNIFICANT CHANGE UP (ref 3.8–10.5)
WBC # FLD AUTO: 6.77 K/UL — SIGNIFICANT CHANGE UP (ref 3.8–10.5)

## 2021-01-09 PROCEDURE — 71045 X-RAY EXAM CHEST 1 VIEW: CPT | Mod: 26

## 2021-01-09 PROCEDURE — 99285 EMERGENCY DEPT VISIT HI MDM: CPT

## 2021-01-09 RX ORDER — MORPHINE SULFATE 50 MG/1
4 CAPSULE, EXTENDED RELEASE ORAL ONCE
Refills: 0 | Status: DISCONTINUED | OUTPATIENT
Start: 2021-01-09 | End: 2021-01-09

## 2021-01-09 RX ADMIN — MORPHINE SULFATE 4 MILLIGRAM(S): 50 CAPSULE, EXTENDED RELEASE ORAL at 23:55

## 2021-01-09 NOTE — ED PROVIDER NOTE - CARE PLAN
Principal Discharge DX:	Chest pain, unspecified type  Secondary Diagnosis:	CAD (coronary artery disease)

## 2021-01-09 NOTE — ED ADULT NURSE NOTE - OBJECTIVE STATEMENT
ID# [id number]. Pt arriving to room 2 A&Ox4 ambulatory at baseline c/o worsening CP radiating to left abdomen and left shoulder blade x 3 days. PMHx HTN, DM, CAD. Pt German speaking. Pt endorsing SOB. RR even and unlabored, oxygen saturation 100% on RA. Large hernia noted to RLQ, dressing dry and intact. NSR on cardiac monitor. Pt denies N/V/diarrhea, fevers. IV established with 22G in left forearm. Labs drawn and sent. VS as charted. MD at bedside, will continue to monitor.

## 2021-01-09 NOTE — ED ADULT NURSE NOTE - CHIEF COMPLAINT QUOTE
Pt arrives to EMS from home reporting 2 days of intermittent CP.  Pain originates in her left shoulder and radiates to the left side chest described as "sharp." Hx of 2 stents, CAD, HTN, DM and high cholesterol.  Pt received 4 baby ASA from EMS.  Pt has a hernia present.  Please call pt daughter Mahogany ([phone] with updates.  Pt takes Eliquis 3x a day 5mg.

## 2021-01-09 NOTE — ED PROVIDER NOTE - CLINICAL SUMMARY MEDICAL DECISION MAKING FREE TEXT BOX
celena: pt with DM htn and CAD, 2 stents who has had chest pain, left sided.  pt speaks Cook Islander.  daughter gave her the daughter's eliquis last night, this morning and this evening "in case she was having a heart attach"  pt breathing comfortably, minimal extremity edema.  ecg looks the same as 2018.  pt very high risk will likely require admission celena: pt with DM htn and CAD, 2 stents who (as per daughter)  has had chest pain at home for a day, left sided.  pt speaks Moroccan.  daughter gave her the daughter's eliquis last night, this morning and this evening "in case she was having a heart attach"  pt breathing comfortably, minimal extremity edema.  ecg looks the same as 2018.  pt very high risk will likely require admission    As per patient she has left sided flank pain with back tenderness as well. she denies chest pain now.  mild left sided tenderness.  large hheria with binder signing out midnight awaiting ct results. to Dr. Nixon

## 2021-01-09 NOTE — ED PROVIDER NOTE - NS ED ROS FT
ROS:  GENERAL: No fever, no chills  EYES: no change in vision  HEENT: no trouble swallowing, no trouble speaking  CARDIAC: no chest pain  PULMONARY: no cough, no shortness of breath  GI: no abdominal pain, no nausea, no vomiting, no diarrhea, no constipation  : +L flank pain. No dysuria, no frequency, no change in appearance, or odor of urine  SKIN: no rashes  NEURO: no headache, no weakness  MSK: No joint pain    Facundo Allison PGY3

## 2021-01-09 NOTE — ED PROVIDER NOTE - ATTENDING CONTRIBUTION TO CARE
celena: pt with DM htn and CAD, 2 stents who has had chest pain, left sided.  pt speaks Namibian.  daughter gave her the daughter's eliquis last night, this morning and this evening "in case she was having a heart attach"  pt breathing comfortably, minimal extremity edema.  ecg looks the same as 2018.  pt very high risk will likely require admission    I performed a history and physical exam of the patient and discussed their management with the resident and /or advanced care provider. I reviewed the resident and /or ACP's note and agree with the documented findings and plan of care. My medical decison making and observations are found above. celena: pt with DM htn and CAD, 2 stents who (as per daughter)  has had chest pain at home for a day, left sided.  pt speaks Mozambican.  daughter gave her the daughter's eliquis last night, this morning and this evening "in case she was having a heart attach"  pt breathing comfortably, minimal extremity edema.  ecg looks the same as 2018.  pt very high risk will likely require admission    As per patient she has left sided flank pain with back tenderness as well. she denies chest pain now.  mild left sided tenderness.  large hheria with binder signing out midnight awaiting ct results. to Dr. Nixon    I performed a history and physical exam of the patient and discussed their management with the resident and /or advanced care provider. I reviewed the resident and /or ACP's note and agree with the documented findings and plan of care. My medical decison making and observations are found above.

## 2021-01-09 NOTE — ED ADULT NURSE NOTE - NSIMPLEMENTINTERV_GEN_ALL_ED
Implemented All Universal Safety Interventions:  [place] to call system. Call bell, personal items and telephone within reach. Instruct patient to call for assistance. Room bathroom lighting operational. Non-slip footwear when patient is off stretcher. Physically safe environment: no spills, clutter or unnecessary equipment. Stretcher in lowest position, wheels locked, appropriate side rails in place.

## 2021-01-09 NOTE — ED ADULT TRIAGE NOTE - CHIEF COMPLAINT QUOTE
Pt arrives to EMS from home reporting 2 days of intermittent CP.  Pain originates in her left shoulder and radiates to the left side chest described as "sharp." Hx of 2 stents, CAD, HTN, DM and high cholesterol.  Pt received 4 baby ASA from EMS.  Pt has a hernia present. Pt arrives to EMS from home reporting 2 days of intermittent CP.  Pain originates in her left shoulder and radiates to the left side chest described as "sharp." Hx of 2 stents, CAD, HTN, DM and high cholesterol.  Pt received 4 baby ASA from EMS.  Pt has a hernia present.  Please call pt daughter Mahogany ([phone] with updates.  Pt takes Eliquis 3x a day 5mg.

## 2021-01-09 NOTE — ED PROVIDER NOTE - OBJECTIVE STATEMENT
85F with PMH of HTN, HLD, DM, CAD s/p 2 stents, abdominal hernia p/w left flank pain over the past 3 days. Worse on deep breathing. Denies any chest pain (despite triage note), SOB, fever, cough, abd pain, N/V/D, urinary symptoms. Denies any leg pain/swelling, hemoptysis, history of DVT/PE, malignancy, hormone use, recent surgery, immobilization, or trauma.

## 2021-01-09 NOTE — ED PROVIDER NOTE - PROGRESS NOTE DETAILS
Daughter reportedly stating the pt has been having chest pain, while the pt describes it more as back pain. Attempted to reach the daughter for clarification but have not been able to get through to her with the number listed.

## 2021-01-09 NOTE — ED PROVIDER NOTE - PHYSICAL EXAMINATION
Gen: AAOx3, non-toxic  Head: NCAT  HEENT: EOMI, oral mucosa moist, normal conjunctiva  Lung: CTAB, no respiratory distress, no wheezes/rhonchi/rales B/L, speaking in full sentences  CV: RRR, no murmurs, rubs or gallops  Abd: soft, NTND, no CVA TTP   MSK: +TTP over left paraspinal muscles.  no visible deformities  Neuro: No focal sensory or motor deficits  Skin: Warm, well perfused, no rash, no LE edema or calf TTP   Psych: normal affect.     Facundo Allison PGY3

## 2021-01-10 DIAGNOSIS — E11.9 TYPE 2 DIABETES MELLITUS WITHOUT COMPLICATIONS: ICD-10-CM

## 2021-01-10 DIAGNOSIS — R07.9 CHEST PAIN, UNSPECIFIED: ICD-10-CM

## 2021-01-10 DIAGNOSIS — T81.30XA DISRUPTION OF WOUND, UNSPECIFIED, INITIAL ENCOUNTER: ICD-10-CM

## 2021-01-10 DIAGNOSIS — Z79.899 OTHER LONG TERM (CURRENT) DRUG THERAPY: ICD-10-CM

## 2021-01-10 DIAGNOSIS — I10 ESSENTIAL (PRIMARY) HYPERTENSION: ICD-10-CM

## 2021-01-10 DIAGNOSIS — Z29.9 ENCOUNTER FOR PROPHYLACTIC MEASURES, UNSPECIFIED: ICD-10-CM

## 2021-01-10 LAB
A1C WITH ESTIMATED AVERAGE GLUCOSE RESULT: 6.7 % — HIGH (ref 4–5.6)
ANION GAP SERPL CALC-SCNC: 15 MMOL/L — HIGH (ref 7–14)
APPEARANCE UR: CLEAR — SIGNIFICANT CHANGE UP
BILIRUB UR-MCNC: NEGATIVE — SIGNIFICANT CHANGE UP
BUN SERPL-MCNC: 12 MG/DL — SIGNIFICANT CHANGE UP (ref 7–23)
CALCIUM SERPL-MCNC: 10.2 MG/DL — SIGNIFICANT CHANGE UP (ref 8.4–10.5)
CHLORIDE SERPL-SCNC: 97 MMOL/L — LOW (ref 98–107)
CO2 SERPL-SCNC: 21 MMOL/L — LOW (ref 22–31)
COLOR SPEC: SIGNIFICANT CHANGE UP
CREAT SERPL-MCNC: 0.66 MG/DL — SIGNIFICANT CHANGE UP (ref 0.5–1.3)
DIFF PNL FLD: NEGATIVE — SIGNIFICANT CHANGE UP
ESTIMATED AVERAGE GLUCOSE: 146 MG/DL — HIGH (ref 68–114)
GLUCOSE SERPL-MCNC: 233 MG/DL — HIGH (ref 70–99)
GLUCOSE UR QL: NEGATIVE — SIGNIFICANT CHANGE UP
HCT VFR BLD CALC: 31.6 % — LOW (ref 34.5–45)
HGB BLD-MCNC: 10.4 G/DL — LOW (ref 11.5–15.5)
KETONES UR-MCNC: NEGATIVE — SIGNIFICANT CHANGE UP
LEUKOCYTE ESTERASE UR-ACNC: NEGATIVE — SIGNIFICANT CHANGE UP
MAGNESIUM SERPL-MCNC: 1.3 MG/DL — LOW (ref 1.6–2.6)
MCHC RBC-ENTMCNC: 29.1 PG — SIGNIFICANT CHANGE UP (ref 27–34)
MCHC RBC-ENTMCNC: 32.9 GM/DL — SIGNIFICANT CHANGE UP (ref 32–36)
MCV RBC AUTO: 88.5 FL — SIGNIFICANT CHANGE UP (ref 80–100)
NITRITE UR-MCNC: NEGATIVE — SIGNIFICANT CHANGE UP
NRBC # BLD: 0 /100 WBCS — SIGNIFICANT CHANGE UP
NRBC # FLD: 0 K/UL — SIGNIFICANT CHANGE UP
PH UR: 6 — SIGNIFICANT CHANGE UP (ref 5–8)
PHOSPHATE SERPL-MCNC: 3.3 MG/DL — SIGNIFICANT CHANGE UP (ref 2.5–4.5)
PLATELET # BLD AUTO: 190 K/UL — SIGNIFICANT CHANGE UP (ref 150–400)
POTASSIUM SERPL-MCNC: 4.7 MMOL/L — SIGNIFICANT CHANGE UP (ref 3.5–5.3)
POTASSIUM SERPL-SCNC: 4.7 MMOL/L — SIGNIFICANT CHANGE UP (ref 3.5–5.3)
PROT UR-MCNC: NEGATIVE — SIGNIFICANT CHANGE UP
RBC # BLD: 3.57 M/UL — LOW (ref 3.8–5.2)
RBC # FLD: 12.5 % — SIGNIFICANT CHANGE UP (ref 10.3–14.5)
SARS-COV-2 RNA SPEC QL NAA+PROBE: SIGNIFICANT CHANGE UP
SODIUM SERPL-SCNC: 133 MMOL/L — LOW (ref 135–145)
SP GR SPEC: 1.02 — SIGNIFICANT CHANGE UP (ref 1.01–1.02)
TROPONIN T, HIGH SENSITIVITY RESULT: 19 NG/L — SIGNIFICANT CHANGE UP
UROBILINOGEN FLD QL: SIGNIFICANT CHANGE UP
WBC # BLD: 6.83 K/UL — SIGNIFICANT CHANGE UP (ref 3.8–10.5)
WBC # FLD AUTO: 6.83 K/UL — SIGNIFICANT CHANGE UP (ref 3.8–10.5)

## 2021-01-10 PROCEDURE — 71275 CT ANGIOGRAPHY CHEST: CPT | Mod: 26

## 2021-01-10 PROCEDURE — 99223 1ST HOSP IP/OBS HIGH 75: CPT

## 2021-01-10 PROCEDURE — 74177 CT ABD & PELVIS W/CONTRAST: CPT | Mod: 26

## 2021-01-10 RX ORDER — PANTOPRAZOLE SODIUM 20 MG/1
40 TABLET, DELAYED RELEASE ORAL
Refills: 0 | Status: DISCONTINUED | OUTPATIENT
Start: 2021-01-10 | End: 2021-01-15

## 2021-01-10 RX ORDER — SODIUM CHLORIDE 9 MG/ML
1000 INJECTION, SOLUTION INTRAVENOUS
Refills: 0 | Status: DISCONTINUED | OUTPATIENT
Start: 2021-01-10 | End: 2021-01-15

## 2021-01-10 RX ORDER — ASPIRIN/CALCIUM CARB/MAGNESIUM 324 MG
81 TABLET ORAL DAILY
Refills: 0 | Status: DISCONTINUED | OUTPATIENT
Start: 2021-01-10 | End: 2021-01-15

## 2021-01-10 RX ORDER — INSULIN LISPRO 100/ML
VIAL (ML) SUBCUTANEOUS AT BEDTIME
Refills: 0 | Status: DISCONTINUED | OUTPATIENT
Start: 2021-01-10 | End: 2021-01-15

## 2021-01-10 RX ORDER — MAGNESIUM SULFATE 500 MG/ML
2 VIAL (ML) INJECTION ONCE
Refills: 0 | Status: COMPLETED | OUTPATIENT
Start: 2021-01-10 | End: 2021-01-10

## 2021-01-10 RX ORDER — POLYETHYLENE GLYCOL 3350 17 G/17G
17 POWDER, FOR SOLUTION ORAL ONCE
Refills: 0 | Status: COMPLETED | OUTPATIENT
Start: 2021-01-10 | End: 2021-01-11

## 2021-01-10 RX ORDER — ISOSORBIDE MONONITRATE 60 MG/1
30 TABLET, EXTENDED RELEASE ORAL DAILY
Refills: 0 | Status: DISCONTINUED | OUTPATIENT
Start: 2021-01-10 | End: 2021-01-12

## 2021-01-10 RX ORDER — ENOXAPARIN SODIUM 100 MG/ML
40 INJECTION SUBCUTANEOUS DAILY
Refills: 0 | Status: DISCONTINUED | OUTPATIENT
Start: 2021-01-10 | End: 2021-01-15

## 2021-01-10 RX ORDER — INSULIN LISPRO 100/ML
VIAL (ML) SUBCUTANEOUS
Refills: 0 | Status: DISCONTINUED | OUTPATIENT
Start: 2021-01-10 | End: 2021-01-15

## 2021-01-10 RX ORDER — METOPROLOL TARTRATE 50 MG
25 TABLET ORAL DAILY
Refills: 0 | Status: DISCONTINUED | OUTPATIENT
Start: 2021-01-10 | End: 2021-01-11

## 2021-01-10 RX ORDER — MORPHINE SULFATE 50 MG/1
2 CAPSULE, EXTENDED RELEASE ORAL EVERY 6 HOURS
Refills: 0 | Status: DISCONTINUED | OUTPATIENT
Start: 2021-01-10 | End: 2021-01-15

## 2021-01-10 RX ORDER — LIDOCAINE 4 G/100G
1 CREAM TOPICAL DAILY
Refills: 0 | Status: DISCONTINUED | OUTPATIENT
Start: 2021-01-10 | End: 2021-01-15

## 2021-01-10 RX ORDER — ONDANSETRON 8 MG/1
4 TABLET, FILM COATED ORAL EVERY 6 HOURS
Refills: 0 | Status: DISCONTINUED | OUTPATIENT
Start: 2021-01-10 | End: 2021-01-15

## 2021-01-10 RX ORDER — ATORVASTATIN CALCIUM 80 MG/1
40 TABLET, FILM COATED ORAL AT BEDTIME
Refills: 0 | Status: DISCONTINUED | OUTPATIENT
Start: 2021-01-10 | End: 2021-01-15

## 2021-01-10 RX ORDER — DEXTROSE 50 % IN WATER 50 %
12.5 SYRINGE (ML) INTRAVENOUS ONCE
Refills: 0 | Status: DISCONTINUED | OUTPATIENT
Start: 2021-01-10 | End: 2021-01-15

## 2021-01-10 RX ORDER — ACETAMINOPHEN 500 MG
650 TABLET ORAL EVERY 6 HOURS
Refills: 0 | Status: DISCONTINUED | OUTPATIENT
Start: 2021-01-10 | End: 2021-01-15

## 2021-01-10 RX ORDER — DEXTROSE 50 % IN WATER 50 %
25 SYRINGE (ML) INTRAVENOUS ONCE
Refills: 0 | Status: DISCONTINUED | OUTPATIENT
Start: 2021-01-10 | End: 2021-01-15

## 2021-01-10 RX ORDER — GLUCAGON INJECTION, SOLUTION 0.5 MG/.1ML
1 INJECTION, SOLUTION SUBCUTANEOUS ONCE
Refills: 0 | Status: DISCONTINUED | OUTPATIENT
Start: 2021-01-10 | End: 2021-01-15

## 2021-01-10 RX ORDER — SENNA PLUS 8.6 MG/1
2 TABLET ORAL AT BEDTIME
Refills: 0 | Status: DISCONTINUED | OUTPATIENT
Start: 2021-01-10 | End: 2021-01-15

## 2021-01-10 RX ORDER — CYCLOBENZAPRINE HYDROCHLORIDE 10 MG/1
5 TABLET, FILM COATED ORAL THREE TIMES A DAY
Refills: 0 | Status: DISCONTINUED | OUTPATIENT
Start: 2021-01-10 | End: 2021-01-15

## 2021-01-10 RX ORDER — DIPHENHYDRAMINE HCL 50 MG
25 CAPSULE ORAL ONCE
Refills: 0 | Status: COMPLETED | OUTPATIENT
Start: 2021-01-10 | End: 2021-01-10

## 2021-01-10 RX ORDER — ALLOPURINOL 300 MG
100 TABLET ORAL DAILY
Refills: 0 | Status: DISCONTINUED | OUTPATIENT
Start: 2021-01-10 | End: 2021-01-15

## 2021-01-10 RX ORDER — DEXTROSE 50 % IN WATER 50 %
15 SYRINGE (ML) INTRAVENOUS ONCE
Refills: 0 | Status: DISCONTINUED | OUTPATIENT
Start: 2021-01-10 | End: 2021-01-15

## 2021-01-10 RX ADMIN — Medication 25 MILLIGRAM(S): at 13:39

## 2021-01-10 RX ADMIN — MORPHINE SULFATE 2 MILLIGRAM(S): 50 CAPSULE, EXTENDED RELEASE ORAL at 23:35

## 2021-01-10 RX ADMIN — MORPHINE SULFATE 2 MILLIGRAM(S): 50 CAPSULE, EXTENDED RELEASE ORAL at 11:01

## 2021-01-10 RX ADMIN — ISOSORBIDE MONONITRATE 30 MILLIGRAM(S): 60 TABLET, EXTENDED RELEASE ORAL at 13:39

## 2021-01-10 RX ADMIN — MORPHINE SULFATE 2 MILLIGRAM(S): 50 CAPSULE, EXTENDED RELEASE ORAL at 17:30

## 2021-01-10 RX ADMIN — Medication 81 MILLIGRAM(S): at 13:39

## 2021-01-10 RX ADMIN — Medication 100 MILLIGRAM(S): at 13:39

## 2021-01-10 RX ADMIN — SENNA PLUS 2 TABLET(S): 8.6 TABLET ORAL at 22:41

## 2021-01-10 RX ADMIN — Medication 3: at 13:17

## 2021-01-10 RX ADMIN — ATORVASTATIN CALCIUM 40 MILLIGRAM(S): 80 TABLET, FILM COATED ORAL at 22:41

## 2021-01-10 RX ADMIN — Medication 50 GRAM(S): at 18:00

## 2021-01-10 NOTE — H&P ADULT - PROBLEM SELECTOR PLAN 4
Chris Lobato
- on vasotec dosing unclear will need to verify with family/pharmacy  - enalapril 20 daily

## 2021-01-10 NOTE — H&P ADULT - PROBLEM SELECTOR PLAN 5
-IMPROVE VTE Individual Risk Assessment    RISK                                                          Points  [] Previous VTE                                           3  [] Thrombophilia                                        2  [] Lower limb paralysis                              2   [x] Current Cancer                                       2   [] Immobilization > 24 hrs                        1  [] ICU/CCU stay > 24 hours                       1  [x] Age > 60                                                   1    IMPROVE VTE Score: 2 lovenox

## 2021-01-10 NOTE — H&P ADULT - NSHPREVIEWOFSYSTEMS_GEN_ALL_CORE
Review of Systems:   CONSTITUTIONAL: No fever, weight loss, or fatigue  EYES: No eye pain, visual disturbances, or discharge  ENMT:  No difficulty hearing, tinnitus, vertigo; No sinus or throat pain  NECK: No pain or stiffness  BREASTS: No pain, masses, or nipple discharge  RESPIRATORY: No cough, wheezing, chills or hemoptysis; No shortness of breath  CARDIOVASCULAR: No chest pain, palpitations, dizziness, or leg swelling  GASTROINTESTINAL: No abdominal or epigastric pain. No nausea, vomiting, or hematemesis; No diarrhea or constipation. No melena or hematochezia.  GENITOURINARY: No dysuria, frequency, hematuria, or incontinence  NEUROLOGICAL: No headaches, memory loss, loss of strength, numbness, or tremors  SKIN: No itching, burning, rashes, or lesions   LYMPH NODES: No enlarged glands  ENDOCRINE: No heat or cold intolerance; No hair loss  MUSCULOSKELETAL: No joint pain or swelling; No muscle, back, or extremity pain  PSYCHIATRIC: No depression, anxiety, mood swings, or difficulty sleeping  HEME/LYMPH: No easy bruising, or bleeding gums  ALLERY AND IMMUNOLOGIC: No hives or eczema Review of Systems:   CONSTITUTIONAL: No fever, weight loss, or fatigue  EYES: No eye pain, visual disturbances, or discharge  ENMT:  No difficulty hearing, tinnitus, vertigo; No sinus or throat pain  NECK: No pain or stiffness  BREASTS: No pain, masses, or nipple discharge  RESPIRATORY: No cough, wheezing, chills or hemoptysis; No shortness of breath  CARDIOVASCULAR: No  palpitations, dizziness, or leg swelling +chest pain  GASTROINTESTINAL: No abdominal or epigastric pain. No nausea, vomiting, or hematemesis; No diarrhea or constipation. No melena or hematochezia.  GENITOURINARY: No dysuria, frequency, hematuria, or incontinence  NEUROLOGICAL: No headaches, memory loss, loss of strength, numbness, or tremors  SKIN: No itching, burning, rashes, or lesions   LYMPH NODES: No enlarged glands  ENDOCRINE: No heat or cold intolerance; No hair loss  MUSCULOSKELETAL: No joint pain or swelling; No muscle,  or extremity pain +LT lower back pain  PSYCHIATRIC: No depression, anxiety, mood swings, or difficulty sleeping  HEME/LYMPH: No easy bruising, or bleeding gums  ALLERGY AND IMMUNOLOGIC: No hives or eczema

## 2021-01-10 NOTE — ED ADULT NURSE REASSESSMENT NOTE - NS ED NURSE REASSESS COMMENT FT1
Pt at baseline mental status, appears comfortable. RR even and unlabored. Pt admitted. Report given to ESSU 1 RN. Pt brought over to area in stable condition at this time.

## 2021-01-10 NOTE — ED ADULT NURSE REASSESSMENT NOTE - NS ED NURSE REASSESS COMMENT FT1
Pt at baseline mental status, appears comfortable. RR even and unlabored. Awaiting CT results. VS as charted. Will continue to monitor.

## 2021-01-10 NOTE — H&P ADULT - NSICDXPASTSURGICALHX_GEN_ALL_CORE_FT
PAST SURGICAL HISTORY:  H/O hernia repair     History of bowel resection     History of coronary artery stent placement

## 2021-01-10 NOTE — H&P ADULT - HISTORY OF PRESENT ILLNESS
Pt is a 84 yo F [place] speaking w/ hx CAD and stents, chronic abdominal wound BIBEMS from home reporting 2 days of intermittent CP starting in Lt shoulder radiating to LT chest as per triage notes. As per ED notes complains of flank pain worse on inspiration. noted tenderness at paraspinal muscle given morphine for pain and developed itching now resolved.      In ED she underwent CT chest/A/P was given morphine  Pt is a 84 yo F Bangladeshi speaking hx obtained with  valuklik ID #[id number] w/ hx CAD and stents, chronic abdominal wound BIBEMS from home reporting 2 days of intermittent CP starting in Lt shoulder radiating to LT chest as per triage notes. As per ED notes complains of flank pain worse on inspiration. noted tenderness at paraspinal muscle given morphine for pain and developed itching now resolved.      In ED she underwent CT chest/A/P was given morphine

## 2021-01-10 NOTE — H&P ADULT - PROBLEM SELECTOR PLAN 1
- as per Hx report Chest pain as per family but as per ED notes flank pain possibly musculoskeletal in nature  - given Hx CAD CE X 2 possible atypical pain will check TTE  - CTA neg for PE and multiple ventral hernia without obstructed bowel loops UA neg for infection   - ASA/BB/ACE/nitrates/statin  - flexeril PRN  - PT c/s  - f/u cards - Pt report Chest pain as per family and as per ED notes flank pain possibly musculoskeletal in nature  - given Hx CAD CE X 2 possible atypical pain will check TTE  - CTA neg for PE and multiple ventral hernia without obstructed bowel loops UA neg for infection +gallstones  - ASA/BB/ACE/nitrates/statin  - flexeril PRN/lidocaine patch to chest wall   - PT c/s  - f/u cards - Pt report Chest pain as per family and as per ED notes flank pain possibly musculoskeletal in nature PE with reproducible chest wall pain  - given Hx CAD CE X 2 possible atypical pain will check TTE  - CTA neg for PE and multiple ventral hernia without obstructed bowel loops UA neg for infection +gallstones  - ASA/BB/ACE/nitrates/statin  - flexeril PRN/lidocaine patch to chest wall   - PT c/s  - f/u cards

## 2021-01-10 NOTE — H&P ADULT - PROBLEM SELECTOR PLAN 3
- previously on insulin current meds as listed below show only oral hypoglycemics  - check A1c  - hold oral hypoglycemics  - ISS for now

## 2021-01-10 NOTE — PHYSICAL THERAPY INITIAL EVALUATION ADULT - PERTINENT HX OF CURRENT PROBLEM, REHAB EVAL
Patient is 85 year old female admitted with history of CAD and stents, chronic abdominal wound, presents with 2 days of intermittent chest pain, CTA shows negative PE.

## 2021-01-10 NOTE — PHYSICAL THERAPY INITIAL EVALUATION ADULT - ADDITIONAL COMMENTS
Patient report lives in apartment, elevator access, has HHA for 11 hours daily, owns a walker, cane and wheel chair.

## 2021-01-10 NOTE — ED ADULT NURSE REASSESSMENT NOTE - NS ED NURSE REASSESS COMMENT FT1
Pt at baseline mental status. Medicated with morphine as per MD orders. Pt endorsing itchiness at the site. Refused benadryl at this time, states itchiness went away. RR even and unlabored, oxygen saturation 99% on RA. Will continue to monitor.

## 2021-01-10 NOTE — H&P ADULT - NSICDXPASTMEDICALHX_GEN_ALL_CORE_FT
PAST MEDICAL HISTORY:  CAD (coronary artery disease)     Colorectal cancer Remote hx; s/p colon resection    Diabetes mellitus     HTN (hypertension)     Hyperuricemia     Primary osteoarthritis of both knees

## 2021-01-10 NOTE — H&P ADULT - ASSESSMENT
Pt is a 83 yo F w/ Hx CAD s/p stents, DM p/w atypical CP/musculoskeletal pain. EKG neg for acute ischemia.

## 2021-01-10 NOTE — H&P ADULT - PROBLEM SELECTOR PLAN 6
- meds reviewed from wound care outpt f/u colace, crestor 20, glimperide 4, Metformin 850 indur 30, Januvia 100, Symibcort, toprol XL 25, Trazadone 50, vasotec 20 and 10.     med rec pharmacist emailed   unable to verify with family called [phone]-/ [phone] - meds reviewed from wound care outpt f/u colace, crestor 20, glimperide 4, Metformin 850 indur 30, Januvia 100, Symibcort, toprol XL 25, Trazadone 50, vasotec 20 and 10.     Pt family brought in few meds at bedside;   imdur 30; lyrica 75; metformin 850 BID januvia 100 qd, lasix 20, allopurinol.    med rec pharmacist emailed   unable to verify with family called [phone]-/ [phone]    Pt obtains meds at [facility] [phone]

## 2021-01-10 NOTE — H&P ADULT - NSHPPHYSICALEXAM_GEN_ALL_CORE
Vital Signs Last 24 Hrs  T(C): 36.6 (10 Hermilo 2021 10:52), Max: 36.6 (10 Hermilo 2021 04:20)  T(F): 97.9 (10 Hermilo 2021 10:52), Max: 97.9 (10 Hermilo 2021 04:20)  HR: 73 (10 Hermilo 2021 10:52) (66 - 79)  BP: 169/70 (10 Hermilo 2021 10:52) (124/43 - 169/70)  BP(mean): --  RR: 18 (10 Hermilo 2021 10:52) (18 - 18)  SpO2: 100% (10 Hermilo 2021 10:52) (97% - 100%)    PHYSICAL EXAM:  GENERAL: NAD, well-developed  HEAD:  Atraumatic, Normocephalic  EYES: EOMI, PERRLA, conjunctiva and sclera clear  NECK: Supple, No JVD  CHEST/LUNG: Clear to auscultation bilaterally; No wheeze  HEART: Regular rate and rhythm; No murmurs, rubs, or gallops  ABDOMEN: Soft, Nontender, Nondistended; Bowel sounds present  EXTREMITIES:  2+ Peripheral Pulses, No clubbing, cyanosis, or edema  PSYCH: AAOx3  NEUROLOGY: non-focal  SKIN: No rashes or lesions Vital Signs Last 24 Hrs  T(C): 36.6 (10 Hermilo 2021 10:52), Max: 36.6 (10 Hermilo 2021 04:20)  T(F): 97.9 (10 Hermilo 2021 10:52), Max: 97.9 (10 Hermilo 2021 04:20)  HR: 73 (10 Hermilo 2021 10:52) (66 - 79)  BP: 169/70 (10 Hermilo 2021 10:52) (124/43 - 169/70)  BP(mean): --  RR: 18 (10 Hermilo 2021 10:52) (18 - 18)  SpO2: 100% (10 Hermilo 2021 10:52) (97% - 100%)    PHYSICAL EXAM:  GENERAL: elderly female   HEAD: Normocephalic  EYES: EOMI, conjunctiva and sclera clear  NECK: Supple, No JVD  CHEST/LUNG: Clear to auscultation bilaterally; No wheeze  HEART: s1/s2  ABDOMEN: +BS obese soft +RT LQ wound superficial no overt signs of infection  EXTREMITIES: no edema  NEUROLOGY: non-focal Vital Signs Last 24 Hrs  T(C): 36.6 (10 Hermilo 2021 10:52), Max: 36.6 (10 Hermilo 2021 04:20)  T(F): 97.9 (10 Hermilo 2021 10:52), Max: 97.9 (10 Hermilo 2021 04:20)  HR: 73 (10 Hermilo 2021 10:52) (66 - 79)  BP: 169/70 (10 Hermilo 2021 10:52) (124/43 - 169/70)  BP(mean): --  RR: 18 (10 Hermilo 2021 10:52) (18 - 18)  SpO2: 100% (10 Hermilo 2021 10:52) (97% - 100%)    PHYSICAL EXAM:  GENERAL: elderly female   HEAD: Normocephalic  EYES: EOMI, conjunctiva and sclera clear  NECK: Supple, No JVD  CHEST/LUNG: Clear to auscultation bilaterally; No wheeze  HEART: s1/s2 +reproducible chest wall pain   ABDOMEN: +BS obese soft +RT LQ wound superficial no overt signs of infection  EXTREMITIES: no edema  NEUROLOGY: non-focal

## 2021-01-10 NOTE — H&P ADULT - NSHPLABSRESULTS_GEN_ALL_CORE
10.9   6.77  )-----------( 205      ( 2021 22:59 )             33.6           134<L>  |  97<L>  |  14  ----------------------------<  181<H>  5.0   |  21<L>  |  0.72    Ca    10.4      2021 22:59    TPro  6.8  /  Alb  3.9  /  TBili  <0.2  /  DBili  x   /  AST  16  /  ALT  16  /  AlkPhos  77        CAPILLARY BLOOD GLUCOSE      POCT Blood Glucose.: 136 mg/dL (10 Hermilo 2021 10:08)      Urinalysis Basic - ( 10 Hermilo 2021 03:48 )    Color: Light Yellow / Appearance: Clear / S.017 / pH: x  Gluc: x / Ketone: Negative  / Bili: Negative / Urobili: <2 mg/dL   Blood: x / Protein: Negative / Nitrite: Negative   Leuk Esterase: Negative / RBC: x / WBC x   Sq Epi: x / Non Sq Epi: x / Bacteria: x        PT/INR - ( 2021 23:23 )   PT: 13.2 sec;   INR: 1.17 ratio         PTT - ( 2021 23:23 )  PTT:34.2 sec    EKG- neg for acute ischemia    Radiology    < from: CT Angio Chest w/ IV Cont (01.10.21 @ 03:08) >    IMPRESSION:  1.  No aortic aneurysm or dissection. No main, lobar, or segmental pulmonary embolism.  2.  No acute pathology in the abdomen or pelvis. Redemonstration of multiple ventral abdominal hernias containing nonobstructed bowel loops and mesenteric fat.    < end of copied text >

## 2021-01-11 LAB
A1C WITH ESTIMATED AVERAGE GLUCOSE RESULT: 6.9 % — HIGH (ref 4–5.6)
ALBUMIN SERPL ELPH-MCNC: 4 G/DL — SIGNIFICANT CHANGE UP (ref 3.3–5)
ALP SERPL-CCNC: 71 U/L — SIGNIFICANT CHANGE UP (ref 40–120)
ALT FLD-CCNC: 16 U/L — SIGNIFICANT CHANGE UP (ref 4–33)
ANION GAP SERPL CALC-SCNC: 13 MMOL/L — SIGNIFICANT CHANGE UP (ref 7–14)
AST SERPL-CCNC: 15 U/L — SIGNIFICANT CHANGE UP (ref 4–32)
BASOPHILS # BLD AUTO: 0.04 K/UL — SIGNIFICANT CHANGE UP (ref 0–0.2)
BASOPHILS NFR BLD AUTO: 0.7 % — SIGNIFICANT CHANGE UP (ref 0–2)
BILIRUB SERPL-MCNC: 0.4 MG/DL — SIGNIFICANT CHANGE UP (ref 0.2–1.2)
BUN SERPL-MCNC: 15 MG/DL — SIGNIFICANT CHANGE UP (ref 7–23)
CALCIUM SERPL-MCNC: 10.7 MG/DL — HIGH (ref 8.4–10.5)
CHLORIDE SERPL-SCNC: 97 MMOL/L — LOW (ref 98–107)
CO2 SERPL-SCNC: 26 MMOL/L — SIGNIFICANT CHANGE UP (ref 22–31)
CREAT SERPL-MCNC: 0.72 MG/DL — SIGNIFICANT CHANGE UP (ref 0.5–1.3)
CULTURE RESULTS: SIGNIFICANT CHANGE UP
EOSINOPHIL # BLD AUTO: 0.14 K/UL — SIGNIFICANT CHANGE UP (ref 0–0.5)
EOSINOPHIL NFR BLD AUTO: 2.5 % — SIGNIFICANT CHANGE UP (ref 0–6)
ESTIMATED AVERAGE GLUCOSE: 151 MG/DL — HIGH (ref 68–114)
GLUCOSE SERPL-MCNC: 148 MG/DL — HIGH (ref 70–99)
HCT VFR BLD CALC: 34.1 % — LOW (ref 34.5–45)
HGB BLD-MCNC: 11.1 G/DL — LOW (ref 11.5–15.5)
IANC: 3.64 K/UL — SIGNIFICANT CHANGE UP (ref 1.5–8.5)
IMM GRANULOCYTES NFR BLD AUTO: 0.2 % — SIGNIFICANT CHANGE UP (ref 0–1.5)
LYMPHOCYTES # BLD AUTO: 1.34 K/UL — SIGNIFICANT CHANGE UP (ref 1–3.3)
LYMPHOCYTES # BLD AUTO: 24.3 % — SIGNIFICANT CHANGE UP (ref 13–44)
MAGNESIUM SERPL-MCNC: 1.4 MG/DL — LOW (ref 1.6–2.6)
MCHC RBC-ENTMCNC: 29 PG — SIGNIFICANT CHANGE UP (ref 27–34)
MCHC RBC-ENTMCNC: 32.6 GM/DL — SIGNIFICANT CHANGE UP (ref 32–36)
MCV RBC AUTO: 89 FL — SIGNIFICANT CHANGE UP (ref 80–100)
MONOCYTES # BLD AUTO: 0.35 K/UL — SIGNIFICANT CHANGE UP (ref 0–0.9)
MONOCYTES NFR BLD AUTO: 6.3 % — SIGNIFICANT CHANGE UP (ref 2–14)
NEUTROPHILS # BLD AUTO: 3.64 K/UL — SIGNIFICANT CHANGE UP (ref 1.8–7.4)
NEUTROPHILS NFR BLD AUTO: 66 % — SIGNIFICANT CHANGE UP (ref 43–77)
NRBC # BLD: 0 /100 WBCS — SIGNIFICANT CHANGE UP
NRBC # FLD: 0 K/UL — SIGNIFICANT CHANGE UP
PHOSPHATE SERPL-MCNC: 3.1 MG/DL — SIGNIFICANT CHANGE UP (ref 2.5–4.5)
PLATELET # BLD AUTO: 207 K/UL — SIGNIFICANT CHANGE UP (ref 150–400)
POTASSIUM SERPL-MCNC: 4.3 MMOL/L — SIGNIFICANT CHANGE UP (ref 3.5–5.3)
POTASSIUM SERPL-SCNC: 4.3 MMOL/L — SIGNIFICANT CHANGE UP (ref 3.5–5.3)
PROT SERPL-MCNC: 6.9 G/DL — SIGNIFICANT CHANGE UP (ref 6–8.3)
RBC # BLD: 3.83 M/UL — SIGNIFICANT CHANGE UP (ref 3.8–5.2)
RBC # FLD: 12.5 % — SIGNIFICANT CHANGE UP (ref 10.3–14.5)
SODIUM SERPL-SCNC: 136 MMOL/L — SIGNIFICANT CHANGE UP (ref 135–145)
SPECIMEN SOURCE: SIGNIFICANT CHANGE UP
WBC # BLD: 5.52 K/UL — SIGNIFICANT CHANGE UP (ref 3.8–10.5)
WBC # FLD AUTO: 5.52 K/UL — SIGNIFICANT CHANGE UP (ref 3.8–10.5)

## 2021-01-11 PROCEDURE — 93306 TTE W/DOPPLER COMPLETE: CPT | Mod: 26

## 2021-01-11 RX ORDER — KETOROLAC TROMETHAMINE 30 MG/ML
15 SYRINGE (ML) INJECTION EVERY 8 HOURS
Refills: 0 | Status: DISCONTINUED | OUTPATIENT
Start: 2021-01-11 | End: 2021-01-12

## 2021-01-11 RX ORDER — METOPROLOL TARTRATE 50 MG
25 TABLET ORAL ONCE
Refills: 0 | Status: COMPLETED | OUTPATIENT
Start: 2021-01-11 | End: 2021-01-11

## 2021-01-11 RX ORDER — KETOROLAC TROMETHAMINE 30 MG/ML
30 SYRINGE (ML) INJECTION ONCE
Refills: 0 | Status: DISCONTINUED | OUTPATIENT
Start: 2021-01-11 | End: 2021-01-11

## 2021-01-11 RX ORDER — METOPROLOL TARTRATE 50 MG
50 TABLET ORAL DAILY
Refills: 0 | Status: DISCONTINUED | OUTPATIENT
Start: 2021-01-11 | End: 2021-01-15

## 2021-01-11 RX ORDER — MAGNESIUM SULFATE 500 MG/ML
2 VIAL (ML) INJECTION ONCE
Refills: 0 | Status: COMPLETED | OUTPATIENT
Start: 2021-01-11 | End: 2021-01-11

## 2021-01-11 RX ADMIN — ENOXAPARIN SODIUM 40 MILLIGRAM(S): 100 INJECTION SUBCUTANEOUS at 11:38

## 2021-01-11 RX ADMIN — PANTOPRAZOLE SODIUM 40 MILLIGRAM(S): 20 TABLET, DELAYED RELEASE ORAL at 05:02

## 2021-01-11 RX ADMIN — Medication 15 MILLIGRAM(S): at 22:08

## 2021-01-11 RX ADMIN — CYCLOBENZAPRINE HYDROCHLORIDE 5 MILLIGRAM(S): 10 TABLET, FILM COATED ORAL at 11:41

## 2021-01-11 RX ADMIN — MORPHINE SULFATE 2 MILLIGRAM(S): 50 CAPSULE, EXTENDED RELEASE ORAL at 06:14

## 2021-01-11 RX ADMIN — MORPHINE SULFATE 2 MILLIGRAM(S): 50 CAPSULE, EXTENDED RELEASE ORAL at 18:00

## 2021-01-11 RX ADMIN — LIDOCAINE 1 PATCH: 4 CREAM TOPICAL at 20:09

## 2021-01-11 RX ADMIN — Medication 25 MILLIGRAM(S): at 14:05

## 2021-01-11 RX ADMIN — LIDOCAINE 1 PATCH: 4 CREAM TOPICAL at 23:59

## 2021-01-11 RX ADMIN — ATORVASTATIN CALCIUM 40 MILLIGRAM(S): 80 TABLET, FILM COATED ORAL at 22:08

## 2021-01-11 RX ADMIN — Medication 100 MILLIGRAM(S): at 11:38

## 2021-01-11 RX ADMIN — SENNA PLUS 2 TABLET(S): 8.6 TABLET ORAL at 22:08

## 2021-01-11 RX ADMIN — LIDOCAINE 1 PATCH: 4 CREAM TOPICAL at 11:38

## 2021-01-11 RX ADMIN — Medication 1: at 13:41

## 2021-01-11 RX ADMIN — ISOSORBIDE MONONITRATE 30 MILLIGRAM(S): 60 TABLET, EXTENDED RELEASE ORAL at 11:38

## 2021-01-11 RX ADMIN — POLYETHYLENE GLYCOL 3350 17 GRAM(S): 17 POWDER, FOR SOLUTION ORAL at 11:38

## 2021-01-11 RX ADMIN — Medication 1: at 17:59

## 2021-01-11 RX ADMIN — Medication 30 MILLIGRAM(S): at 14:10

## 2021-01-11 RX ADMIN — Medication 81 MILLIGRAM(S): at 11:38

## 2021-01-11 RX ADMIN — Medication 25 MILLIGRAM(S): at 04:57

## 2021-01-11 RX ADMIN — Medication 20 MILLIGRAM(S): at 04:57

## 2021-01-11 RX ADMIN — Medication 10 MILLIGRAM(S): at 08:55

## 2021-01-11 RX ADMIN — Medication 50 GRAM(S): at 17:59

## 2021-01-11 NOTE — CONSULT NOTE ADULT - ASSESSMENT
Pt is a 81 yo F w/ Hx CAD s/p stents, DM p/w atypical CP/musculoskeletal pain. EKG neg for acute ischemia.

## 2021-01-11 NOTE — CONSULT NOTE ADULT - ASSESSMENT
a/p    Pt is a 84 yo F Finnish speaking hx obtained with  Ezra ID #[id number] w/ PMhx CAD and stents, HTN, DM, chronic abdominal wound BIBEMS from home reporting 2 days of intermittent CP starting in Lt shoulder radiating to LT chest.    1. Atypical cp  -?musculoskeletal   -HsT neg  -EKG without ischemic changes  -no acs  -c/w asa, imdur  -CTA neg for PE, and multiple ventral hernia without obstructed bowel loops, +gallstones  -UA neg   -pending echo     2. HTN  -bp stable  -cont current meds    3. Abdominal wound dehiscence  -follows with Dr. Cook as outpt   -pending wound care consult    4.  Diabetes mellitus  -A1c noted   -hold oral hypoglycemics  -c/w ISS     dvt ppx  a/p    Pt is a 86 yo F [place] citizen speaking hx obtained with  Ezra ID #[id number] w/ PMhx CAD and stents, HTN, DM, chronic abdominal wound BIBEMS from home reporting 2 days of intermittent CP starting in Lt shoulder radiating to LT chest.    1. Atypical cp  -?musculoskeletal   -HsT neg  -EKG without ischemic changes  -no acs  -c/w asa, imdur  -CTA neg for PE, and multiple ventral hernia without obstructed bowel loops, +gallstones  -UA neg   -pending echo     2. HTN  -bp elevated  -bb inc to 50 daily   -cont w acei, imdur     3. Abdominal wound dehiscence  -follows with Dr. Cook as outpt   -pending wound care consult    4.  Diabetes mellitus  -A1c noted   -hold oral hypoglycemics  -c/w ISS     dvt ppx  a/p    Pt is a 84 yo F Salvadorean speaking hx obtained with  Ezra ID #[id number] w/ PMhx CAD and stents, HTN, DM, chronic abdominal wound BIBEMS from home reporting 2 days of intermittent CP starting in Lt shoulder radiating to LT chest.    1. Atypical cp  -?musculoskeletal   -HsT neg  -EKG without ischemic changes  -no acs  -c/w asa, imdur  -CTA neg for PE, and multiple ventral hernia without obstructed bowel loops, +gallstones  -UA neg   -pending echo     2. HTN  -bp elevated  -bb inc to 50 daily, 1x dose 25 mg given today   -cont w acei, imdur     3. Abdominal wound dehiscence  -follows with Dr. Cook as outpt   -pending wound care consult    4.  Diabetes mellitus  -A1c noted   -hold oral hypoglycemics  -c/w ISS     dvt ppx     d/w pa  Echo 1/11/21: EF 67%, min MR, grossly normal lv sys fx, mild diastolic dysfx, no pericardial effusion     a/p    Pt is a 84 yo F Georgian speaking hx obtained with  Ezra ID #[id number] w/ PMhx CAD and stents, HTN, DM, chronic abdominal wound BIBEMS from home reporting 2 days of intermittent CP starting in Lt shoulder radiating to LT chest.    1. Atypical cp  -?musculoskeletal   -HsT neg  -EKG without ischemic changes  -no acs  -c/w asa, imdur  -CTA neg for PE, and multiple ventral hernia without obstructed bowel loops, +gallstones  -UA neg   -Echo with grossly normal lv sys fx     2. HTN  -bp elevated  -bb inc to 50 daily, 1x dose 25 mg given today   -cont w acei, imdur     3. Abdominal wound dehiscence  -follows with Dr. Cook as outpt   -pending wound care consult    4.  Diabetes mellitus  -A1c noted   -hold oral hypoglycemics  -c/w ISS     dvt ppx     d/w pa

## 2021-01-11 NOTE — CONSULT NOTE ADULT - SUBJECTIVE AND OBJECTIVE BOX
CARDIOLOGY CONSULT - Dr. Mendieta         HPI:    Pt is a 86 yo F Malawian speaking known to practice hx obtained with  Ezra ID #[id number] w/ PMhx CAD and stents, chronic abdominal wound BIBEMS from home reporting 2 days of intermittent CP starting in Lt shoulder radiating to LT chest.  As per ED notes complains of flank pain worse on inspiration.  Reports some relief w morphine.  Patient denies any dyspnea, palpitations, cough, syncope, edema, exertional symptoms, nausea, fever, chills,  or rash.  Denies any history of MI, valve disease, cardiomyopathy, or congenital heart disease.       PAST MEDICAL & SURGICAL HISTORY:  Hyperuricemia    Colorectal cancer  Remote hx; s/p colon resection    Primary osteoarthritis of both knees    CAD (coronary artery disease)    Diabetes mellitus    HTN (hypertension)    History of coronary artery stent placement    History of bowel resection    H/O hernia repair            PREVIOUS DIAGNOSTIC TESTING:    [ ] Echocardiogram:    [ ]  Catheterization:  [ ] Stress Test:  	    MEDICATIONS:  Home Medications:  allopurinol 100 mg oral tablet: 1 tab(s) orally once a day (11 Aug 2018 13:21)  aspirin 81 mg oral delayed release tablet: 1 tab(s) orally once a day (11 Aug 2018 13:21)  Colace 100 mg oral capsule: 1 cap(s) orally 2 times a day (11 Aug 2018 13:21)  Glucophage 850 mg oral tablet: 1 tab(s) orally 2 times a day (11 Aug 2018 13:21)  isosorbide mononitrate 30 mg oral tablet, extended release: 1 tab(s) orally once a day (in the morning) (11 Aug 2018 13:21)  Januvia 100 mg oral tablet: 1 tab(s) orally once a day (11 Aug 2018 13:21)  Lasix 20 mg oral tablet: 1 tab(s) orally once a day, As Needed for leg swelling (11 Aug 2018 13:21)  senna oral tablet: 2 tab(s) orally once a day (at bedtime) (11 Aug 2018 13:21)  Toprol-XL 25 mg oral tablet, extended release: 1 tab(s) orally once a day (11 Aug 2018 13:21)      MEDICATIONS  (STANDING):  allopurinol 100 milliGRAM(s) Oral daily  aspirin enteric coated 81 milliGRAM(s) Oral daily  atorvastatin 40 milliGRAM(s) Oral at bedtime  dextrose 40% Gel 15 Gram(s) Oral once  dextrose 5%. 1000 milliLiter(s) (50 mL/Hr) IV Continuous <Continuous>  dextrose 5%. 1000 milliLiter(s) (100 mL/Hr) IV Continuous <Continuous>  dextrose 50% Injectable 25 Gram(s) IV Push once  dextrose 50% Injectable 12.5 Gram(s) IV Push once  dextrose 50% Injectable 25 Gram(s) IV Push once  enalapril 20 milliGRAM(s) Oral daily  enoxaparin Injectable 40 milliGRAM(s) SubCutaneous daily  glucagon  Injectable 1 milliGRAM(s) IntraMuscular once  insulin lispro (ADMELOG) corrective regimen sliding scale   SubCutaneous three times a day before meals  insulin lispro (ADMELOG) corrective regimen sliding scale   SubCutaneous at bedtime  isosorbide   mononitrate ER Tablet (IMDUR) 30 milliGRAM(s) Oral daily  lidocaine   Patch 1 Patch Transdermal daily  metoprolol succinate ER 25 milliGRAM(s) Oral daily  pantoprazole    Tablet 40 milliGRAM(s) Oral before breakfast  polyethylene glycol 3350 17 Gram(s) Oral once  senna 2 Tablet(s) Oral at bedtime      FAMILY HISTORY:  No pertinent family history in first degree relatives        SOCIAL HISTORY:    [ ] Non-smoker  [ ] Smoker  [ ] Alcohol    Allergies    No Known Allergies    Intolerances    	    REVIEW OF SYSTEMS:  CONSTITUTIONAL: No fever, weight loss, or fatigue  EYES: No eye pain, visual disturbances, or discharge  ENMT:  No difficulty hearing, tinnitus, vertigo; No sinus or throat pain  NECK: No pain or stiffness  RESPIRATORY: No cough, wheezing, chills or hemoptysis; No Shortness of Breath  CARDIOVASCULAR: No chest pain, palpitations, passing out, dizziness, or leg swelling  GASTROINTESTINAL: No abdominal or epigastric pain. No nausea, vomiting, or hematemesis; No diarrhea or constipation. No melena or hematochezia.  GENITOURINARY: No dysuria, frequency, hematuria, or incontinence  NEUROLOGICAL: No headaches, memory loss, loss of strength, numbness, or tremors  SKIN: No itching, burning, rashes, or lesions   	    [ ] All others negative	  [ ] Unable to obtain    PHYSICAL EXAM:  T(C): 36.7 (01-11-21 @ 04:56), Max: 36.8 (01-10-21 @ 13:37)  HR: 67 (01-11-21 @ 04:56) (66 - 73)  BP: 157/60 (01-11-21 @ 04:56) (129/60 - 169/70)  RR: 18 (01-11-21 @ 04:56) (18 - 18)  SpO2: 98% (01-11-21 @ 04:56) (98% - 100%)  Wt(kg): --  I&O's Summary      Appearance: Normal	  Psychiatry: A & O x 3, Mood & affect appropriate  HEENT:   Normal oral mucosa, PERRL, EOMI	  Lymphatic: No lymphadenopathy  Cardiovascular: Normal S1 S2,RRR, No JVD, No murmurs  Respiratory: Lungs clear to auscultation	  Gastrointestinal:  Soft, Non-tender, + BS	  Skin: No rashes, No ecchymoses, No cyanosis	  Neurologic: Non-focal  Extremities: Normal range of motion, No clubbing, cyanosis or edema  Vascular: Peripheral pulses palpable 2+ bilaterally    TELEMETRY: 	    ECG:  	  RADIOLOGY:  OTHER: 	  	  LABS:	 	    CARDIAC MARKERS:  Troponin T, High Sensitivity Result: 19 ng/L (01-10 @ 00:48)  Troponin T, High Sensitivity Result: 21 ng/L (01-09 @ 22:59)                                  11.1   5.52  )-----------( 207      ( 11 Jan 2021 07:08 )             34.1     01-11    136  |  97<L>  |  15  ----------------------------<  148<H>  4.3   |  26  |  0.72    Ca    10.7<H>      11 Jan 2021 07:08  Phos  3.1     01-11  Mg     1.4     01-11    TPro  6.9  /  Alb  4.0  /  TBili  0.4  /  DBili  x   /  AST  15  /  ALT  16  /  AlkPhos  71  01-11    PT/INR - ( 09 Jan 2021 23:23 )   PT: 13.2 sec;   INR: 1.17 ratio         PTT - ( 09 Jan 2021 23:23 )  PTT:34.2 sec  proBNP:   Lipid Profile:   HgA1c:   TSH: Thyroid Stimulating Hormone, Serum: 0.90 uIU/mL (01-10 @ 14:21)         CARDIOLOGY CONSULT - Dr. Mendieta         HPI:    Pt is a 84 yo F [place]aur[place] speaking hx obtained with  Ezra ID #[id number] w/ PMhx CAD and stents, HTN, DM chronic abdominal wound BIBEMS from home reporting 2 days of intermittent CP starting in Lt shoulder radiating to LT chest.  Pain is the same with ambulation and at rest.  As per ED notes complains of flank pain worse on inspiration.  Reports some relief w morphine.  Patient denies any dyspnea, palpitations, cough, syncope, edema, exertional symptoms, nausea, fever, chills,  or rash.  Denies any history of MI, valve disease, cardiomyopathy, or congenital heart disease.       PAST MEDICAL & SURGICAL HISTORY:  Hyperuricemia    Colorectal cancer  Remote hx; s/p colon resection    Primary osteoarthritis of both knees    CAD (coronary artery disease)    Diabetes mellitus    HTN (hypertension)    History of coronary artery stent placement    History of bowel resection    H/O hernia repair            PREVIOUS DIAGNOSTIC TESTING:    [ ] Echocardiogram:  [ ]  Catheterization:  [ ] Stress Test:  	    MEDICATIONS:  Home Medications:  allopurinol 100 mg oral tablet: 1 tab(s) orally once a day (11 Aug 2018 13:21)  aspirin 81 mg oral delayed release tablet: 1 tab(s) orally once a day (11 Aug 2018 13:21)  Colace 100 mg oral capsule: 1 cap(s) orally 2 times a day (11 Aug 2018 13:21)  Glucophage 850 mg oral tablet: 1 tab(s) orally 2 times a day (11 Aug 2018 13:21)  isosorbide mononitrate 30 mg oral tablet, extended release: 1 tab(s) orally once a day (in the morning) (11 Aug 2018 13:21)  Januvia 100 mg oral tablet: 1 tab(s) orally once a day (11 Aug 2018 13:21)  Lasix 20 mg oral tablet: 1 tab(s) orally once a day, As Needed for leg swelling (11 Aug 2018 13:21)  senna oral tablet: 2 tab(s) orally once a day (at bedtime) (11 Aug 2018 13:21)  Toprol-XL 25 mg oral tablet, extended release: 1 tab(s) orally once a day (11 Aug 2018 13:21)      MEDICATIONS  (STANDING):  allopurinol 100 milliGRAM(s) Oral daily  aspirin enteric coated 81 milliGRAM(s) Oral daily  atorvastatin 40 milliGRAM(s) Oral at bedtime  dextrose 40% Gel 15 Gram(s) Oral once  dextrose 5%. 1000 milliLiter(s) (50 mL/Hr) IV Continuous <Continuous>  dextrose 5%. 1000 milliLiter(s) (100 mL/Hr) IV Continuous <Continuous>  dextrose 50% Injectable 25 Gram(s) IV Push once  dextrose 50% Injectable 12.5 Gram(s) IV Push once  dextrose 50% Injectable 25 Gram(s) IV Push once  enalapril 20 milliGRAM(s) Oral daily  enoxaparin Injectable 40 milliGRAM(s) SubCutaneous daily  glucagon  Injectable 1 milliGRAM(s) IntraMuscular once  insulin lispro (ADMELOG) corrective regimen sliding scale   SubCutaneous three times a day before meals  insulin lispro (ADMELOG) corrective regimen sliding scale   SubCutaneous at bedtime  isosorbide   mononitrate ER Tablet (IMDUR) 30 milliGRAM(s) Oral daily  lidocaine   Patch 1 Patch Transdermal daily  metoprolol succinate ER 25 milliGRAM(s) Oral daily  pantoprazole    Tablet 40 milliGRAM(s) Oral before breakfast  polyethylene glycol 3350 17 Gram(s) Oral once  senna 2 Tablet(s) Oral at bedtime      FAMILY HISTORY:  No pertinent family history in first degree relatives        SOCIAL HISTORY:    [ ] Non-smoker  [ ] Smoker  [ ] Alcohol    Allergies    No Known Allergies    Intolerances    	    REVIEW OF SYSTEMS:  CONSTITUTIONAL: No fever, weight loss, or fatigue  EYES: No eye pain, visual disturbances, or discharge  ENMT:  No difficulty hearing, tinnitus, vertigo; No sinus or throat pain  NECK: No pain or stiffness  RESPIRATORY: No cough, wheezing, chills or hemoptysis; No Shortness of Breath  CARDIOVASCULAR: + chest pain, denies palpitations, passing out, dizziness, or leg swelling  GASTROINTESTINAL: No abdominal or epigastric pain. No nausea, vomiting, or hematemesis; No diarrhea or constipation. No melena or hematochezia.  GENITOURINARY: No dysuria, frequency, hematuria, or incontinence  NEUROLOGICAL: No headaches, memory loss, loss of strength, numbness, or tremors  SKIN: No itching, burning, rashes, or lesions   	    [ x] All others negative	see hpi   [ ] Unable to obtain    PHYSICAL EXAM:  T(C): 36.7 (01-11-21 @ 04:56), Max: 36.8 (01-10-21 @ 13:37)  HR: 67 (01-11-21 @ 04:56) (66 - 73)  BP: 157/60 (01-11-21 @ 04:56) (129/60 - 169/70)  RR: 18 (01-11-21 @ 04:56) (18 - 18)  SpO2: 98% (01-11-21 @ 04:56) (98% - 100%)  Wt(kg): --  I&O's Summary      Appearance: Normal	  Psychiatry: A & O x 3, Mood & affect appropriate  HEENT:   Normal oral mucosa, PERRL, EOMI	  Lymphatic: No lymphadenopathy  Cardiovascular: Normal S1 S2,RRR, No JVD, No murmurs  Respiratory: Lungs clear to auscultation	  Gastrointestinal:  Soft, Non-tender, + BS	  Skin: No rashes, No ecchymoses, No cyanosis	  Neurologic: Non-focal  Extremities: Normal range of motion, No clubbing, cyanosis or edema  Vascular: Peripheral pulses palpable 2+ bilaterally    TELEMETRY: SR 60-80s	    ECG:  SR 1st avb - no ischemic changes	  RADIOLOGY:  Xray Chest 1 View- PORTABLE-Urgent (01.09.21 @ 22:41)   FINDINGS:  The lungs are clear.  Heart size is normal.  No acute osseous findings.    IMPRESSION:  Clear lungs.    CT Angio Chest w/ IV Cont (01.10.21 @ 03:08)   FINDINGS:  CHEST:  LUNGS AND LARGE AIRWAYS: Patent central airways. No suspicious pulmonary nodules. Mild dependent subsegmental atelectasis  PLEURA: No pleural effusion.  VESSELS: Atherosclerotic changes of the aorta and coronary arteries. Left anterior descending coronary stent. No main, lobar, or segmental pulmonary embolism. Common origin of the left common carotid and innominate arteries, a normal variant.  HEART: Heart size is normal. No pericardial effusion.  MEDIASTINUM AND THEA: No lymphadenopathy.  CHEST WALL AND LOWER NECK: Heterogeneous and enlarged left lobe of the thyroid, including small calcification.    ABDOMEN AND PELVIS:  LIVER: Tiny calcification at the dome, likely a granuloma. Additional punctate calcification in the right inferior hepatic lobe on 3:45.  BILE DUCTS: Normal caliber.  GALLBLADDER: Cholelithiasis.  SPLEEN: Within normal limits.  PANCREAS: Few pancreatic calcifications, likely represent sequela of chronic pancreatitis.  ADRENALS: Within normal limits.  KIDNEYS/URETERS: No renal stones or hydronephrosis.    BLADDER: Within normal limits.  REPRODUCTIVE ORGANS: Calcified fibroid uterus.    BOWEL: No bowel obstruction. Anastomotic sutures in the sigmoid colon. Redemonstration of multiple ventral abdominal wall hernias, the largest in the right ventral abdominal wall which is widemouthed and contains numerous loops of both small and large bowel and mesenteric fat. Smaller paracentral abdominal hernia inferiorly containing mildly thick-walled obstructed loops of small bowel with adjacent phlegmonous changes. No evidence of strangulation.  PERITONEUM: No ascites.  VESSELS: Atherosclerotic changes.  RETROPERITONEUM/LYMPH NODES: No lymphadenopathy.  ABDOMINAL WALL: Ventral hernias, as described above.  BONES: Degenerative changes.    IMPRESSION:  1.  No aortic aneurysm or dissection. No main, lobar, or segmental pulmonary embolism.  2.  No acute pathology in the abdomen or pelvis. Redemonstration of multiple ventral abdominal hernias containing nonobstructed bowel loops and mesenteric fat.      OTHER: 	  	  LABS:	 	    CARDIAC MARKERS:  Troponin T, High Sensitivity Result: 19 ng/L (01-10 @ 00:48)  Troponin T, High Sensitivity Result: 21 ng/L (01-09 @ 22:59)                                  11.1   5.52  )-----------( 207      ( 11 Jan 2021 07:08 )             34.1     01-11    136  |  97<L>  |  15  ----------------------------<  148<H>  4.3   |  26  |  0.72    Ca    10.7<H>      11 Jan 2021 07:08  Phos  3.1     01-11  Mg     1.4     01-11    TPro  6.9  /  Alb  4.0  /  TBili  0.4  /  DBili  x   /  AST  15  /  ALT  16  /  AlkPhos  71  01-11    PT/INR - ( 09 Jan 2021 23:23 )   PT: 13.2 sec;   INR: 1.17 ratio         PTT - ( 09 Jan 2021 23:23 )  PTT:34.2 sec  proBNP:   Lipid Profile:   HgA1c:   TSH: Thyroid Stimulating Hormone, Serum: 0.90 uIU/mL (01-10 @ 14:21)         CARDIOLOGY CONSULT - Dr. Mendieta         HPI:    Pt is a 84 yo F [place] speaking hx obtained with  Ezra ID #[id number] w/ PMhx CAD and stents, HTN, DM chronic abdominal wound BIBEMS from home reporting 2 days of intermittent CP starting in Lt shoulder radiating to LT chest.  Pain is the same with ambulation and at rest.  As per ED notes complains of flank pain worse on inspiration.  Reports some relief w morphine.  Patient denies any dyspnea, palpitations, cough, syncope, edema, exertional symptoms, nausea, fever, chills,  or rash.  Denies any history of MI, valve disease, cardiomyopathy, or congenital heart disease.       PAST MEDICAL & SURGICAL HISTORY:  Hyperuricemia    Colorectal cancer  Remote hx; s/p colon resection    Primary osteoarthritis of both knees    CAD (coronary artery disease)    Diabetes mellitus    HTN (hypertension)    History of coronary artery stent placement    History of bowel resection    H/O hernia repair            PREVIOUS DIAGNOSTIC TESTING:    [ ] Echocardiogram:  [ ]  Catheterization:  [ ] Stress Test:  	    MEDICATIONS:  Home Medications:  allopurinol 100 mg oral tablet: 1 tab(s) orally once a day (11 Aug 2018 13:21)  aspirin 81 mg oral delayed release tablet: 1 tab(s) orally once a day (11 Aug 2018 13:21)  Colace 100 mg oral capsule: 1 cap(s) orally 2 times a day (11 Aug 2018 13:21)  Glucophage 850 mg oral tablet: 1 tab(s) orally 2 times a day (11 Aug 2018 13:21)  isosorbide mononitrate 30 mg oral tablet, extended release: 1 tab(s) orally once a day (in the morning) (11 Aug 2018 13:21)  Januvia 100 mg oral tablet: 1 tab(s) orally once a day (11 Aug 2018 13:21)  Lasix 20 mg oral tablet: 1 tab(s) orally once a day, As Needed for leg swelling (11 Aug 2018 13:21)  senna oral tablet: 2 tab(s) orally once a day (at bedtime) (11 Aug 2018 13:21)  Toprol-XL 25 mg oral tablet, extended release: 1 tab(s) orally once a day (11 Aug 2018 13:21)      MEDICATIONS  (STANDING):  allopurinol 100 milliGRAM(s) Oral daily  aspirin enteric coated 81 milliGRAM(s) Oral daily  atorvastatin 40 milliGRAM(s) Oral at bedtime  dextrose 40% Gel 15 Gram(s) Oral once  dextrose 5%. 1000 milliLiter(s) (50 mL/Hr) IV Continuous <Continuous>  dextrose 5%. 1000 milliLiter(s) (100 mL/Hr) IV Continuous <Continuous>  dextrose 50% Injectable 25 Gram(s) IV Push once  dextrose 50% Injectable 12.5 Gram(s) IV Push once  dextrose 50% Injectable 25 Gram(s) IV Push once  enalapril 20 milliGRAM(s) Oral daily  enoxaparin Injectable 40 milliGRAM(s) SubCutaneous daily  glucagon  Injectable 1 milliGRAM(s) IntraMuscular once  insulin lispro (ADMELOG) corrective regimen sliding scale   SubCutaneous three times a day before meals  insulin lispro (ADMELOG) corrective regimen sliding scale   SubCutaneous at bedtime  isosorbide   mononitrate ER Tablet (IMDUR) 30 milliGRAM(s) Oral daily  lidocaine   Patch 1 Patch Transdermal daily  metoprolol succinate ER 25 milliGRAM(s) Oral daily  pantoprazole    Tablet 40 milliGRAM(s) Oral before breakfast  polyethylene glycol 3350 17 Gram(s) Oral once  senna 2 Tablet(s) Oral at bedtime      FAMILY HISTORY:  No pertinent family history in first degree relatives        SOCIAL HISTORY:    [ ] Non-smoker  [ ] Smoker  [ ] Alcohol    Allergies    No Known Allergies    Intolerances    	    REVIEW OF SYSTEMS:  CONSTITUTIONAL: No fever, weight loss, or fatigue  EYES: No eye pain, visual disturbances, or discharge  ENMT:  No difficulty hearing, tinnitus, vertigo; No sinus or throat pain  NECK: No pain or stiffness  RESPIRATORY: No cough, wheezing, chills or hemoptysis; No Shortness of Breath  CARDIOVASCULAR: + chest pain, denies palpitations, passing out, dizziness, or leg swelling  GASTROINTESTINAL: No abdominal or epigastric pain. No nausea, vomiting, or hematemesis; No diarrhea or constipation. No melena or hematochezia.  GENITOURINARY: No dysuria, frequency, hematuria, or incontinence  NEUROLOGICAL: No headaches, memory loss, loss of strength, numbness, or tremors  SKIN: No itching, burning, rashes, or lesions   	    [ x] All others negative	see hpi   [ ] Unable to obtain    PHYSICAL EXAM:  T(C): 36.7 (01-11-21 @ 04:56), Max: 36.8 (01-10-21 @ 13:37)  HR: 67 (01-11-21 @ 04:56) (66 - 73)  BP: 157/60 (01-11-21 @ 04:56) (129/60 - 169/70)  RR: 18 (01-11-21 @ 04:56) (18 - 18)  SpO2: 98% (01-11-21 @ 04:56) (98% - 100%)  Wt(kg): --  I&O's Summary      Appearance: Normal	  Psychiatry: A & O x 3, Mood & affect appropriate  HEENT:   Normal oral mucosa, PERRL, EOMI	  Lymphatic: No lymphadenopathy  Cardiovascular: Normal S1 S2,RRR, No JVD, No murmurs  Respiratory: Lungs clear to auscultation	  Gastrointestinal:  Soft, Non-tender, + BS	  Skin: No rashes, No ecchymoses, No cyanosis	  Neurologic: Non-focal  Extremities: Normal range of motion, No clubbing, cyanosis or edema  Vascular: Peripheral pulses palpable 2+ bilaterally    TELEMETRY: SR 60-80s	    ECG:  SR 1st avb - no ischemic changes	  RADIOLOGY:  Xray Chest 1 View- PORTABLE-Urgent (01.09.21 @ 22:41)   FINDINGS:  The lungs are clear.  Heart size is normal.  No acute osseous findings.    IMPRESSION:  Clear lungs.    CT Angio Chest w/ IV Cont (01.10.21 @ 03:08)   FINDINGS:  CHEST:  LUNGS AND LARGE AIRWAYS: Patent central airways. No suspicious pulmonary nodules. Mild dependent subsegmental atelectasis  PLEURA: No pleural effusion.  VESSELS: Atherosclerotic changes of the aorta and coronary arteries. Left anterior descending coronary stent. No main, lobar, or segmental pulmonary embolism. Common origin of the left common carotid and innominate arteries, a normal variant.  HEART: Heart size is normal. No pericardial effusion.  MEDIASTINUM AND THEA: No lymphadenopathy.  CHEST WALL AND LOWER NECK: Heterogeneous and enlarged left lobe of the thyroid, including small calcification.    ABDOMEN AND PELVIS:  LIVER: Tiny calcification at the dome, likely a granuloma. Additional punctate calcification in the right inferior hepatic lobe on 3:45.  BILE DUCTS: Normal caliber.  GALLBLADDER: Cholelithiasis.  SPLEEN: Within normal limits.  PANCREAS: Few pancreatic calcifications, likely represent sequela of chronic pancreatitis.  ADRENALS: Within normal limits.  KIDNEYS/URETERS: No renal stones or hydronephrosis.    BLADDER: Within normal limits.  REPRODUCTIVE ORGANS: Calcified fibroid uterus.    BOWEL: No bowel obstruction. Anastomotic sutures in the sigmoid colon. Redemonstration of multiple ventral abdominal wall hernias, the largest in the right ventral abdominal wall which is widemouthed and contains numerous loops of both small and large bowel and mesenteric fat. Smaller paracentral abdominal hernia inferiorly containing mildly thick-walled obstructed loops of small bowel with adjacent phlegmonous changes. No evidence of strangulation.  PERITONEUM: No ascites.  VESSELS: Atherosclerotic changes.  RETROPERITONEUM/LYMPH NODES: No lymphadenopathy.  ABDOMINAL WALL: Ventral hernias, as described above.  BONES: Degenerative changes.    IMPRESSION:  1.  No aortic aneurysm or dissection. No main, lobar, or segmental pulmonary embolism.  2.  No acute pathology in the abdomen or pelvis. Redemonstration of multiple ventral abdominal hernias containing nonobstructed bowel loops and mesenteric fat.    Transthoracic Echocardiogram (01.11.21 @ 11:41)   Ejection Fraction (Teicholtz): 67 %  ------------------------------------------------------------------------  OBSERVATIONS:  Mitral Valve: Mitral annular calcification, otherwise  normal mitral valve. Minimal mitral regurgitation.  Aortic Root: Normal aortic root.  Aortic Valve: Aortic valve leaflet morphology not well  visualized.  Left Atrium: Normal left atrium.  Left Ventricle: Endocardium not well visualized; grossly  normal left ventricular systolic function. Normal left  ventricular internal dimensions and wall thicknesses. Mild  diastolic dysfunction (Stage I).  Right Heart: Normal right atrium. The right ventricle is  not well visualized; grossly normal right ventricular  systolic function. Normal tricuspid valve.  Minimal  tricuspid regurgitation. Normal pulmonic valve. Minimal  pulmonic regurgitation.  Pericardium/PleuraNormal pericardium with no pericardial  effusion.  ------------------------------------------------------------------------  CONCLUSIONS:  1. Mitral annular calcification, otherwise normal mitral  valve. Minimal mitral regurgitation.  2. Normal left ventricular internal dimensions and wall  thicknesses.  3. Endocardium not well visualized; grossly normal left  ventricular systolic function.  4. Mild diastolic dysfunction (Stage I).  5. The right ventricle is not well visualized; grossly  normal right ventricular systolic function.        OTHER: 	  	  LABS:	 	    CARDIAC MARKERS:  Troponin T, High Sensitivity Result: 19 ng/L (01-10 @ 00:48)  Troponin T, High Sensitivity Result: 21 ng/L (01-09 @ 22:59)                                  11.1   5.52  )-----------( 207      ( 11 Jan 2021 07:08 )             34.1     01-11    136  |  97<L>  |  15  ----------------------------<  148<H>  4.3   |  26  |  0.72    Ca    10.7<H>      11 Jan 2021 07:08  Phos  3.1     01-11  Mg     1.4     01-11    TPro  6.9  /  Alb  4.0  /  TBili  0.4  /  DBili  x   /  AST  15  /  ALT  16  /  AlkPhos  71  01-11    PT/INR - ( 09 Jan 2021 23:23 )   PT: 13.2 sec;   INR: 1.17 ratio         PTT - ( 09 Jan 2021 23:23 )  PTT:34.2 sec  proBNP:   Lipid Profile:   HgA1c:   TSH: Thyroid Stimulating Hormone, Serum: 0.90 uIU/mL (01-10 @ 14:21)

## 2021-01-12 DIAGNOSIS — R07.89 OTHER CHEST PAIN: ICD-10-CM

## 2021-01-12 DIAGNOSIS — E87.1 HYPO-OSMOLALITY AND HYPONATREMIA: ICD-10-CM

## 2021-01-12 LAB
ANION GAP SERPL CALC-SCNC: 13 MMOL/L — SIGNIFICANT CHANGE UP (ref 7–14)
BUN SERPL-MCNC: 26 MG/DL — HIGH (ref 7–23)
CALCIUM SERPL-MCNC: 10 MG/DL — SIGNIFICANT CHANGE UP (ref 8.4–10.5)
CHLORIDE SERPL-SCNC: 94 MMOL/L — LOW (ref 98–107)
CO2 SERPL-SCNC: 24 MMOL/L — SIGNIFICANT CHANGE UP (ref 22–31)
CREAT SERPL-MCNC: 0.85 MG/DL — SIGNIFICANT CHANGE UP (ref 0.5–1.3)
GLUCOSE SERPL-MCNC: 243 MG/DL — HIGH (ref 70–99)
HCT VFR BLD CALC: 31.8 % — LOW (ref 34.5–45)
HGB BLD-MCNC: 10.7 G/DL — LOW (ref 11.5–15.5)
MAGNESIUM SERPL-MCNC: 1.6 MG/DL — SIGNIFICANT CHANGE UP (ref 1.6–2.6)
MCHC RBC-ENTMCNC: 29.1 PG — SIGNIFICANT CHANGE UP (ref 27–34)
MCHC RBC-ENTMCNC: 33.6 GM/DL — SIGNIFICANT CHANGE UP (ref 32–36)
MCV RBC AUTO: 86.4 FL — SIGNIFICANT CHANGE UP (ref 80–100)
NRBC # BLD: 0 /100 WBCS — SIGNIFICANT CHANGE UP
NRBC # FLD: 0 K/UL — SIGNIFICANT CHANGE UP
PLATELET # BLD AUTO: 186 K/UL — SIGNIFICANT CHANGE UP (ref 150–400)
POTASSIUM SERPL-MCNC: 4.4 MMOL/L — SIGNIFICANT CHANGE UP (ref 3.5–5.3)
POTASSIUM SERPL-SCNC: 4.4 MMOL/L — SIGNIFICANT CHANGE UP (ref 3.5–5.3)
RBC # BLD: 3.68 M/UL — LOW (ref 3.8–5.2)
RBC # FLD: 12.2 % — SIGNIFICANT CHANGE UP (ref 10.3–14.5)
SODIUM SERPL-SCNC: 131 MMOL/L — LOW (ref 135–145)
WBC # BLD: 6.61 K/UL — SIGNIFICANT CHANGE UP (ref 3.8–10.5)
WBC # FLD AUTO: 6.61 K/UL — SIGNIFICANT CHANGE UP (ref 3.8–10.5)

## 2021-01-12 PROCEDURE — 78452 HT MUSCLE IMAGE SPECT MULT: CPT | Mod: 26

## 2021-01-12 PROCEDURE — 93018 CV STRESS TEST I&R ONLY: CPT | Mod: GC

## 2021-01-12 PROCEDURE — 93016 CV STRESS TEST SUPVJ ONLY: CPT | Mod: GC

## 2021-01-12 RX ORDER — KETOROLAC TROMETHAMINE 30 MG/ML
15 SYRINGE (ML) INJECTION ONCE
Refills: 0 | Status: DISCONTINUED | OUTPATIENT
Start: 2021-01-12 | End: 2021-01-12

## 2021-01-12 RX ORDER — ISOSORBIDE MONONITRATE 60 MG/1
60 TABLET, EXTENDED RELEASE ORAL DAILY
Refills: 0 | Status: DISCONTINUED | OUTPATIENT
Start: 2021-01-12 | End: 2021-01-15

## 2021-01-12 RX ORDER — MAGNESIUM HYDROXIDE 400 MG/1
30 TABLET, CHEWABLE ORAL DAILY
Refills: 0 | Status: DISCONTINUED | OUTPATIENT
Start: 2021-01-12 | End: 2021-01-15

## 2021-01-12 RX ORDER — POLYETHYLENE GLYCOL 3350 17 G/17G
17 POWDER, FOR SOLUTION ORAL DAILY
Refills: 0 | Status: DISCONTINUED | OUTPATIENT
Start: 2021-01-12 | End: 2021-01-15

## 2021-01-12 RX ADMIN — Medication 2: at 08:36

## 2021-01-12 RX ADMIN — Medication 81 MILLIGRAM(S): at 13:56

## 2021-01-12 RX ADMIN — Medication 2: at 18:30

## 2021-01-12 RX ADMIN — POLYETHYLENE GLYCOL 3350 17 GRAM(S): 17 POWDER, FOR SOLUTION ORAL at 20:41

## 2021-01-12 RX ADMIN — Medication 15 MILLIGRAM(S): at 09:28

## 2021-01-12 RX ADMIN — SENNA PLUS 2 TABLET(S): 8.6 TABLET ORAL at 21:48

## 2021-01-12 RX ADMIN — MORPHINE SULFATE 2 MILLIGRAM(S): 50 CAPSULE, EXTENDED RELEASE ORAL at 00:16

## 2021-01-12 RX ADMIN — Medication 2: at 13:57

## 2021-01-12 RX ADMIN — Medication 50 MILLIGRAM(S): at 05:28

## 2021-01-12 RX ADMIN — MORPHINE SULFATE 2 MILLIGRAM(S): 50 CAPSULE, EXTENDED RELEASE ORAL at 20:40

## 2021-01-12 RX ADMIN — ENOXAPARIN SODIUM 40 MILLIGRAM(S): 100 INJECTION SUBCUTANEOUS at 13:56

## 2021-01-12 RX ADMIN — ATORVASTATIN CALCIUM 40 MILLIGRAM(S): 80 TABLET, FILM COATED ORAL at 21:48

## 2021-01-12 RX ADMIN — Medication 20 MILLIGRAM(S): at 05:27

## 2021-01-12 RX ADMIN — LIDOCAINE 1 PATCH: 4 CREAM TOPICAL at 19:21

## 2021-01-12 RX ADMIN — LIDOCAINE 1 PATCH: 4 CREAM TOPICAL at 13:56

## 2021-01-12 RX ADMIN — ISOSORBIDE MONONITRATE 60 MILLIGRAM(S): 60 TABLET, EXTENDED RELEASE ORAL at 14:04

## 2021-01-12 RX ADMIN — PANTOPRAZOLE SODIUM 40 MILLIGRAM(S): 20 TABLET, DELAYED RELEASE ORAL at 05:27

## 2021-01-12 RX ADMIN — MAGNESIUM HYDROXIDE 30 MILLILITER(S): 400 TABLET, CHEWABLE ORAL at 17:21

## 2021-01-12 RX ADMIN — Medication 15 MILLIGRAM(S): at 05:27

## 2021-01-12 RX ADMIN — Medication 3: at 21:48

## 2021-01-12 RX ADMIN — Medication 100 MILLIGRAM(S): at 14:04

## 2021-01-12 NOTE — ADVANCED PRACTICE NURSE CONSULT - ASSESSMENT
General: A&O x 4, independently mobile in bed. Patient obese. Continent of urine and stool. Skin warm, dry with increased moisture in intertriginous folds, adequate skin turgor. C/o of left upper back pain. Primary RN aware.    Risk for moisture associated dermatitis beneath bilateral breasts and large abdominal pannus, increased moisture, skin intact.     Right upper and lower quadrant- (+) large hernia noted, skin affected by hernia with several open ulceration. Largest measures 3.5cmx3.5cmx0.3cm and is full thickness exposing 100% pink-moist agranular base. At 12 o'clock 2 cm from wound edge there are three areas that scattered all 0.5cmx0.5cmx0.2cm with denuded epidermis exposing pink-moist dermis. At 4 and 7 o'clock there area another three open ulcerations 0.8cm from wound edge, scattered exposing pink-moist dermis. Distally there is a stable intact scab measuring [id number]. Wounds are likely related to internal pressure. Periwound skin with significant hypopigmentation, suggesting that wound was previously larger. Skin is thin and fragile. Scant serous drainage. Goals of care: maintain moist environment to promote wound healing, protect periwound skin, atraumatic dressing application and removal, support hernia to prevent internal pressure.

## 2021-01-12 NOTE — PROGRESS NOTE ADULT - ASSESSMENT
Echo 1/11/21: EF 67%, min MR, grossly normal lv sys fx, mild diastolic dysfx, no pericardial effusion     a/p    Pt is a 84 yo F Dutch speaking hx obtained with  Ezra ID #[id number] w/ PMhx CAD and stents, HTN, DM, chronic abdominal wound BIBEMS from home reporting 2 days of intermittent CP starting in Lt shoulder radiating to LT chest.    1. Atypical cp  -?musculoskeletal   -HsT neg  -EKG without ischemic changes  -no acs  -c/w asa, imdur  -CTA neg for PE, and multiple ventral hernia without obstructed bowel loops, +gallstones  -UA neg   -Echo with grossly normal lv sys fx   -pending NST     2. HTN  -bp elevated  -imdur inc to 60 mg today  -cont w acei, bb - can uptitrate for better bp control     3. Abdominal wound dehiscence  -follows with Dr. Cook as outpt   -pending wound care consult    4.  Diabetes mellitus  -A1c noted   -hold oral hypoglycemics  -c/w ISS     dvt ppx

## 2021-01-12 NOTE — ADVANCED PRACTICE NURSE CONSULT - RECOMMEDATIONS
Continue to follow up with visiting nurse services upon discharge for chronic abdominal wound.  Consider follow up care at [facility]: [phone]. Address: [address] [place] [address].    Topical Recommendations:  Risk for moisture associate dermatitis- Cleanser intertriginous folds with luke-warm soap and water, dry well. Apply Interdry textile sheeting, under intertriginous folds leaving 2 inches exposed at ends to wick, remove to wash & dry affected area, then replace. Individual sheeting may be used for up to 5 days unless soiled.     Right upper and lower abdominal quadrant- gently cleanse wound and periwound skin with NS. Pat dry. Apply Liquid barrier film to periwound skin. Apply Silicone contact layer to wound base, cover with large silicone foam with border. Change every-other day. Apply abdominal binder.    Continue bariatric low air loss bed therapy, continue heel elevation with Z-flex fluidized positioning boots, continue to turn & reposition q2h with Z-edwin fluidized positioning device, continue use of single breathable pad, continue measures to decrease friction/shear/pressure.     Continue with nutritional support as per dietary/orders.    Findings and plan discussed with patient, patient's daughter Mahogany, primary RN, and primary team. All questions and concern addressed.    Please contact Wound Care Service Line if we can be of further assistance (ext [phone]).

## 2021-01-12 NOTE — ADVANCED PRACTICE NURSE CONSULT - REASON FOR CONSULT
Attempted to see patient after wound care consult received. Patient off unit for stress test. Will follow up.
Patient seen on skin care rounds after wound care referral received for assessment of skin impairment and recommendations of topical management. Chart reviewed: Pt h/o CAD s/p stents, Colorectal CA s/p colorectal resection and hernia repair, HTN, hyperuricemia, primary osteoarthritis of both knees, DM. P/w atypical CP/musculoskeletal pain. EKG neg for acute ischemia. Labs reviewed: BMI 34.6kg/m2, Mathieu 18, WBC 6.61 k/uL, H/H 10.7/31.8, Plt 186, HgbA1C 6.9%. CT abdomen and pelvis with contrast "Impression: 1.No aortic aneurysm or dissection. No main, lobar, or segmental pulmonary embolism. 2. No acute pathology in the abdomen or pelvis. Redemonstration of multiple ventral abdominal hernias containing nonobstructed bowel loops and mesenteric fat." Patient followed by internal medicine for medical management and cardiology for CP. Of note: on assessment patient speaks Palestinian, attempted to use  phone but patient continuously requested that we speak via her daughter using patient's cell phone. While talking to daughter Mahogany, requested that I communicate with patient using  both patient and daughter preferred not to use  phone. As per patient, she has had abdominal wound for "a long time" seen by visiting nurse services twice a day, applying adaptic touch and abdominal pads or large pink foam. As per patient wound is healing.

## 2021-01-13 ENCOUNTER — NON-APPOINTMENT (OUTPATIENT)
Age: 86
End: 2021-01-13

## 2021-01-13 LAB
ANION GAP SERPL CALC-SCNC: 14 MMOL/L — SIGNIFICANT CHANGE UP (ref 7–14)
BUN SERPL-MCNC: 23 MG/DL — SIGNIFICANT CHANGE UP (ref 7–23)
CALCIUM SERPL-MCNC: 10 MG/DL — SIGNIFICANT CHANGE UP (ref 8.4–10.5)
CHLORIDE SERPL-SCNC: 91 MMOL/L — LOW (ref 98–107)
CO2 SERPL-SCNC: 25 MMOL/L — SIGNIFICANT CHANGE UP (ref 22–31)
CREAT SERPL-MCNC: 0.8 MG/DL — SIGNIFICANT CHANGE UP (ref 0.5–1.3)
GLUCOSE SERPL-MCNC: 273 MG/DL — HIGH (ref 70–99)
HCT VFR BLD CALC: 32.4 % — LOW (ref 34.5–45)
HGB BLD-MCNC: 10.6 G/DL — LOW (ref 11.5–15.5)
MAGNESIUM SERPL-MCNC: 2 MG/DL — SIGNIFICANT CHANGE UP (ref 1.6–2.6)
MCHC RBC-ENTMCNC: 28.9 PG — SIGNIFICANT CHANGE UP (ref 27–34)
MCHC RBC-ENTMCNC: 32.7 GM/DL — SIGNIFICANT CHANGE UP (ref 32–36)
MCV RBC AUTO: 88.3 FL — SIGNIFICANT CHANGE UP (ref 80–100)
NRBC # BLD: 0 /100 WBCS — SIGNIFICANT CHANGE UP
NRBC # FLD: 0 K/UL — SIGNIFICANT CHANGE UP
OSMOLALITY SERPL: 282 MOSMOL/KG — SIGNIFICANT CHANGE UP (ref 280–301)
OSMOLALITY UR: 633 MOSM/KG — SIGNIFICANT CHANGE UP (ref 50–1200)
PLATELET # BLD AUTO: 191 K/UL — SIGNIFICANT CHANGE UP (ref 150–400)
POTASSIUM SERPL-MCNC: 4.2 MMOL/L — SIGNIFICANT CHANGE UP (ref 3.5–5.3)
POTASSIUM SERPL-SCNC: 4.2 MMOL/L — SIGNIFICANT CHANGE UP (ref 3.5–5.3)
RBC # BLD: 3.67 M/UL — LOW (ref 3.8–5.2)
RBC # FLD: 12.3 % — SIGNIFICANT CHANGE UP (ref 10.3–14.5)
SODIUM SERPL-SCNC: 130 MMOL/L — LOW (ref 135–145)
WBC # BLD: 6.21 K/UL — SIGNIFICANT CHANGE UP (ref 3.8–10.5)
WBC # FLD AUTO: 6.21 K/UL — SIGNIFICANT CHANGE UP (ref 3.8–10.5)

## 2021-01-13 PROCEDURE — 72146 MRI CHEST SPINE W/O DYE: CPT | Mod: 26

## 2021-01-13 RX ORDER — MINERAL OIL
30 OIL (ML) MISCELLANEOUS ONCE
Refills: 0 | Status: COMPLETED | OUTPATIENT
Start: 2021-01-13 | End: 2021-01-13

## 2021-01-13 RX ORDER — INSULIN GLARGINE 100 [IU]/ML
10 INJECTION, SOLUTION SUBCUTANEOUS AT BEDTIME
Refills: 0 | Status: DISCONTINUED | OUTPATIENT
Start: 2021-01-13 | End: 2021-01-15

## 2021-01-13 RX ORDER — MORPHINE SULFATE 50 MG/1
1 CAPSULE, EXTENDED RELEASE ORAL ONCE
Refills: 0 | Status: DISCONTINUED | OUTPATIENT
Start: 2021-01-13 | End: 2021-01-13

## 2021-01-13 RX ADMIN — ISOSORBIDE MONONITRATE 60 MILLIGRAM(S): 60 TABLET, EXTENDED RELEASE ORAL at 14:00

## 2021-01-13 RX ADMIN — MORPHINE SULFATE 2 MILLIGRAM(S): 50 CAPSULE, EXTENDED RELEASE ORAL at 06:30

## 2021-01-13 RX ADMIN — Medication 50 MILLIGRAM(S): at 06:14

## 2021-01-13 RX ADMIN — MORPHINE SULFATE 1 MILLIGRAM(S): 50 CAPSULE, EXTENDED RELEASE ORAL at 23:15

## 2021-01-13 RX ADMIN — Medication 81 MILLIGRAM(S): at 14:00

## 2021-01-13 RX ADMIN — Medication 3: at 18:41

## 2021-01-13 RX ADMIN — ENOXAPARIN SODIUM 40 MILLIGRAM(S): 100 INJECTION SUBCUTANEOUS at 14:00

## 2021-01-13 RX ADMIN — MORPHINE SULFATE 2 MILLIGRAM(S): 50 CAPSULE, EXTENDED RELEASE ORAL at 12:33

## 2021-01-13 RX ADMIN — POLYETHYLENE GLYCOL 3350 17 GRAM(S): 17 POWDER, FOR SOLUTION ORAL at 14:01

## 2021-01-13 RX ADMIN — PANTOPRAZOLE SODIUM 40 MILLIGRAM(S): 20 TABLET, DELAYED RELEASE ORAL at 06:14

## 2021-01-13 RX ADMIN — SENNA PLUS 2 TABLET(S): 8.6 TABLET ORAL at 21:31

## 2021-01-13 RX ADMIN — Medication 30 MILLIGRAM(S): at 19:23

## 2021-01-13 RX ADMIN — Medication 100 MILLIGRAM(S): at 14:00

## 2021-01-13 RX ADMIN — Medication 20 MILLIGRAM(S): at 06:14

## 2021-01-13 RX ADMIN — ATORVASTATIN CALCIUM 40 MILLIGRAM(S): 80 TABLET, FILM COATED ORAL at 21:31

## 2021-01-13 RX ADMIN — Medication 3: at 08:55

## 2021-01-13 RX ADMIN — MORPHINE SULFATE 2 MILLIGRAM(S): 50 CAPSULE, EXTENDED RELEASE ORAL at 18:42

## 2021-01-13 RX ADMIN — LIDOCAINE 1 PATCH: 4 CREAM TOPICAL at 19:17

## 2021-01-13 RX ADMIN — CYCLOBENZAPRINE HYDROCHLORIDE 5 MILLIGRAM(S): 10 TABLET, FILM COATED ORAL at 21:31

## 2021-01-13 RX ADMIN — Medication 30 MILLILITER(S): at 06:14

## 2021-01-13 RX ADMIN — INSULIN GLARGINE 10 UNIT(S): 100 INJECTION, SOLUTION SUBCUTANEOUS at 21:31

## 2021-01-13 RX ADMIN — LIDOCAINE 1 PATCH: 4 CREAM TOPICAL at 02:50

## 2021-01-13 RX ADMIN — LIDOCAINE 1 PATCH: 4 CREAM TOPICAL at 14:01

## 2021-01-13 NOTE — PROGRESS NOTE ADULT - ASSESSMENT
Echo 1/11/21: EF 67%, min MR, grossly normal lv sys fx, mild diastolic dysfx, no pericardial effusion   NST 1/12/21: normal study, revealing normal LV function., RV function appeared normal.      a/p    Pt is a 86 yo F Singaporean speaking hx obtained with  Ezra ID #[id number] w/ PMhx CAD and stents, HTN, DM, chronic abdominal wound BIBEMS from home reporting 2 days of intermittent CP starting in Lt shoulder radiating to LT chest.    1. Atypical cp  -?musculoskeletal   -HsT neg  -EKG without ischemic changes  -no acs  -c/w asa, imdur  -CTA neg for PE, and multiple ventral hernia without obstructed bowel loops, +gallstones  -UA neg   -Echo with grossly normal lv sys fx   -NST - normal study  -pending MRI     2. HTN  -bp improved today  -cont w current meds     3. Abdominal wound dehiscence  -follows with Dr. Cook as outpt   -cont wound care per reccs     4.  Diabetes mellitus  -A1c noted   -hold oral hypoglycemics  -c/w ISS     dvt ppx      Echo 1/11/21: EF 67%, min MR, grossly normal lv sys fx, mild diastolic dysfx, no pericardial effusion   NST 1/12/21: normal study, revealing normal LV function., RV function appeared normal.      a/p    Pt is a 86 yo F Sri Lankan speaking hx obtained with  Ezra ID #[id number] w/ PMhx CAD and stents, HTN, DM, chronic abdominal wound BIBEMS from home reporting 2 days of intermittent CP starting in Lt shoulder radiating to LT chest.    1. Atypical cp  -?musculoskeletal   -HsT neg  -EKG without ischemic changes  -no acs  -c/w asa, imdur  -CTA neg for PE, and multiple ventral hernia without obstructed bowel loops, +gallstones  -UA neg   -Echo with grossly normal lv sys fx   -NST - normal study  -pending MRI     2. HTN  -bp improved today  -cont w current meds     3. Abdominal wound dehiscence  -follows with Dr. Cook as outpt   -cont wound care per reccs     4.  Diabetes mellitus  -A1c noted   -hold oral hypoglycemics  -c/w ISS     dvt ppx     d/w pa

## 2021-01-14 ENCOUNTER — APPOINTMENT (OUTPATIENT)
Dept: WOUND CARE | Facility: CLINIC | Age: 86
End: 2021-01-14

## 2021-01-14 DIAGNOSIS — B02.9 ZOSTER WITHOUT COMPLICATIONS: ICD-10-CM

## 2021-01-14 LAB
ANION GAP SERPL CALC-SCNC: 14 MMOL/L — SIGNIFICANT CHANGE UP (ref 7–14)
BUN SERPL-MCNC: 20 MG/DL — SIGNIFICANT CHANGE UP (ref 7–23)
CALCIUM SERPL-MCNC: 10.1 MG/DL — SIGNIFICANT CHANGE UP (ref 8.4–10.5)
CHLORIDE SERPL-SCNC: 94 MMOL/L — LOW (ref 98–107)
CO2 SERPL-SCNC: 22 MMOL/L — SIGNIFICANT CHANGE UP (ref 22–31)
CREAT SERPL-MCNC: 0.67 MG/DL — SIGNIFICANT CHANGE UP (ref 0.5–1.3)
GLUCOSE SERPL-MCNC: 269 MG/DL — HIGH (ref 70–99)
POTASSIUM SERPL-MCNC: 4.7 MMOL/L — SIGNIFICANT CHANGE UP (ref 3.5–5.3)
POTASSIUM SERPL-SCNC: 4.7 MMOL/L — SIGNIFICANT CHANGE UP (ref 3.5–5.3)
SODIUM SERPL-SCNC: 130 MMOL/L — LOW (ref 135–145)

## 2021-01-14 PROCEDURE — 99222 1ST HOSP IP/OBS MODERATE 55: CPT | Mod: GC

## 2021-01-14 RX ORDER — GABAPENTIN 400 MG/1
300 CAPSULE ORAL ONCE
Refills: 0 | Status: COMPLETED | OUTPATIENT
Start: 2021-01-14 | End: 2021-01-14

## 2021-01-14 RX ORDER — GABAPENTIN 400 MG/1
100 CAPSULE ORAL THREE TIMES A DAY
Refills: 0 | Status: DISCONTINUED | OUTPATIENT
Start: 2021-01-14 | End: 2021-01-15

## 2021-01-14 RX ORDER — TIZANIDINE 4 MG/1
2 TABLET ORAL THREE TIMES A DAY
Refills: 0 | Status: DISCONTINUED | OUTPATIENT
Start: 2021-01-14 | End: 2021-01-14

## 2021-01-14 RX ORDER — TIZANIDINE 4 MG/1
2 TABLET ORAL ONCE
Refills: 0 | Status: DISCONTINUED | OUTPATIENT
Start: 2021-01-14 | End: 2021-01-14

## 2021-01-14 RX ORDER — VALACYCLOVIR 500 MG/1
1000 TABLET, FILM COATED ORAL THREE TIMES A DAY
Refills: 0 | Status: DISCONTINUED | OUTPATIENT
Start: 2021-01-14 | End: 2021-01-15

## 2021-01-14 RX ORDER — LIDOCAINE 4 G/100G
1 CREAM TOPICAL ONCE
Refills: 0 | Status: DISCONTINUED | OUTPATIENT
Start: 2021-01-14 | End: 2021-01-15

## 2021-01-14 RX ADMIN — Medication 3: at 09:14

## 2021-01-14 RX ADMIN — GABAPENTIN 300 MILLIGRAM(S): 400 CAPSULE ORAL at 22:17

## 2021-01-14 RX ADMIN — MORPHINE SULFATE 2 MILLIGRAM(S): 50 CAPSULE, EXTENDED RELEASE ORAL at 04:49

## 2021-01-14 RX ADMIN — Medication 100 MILLIGRAM(S): at 11:54

## 2021-01-14 RX ADMIN — Medication 4: at 12:53

## 2021-01-14 RX ADMIN — PANTOPRAZOLE SODIUM 40 MILLIGRAM(S): 20 TABLET, DELAYED RELEASE ORAL at 06:32

## 2021-01-14 RX ADMIN — Medication 2: at 21:33

## 2021-01-14 RX ADMIN — Medication 50 MILLIGRAM(S): at 06:33

## 2021-01-14 RX ADMIN — Medication 3: at 18:22

## 2021-01-14 RX ADMIN — GABAPENTIN 300 MILLIGRAM(S): 400 CAPSULE ORAL at 12:01

## 2021-01-14 RX ADMIN — ENOXAPARIN SODIUM 40 MILLIGRAM(S): 100 INJECTION SUBCUTANEOUS at 11:54

## 2021-01-14 RX ADMIN — Medication 81 MILLIGRAM(S): at 11:53

## 2021-01-14 RX ADMIN — LIDOCAINE 1 PATCH: 4 CREAM TOPICAL at 11:53

## 2021-01-14 RX ADMIN — SENNA PLUS 2 TABLET(S): 8.6 TABLET ORAL at 21:33

## 2021-01-14 RX ADMIN — INSULIN GLARGINE 10 UNIT(S): 100 INJECTION, SOLUTION SUBCUTANEOUS at 21:33

## 2021-01-14 RX ADMIN — LIDOCAINE 1 PATCH: 4 CREAM TOPICAL at 02:50

## 2021-01-14 RX ADMIN — ISOSORBIDE MONONITRATE 60 MILLIGRAM(S): 60 TABLET, EXTENDED RELEASE ORAL at 11:54

## 2021-01-14 RX ADMIN — LIDOCAINE 1 PATCH: 4 CREAM TOPICAL at 23:43

## 2021-01-14 RX ADMIN — ATORVASTATIN CALCIUM 40 MILLIGRAM(S): 80 TABLET, FILM COATED ORAL at 21:33

## 2021-01-14 RX ADMIN — LIDOCAINE 1 PATCH: 4 CREAM TOPICAL at 20:07

## 2021-01-14 RX ADMIN — Medication 20 MILLIGRAM(S): at 06:32

## 2021-01-14 RX ADMIN — GABAPENTIN 100 MILLIGRAM(S): 400 CAPSULE ORAL at 21:33

## 2021-01-14 RX ADMIN — MORPHINE SULFATE 2 MILLIGRAM(S): 50 CAPSULE, EXTENDED RELEASE ORAL at 21:33

## 2021-01-14 RX ADMIN — VALACYCLOVIR 1000 MILLIGRAM(S): 500 TABLET, FILM COATED ORAL at 21:35

## 2021-01-14 RX ADMIN — VALACYCLOVIR 1000 MILLIGRAM(S): 500 TABLET, FILM COATED ORAL at 15:16

## 2021-01-14 NOTE — CONSULT NOTE ADULT - ATTENDING COMMENTS
Patient seen and examined.  Agree with above NP note.  cv stable  atypical sx but recurrent   plan for nuclear stress faisal
Left T4 herpes zoster with acute neuritis, left CP, abd wall ulcer  -valtrex 1 gm po tid  -pain meds per primary team  -no s/s of secondary infection  -doubt steroids will be beneficial  -abd wall ulcer related to hernia - not infected    Parminder Johnson  Attending Physician   Division of Infectious Disease  Pager #[phone]  Available on [employer] Teams also  After 5pm/weekend or no response, call #[phone]

## 2021-01-14 NOTE — PROVIDER CONTACT NOTE (OTHER) - ASSESSMENT
Patient vitals are stable, AOx4, O2 >95% on room air. Patient is agitated and worrisome about her constant chest pain, and constipation.
Patient vitals are stable, AOx4, no distress noted at this time.
Patient vitals are stable, AOx4, no distress noted from the pain.
Patent is denying any pain or discomfort, all vital signs have been stable

## 2021-01-14 NOTE — PROVIDER CONTACT NOTE (OTHER) - SITUATION
Patient BP is 170/58 HR 63 this morning, elevated than usual. Patient in bed calm.
Patient is 1st degree AV block on cardiac monitor
Patient complaining of chest pain that is still consistent and only shortly relieved by morphine. Patient still constipated after milk of mag, miralax and senna.
Patient complaining of chest pain that is crushing like, that is not radiating to the arm, no changes in pain for posture or movement.

## 2021-01-14 NOTE — PROVIDER CONTACT NOTE (OTHER) - ACTION/TREATMENT ORDERED:
ELIZABETH Conroy notified if MRI can be expedited, per PA, will endorse to day shift. Mineral oil was given, enema to be given once the chest pain resolves and patient can tolerate.
no further actions ordered at this time
To continue with morning enalapril and metoprolol and continue to monitor BP per order.
Stat EKG ordered and completed. Placed in the chart. PRN morphine 2mg administered after the EKG for comfort.

## 2021-01-14 NOTE — PROVIDER CONTACT NOTE (OTHER) - RECOMMENDATIONS
Marycarmen Brock notified if MRI can be expedited, since she has constant chest pain that only resolve shortly after pain medication.
Stat EKG ordered.
To continue with morning enalapril and metoprolol and continue to monitor BP per order.
continue to monitor patient

## 2021-01-14 NOTE — PROGRESS NOTE ADULT - ASSESSMENT
Echo 1/11/21: EF 67%, min MR, grossly normal lv sys fx, mild diastolic dysfx, no pericardial effusion   NST 1/12/21: normal study, revealing normal LV function., RV function appeared normal.      a/p    Pt is a 84 yo F Sudanese speaking hx obtained with  Ezra ID #[id number] w/ PMhx CAD and stents, HTN, DM, chronic abdominal wound BIBEMS from home reporting 2 days of intermittent CP starting in Lt shoulder radiating to LT chest.    1. Atypical cp  -r/t shingles   -HsT neg  -EKG without ischemic changes  -no acs  -c/w asa, imdur  -CTA neg for PE, and multiple ventral hernia without obstructed bowel loops, +gallstones  -UA neg   -Echo with grossly normal lv sys fx   -NST - normal study  -MRI neg     2. HTN  -bp elevated in am  -cont w current meds   -cont to monitor     3. Abdominal wound dehiscence  -follows with Dr. Cook as outpt   -cont wound care per reccs     4.  Diabetes mellitus  -A1c noted   -c/w ISS     5. Shingles  -started valacyclovir, gabapentin   -ID evaluation     dvt ppx     d/w pa

## 2021-01-14 NOTE — CONSULT NOTE ADULT - SUBJECTIVE AND OBJECTIVE BOX
Patient is a 85y old  Female who presents with a chief complaint of chest pain (14 Jan 2021 11:35)    HPI:  Pt is a 84 yo F [place] citizen speaking hx obtained with  Ezra ID #[id number] w/ hx CAD and stents, chronic abdominal wound BIBEMS from home reporting 2 days of intermittent CP starting in Lt shoulder radiating to LT chest as per triage notes. As per ED notes complains of flank pain worse on inspiration. noted tenderness at paraspinal muscle given morphine for pain and developed itching now resolved.      In ED she underwent CT chest/A/P was given morphine  (10 Hermilo 2021 11:28)    Additional HPI:   [place]  ID#___ Patient underwent extensive workup including CTA, CT a/p, MRI T-spine, TTE, and nuclear stress test which were overall unremarkable. Patient has been receiving Toradol, lidocaine patches for pain which have been helping to alleviate pain. Also received single dose of solumedrol 30mg.      prior hospital charts reviewed [ x ]  primary team notes reviewed [ x ]  other consultant notes reviewed [ x ]    PAST MEDICAL & SURGICAL HISTORY:  Hyperuricemia    Colorectal cancer  Remote hx; s/p colon resection    Primary osteoarthritis of both knees    CAD (coronary artery disease)    Diabetes mellitus    HTN (hypertension)    History of coronary artery stent placement    History of bowel resection    H/O hernia repair      Allergies  No Known Allergies    ANTIMICROBIALS (past 90 days)  MEDICATIONS  (STANDING):      ANTIMICROBIALS:    valACYclovir 1000 three times a day    OTHER MEDS: MEDICATIONS  (STANDING):  acetaminophen   Tablet .. 650 every 6 hours PRN  allopurinol 100 daily  aspirin enteric coated 81 daily  atorvastatin 40 at bedtime  cyclobenzaprine 5 three times a day PRN  dextrose 40% Gel 15 once  dextrose 50% Injectable 25 once  dextrose 50% Injectable 12.5 once  dextrose 50% Injectable 25 once  enalapril 20 daily  enoxaparin Injectable 40 daily  gabapentin 100 three times a day  glucagon  Injectable 1 once  insulin glargine Injectable (LANTUS) 10 at bedtime  insulin lispro (ADMELOG) corrective regimen sliding scale  three times a day before meals  insulin lispro (ADMELOG) corrective regimen sliding scale  at bedtime  isosorbide   mononitrate ER Tablet (IMDUR) 60 daily  magnesium hydroxide Suspension 30 daily PRN  metoprolol succinate ER 50 daily  morphine  - Injectable 2 every 6 hours PRN  ondansetron Injectable 4 every 6 hours PRN  pantoprazole    Tablet 40 before breakfast  polyethylene glycol 3350 17 daily  senna 2 at bedtime    SOCIAL HISTORY:       FAMILY HISTORY:  No pertinent family history in first degree relatives      REVIEW OF SYSTEMS  [  ] ROS unobtainable because:    [ x ] All other systems negative except as noted below:	    Constitutional:  [ ] fever [ ] chills  [ ] weight loss  [ ] weakness  Skin:  [ ] rash [ ] phlebitis	  Eyes: [ ] icterus [ ] pain  [ ] discharge	  ENMT: [ ] sore throat  [ ] thrush [ ] ulcers [ ] exudates  Respiratory: [ ] dyspnea [ ] hemoptysis [ ] cough [ ] sputum	  Cardiovascular:  [ ] chest pain [ ] palpitations [ ] edema	  Gastrointestinal:  [ ] nausea [ ] vomiting [ ] diarrhea [ ] constipation [ ] pain	  Genitourinary:  [ ] dysuria [ ] frequency [ ] hematuria [ ] discharge [ ] flank pain  [ ] incontinence  Musculoskeletal:  [ ] myalgias [ ] arthralgias [ ] arthritis  [x ] chest wall pain  Neurological:  [ ] headache [ ] seizures  [ ] confusion/altered mental status  Psychiatric:  [ ] anxiety [ ] depression	  Hematology/Lymphatics:  [ ] lymphadenopathy  Endocrine:  [ ] adrenal [ ] thyroid  Allergic/Immunologic:	 [ ] transplant [ ] seasonal    Vital Signs Last 24 Hrs  T(F): 98 (01-14-21 @ 11:51), Max: 98.9 (01-12-21 @ 05:25)  Vital Signs Last 24 Hrs  HR: 66 (01-14-21 @ 11:51) (63 - 67)  BP: 168/61 (01-14-21 @ 11:51) (124/60 - 170/58)  RR: 17 (01-14-21 @ 11:51)  SpO2: 98% (01-14-21 @ 11:51) (96% - 98%)  Wt(kg): --    EXAM:  Constitutional: Not in acute distress  Eyes: Pupils bilaterally reactive to light. No icterus.  Oral cavity: Clear, no lesions  Neck: No neck vein distension noted  RS: Chest clear to auscultation bilaterally. No wheeze/rhonchi/crepitations.  CVS: S1, S2 heard. Regular rate and rhythm. No murmurs/rubs/gallops.  Abdomen: Soft. No guarding/rigidity/tenderness.  : No acute abnormalities  Extremities: Warm. No pedal edema  Skin:   Vascular: No evidence of phlebitis  Neuro: Alert, oriented to time/place/person                          10.6   6.21  )-----------( 191      ( 13 Jan 2021 07:39 )             32.4     01-14    130<L>  |  94<L>  |  20  ----------------------------<  269<H>  4.7   |  22  |  0.67    Ca    10.1      14 Jan 2021 08:35  Mg     2.0     01-13      MICROBIOLOGY:  Culture - Urine (collected 10 Hermilo 2021 08:52)  Source: .Urine Clean Catch (Midstream)  Final Report (11 Jan 2021 10:22):    <10,000 CFU/mL Normal Urogenital Chanelle        RADIOLOGY:  Imaging below personally reviewed    < from: MR Thoracic Spine No Cont (01.13.21 @ 14:43) >  INTERPRETATION:  Clinical indication: Back pain.    MRI of the thoracic spine was performed using sagittal T1-T2 and STIR sequence. Axial T1 and T2-weighted sequences were performed as well.    The slight increased kyphosis is seen.    The vertebral body height alignment and marrow signal appear normal    Disc desiccation is seen throughout the thoracic spine region.    Modic type II degenerative change seen involving the superior inferior endplate of T12.    C7-T1: Small central disc herniation is seen without significant compromise of the spinal canal or either neural foramen.    The spinal cord demonstrates normal signal and caliber.    Evaluation of the paraspinal softtissues appear unremarkable    IMPRESSION: Degenerative changes involving the thoracic spine as described above.    < end of copied text >  < from: CT Angio Chest w/ IV Cont (01.10.21 @ 03:08) >  IMPRESSION:  1.  No aortic aneurysm or dissection. No main, lobar, or segmental pulmonary embolism.  2.  No acute pathology in the abdomen or pelvis. Redemonstration of multiple ventral abdominal hernias containing nonobstructed bowel loops and mesenteric fat.    < end of copied text >     Patient is a 85y old  Female who presents with a chief complaint of chest pain (14 Jan 2021 11:35)    HPI:  Pt is a 86 yo F Marshallese speaking hx obtained with  Ezra ID #[id number] w/ hx CAD and stents, chronic abdominal wound BIBEMS from home reporting 2 days of intermittent CP starting in Lt shoulder radiating to LT chest as per triage notes. As per ED notes complains of flank pain worse on inspiration. noted tenderness at paraspinal muscle given morphine for pain and developed itching now resolved.      In ED she underwent CT chest/A/P was given morphine  (10 Hermilo 2021 11:28)    Additional HPI:   [place]  ID#___ Patient underwent extensive workup including CTA, CT a/p, MRI T-spine, TTE, and nuclear stress test which were overall unremarkable. Patient has been receiving Toradol, lidocaine patches, morphine 2mg IV q6 hours PRN (received x3 doses last 24hrs) for pain which have been helping to alleviate pain. Also received single dose of solumedrol 30mg.      prior hospital charts reviewed [ x ]  primary team notes reviewed [ x ]  other consultant notes reviewed [ x ]    PAST MEDICAL & SURGICAL HISTORY:  Hyperuricemia    Colorectal cancer  Remote hx; s/p colon resection    Primary osteoarthritis of both knees    CAD (coronary artery disease)    Diabetes mellitus    HTN (hypertension)    History of coronary artery stent placement    History of bowel resection    H/O hernia repair      Allergies  No Known Allergies    ANTIMICROBIALS (past 90 days)  MEDICATIONS  (STANDING):      ANTIMICROBIALS:    valACYclovir 1000 three times a day    OTHER MEDS: MEDICATIONS  (STANDING):  acetaminophen   Tablet .. 650 every 6 hours PRN  allopurinol 100 daily  aspirin enteric coated 81 daily  atorvastatin 40 at bedtime  cyclobenzaprine 5 three times a day PRN  dextrose 40% Gel 15 once  dextrose 50% Injectable 25 once  dextrose 50% Injectable 12.5 once  dextrose 50% Injectable 25 once  enalapril 20 daily  enoxaparin Injectable 40 daily  gabapentin 100 three times a day  glucagon  Injectable 1 once  insulin glargine Injectable (LANTUS) 10 at bedtime  insulin lispro (ADMELOG) corrective regimen sliding scale  three times a day before meals  insulin lispro (ADMELOG) corrective regimen sliding scale  at bedtime  isosorbide   mononitrate ER Tablet (IMDUR) 60 daily  magnesium hydroxide Suspension 30 daily PRN  metoprolol succinate ER 50 daily  morphine  - Injectable 2 every 6 hours PRN  ondansetron Injectable 4 every 6 hours PRN  pantoprazole    Tablet 40 before breakfast  polyethylene glycol 3350 17 daily  senna 2 at bedtime    SOCIAL HISTORY:       FAMILY HISTORY:  No pertinent family history in first degree relatives      REVIEW OF SYSTEMS  [  ] ROS unobtainable because:    [ x ] All other systems negative except as noted below:	    Constitutional:  [ ] fever [ ] chills  [ ] weight loss  [ ] weakness  Skin:  [ ] rash [ ] phlebitis	  Eyes: [ ] icterus [ ] pain  [ ] discharge	  ENMT: [ ] sore throat  [ ] thrush [ ] ulcers [ ] exudates  Respiratory: [ ] dyspnea [ ] hemoptysis [ ] cough [ ] sputum	  Cardiovascular:  [ ] chest pain [ ] palpitations [ ] edema	  Gastrointestinal:  [ ] nausea [ ] vomiting [ ] diarrhea [ ] constipation [ ] pain	  Genitourinary:  [ ] dysuria [ ] frequency [ ] hematuria [ ] discharge [ ] flank pain  [ ] incontinence  Musculoskeletal:  [ ] myalgias [ ] arthralgias [ ] arthritis  [x ] chest wall pain  Neurological:  [ ] headache [ ] seizures  [ ] confusion/altered mental status  Psychiatric:  [ ] anxiety [ ] depression	  Hematology/Lymphatics:  [ ] lymphadenopathy  Endocrine:  [ ] adrenal [ ] thyroid  Allergic/Immunologic:	 [ ] transplant [ ] seasonal    Vital Signs Last 24 Hrs  T(F): 98 (01-14-21 @ 11:51), Max: 98.9 (01-12-21 @ 05:25)  Vital Signs Last 24 Hrs  HR: 66 (01-14-21 @ 11:51) (63 - 67)  BP: 168/61 (01-14-21 @ 11:51) (124/60 - 170/58)  RR: 17 (01-14-21 @ 11:51)  SpO2: 98% (01-14-21 @ 11:51) (96% - 98%)  Wt(kg): --    EXAM:  Constitutional: Not in acute distress  Eyes: Pupils bilaterally reactive to light. No icterus.  Oral cavity: Clear, no lesions  Neck: No neck vein distension noted  RS: Chest clear to auscultation bilaterally. No wheeze/rhonchi/crepitations.  CVS: S1, S2 heard. Regular rate and rhythm. No murmurs/rubs/gallops.  Abdomen: Soft. No guarding/rigidity/tenderness.  : No acute abnormalities  Extremities: Warm. No pedal edema  Skin:   Vascular: No evidence of phlebitis  Neuro: Alert, oriented to time/place/person                          10.6   6.21  )-----------( 191      ( 13 Jan 2021 07:39 )             32.4     01-14    130<L>  |  94<L>  |  20  ----------------------------<  269<H>  4.7   |  22  |  0.67    Ca    10.1      14 Jan 2021 08:35  Mg     2.0     01-13      MICROBIOLOGY:  Culture - Urine (collected 10 Hermilo 2021 08:52)  Source: .Urine Clean Catch (Midstream)  Final Report (11 Jan 2021 10:22):    <10,000 CFU/mL Normal Urogenital Chanelle        RADIOLOGY:  Imaging below personally reviewed    < from: MR Thoracic Spine No Cont (01.13.21 @ 14:43) >  INTERPRETATION:  Clinical indication: Back pain.    MRI of the thoracic spine was performed using sagittal T1-T2 and STIR sequence. Axial T1 and T2-weighted sequences were performed as well.    The slight increased kyphosis is seen.    The vertebral body height alignment and marrow signal appear normal    Disc desiccation is seen throughout the thoracic spine region.    Modic type II degenerative change seen involving the superior inferior endplate of T12.    C7-T1: Small central disc herniation is seen without significant compromise of the spinal canal or either neural foramen.    The spinal cord demonstrates normal signal and caliber.    Evaluation of the paraspinal softtissues appear unremarkable    IMPRESSION: Degenerative changes involving the thoracic spine as described above.    < end of copied text >  < from: CT Angio Chest w/ IV Cont (01.10.21 @ 03:08) >  IMPRESSION:  1.  No aortic aneurysm or dissection. No main, lobar, or segmental pulmonary embolism.  2.  No acute pathology in the abdomen or pelvis. Redemonstration of multiple ventral abdominal hernias containing nonobstructed bowel loops and mesenteric fat.    < end of copied text >     Patient is a 85y old  Female who presents with a chief complaint of chest pain (14 Jan 2021 11:35)    HPI:  Pt is a 86 yo F Lithuanian speaking hx obtained with  Ezra ID #[id number] w/ hx CAD and stents, chronic abdominal wound BIBEMS from home reporting 2 days of intermittent CP starting in Lt shoulder radiating to LT chest as per triage notes. As per ED notes complains of flank pain worse on inspiration. noted tenderness at paraspinal muscle given morphine for pain and developed itching now resolved.      In ED she underwent CT chest/A/P was given morphine  (10 Hermilo 2021 11:28)    Additional HPI:   Slovak  ID#[id number]. Patient underwent extensive workup including CTA, CT a/p, MRI T-spine, TTE, and nuclear stress test which were overall unremarkable. Patient has been receiving Toradol, lidocaine patches, morphine 2mg IV q6 hours PRN (received x3 doses last 24hrs) for pain which initially helped but now has not been controlling her pain. Also received single dose of solumedrol 30mg. She reports continued severe pain in the L chest, flank and back which is worse with deep inspiration. Patient states that the pain is debilitating and bothers her especially at night. She has never had chicken pox or shingles and has not received the shingles vaccine. Unfortunately, patient's  recently passed away. Of note, patient has history of DM, FSBG this admission 200-300s.     prior hospital charts reviewed [ x ]  primary team notes reviewed [ x ]  other consultant notes reviewed [ x ]    PAST MEDICAL & SURGICAL HISTORY:  Hyperuricemia    Colorectal cancer  Remote hx; s/p colon resection    Primary osteoarthritis of both knees    CAD (coronary artery disease)    Diabetes mellitus    HTN (hypertension)    History of coronary artery stent placement    History of bowel resection    H/O hernia repair      Allergies  No Known Allergies    ANTIMICROBIALS (past 90 days)  MEDICATIONS  (STANDING):      ANTIMICROBIALS:    valACYclovir 1000 three times a day    OTHER MEDS: MEDICATIONS  (STANDING):  acetaminophen   Tablet .. 650 every 6 hours PRN  allopurinol 100 daily  aspirin enteric coated 81 daily  atorvastatin 40 at bedtime  cyclobenzaprine 5 three times a day PRN  dextrose 40% Gel 15 once  dextrose 50% Injectable 25 once  dextrose 50% Injectable 12.5 once  dextrose 50% Injectable 25 once  enalapril 20 daily  enoxaparin Injectable 40 daily  gabapentin 100 three times a day  glucagon  Injectable 1 once  insulin glargine Injectable (LANTUS) 10 at bedtime  insulin lispro (ADMELOG) corrective regimen sliding scale  three times a day before meals  insulin lispro (ADMELOG) corrective regimen sliding scale  at bedtime  isosorbide   mononitrate ER Tablet (IMDUR) 60 daily  magnesium hydroxide Suspension 30 daily PRN  metoprolol succinate ER 50 daily  morphine  - Injectable 2 every 6 hours PRN  ondansetron Injectable 4 every 6 hours PRN  pantoprazole    Tablet 40 before breakfast  polyethylene glycol 3350 17 daily  senna 2 at bedtime    SOCIAL HISTORY:     Patient lives at home alone, has home aides visit daily. Patient's  recently passed away. Denies tobacco, ETOH use. No recent travel or sick contacts.    FAMILY HISTORY:  No pertinent family history in first degree relatives      REVIEW OF SYSTEMS  [  ] ROS unobtainable because:    [ x ] All other systems negative except as noted below:	    Constitutional:  [ ] fever [ ] chills  [ ] weight loss  [ ] weakness  Skin:  [ ] rash [ ] phlebitis	  Eyes: [ ] icterus [ ] pain  [ ] discharge	  ENMT: [ ] sore throat  [ ] thrush [ ] ulcers [ ] exudates  Respiratory: [ ] dyspnea [ ] hemoptysis [ ] cough [ ] sputum	  Cardiovascular:  [x] chest pain [ ] palpitations [ ] edema	  Gastrointestinal:  [ ] nausea [ ] vomiting [ ] diarrhea [x] constipation [ ] pain	  Genitourinary:  [ ] dysuria [ ] frequency [ ] hematuria [ ] discharge [ ] flank pain  [ ] incontinence  Musculoskeletal:  [ ] myalgias [ ] arthralgias [ ] arthritis  [x ] chest wall pain  Neurological:  [ ] headache [ ] seizures  [ ] confusion/altered mental status  Psychiatric:  [ ] anxiety [ ] depression	  Hematology/Lymphatics:  [ ] lymphadenopathy  Endocrine:  [ ] adrenal [ ] thyroid  Allergic/Immunologic:	 [ ] transplant [ ] seasonal    Vital Signs Last 24 Hrs  T(F): 98 (01-14-21 @ 11:51), Max: 98.9 (01-12-21 @ 05:25)  Vital Signs Last 24 Hrs  HR: 66 (01-14-21 @ 11:51) (63 - 67)  BP: 168/61 (01-14-21 @ 11:51) (124/60 - 170/58)  RR: 17 (01-14-21 @ 11:51)  SpO2: 98% (01-14-21 @ 11:51) (96% - 98%)  Wt(kg): --    EXAM:  Constitutional: Mild distress due to pain  Eyes: Pupils bilaterally reactive to light. No icterus.  Oral cavity: Clear, no lesions  Neck: No neck vein distension noted  RS: Chest clear to auscultation bilaterally. No wheeze/rhonchi/crepitations. Pain with deep inspiration  CVS: S1, S2 heard. Regular rate and rhythm. No murmurs/rubs/gallops.  Abdomen: Obese, NTTP, R ventral hernia with dressed ulcer; normal bowel sounds  : No acute abnormalities  Extremities: Warm. 1+ pitting edema BLE R>L  Skin: Painful vesicular rash L flank ~T11 dermatome  Vascular: No evidence of phlebitis  Neuro: Alert, oriented to time/place/person                          10.6   6.21  )-----------( 191      ( 13 Jan 2021 07:39 )             32.4     01-14    130<L>  |  94<L>  |  20  ----------------------------<  269<H>  4.7   |  22  |  0.67    Ca    10.1      14 Jan 2021 08:35  Mg     2.0     01-13      MICROBIOLOGY:  Culture - Urine (collected 10 Hermilo 2021 08:52)  Source: .Urine Clean Catch (Midstream)  Final Report (11 Jan 2021 10:22):    <10,000 CFU/mL Normal Urogenital Chanelle        RADIOLOGY:  Imaging below personally reviewed    < from: MR Thoracic Spine No Cont (01.13.21 @ 14:43) >  INTERPRETATION:  Clinical indication: Back pain.    MRI of the thoracic spine was performed using sagittal T1-T2 and STIR sequence. Axial T1 and T2-weighted sequences were performed as well.    The slight increased kyphosis is seen.    The vertebral body height alignment and marrow signal appear normal    Disc desiccation is seen throughout the thoracic spine region.    Modic type II degenerative change seen involving the superior inferior endplate of T12.    C7-T1: Small central disc herniation is seen without significant compromise of the spinal canal or either neural foramen.    The spinal cord demonstrates normal signal and caliber.    Evaluation of the paraspinal softtissues appear unremarkable    IMPRESSION: Degenerative changes involving the thoracic spine as described above.    < end of copied text >  < from: CT Angio Chest w/ IV Cont (01.10.21 @ 03:08) >  IMPRESSION:  1.  No aortic aneurysm or dissection. No main, lobar, or segmental pulmonary embolism.  2.  No acute pathology in the abdomen or pelvis. Redemonstration of multiple ventral abdominal hernias containing nonobstructed bowel loops and mesenteric fat.    < end of copied text >     Patient is a 85y old  Female who presents with a chief complaint of chest pain (14 Jan 2021 11:35)    HPI:  Pt is a 86 yo F Egyptian speaking hx obtained with  Ezra ID #[id number] w/ hx CAD and stents, chronic abdominal wound BIBEMS from home reporting 2 days of intermittent CP starting in Lt shoulder radiating to LT chest as per triage notes. As per ED notes complains of flank pain worse on inspiration. noted tenderness at paraspinal muscle given morphine for pain and developed itching now resolved.      In ED she underwent CT chest/A/P was given morphine  (10 Hermilo 2021 11:28)    Additional HPI:   [place]  ID#[id number]. Patient underwent extensive workup including CTA, CT a/p, MRI T-spine, TTE, and nuclear stress test which were overall unremarkable. Patient has been receiving Toradol, lidocaine patches, morphine 2mg IV q6 hours PRN (received x3 doses last 24hrs) for pain which initially helped but now has not been controlling her pain. Also received single dose of solumedrol 30mg. She reports continued severe pain in the L chest, flank and back which is worse with deep inspiration. Patient states that the pain is debilitating and bothers her especially at night. She has never had chicken pox or shingles and has not received the shingles vaccine. Unfortunately, patient's  recently passed away. Of note, patient has history of DM, FSBG this admission 200-300s.     prior hospital charts reviewed [ x ]  primary team notes reviewed [ x ]  other consultant notes reviewed [ x ]    PAST MEDICAL & SURGICAL HISTORY:  Hyperuricemia    Colorectal cancer  Remote hx; s/p colon resection    Primary osteoarthritis of both knees    CAD (coronary artery disease)    Diabetes mellitus    HTN (hypertension)    History of coronary artery stent placement    History of bowel resection    H/O hernia repair      Allergies  No Known Allergies    ANTIMICROBIALS (past 90 days)  MEDICATIONS  (STANDING):      ANTIMICROBIALS:    valACYclovir 1000 three times a day    OTHER MEDS: MEDICATIONS  (STANDING):  acetaminophen   Tablet .. 650 every 6 hours PRN  allopurinol 100 daily  aspirin enteric coated 81 daily  atorvastatin 40 at bedtime  cyclobenzaprine 5 three times a day PRN  dextrose 40% Gel 15 once  dextrose 50% Injectable 25 once  dextrose 50% Injectable 12.5 once  dextrose 50% Injectable 25 once  enalapril 20 daily  enoxaparin Injectable 40 daily  gabapentin 100 three times a day  glucagon  Injectable 1 once  insulin glargine Injectable (LANTUS) 10 at bedtime  insulin lispro (ADMELOG) corrective regimen sliding scale  three times a day before meals  insulin lispro (ADMELOG) corrective regimen sliding scale  at bedtime  isosorbide   mononitrate ER Tablet (IMDUR) 60 daily  magnesium hydroxide Suspension 30 daily PRN  metoprolol succinate ER 50 daily  morphine  - Injectable 2 every 6 hours PRN  ondansetron Injectable 4 every 6 hours PRN  pantoprazole    Tablet 40 before breakfast  polyethylene glycol 3350 17 daily  senna 2 at bedtime    SOCIAL HISTORY:     Patient lives at home alone, has home aides visit daily. Patient's  recently passed away. Denies tobacco, ETOH use. No recent travel or sick contacts.    FAMILY HISTORY:  No pertinent family history in first degree relatives      REVIEW OF SYSTEMS  [  ] ROS unobtainable because:    [ x ] All other systems negative except as noted below:	    Constitutional:  [ ] fever [ ] chills  [ ] weight loss  [ ] weakness  Skin:  [ ] rash [ ] phlebitis	  Eyes: [ ] icterus [ ] pain  [ ] discharge	  ENMT: [ ] sore throat  [ ] thrush [ ] ulcers [ ] exudates  Respiratory: [ ] dyspnea [ ] hemoptysis [ ] cough [ ] sputum	  Cardiovascular:  [x] chest pain [ ] palpitations [ ] edema	  Gastrointestinal:  [ ] nausea [ ] vomiting [ ] diarrhea [x] constipation [ ] pain	  Genitourinary:  [ ] dysuria [ ] frequency [ ] hematuria [ ] discharge [ ] flank pain  [ ] incontinence  Musculoskeletal:  [ ] myalgias [ ] arthralgias [ ] arthritis  [x ] chest wall pain  Neurological:  [ ] headache [ ] seizures  [ ] confusion/altered mental status  Psychiatric:  [ ] anxiety [ ] depression	  Hematology/Lymphatics:  [ ] lymphadenopathy  Endocrine:  [ ] adrenal [ ] thyroid  Allergic/Immunologic:	 [ ] transplant [ ] seasonal    Vital Signs Last 24 Hrs  T(F): 98 (01-14-21 @ 11:51), Max: 98.9 (01-12-21 @ 05:25)  Vital Signs Last 24 Hrs  HR: 66 (01-14-21 @ 11:51) (63 - 67)  BP: 168/61 (01-14-21 @ 11:51) (124/60 - 170/58)  RR: 17 (01-14-21 @ 11:51)  SpO2: 98% (01-14-21 @ 11:51) (96% - 98%)  Wt(kg): --    EXAM:  Constitutional: Mild distress due to pain  Eyes: Pupils bilaterally reactive to light. No icterus.  Oral cavity: Clear, no lesions  Neck: No neck vein distension noted  RS: Chest clear to auscultation bilaterally. No wheeze/rhonchi/crepitations. Pain with deep inspiration  CVS: S1, S2 heard. Regular rate and rhythm. No murmurs/rubs/gallops.  Abdomen: Obese, NTTP, R ventral hernia with dressed ulcer does not appear acutely infected; normal bowel sounds  : No acute abnormalities  Extremities: Warm. 1+ pitting edema BLE R>L  Skin: Painful vesicular rash L flank ~T11 dermatome, lesions fresh, midline chest under L breast to L flank; abd ulcer as above  Vascular: No evidence of phlebitis  Neuro: Alert, oriented to time/place/person                          10.6   6.21  )-----------( 191      ( 13 Jan 2021 07:39 )             32.4     01-14    130<L>  |  94<L>  |  20  ----------------------------<  269<H>  4.7   |  22  |  0.67    Ca    10.1      14 Jan 2021 08:35  Mg     2.0     01-13      MICROBIOLOGY:  Culture - Urine (collected 10 Hermilo 2021 08:52)  Source: .Urine Clean Catch (Midstream)  Final Report (11 Jan 2021 10:22):    <10,000 CFU/mL Normal Urogenital Chanelle        RADIOLOGY:  Imaging below personally reviewed    < from: MR Thoracic Spine No Cont (01.13.21 @ 14:43) >  INTERPRETATION:  Clinical indication: Back pain.    MRI of the thoracic spine was performed using sagittal T1-T2 and STIR sequence. Axial T1 and T2-weighted sequences were performed as well.    The slight increased kyphosis is seen.    The vertebral body height alignment and marrow signal appear normal    Disc desiccation is seen throughout the thoracic spine region.    Modic type II degenerative change seen involving the superior inferior endplate of T12.    C7-T1: Small central disc herniation is seen without significant compromise of the spinal canal or either neural foramen.    The spinal cord demonstrates normal signal and caliber.    Evaluation of the paraspinal softtissues appear unremarkable    IMPRESSION: Degenerative changes involving the thoracic spine as described above.    < end of copied text >  < from: CT Angio Chest w/ IV Cont (01.10.21 @ 03:08) >  IMPRESSION:  1.  No aortic aneurysm or dissection. No main, lobar, or segmental pulmonary embolism.  2.  No acute pathology in the abdomen or pelvis. Redemonstration of multiple ventral abdominal hernias containing nonobstructed bowel loops and mesenteric fat.    < end of copied text >

## 2021-01-14 NOTE — PROVIDER CONTACT NOTE (OTHER) - REASON
Patient is still complaining of chest pain and constipated for 5days
Patient is complaining of chest pain that is crushing like, not radiating to arm
Patient is 1st degree AV block on cardiac monitor
Patient BP is 170/58 HR 63

## 2021-01-14 NOTE — PROVIDER CONTACT NOTE (OTHER) - BACKGROUND
Patient admitted for chest pain, PMH of diabetes

## 2021-01-14 NOTE — CONSULT NOTE ADULT - ASSESSMENT
ASSESSMENT:    RECOMMENDATIONS:    ***NOTE INCOMPLETE*** ASSESSMENT:  81 yo F w/ Hx CAD s/p stents, DM p/w atypical chest pain with new vesicular dermatomal rash c/f acute herpes zoster    #Herpes zoster with acute neuritis  -New dermatomal vesicular rash   -Pain not well controlled on morphine 2mg IV q6 hours PRN  -Started on valacyclovir 1000mg TID, gabapentin 100mg TID  -No concern for secondary bacterial infection at this time  -DM with recent high FSBG >300    RECOMMENDATIONS:    ***NOTE INCOMPLETE*** ASSESSMENT:  83 yo F w/ Hx CAD s/p stents, DM p/w atypical chest pain with new vesicular dermatomal rash c/f acute herpes zoster    #Herpes zoster with acute neuritis  -New dermatomal vesicular rash   -Pain not well controlled on morphine 2mg IV q6 hours PRN  -Started on valacyclovir 1000mg TID, gabapentin 100mg TID  -No concern for secondary bacterial infection at this time  -DM with recent high FSBG >300    RECOMMENDATIONS:  -Agree with valacyclovir 1000mg TID  -Can continue gabapentin though will not help acutely with pain, effect seen in 1-2 weeks; monitor for over-sedation  -No role for prednisone in acute uncomplicated zoster  -Wound care  -Rest of care per primary    Plan discussed with primary team ASSESSMENT:  81 yo F w/ Hx CAD s/p stents, DM p/w atypical chest pain with new vesicular dermatomal rash c/f acute herpes zoster    #Herpes zoster with acute neuritis  -New dermatomal vesicular rash   -Pain not well controlled on morphine 2mg IV q6 hours PRN  -Started on valacyclovir 1000mg TID, gabapentin 100mg TID  -No concern for secondary bacterial infection at this time  -DM with recent high FSBG >300    RECOMMENDATIONS:  -Agree with valacyclovir 1000mg TID  -Can continue gabapentin though will not help acutely with pain, effect seen in 1-2 weeks; monitor for over-sedation  -No role for prednisone in acute uncomplicated zoster  -Wound care  -Rest of care per primary

## 2021-01-14 NOTE — CONSULT NOTE ADULT - SUBJECTIVE AND OBJECTIVE BOX
[facility]        Patient is a 85y old  Female who presents with a chief complaint of chest pain (14 Jan 2021 13:27)    Excerpt from H&P,"  HPI:  Pt is a 84 yo F Northern Irish speaking hx obtained from chart w/ hx CAD and stents, chronic abdominal wound BIBEMS from home reporting 2 days of intermittent CP starting in Lt shoulder radiating to LT chest as per triage notes. As per ED notes complains of flank pain worse on inspiration. noted tenderness at paraspinal muscle given morphine for pain and developed itching now resolved.       grand daughter translated over the phone. refused  service            *****PAST MEDICAL / Surgical  HISTORY:  PAST MEDICAL & SURGICAL HISTORY:  Hyperuricemia    Colorectal cancer  Remote hx; s/p colon resection    Primary osteoarthritis of both knees    CAD (coronary artery disease)    Diabetes mellitus    HTN (hypertension)    History of coronary artery stent placement    History of bowel resection    H/O hernia repair             *****FAMILY HISTORY:  FAMILY HISTORY:  No pertinent family history in first degree relatives             *****SOCIAL HISTORY:  Alcohol: None  Smoking: None         *****ALLERGIES:   Allergies    No Known Allergies    Intolerances             *****MEDICATIONS: current medication reviewed and documented.   MEDICATIONS  (STANDING):  allopurinol 100 milliGRAM(s) Oral daily  aspirin enteric coated 81 milliGRAM(s) Oral daily  atorvastatin 40 milliGRAM(s) Oral at bedtime  dextrose 40% Gel 15 Gram(s) Oral once  dextrose 5%. 1000 milliLiter(s) (50 mL/Hr) IV Continuous <Continuous>  dextrose 5%. 1000 milliLiter(s) (100 mL/Hr) IV Continuous <Continuous>  dextrose 50% Injectable 25 Gram(s) IV Push once  dextrose 50% Injectable 12.5 Gram(s) IV Push once  dextrose 50% Injectable 25 Gram(s) IV Push once  enalapril 20 milliGRAM(s) Oral daily  enoxaparin Injectable 40 milliGRAM(s) SubCutaneous daily  gabapentin 100 milliGRAM(s) Oral three times a day  gabapentin 300 milliGRAM(s) Oral once  glucagon  Injectable 1 milliGRAM(s) IntraMuscular once  insulin glargine Injectable (LANTUS) 10 Unit(s) SubCutaneous at bedtime  insulin lispro (ADMELOG) corrective regimen sliding scale   SubCutaneous three times a day before meals  insulin lispro (ADMELOG) corrective regimen sliding scale   SubCutaneous at bedtime  isosorbide   mononitrate ER Tablet (IMDUR) 60 milliGRAM(s) Oral daily  lidocaine   Patch 1 Patch Transdermal once  lidocaine   Patch 1 Patch Transdermal daily  metoprolol succinate ER 50 milliGRAM(s) Oral daily  pantoprazole    Tablet 40 milliGRAM(s) Oral before breakfast  polyethylene glycol 3350 17 Gram(s) Oral daily  senna 2 Tablet(s) Oral at bedtime  valACYclovir 1000 milliGRAM(s) Oral three times a day    MEDICATIONS  (PRN):  acetaminophen   Tablet .. 650 milliGRAM(s) Oral every 6 hours PRN Temp greater or equal to 38C (100.4F), Mild Pain (1 - 3)  cyclobenzaprine 5 milliGRAM(s) Oral three times a day PRN Muscle Spasm  magnesium hydroxide Suspension 30 milliLiter(s) Oral daily PRN Constipation  morphine  - Injectable 2 milliGRAM(s) IV Push every 6 hours PRN moderate to severe pain  ondansetron Injectable 4 milliGRAM(s) IV Push every 6 hours PRN Nausea           *****REVIEW OF SYSTEM:  GEN: no fever, no chills, no pain  RESP: no SOB, no cough, no sputum  CVS: no chest pain, no palpitations, no edema  GI: no abdominal pain, no nausea, no vomiting, no constipation, no diarrhea  : no dysurea, no frequency, no hematurea  Neuro: no headache, no dizziness  PSYCH: no anxiety, no depression  Derm : no itching, no rash         *****VITAL SIGNS:  T(C): 36.7 (01-14-21 @ 11:51), Max: 36.7 (01-14-21 @ 06:27)  HR: 66 (01-14-21 @ 11:51) (64 - 66)  BP: 168/61 (01-14-21 @ 11:51) (168/61 - 170/58)  RR: 17 (01-14-21 @ 11:51) (17 - 18)  SpO2: 98% (01-14-21 @ 11:51) (96% - 98%)  Wt(kg): --           *****PHYSICAL EXAM:    Alert oriented x 3    comprehension intact   Able to name, repeat,  without any difficulty.   Able to follow 1 step commands.     EOMI fundi not visualized,     No facial asymmetry   Tongue is midline    Moving all 4 ext symmetrically  limited due to generalized pain  Reflexes are  diminished  throughout   sensation is grossly symmetric  Gait : not assessed.  B/L down going toes               *****LAB AND IMAGING:                          10.6   6.21  )-----------( 191      ( 13 Jan 2021 07:39 )             32.4               01-14    130<L>  |  94<L>  |  20  ----------------------------<  269<H>  4.7   |  22  |  0.67    Ca    10.1      14 Jan 2021 08:35  Mg     2.0     01-13                                  [All pertinent recent Imaging reports reviewed]         *****A S S E S S M E N T   A N D   P L A N :      Pt is a 84 yo F Northern Irish speaking hx obtained from chart and grand daughter  w/ hx CAD and stents, chronic abdominal wound BIBEMS from home reporting 2 days of intermittent CP starting in Lt shoulder radiating to LT chest as per triage notes. As per ED notes complains of flank pain worse on inspiration. noted tenderness at paraspinal muscle given morphine for pain and developed itching now resolved.           Problem/Recommendations 1:zoster herpeticus with pain along the t4 dermatome   gabapentin 100 tid   given additional dose 300 for pain control due to severe pain   valacyclovir initiated   will continue to observe closely        Problem/Recommendations 2 hyponatremia   trend   likely pain related siadh   will continue to trend      ___________________________  Will follow with you.  Thank you,  Renetta Shin MD  Diplomate of the American Board of Neurology and Psychiatry.  Diplomate of the American Board of Vascular Neurology.   [facility] ([facility]), [facility]   Ph: [phone]    Differential diagnosis and plan of care discussed with patient after the evaluation.   Advanced care planning options discussed.   Pain assessed and judicious use of narcotics when appropriate was discussed.  Importance of Fall prevention discussed.  Counseling on Smoking and Alcohol cessation was offered when appropriate.  Counseling on Diet, exercise, and medication compliance was done.   83 minutes spent on the total encounter;  more than 50 % of the visit was spent on counseling  and or coordinating care by the attending physician.    Thank you for allowing me to participate in the care of this di patient. Please do not hesitate to call me if you have any questions.     This and subsequent notes were partially created using voice recognition software and will  inherently be subject to errors including those of syntax and sound alike substitutions which may escape proofreading. In such instances original meaning may be extrapolated by contextual derivation.

## 2021-01-15 ENCOUNTER — TRANSCRIPTION ENCOUNTER (OUTPATIENT)
Age: 86
End: 2021-01-15

## 2021-01-15 VITALS
DIASTOLIC BLOOD PRESSURE: 60 MMHG | OXYGEN SATURATION: 96 % | SYSTOLIC BLOOD PRESSURE: 165 MMHG | HEART RATE: 75 BPM | RESPIRATION RATE: 18 BRPM | TEMPERATURE: 98 F

## 2021-01-15 LAB
ANION GAP SERPL CALC-SCNC: 12 MMOL/L — SIGNIFICANT CHANGE UP (ref 7–14)
BUN SERPL-MCNC: 21 MG/DL — SIGNIFICANT CHANGE UP (ref 7–23)
CALCIUM SERPL-MCNC: 9.5 MG/DL — SIGNIFICANT CHANGE UP (ref 8.4–10.5)
CHLORIDE SERPL-SCNC: 93 MMOL/L — LOW (ref 98–107)
CO2 SERPL-SCNC: 24 MMOL/L — SIGNIFICANT CHANGE UP (ref 22–31)
CREAT SERPL-MCNC: 0.93 MG/DL — SIGNIFICANT CHANGE UP (ref 0.5–1.3)
GLUCOSE SERPL-MCNC: 267 MG/DL — HIGH (ref 70–99)
POTASSIUM SERPL-MCNC: 4.1 MMOL/L — SIGNIFICANT CHANGE UP (ref 3.5–5.3)
POTASSIUM SERPL-SCNC: 4.1 MMOL/L — SIGNIFICANT CHANGE UP (ref 3.5–5.3)
SODIUM SERPL-SCNC: 129 MMOL/L — LOW (ref 135–145)

## 2021-01-15 PROCEDURE — 99232 SBSQ HOSP IP/OBS MODERATE 35: CPT

## 2021-01-15 RX ORDER — ISOSORBIDE MONONITRATE 60 MG/1
1 TABLET, EXTENDED RELEASE ORAL
Qty: 30 | Refills: 0
Start: 2021-01-15 | End: 2021-02-13

## 2021-01-15 RX ORDER — GABAPENTIN 400 MG/1
1 CAPSULE ORAL
Qty: 90 | Refills: 0
Start: 2021-01-15 | End: 2021-02-13

## 2021-01-15 RX ORDER — LIDOCAINE 4 G/100G
1 CREAM TOPICAL
Qty: 30 | Refills: 0
Start: 2021-01-15 | End: 2021-02-13

## 2021-01-15 RX ORDER — CYCLOBENZAPRINE HYDROCHLORIDE 10 MG/1
1 TABLET, FILM COATED ORAL
Qty: 21 | Refills: 0
Start: 2021-01-15 | End: 2021-01-21

## 2021-01-15 RX ORDER — RIVAROXABAN 15 MG-20MG
1 KIT ORAL
Qty: 30 | Refills: 0
Start: 2021-01-15 | End: 2021-02-13

## 2021-01-15 RX ORDER — METOPROLOL TARTRATE 50 MG
1 TABLET ORAL
Qty: 30 | Refills: 0
Start: 2021-01-15 | End: 2021-02-13

## 2021-01-15 RX ORDER — ATORVASTATIN CALCIUM 80 MG/1
1 TABLET, FILM COATED ORAL
Qty: 30 | Refills: 0
Start: 2021-01-15 | End: 2021-02-13

## 2021-01-15 RX ORDER — METOPROLOL TARTRATE 50 MG
1 TABLET ORAL
Qty: 0 | Refills: 0 | DISCHARGE

## 2021-01-15 RX ORDER — ACETAMINOPHEN 500 MG
2 TABLET ORAL
Qty: 0 | Refills: 0 | DISCHARGE
Start: 2021-01-15

## 2021-01-15 RX ORDER — VALACYCLOVIR 500 MG/1
1 TABLET, FILM COATED ORAL
Qty: 21 | Refills: 0
Start: 2021-01-15 | End: 2021-01-21

## 2021-01-15 RX ORDER — SODIUM CHLORIDE 9 MG/ML
1 INJECTION INTRAMUSCULAR; INTRAVENOUS; SUBCUTANEOUS
Qty: 6 | Refills: 0
Start: 2021-01-15 | End: 2021-01-17

## 2021-01-15 RX ORDER — LIDOCAINE 4 G/100G
1 CREAM TOPICAL
Qty: 0 | Refills: 0 | DISCHARGE
Start: 2021-01-15

## 2021-01-15 RX ORDER — ISOSORBIDE MONONITRATE 60 MG/1
1 TABLET, EXTENDED RELEASE ORAL
Qty: 0 | Refills: 0 | DISCHARGE

## 2021-01-15 RX ADMIN — Medication 20 MILLIGRAM(S): at 05:42

## 2021-01-15 RX ADMIN — MORPHINE SULFATE 2 MILLIGRAM(S): 50 CAPSULE, EXTENDED RELEASE ORAL at 18:34

## 2021-01-15 RX ADMIN — GABAPENTIN 100 MILLIGRAM(S): 400 CAPSULE ORAL at 12:17

## 2021-01-15 RX ADMIN — Medication 100 MILLIGRAM(S): at 12:16

## 2021-01-15 RX ADMIN — Medication 2: at 08:51

## 2021-01-15 RX ADMIN — ISOSORBIDE MONONITRATE 60 MILLIGRAM(S): 60 TABLET, EXTENDED RELEASE ORAL at 12:16

## 2021-01-15 RX ADMIN — ENOXAPARIN SODIUM 40 MILLIGRAM(S): 100 INJECTION SUBCUTANEOUS at 12:16

## 2021-01-15 RX ADMIN — Medication 2: at 13:00

## 2021-01-15 RX ADMIN — Medication 81 MILLIGRAM(S): at 12:16

## 2021-01-15 RX ADMIN — Medication 50 MILLIGRAM(S): at 05:41

## 2021-01-15 RX ADMIN — GABAPENTIN 100 MILLIGRAM(S): 400 CAPSULE ORAL at 05:43

## 2021-01-15 RX ADMIN — VALACYCLOVIR 1000 MILLIGRAM(S): 500 TABLET, FILM COATED ORAL at 12:17

## 2021-01-15 RX ADMIN — VALACYCLOVIR 1000 MILLIGRAM(S): 500 TABLET, FILM COATED ORAL at 05:41

## 2021-01-15 RX ADMIN — Medication 4: at 17:36

## 2021-01-15 RX ADMIN — LIDOCAINE 1 PATCH: 4 CREAM TOPICAL at 12:16

## 2021-01-15 RX ADMIN — POLYETHYLENE GLYCOL 3350 17 GRAM(S): 17 POWDER, FOR SOLUTION ORAL at 12:16

## 2021-01-15 RX ADMIN — LIDOCAINE 1 PATCH: 4 CREAM TOPICAL at 17:37

## 2021-01-15 RX ADMIN — Medication 650 MILLIGRAM(S): at 13:00

## 2021-01-15 RX ADMIN — PANTOPRAZOLE SODIUM 40 MILLIGRAM(S): 20 TABLET, DELAYED RELEASE ORAL at 05:42

## 2021-01-15 NOTE — PROGRESS NOTE ADULT - PROBLEM SELECTOR PLAN 4
wound care evaluation noted  will follow recommendations
wound care evaluation noted  will follow recommendations
wound care eval
wound care evaluation noted  will follow recommendations on discharge

## 2021-01-15 NOTE — DISCHARGE NOTE NURSING/CASE MANAGEMENT/SOCIAL WORK - PATIENT PORTAL LINK FT
You can access the FollowMyHealth Patient Portal offered by [facility] by registering at the following website: [email or web address]. By joining [facility]’s FollowMyHealth portal, you will also be able to view your health information using other applications (apps) compatible with our system.

## 2021-01-15 NOTE — PROGRESS NOTE ADULT - PROBLEM SELECTOR PROBLEM 4
Abdominal wound dehiscence

## 2021-01-15 NOTE — DISCHARGE NOTE NURSING/CASE MANAGEMENT/SOCIAL WORK - NSSCNAMETXT_GEN_ALL_CORE
1.VNSNY for RN after discharge, as scheduled by VNSNY; 2. VNS CHOICE for reinstatement of aide 11h M-F, 10H S/S

## 2021-01-15 NOTE — PROGRESS NOTE ADULT - PROBLEM SELECTOR PLAN 2
Imdur and metoprolol increased   defer to cardiology
continue to monitor   acceptable
continue to monitor   acceptable
Imdur and metoprolol increased   defer to cardiology

## 2021-01-15 NOTE — DISCHARGE NOTE PROVIDER - NSDCFUADDINST_GEN_ALL_CORE_FT
follow up care at [facility]: [phone]. Address: [address] [place] [address].

## 2021-01-15 NOTE — PROGRESS NOTE ADULT - PROBLEM SELECTOR PLAN 3
finger sticks ~ low 200s  will continue ss coverage  resume home meds on discharge
Lantus 10 units qhs while inpatient   continue finger sticks with short acting insulin sliding scale
finger sticks increased  will increase further with solumedrol IV  start Lantus 10 units qhs while inpatient   will continue ss coverage
resume home meds on discharge   continue finger sticks with short acting insulin sliding scale while inpatient  discussed with daughter Mahogany and the patient  they do not want to take insulin and state that the glucose is much better at home  the patient stopped insulin secondary to hypoglycemia as per daughter   outpatient follow up with Dr Pritchard

## 2021-01-15 NOTE — DISCHARGE NOTE PROVIDER - NSDCMRMEDTOKEN_GEN_ALL_CORE_FT
acetaminophen 325 mg oral tablet: 2 tab(s) orally every 6 hours, As needed, Temp greater or equal to 38C (100.4F), Mild Pain (1 - 3)  allopurinol 100 mg oral tablet: 1 tab(s) orally once a day  aspirin 81 mg oral delayed release tablet: 1 tab(s) orally once a day  atorvastatin 40 mg oral tablet: 1 tab(s) orally once a day (at bedtime)  Colace 100 mg oral capsule: 1 cap(s) orally 2 times a day  cyclobenzaprine 5 mg oral tablet: 1 tab(s) orally 3 times a day, As needed, Muscle Spasm  enalapril 20 mg oral tablet: 1 tab(s) orally once a day  gabapentin 100 mg oral capsule: 1 cap(s) orally 3 times a day  Glucophage 850 mg oral tablet: 1 tab(s) orally 2 times a day  isosorbide mononitrate 60 mg oral tablet, extended release: 1 tab(s) orally once a day  Januvia 100 mg oral tablet: 1 tab(s) orally once a day  Lasix 20 mg oral tablet: 1 tab(s) orally once a day, As Needed for leg swelling  lidocaine 5% topical film: Apply topically to affected area once a day   metoprolol succinate 50 mg oral tablet, extended release: 1 tab(s) orally once a day  senna oral tablet: 2 tab(s) orally once a day (at bedtime)  valACYclovir 1 g oral tablet: 1 tab(s) orally 3 times a day  Xarelto 10 mg oral tablet: 1 tab(s) orally once a day    acetaminophen 325 mg oral tablet: 2 tab(s) orally every 6 hours, As needed, Temp greater or equal to 38C (100.4F), Mild Pain (1 - 3)  allopurinol 100 mg oral tablet: 1 tab(s) orally once a day  aspirin 81 mg oral delayed release tablet: 1 tab(s) orally once a day  atorvastatin 40 mg oral tablet: 1 tab(s) orally once a day (at bedtime)  Colace 100 mg oral capsule: 1 cap(s) orally 2 times a day  cyclobenzaprine 5 mg oral tablet: 1 tab(s) orally 3 times a day, As needed, Muscle Spasm  enalapril 20 mg oral tablet: 1 tab(s) orally once a day  gabapentin 100 mg oral capsule: 1 cap(s) orally 3 times a day  Glucophage 850 mg oral tablet: 1 tab(s) orally 2 times a day  isosorbide mononitrate 60 mg oral tablet, extended release: 1 tab(s) orally once a day  Januvia 100 mg oral tablet: 1 tab(s) orally once a day  Lasix 20 mg oral tablet: 1 tab(s) orally once a day, As Needed for leg swelling  lidocaine 4% topical film: 1 patch topically once a day  metoprolol succinate 50 mg oral tablet, extended release: 1 tab(s) orally once a day  senna oral tablet: 2 tab(s) orally once a day (at bedtime)  valACYclovir 1 g oral tablet: 1 tab(s) orally 3 times a day  Xarelto 10 mg oral tablet: 1 tab(s) orally once a day    acetaminophen 325 mg oral tablet: 2 tab(s) orally every 6 hours, As needed, Temp greater or equal to 38C (100.4F), Mild Pain (1 - 3)  allopurinol 100 mg oral tablet: 1 tab(s) orally once a day  aspirin 81 mg oral delayed release tablet: 1 tab(s) orally once a day  atorvastatin 40 mg oral tablet: 1 tab(s) orally once a day (at bedtime)  Colace 100 mg oral capsule: 1 cap(s) orally 2 times a day  cyclobenzaprine 5 mg oral tablet: 1 tab(s) orally 3 times a day, As needed, Muscle Spasm  enalapril 20 mg oral tablet: 1 tab(s) orally once a day  Glucophage 850 mg oral tablet: 1 tab(s) orally 2 times a day  isosorbide mononitrate 60 mg oral tablet, extended release: 1 tab(s) orally once a day  Januvia 100 mg oral tablet: 1 tab(s) orally once a day  Lasix 20 mg oral tablet: 1 tab(s) orally once a day, As Needed for leg swelling  lidocaine 4% topical film: 1 patch topically once a day  Lyrica 50 mg oral capsule: 1 cap(s) orally 2 times a day  metoprolol succinate 50 mg oral tablet, extended release: 1 tab(s) orally once a day  senna oral tablet: 2 tab(s) orally once a day (at bedtime)  valACYclovir 1 g oral tablet: 1 tab(s) orally 3 times a day  Xarelto 10 mg oral tablet: 1 tab(s) orally once a day    acetaminophen 325 mg oral tablet: 2 tab(s) orally every 6 hours, As needed, Temp greater or equal to 38C (100.4F), Mild Pain (1 - 3)  allopurinol 100 mg oral tablet: 1 tab(s) orally once a day  aspirin 81 mg oral delayed release tablet: 1 tab(s) orally once a day  atorvastatin 40 mg oral tablet: 1 tab(s) orally once a day (at bedtime)  Colace 100 mg oral capsule: 1 cap(s) orally 2 times a day  cyclobenzaprine 5 mg oral tablet: 1 tab(s) orally 3 times a day, As needed, Muscle Spasm  enalapril 20 mg oral tablet: 1 tab(s) orally once a day  Glucophage 850 mg oral tablet: 1 tab(s) orally 2 times a day  isosorbide mononitrate 60 mg oral tablet, extended release: 1 tab(s) orally once a day  Januvia 100 mg oral tablet: 1 tab(s) orally once a day  Lasix 20 mg oral tablet: 1 tab(s) orally once a day, As Needed for leg swelling  lidocaine 4% topical film: 1 patch topically once a day  Lyrica 50 mg oral capsule: 1 cap(s) orally 2 times a day  metoprolol succinate 50 mg oral tablet, extended release: 1 tab(s) orally once a day  senna oral tablet: 2 tab(s) orally once a day (at bedtime)  Sodium Chloride 1 g oral tablet: 1 tab(s) orally 2 times a day   valACYclovir 1 g oral tablet: 1 tab(s) orally 3 times a day  Xarelto 10 mg oral tablet: 1 tab(s) orally once a day

## 2021-01-15 NOTE — DISCHARGE NOTE PROVIDER - HOSPITAL COURSE
81 yo F w/ Hx CAD s/p stents, DM p/w atypical CP/musculoskeletal pain.    Hospital course:    chest pain   - likely related to shingles  - CT: no aneurysm, no PE. +hernia   - trop - 21-19  - TTE: normal   - NST normal   - EKG no ischenic change  - UA negative   - MRI spine: degenerative change    Shingles  - ID: no role for steroid, valtrex for another 7 days and neurotin      CAD, HTN  - enalapril     DM  - sliding scale   - hgba1c 6.7    abdominal wound dehiscence  - follows with Dr. Cook as outpt   - wound care recommendation placed.     Case discussed with Dr Mendieta on 1/15. Reviewed discharge medications with patient; All new medications requiring new prescription sent to pharmacy of patients choice. Reviewed need for prescription for previous home medication and new prescriptions sent if requested. Patient in agreement and understands.     83 yo F w/ Hx CAD s/p stents, DM p/w atypical CP/musculoskeletal pain.    Hospital course:    chest pain   - likely related to shingles  - CT: no aneurysm, no PE. +hernia   - trop - 21-19  - TTE: normal   - NST normal   - EKG no ischenic change  - UA negative   - MRI spine: degenerative change    Shingles  - ID: no role for steroid, valtrex for another 7 days and neurotin. Pt to be dc with home dose lyrica     CAD, HTN  - enalapril     DM  - sliding scale   - hgba1c 6.7    abdominal wound dehiscence  - follows with Dr. Cook as outpt   - wound care recommendation placed.     Case discussed with Dr Mendieta on 1/15. Reviewed discharge medications with patient; All new medications requiring new prescription sent to pharmacy of patients choice. Reviewed need for prescription for previous home medication and new prescriptions sent if requested. Patient in agreement and understands.     81 yo F w/ Hx CAD s/p stents, DM p/w atypical CP/musculoskeletal pain.    Hospital course:    chest pain   - likely related to shingles  - CT: no aneurysm, no PE. +hernia   - trop - 21-19  - TTE: normal   - NST normal   - EKG no ischenic change  - UA negative   - MRI spine: degenerative change    Shingles  - ID: no role for steroid, valtrex for another 7 days and neurotin. Pt to be dc with home dose lyrica     CAD, HTN  - enalapril     DM  - sliding scale   - hgba1c 6.7    abdominal wound dehiscence  - follows with Dr. Cook as outpt   - wound care recommendation placed.     hyponatremia   - likely SIADH, salt tab 1g BID for 3 days, fluid restriction  - followup with PMD in 1 week for repeat basic panel.     Case discussed with Dr Mendieta on 1/15. Reviewed discharge medications with patient; All new medications requiring new prescription sent to pharmacy of patients choice. Reviewed need for prescription for previous home medication and new prescriptions sent if requested. Patient in agreement and understands.

## 2021-01-15 NOTE — CHART NOTE - NSCHARTNOTEFT_GEN_A_CORE
Labs and case reviewed with medicine and cardiology, hyponatremia likely SIADH recommended NaCl 1g BID X3 days and repeat lab with PMD in 1 week.  fluid restriction 1L. Above discussed with daughter Mahogany who agrees with plan. Mahogany also agreed to payment of $4 for NaCl.     Pt medically stabilized for discharge
Called by RN.  Daughter gave the patient 5 mg of eliquis on Friday and 5 mg x2 of eliquis on Saturday.     Magali Andrew NP   pager [phone]

## 2021-01-15 NOTE — PROGRESS NOTE ADULT - ASSESSMENT
ASSESSMENT:  83 yo F w/ Hx CAD s/p stents, DM p/w atypical chest pain with new vesicular dermatomal rash c/f acute herpes zoster    #Left T4 Herpes zoster with acute neuritis with abd wall ulcer  -New dermatomal vesicular rash   -Pain not well controlled on morphine 2mg IV q6 hours PRN  -Started on valacyclovir 1000mg TID, gabapentin 100mg TID  -No concern for secondary bacterial infection at this time  -DM with recent high FSBG >300  -abd wall ulcers not infected and likely related to underlying hernia stretching the skin     RECOMMENDATIONS:  -Agree with valacyclovir 1000mg TID  -pain meds per medicine   -No role for prednisone in acute uncomplicated zoster  -Wound care  -DM control   -Rest of care per primary

## 2021-01-15 NOTE — PROGRESS NOTE ADULT - PROVIDER SPECIALTY LIST ADULT
Cardiology
Neurology
Cardiology
Internal Medicine
Infectious Disease

## 2021-01-15 NOTE — PROGRESS NOTE ADULT - PROBLEM SELECTOR PLAN 5
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
likely secondary to pain  add osm studies to r/o SIADH
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

## 2021-01-15 NOTE — PROGRESS NOTE ADULT - ASSESSMENT
Echo 1/11/21: EF 67%, min MR, grossly normal lv sys fx, mild diastolic dysfx, no pericardial effusion   NST 1/12/21: normal study, revealing normal LV function., RV function appeared normal.      a/p    Pt is a 86 yo F [place] speaking hx obtained with  [facility]ra ID #[id number] w/ PMhx CAD and stents, HTN, DM, chronic abdominal wound BIBEMS from home reporting 2 days of intermittent CP starting in Lt shoulder radiating to LT chest.    1. Atypical cp  -r/t shingles   -HsT neg; EKG without ischemic changes; no acs  -c/w asa, imdur  -CTA neg for PE, and multiple ventral hernia without obstructed bowel loops, +gallstones  -UA neg   -Echo with grossly normal lv sys fx   -NST - normal study  -MRI neg     2. HTN  -bp acceptable   -cont w current meds     3. Abdominal wound dehiscence  -follows with Dr. Cook as outpt   -cont wound care per reccs     4.  Diabetes mellitus  -A1c noted   -c/w ISS     5. Shingles  -started valacyclovir, gabapentin   -ID eval noted. neuro eval noted     6.Hyponatremia   -check BMP today      dvt ppx     d/w pa , DCP today

## 2021-01-15 NOTE — PROGRESS NOTE ADULT - ATTENDING COMMENTS
Agree with above NP note.  cv stable   being treated for active zoster as etiology for pain  stress normal   antivirals, steroids per med  cont current tx  d/c planning
Agree with above NP note.  cv stable   being treated for active zoster as etiology for pain  stress normal   antivirals, steroids per med  cont current tx  d/c planning
Agree with above NP note.  cv stable  atypical sx but recurrent   plan for nuclear stress   pain control/wound care  MRI pending
Agree with above NP note.  cv stable  nuclear stress normal  cont pain control   pain control/wound care  MRI pending
Parminder Johnson  Attending Physician   Division of Infectious Disease  Pager #[phone]  Available on [employer] Teams also  After 5pm/weekend or no response, call #[phone]    ID coverage available over [facility] 3-day holiday weekend if needed. Call #[phone] for questions/concerns.
discussed with cardiology  discussed with covering ACP
discussed with patient in detail, expresses understanding of treatment plans.  discussed with daughter over the phone in detail all questions answered
discussed with patient in detail, expresses understanding of treatment plans.  discussed with daughter Mahogany and grand daughter

## 2021-01-15 NOTE — PROGRESS NOTE ADULT - PROBLEM SELECTOR PLAN 1
reproducible  improved with Toradol  will await MRI  doubt spinal involvement  no further Toradol  will consider solumedrol IV low dose 20 IVP x 1 if MRI negative
single dermatome  continue  valacyclovir   ID evaluation appreciated  will continue to follow recommendations   neuro help appreciated
reproducible   likely inflammation ? costochondritis  improved with Toradol  will give solumedrol IV 30 x 1 dose  should improve  MRI pending   doubt spinal involvement
left chest   single dermatome  no open lesions evident  start valacyclovir   ID evaluation for advice on prednisone   gabapentin 300 x 1  then 100 TID  continue MSO4 for intermittent pain  discussed with patient in detail, expresses understanding of treatment plans. via  phone ID # [phone]  MRI thoracic spine negative except expected DJD

## 2021-01-15 NOTE — PROGRESS NOTE ADULT - SUBJECTIVE AND OBJECTIVE BOX
CARDIOLOGY FOLLOW UP - Dr. Mendieta    CC: reports shoulder pain, denies cp, sob, and palpitations       PHYSICAL EXAM:  T(C): 36.7 (01-14-21 @ 06:27), Max: 37 (01-13-21 @ 21:25)  HR: 64 (01-14-21 @ 06:27) (63 - 67)  BP: 170/58 (01-14-21 @ 06:27) (124/60 - 170/58)  RR: 18 (01-14-21 @ 06:27) (18 - 19)  SpO2: 96% (01-14-21 @ 06:27) (96% - 98%)  Wt(kg): --  I&O's Summary      Appearance: Normal	  Cardiovascular: Normal S1 S2,RRR, No JVD, No murmurs  Respiratory: Lungs clear to auscultation	  Gastrointestinal:  Soft, Non-tender, + BS	  Extremities: Normal range of motion, No clubbing, cyanosis or edema      Home Medications:  allopurinol 100 mg oral tablet: 1 tab(s) orally once a day (11 Aug 2018 13:21)  aspirin 81 mg oral delayed release tablet: 1 tab(s) orally once a day (11 Aug 2018 13:21)  Colace 100 mg oral capsule: 1 cap(s) orally 2 times a day (11 Aug 2018 13:21)  Glucophage 850 mg oral tablet: 1 tab(s) orally 2 times a day (11 Aug 2018 13:21)  isosorbide mononitrate 30 mg oral tablet, extended release: 1 tab(s) orally once a day (in the morning) (11 Aug 2018 13:21)  Januvia 100 mg oral tablet: 1 tab(s) orally once a day (11 Aug 2018 13:21)  Lasix 20 mg oral tablet: 1 tab(s) orally once a day, As Needed for leg swelling (11 Aug 2018 13:21)  senna oral tablet: 2 tab(s) orally once a day (at bedtime) (11 Aug 2018 13:21)  Toprol-XL 25 mg oral tablet, extended release: 1 tab(s) orally once a day (11 Aug 2018 13:21)      MEDICATIONS  (STANDING):  allopurinol 100 milliGRAM(s) Oral daily  aspirin enteric coated 81 milliGRAM(s) Oral daily  atorvastatin 40 milliGRAM(s) Oral at bedtime  dextrose 40% Gel 15 Gram(s) Oral once  dextrose 5%. 1000 milliLiter(s) (50 mL/Hr) IV Continuous <Continuous>  dextrose 5%. 1000 milliLiter(s) (100 mL/Hr) IV Continuous <Continuous>  dextrose 50% Injectable 25 Gram(s) IV Push once  dextrose 50% Injectable 12.5 Gram(s) IV Push once  dextrose 50% Injectable 25 Gram(s) IV Push once  enalapril 20 milliGRAM(s) Oral daily  enoxaparin Injectable 40 milliGRAM(s) SubCutaneous daily  gabapentin 300 milliGRAM(s) Oral once  gabapentin 100 milliGRAM(s) Oral three times a day  glucagon  Injectable 1 milliGRAM(s) IntraMuscular once  insulin glargine Injectable (LANTUS) 10 Unit(s) SubCutaneous at bedtime  insulin lispro (ADMELOG) corrective regimen sliding scale   SubCutaneous three times a day before meals  insulin lispro (ADMELOG) corrective regimen sliding scale   SubCutaneous at bedtime  isosorbide   mononitrate ER Tablet (IMDUR) 60 milliGRAM(s) Oral daily  lidocaine   Patch 1 Patch Transdermal daily  lidocaine   Patch 1 Patch Transdermal once  metoprolol succinate ER 50 milliGRAM(s) Oral daily  pantoprazole    Tablet 40 milliGRAM(s) Oral before breakfast  polyethylene glycol 3350 17 Gram(s) Oral daily  senna 2 Tablet(s) Oral at bedtime  valACYclovir 1000 milliGRAM(s) Oral three times a day      TELEMETRY: SR 70s, PVCs 	    ECG:  	  RADIOLOGY:   MR Thoracic Spine No Cont (01.13.21 @ 14:43)   MRI of the thoracic spine was performed using sagittal T1-T2 and STIR sequence. Axial T1 and T2-weighted sequences were performed as well.  The slight increased kyphosis is seen.  The vertebral body height alignment and marrow signal appear normal  Disc desiccation is seen throughout the thoracic spine region.  Modic type II degenerative change seen involving the superior inferior endplate of T12.  C7-T1: Small central disc herniation is seen without significant compromise of the spinal canal or either neural foramen.  The spinal cord demonstrates normal signal and caliber.  Evaluation of the paraspinal softtissues appear unremarkable    IMPRESSION: Degenerative changes involving the thoracic spine as described above.    DIAGNOSTIC TESTING:  [ ] Echocardiogram:  [ ]  Catheterization:  [ ] Stress Test:    OTHER: 	    LABS:	 	    Creatine Kinase, Serum: 50 U/L [25 - 170] (01-11 @ 07:08)  CKMB Units: 3.0 ng/mL (01-11 @ 07:08)  Troponin T, High Sensitivity Result: 24 ng/L (01-11 @ 07:08)  Troponin T, High Sensitivity Result: 19 ng/L (01-10 @ 00:48)  Troponin T, High Sensitivity Result: 21 ng/L (01-09 @ 22:59)                          10.6   6.21  )-----------( 191      ( 13 Jan 2021 07:39 )             32.4     01-14    130<L>  |  94<L>  |  20  ----------------------------<  269<H>  4.7   |  22  |  0.67    Ca    10.1      14 Jan 2021 08:35  Mg     2.0     01-13              
CARDIOLOGY FOLLOW UP - Dr. Mendieta    CC: events noted overnight, EKG without ischemic changes  reports mild chest aches, no sob/palpitations       PHYSICAL EXAM:  T(C): 37.2 (01-12-21 @ 05:25), Max: 37.2 (01-12-21 @ 05:25)  HR: 87 (01-12-21 @ 05:25) (69 - 87)  BP: 179/59 (01-12-21 @ 05:25) (130/62 - 180/81)  RR: 18 (01-12-21 @ 05:25) (17 - 18)  SpO2: 98% (01-12-21 @ 05:25) (97% - 99%)  Wt(kg): --  I&O's Summary      Appearance: Normal	  Cardiovascular: Normal S1 S2,RRR, No JVD, No murmurs  Respiratory: Lungs clear to auscultation	  Gastrointestinal:  Soft, Non-tender, + BS	  Extremities: Normal range of motion, No clubbing, cyanosis or edema      Home Medications:  allopurinol 100 mg oral tablet: 1 tab(s) orally once a day (11 Aug 2018 13:21)  aspirin 81 mg oral delayed release tablet: 1 tab(s) orally once a day (11 Aug 2018 13:21)  Colace 100 mg oral capsule: 1 cap(s) orally 2 times a day (11 Aug 2018 13:21)  Glucophage 850 mg oral tablet: 1 tab(s) orally 2 times a day (11 Aug 2018 13:21)  isosorbide mononitrate 30 mg oral tablet, extended release: 1 tab(s) orally once a day (in the morning) (11 Aug 2018 13:21)  Januvia 100 mg oral tablet: 1 tab(s) orally once a day (11 Aug 2018 13:21)  Lasix 20 mg oral tablet: 1 tab(s) orally once a day, As Needed for leg swelling (11 Aug 2018 13:21)  senna oral tablet: 2 tab(s) orally once a day (at bedtime) (11 Aug 2018 13:21)  Toprol-XL 25 mg oral tablet, extended release: 1 tab(s) orally once a day (11 Aug 2018 13:21)      MEDICATIONS  (STANDING):  allopurinol 100 milliGRAM(s) Oral daily  aspirin enteric coated 81 milliGRAM(s) Oral daily  atorvastatin 40 milliGRAM(s) Oral at bedtime  dextrose 40% Gel 15 Gram(s) Oral once  dextrose 5%. 1000 milliLiter(s) (50 mL/Hr) IV Continuous <Continuous>  dextrose 5%. 1000 milliLiter(s) (100 mL/Hr) IV Continuous <Continuous>  dextrose 50% Injectable 25 Gram(s) IV Push once  dextrose 50% Injectable 12.5 Gram(s) IV Push once  dextrose 50% Injectable 25 Gram(s) IV Push once  enalapril 20 milliGRAM(s) Oral daily  enoxaparin Injectable 40 milliGRAM(s) SubCutaneous daily  glucagon  Injectable 1 milliGRAM(s) IntraMuscular once  insulin lispro (ADMELOG) corrective regimen sliding scale   SubCutaneous three times a day before meals  insulin lispro (ADMELOG) corrective regimen sliding scale   SubCutaneous at bedtime  isosorbide   mononitrate ER Tablet (IMDUR) 30 milliGRAM(s) Oral daily  lidocaine   Patch 1 Patch Transdermal daily  metoprolol succinate ER 50 milliGRAM(s) Oral daily  pantoprazole    Tablet 40 milliGRAM(s) Oral before breakfast  senna 2 Tablet(s) Oral at bedtime      TELEMETRY: SR 60-80s	    ECG:  	  RADIOLOGY:   DIAGNOSTIC TESTING:  [ ] Echocardiogram:  [ ]  Catheterization:  [ ] Stress Test:    OTHER: 	    LABS:	 	    Creatine Kinase, Serum: 50 U/L [25 - 170] (01-11 @ 07:08)  CKMB Units: 3.0 ng/mL (01-11 @ 07:08)  Troponin T, High Sensitivity Result: 24 ng/L (01-11 @ 07:08)  Troponin T, High Sensitivity Result: 19 ng/L (01-10 @ 00:48)  Troponin T, High Sensitivity Result: 21 ng/L (01-09 @ 22:59)                          10.7   6.61  )-----------( 186      ( 12 Jan 2021 06:44 )             31.8     01-12    131<L>  |  94<L>  |  26<H>  ----------------------------<  243<H>  4.4   |  24  |  0.85    Ca    10.0      12 Jan 2021 06:44  Phos  3.1     01-11  Mg     1.6     01-12    TPro  6.9  /  Alb  4.0  /  TBili  0.4  /  DBili  x   /  AST  15  /  ALT  16  /  AlkPhos  71  01-11            
[facility]      Seen earlier today, and examined.  - Today, patient is without complaints.           *****MEDICATIONS: Current medication reviewed and documented.    MEDICATIONS  (STANDING):  allopurinol 100 milliGRAM(s) Oral daily  aspirin enteric coated 81 milliGRAM(s) Oral daily  atorvastatin 40 milliGRAM(s) Oral at bedtime  dextrose 40% Gel 15 Gram(s) Oral once  dextrose 5%. 1000 milliLiter(s) (50 mL/Hr) IV Continuous <Continuous>  dextrose 5%. 1000 milliLiter(s) (100 mL/Hr) IV Continuous <Continuous>  dextrose 50% Injectable 25 Gram(s) IV Push once  dextrose 50% Injectable 12.5 Gram(s) IV Push once  dextrose 50% Injectable 25 Gram(s) IV Push once  enalapril 20 milliGRAM(s) Oral daily  enoxaparin Injectable 40 milliGRAM(s) SubCutaneous daily  gabapentin 100 milliGRAM(s) Oral three times a day  glucagon  Injectable 1 milliGRAM(s) IntraMuscular once  insulin glargine Injectable (LANTUS) 10 Unit(s) SubCutaneous at bedtime  insulin lispro (ADMELOG) corrective regimen sliding scale   SubCutaneous three times a day before meals  insulin lispro (ADMELOG) corrective regimen sliding scale   SubCutaneous at bedtime  isosorbide   mononitrate ER Tablet (IMDUR) 60 milliGRAM(s) Oral daily  lidocaine   Patch 1 Patch Transdermal once  lidocaine   Patch 1 Patch Transdermal daily  metoprolol succinate ER 50 milliGRAM(s) Oral daily  pantoprazole    Tablet 40 milliGRAM(s) Oral before breakfast  polyethylene glycol 3350 17 Gram(s) Oral daily  senna 2 Tablet(s) Oral at bedtime  valACYclovir 1000 milliGRAM(s) Oral three times a day    MEDICATIONS  (PRN):  acetaminophen   Tablet .. 650 milliGRAM(s) Oral every 6 hours PRN Temp greater or equal to 38C (100.4F), Mild Pain (1 - 3)  cyclobenzaprine 5 milliGRAM(s) Oral three times a day PRN Muscle Spasm  magnesium hydroxide Suspension 30 milliLiter(s) Oral daily PRN Constipation  morphine  - Injectable 2 milliGRAM(s) IV Push every 6 hours PRN moderate to severe pain  ondansetron Injectable 4 milliGRAM(s) IV Push every 6 hours PRN Nausea          ***** VITAL SIGNS:  T(F): 97.7 (01-15-21 @ 16:45), Max: 98.4 (01-15-21 @ 12:11)  HR: 75 (01-15-21 @ 16:45) (74 - 75)  BP: 165/60 (01-15-21 @ 16:45) (157/62 - 165/60)  RR: 18 (01-15-21 @ 16:45) (18 - 18)  SpO2: 96% (01-15-21 @ 16:45) (96% - 100%)  Wt(kg): --  ,   I&O's Summary           *****PHYSICAL EXAM:   Alert oriented x 3    comprehension intact   Able to name, repeat,  without any difficulty.   Able to follow 1 step commands.     EOMI fundi not visualized,     No facial asymmetry   Tongue is midline    Moving all 4 ext symmetrically  limited due to generalized pain  Reflexes are  diminished  throughout   sensation is grossly symmetric  Gait : not assessed.  B/L down going toes            *****LAB AND IMAGIN-15    129<L>  |  93<L>  |  21  ----------------------------<  267<H>  4.1   |  24  |  0.93    Ca    9.5      15 Hermilo 2021 14:58                           [All pertinent recent Imaging/Reports reviewed]           *****A S S E S S M E N T   A N D   P L A N :         Pt is a 86 yo F [place] speaking hx obtained from chart and grand daughter  w/ hx CAD and stents, chronic abdominal wound BIBEMS from home reporting 2 days of intermittent CP starting in Lt shoulder radiating to LT chest as per triage notes. As per ED notes complains of flank pain worse on inspiration. noted tenderness at paraspinal muscle given morphine for pain and developed itching now resolved.           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 Please do not hesitate to call me if you have any  questions.        ________________  Renetta Shin MD  [facility]  [phone]      33 minutes spent on total encounter; more than 50 % of the visit was  spent counseling about plan of care, compliance to diet/exercise and medication regimen and or  coordinating care by the attending physician.      It is advised that stroke patients follow up with JAGUAR Khan @ [phone] in 1- 2 weeks.   Others please follow up with Dr. Michael Nissenbaum [phone]
CARDIOLOGY FOLLOW UP - Dr. Mendieta    CC generalized pain   no sob        PHYSICAL EXAM:  T(C): 36.7 (01-15-21 @ 05:40), Max: 36.7 (01-14-21 @ 11:51)  HR: 75 (01-15-21 @ 05:40) (65 - 75)  BP: 157/62 (01-15-21 @ 05:40) (135/68 - 168/61)  RR: 18 (01-15-21 @ 05:40) (17 - 18)  SpO2: 98% (01-15-21 @ 05:40) (97% - 98%)  Wt(kg): --  I&O's Summary      Appearance: Normal	  Cardiovascular: Normal S1 S2,RRR, No JVD, No murmurs  Respiratory: Lungs clear to auscultation	  Gastrointestinal:  Soft, Non-tender, + BS	  Extremities: Normal range of motion, No clubbing, cyanosis or edema      Home Medications:  allopurinol 100 mg oral tablet: 1 tab(s) orally once a day (11 Aug 2018 13:21)  aspirin 81 mg oral delayed release tablet: 1 tab(s) orally once a day (11 Aug 2018 13:21)  Colace 100 mg oral capsule: 1 cap(s) orally 2 times a day (11 Aug 2018 13:21)  Glucophage 850 mg oral tablet: 1 tab(s) orally 2 times a day (11 Aug 2018 13:21)  isosorbide mononitrate 30 mg oral tablet, extended release: 1 tab(s) orally once a day (in the morning) (11 Aug 2018 13:21)  Januvia 100 mg oral tablet: 1 tab(s) orally once a day (11 Aug 2018 13:21)  Lasix 20 mg oral tablet: 1 tab(s) orally once a day, As Needed for leg swelling (11 Aug 2018 13:21)  senna oral tablet: 2 tab(s) orally once a day (at bedtime) (11 Aug 2018 13:21)  Toprol-XL 25 mg oral tablet, extended release: 1 tab(s) orally once a day (11 Aug 2018 13:21)      MEDICATIONS  (STANDING):  allopurinol 100 milliGRAM(s) Oral daily  aspirin enteric coated 81 milliGRAM(s) Oral daily  atorvastatin 40 milliGRAM(s) Oral at bedtime  dextrose 40% Gel 15 Gram(s) Oral once  dextrose 5%. 1000 milliLiter(s) (50 mL/Hr) IV Continuous <Continuous>  dextrose 5%. 1000 milliLiter(s) (100 mL/Hr) IV Continuous <Continuous>  dextrose 50% Injectable 25 Gram(s) IV Push once  dextrose 50% Injectable 12.5 Gram(s) IV Push once  dextrose 50% Injectable 25 Gram(s) IV Push once  enalapril 20 milliGRAM(s) Oral daily  enoxaparin Injectable 40 milliGRAM(s) SubCutaneous daily  gabapentin 100 milliGRAM(s) Oral three times a day  glucagon  Injectable 1 milliGRAM(s) IntraMuscular once  insulin glargine Injectable (LANTUS) 10 Unit(s) SubCutaneous at bedtime  insulin lispro (ADMELOG) corrective regimen sliding scale   SubCutaneous three times a day before meals  insulin lispro (ADMELOG) corrective regimen sliding scale   SubCutaneous at bedtime  isosorbide   mononitrate ER Tablet (IMDUR) 60 milliGRAM(s) Oral daily  lidocaine   Patch 1 Patch Transdermal once  lidocaine   Patch 1 Patch Transdermal daily  metoprolol succinate ER 50 milliGRAM(s) Oral daily  pantoprazole    Tablet 40 milliGRAM(s) Oral before breakfast  polyethylene glycol 3350 17 Gram(s) Oral daily  senna 2 Tablet(s) Oral at bedtime  valACYclovir 1000 milliGRAM(s) Oral three times a day      TELEMETRY: nsr pvc 	    ECG:  	  RADIOLOGY:   DIAGNOSTIC TESTING:  [ ] Echocardiogram:  [ ]  Catheterization:  [ ] Stress Test:    OTHER: 	    LABS:	 	    Creatine Kinase, Serum: 50 U/L [25 - 170] (01-11 @ 07:08)  CKMB Units: 3.0 ng/mL (01-11 @ 07:08)  Troponin T, High Sensitivity Result: 24 ng/L (01-11 @ 07:08)  Troponin T, High Sensitivity Result: 19 ng/L (01-10 @ 00:48)  Troponin T, High Sensitivity Result: 21 ng/L (01-09 @ 22:59)      01-14    130<L>  |  94<L>  |  20  ----------------------------<  269<H>  4.7   |  22  |  0.67    Ca    10.1      14 Jan 2021 08:35              
CARDIOLOGY FOLLOW UP - Dr. Mendieta    CC: resting comfortably, events noted overnight, no active cp       PHYSICAL EXAM:  T(C): 36.9 (01-13-21 @ 05:45), Max: 36.9 (01-13-21 @ 05:45)  HR: 82 (01-13-21 @ 05:45) (65 - 82)  BP: 159/69 (01-13-21 @ 05:45) (115/42 - 173/74)  RR: 18 (01-13-21 @ 05:45) (18 - 18)  SpO2: 97% (01-13-21 @ 05:45) (97% - 99%)  Wt(kg): --  I&O's Summary      Appearance: Normal	  Cardiovascular: Normal S1 S2,RRR, No JVD, No murmurs  Respiratory: Lungs clear to auscultation	  Gastrointestinal:  Soft, Non-tender, + BS	  Extremities: Normal range of motion, No clubbing, cyanosis or edema      Home Medications:  allopurinol 100 mg oral tablet: 1 tab(s) orally once a day (11 Aug 2018 13:21)  aspirin 81 mg oral delayed release tablet: 1 tab(s) orally once a day (11 Aug 2018 13:21)  Colace 100 mg oral capsule: 1 cap(s) orally 2 times a day (11 Aug 2018 13:21)  Glucophage 850 mg oral tablet: 1 tab(s) orally 2 times a day (11 Aug 2018 13:21)  isosorbide mononitrate 30 mg oral tablet, extended release: 1 tab(s) orally once a day (in the morning) (11 Aug 2018 13:21)  Januvia 100 mg oral tablet: 1 tab(s) orally once a day (11 Aug 2018 13:21)  Lasix 20 mg oral tablet: 1 tab(s) orally once a day, As Needed for leg swelling (11 Aug 2018 13:21)  senna oral tablet: 2 tab(s) orally once a day (at bedtime) (11 Aug 2018 13:21)  Toprol-XL 25 mg oral tablet, extended release: 1 tab(s) orally once a day (11 Aug 2018 13:21)      MEDICATIONS  (STANDING):  allopurinol 100 milliGRAM(s) Oral daily  aspirin enteric coated 81 milliGRAM(s) Oral daily  atorvastatin 40 milliGRAM(s) Oral at bedtime  dextrose 40% Gel 15 Gram(s) Oral once  dextrose 5%. 1000 milliLiter(s) (50 mL/Hr) IV Continuous <Continuous>  dextrose 5%. 1000 milliLiter(s) (100 mL/Hr) IV Continuous <Continuous>  dextrose 50% Injectable 25 Gram(s) IV Push once  dextrose 50% Injectable 12.5 Gram(s) IV Push once  dextrose 50% Injectable 25 Gram(s) IV Push once  enalapril 20 milliGRAM(s) Oral daily  enoxaparin Injectable 40 milliGRAM(s) SubCutaneous daily  glucagon  Injectable 1 milliGRAM(s) IntraMuscular once  insulin lispro (ADMELOG) corrective regimen sliding scale   SubCutaneous three times a day before meals  insulin lispro (ADMELOG) corrective regimen sliding scale   SubCutaneous at bedtime  isosorbide   mononitrate ER Tablet (IMDUR) 60 milliGRAM(s) Oral daily  lidocaine   Patch 1 Patch Transdermal daily  metoprolol succinate ER 50 milliGRAM(s) Oral daily  pantoprazole    Tablet 40 milliGRAM(s) Oral before breakfast  polyethylene glycol 3350 17 Gram(s) Oral daily  senna 2 Tablet(s) Oral at bedtime      TELEMETRY: 	 60-80s    ECG:  	  RADIOLOGY:    Nuclear Stress Test-Pharmacologic (01.12.21 @ 11:36)   STRESS TEST IMPRESSIONS:  Chest Pain: No chest pain with administration of  Regadenoson.  Symptom: Shortness of breath, Headache.  HR Response: Appropriate.  BP Response: Appropriate.  Heart Rhythm: Normal Sinus Rhythm - 66 BPM - 1st Degree  Heart Block.  Baseline ECG: No significant ST abnormalities.  ECG Abnormalities: None.  Arrhythmia: None.  ------------------------------------------------------------------------  PROCEDURE:  21.8 mCi of Tc99m Sestamibi were injected during stress  protocol. Approximately 45 minutes later, tomographic  images were obtained in a 180 degree arc from right  anterior oblique to left anterior oblique with 64 stops.  The tomographic slices were reconstructed in 3orthogonal  planes (short axis, horizontal long axis and vertical long  axis).  Interpretation was performed both by visual and  quantitative analysis.  Stress images were acquired using CZT-based system with  pinhole collimation ([employer]),  and reconstructed using MLEM algorithm. The patient could  not lay prone for attenuation corrected imaging.  ------------------------------------------------------------------------  NUCLEAR FINDINGS:  Review of raw data shows: The study is of good technical  quality.  The left ventricle was normal in size. Normal myocardial  perfusion scan,with no evidence of infarction or inducible  ischemia.  ------------------------------------------------------------------------  GATED ANALYSIS:  Post-stress gated wall motion analysis was performed (LVEF  > 70%;LVEDV = 58 ml.), revealing normal LV function. RV  function appeared normal.  ------------------------------------------------------------------------  IMPRESSIONS:Normal Study  *Myocardial Perfusion SPECT results are normal.  * Review of raw data shows: The study is of good technical  quality.  * The left ventricle was normal in size. Normal myocardial  perfusion scan,with no evidence of infarction or inducible  ischemia.  *Post-stress gated wall motion analysis was performed  (LVEF > 70%;LVEDV = 58 ml.), revealing normal LV function.  RV function appeared normal.      DIAGNOSTIC TESTING:  [ ] Echocardiogram:  [ ]  Catheterization:  [ ] Stress Test:    OTHER: 	    LABS:	 	    Creatine Kinase, Serum: 50 U/L [25 - 170] (01-11 @ 07:08)  CKMB Units: 3.0 ng/mL (01-11 @ 07:08)  Troponin T, High Sensitivity Result: 24 ng/L (01-11 @ 07:08)  Troponin T, High Sensitivity Result: 19 ng/L (01-10 @ 00:48)  Troponin T, High Sensitivity Result: 21 ng/L (01-09 @ 22:59)                          10.6   6.21  )-----------( 191      ( 13 Jan 2021 07:39 )             32.4     01-13    130<L>  |  91<L>  |  23  ----------------------------<  273<H>  4.2   |  25  |  0.80    Ca    10.0      13 Jan 2021 07:39  Mg     2.0     01-13              
Patient is a 85y old  Female who presents with a chief complaint of chest pain (15 Hermilo 2021 11:21)      DATE OF SERVICE: 01-15-21 @ 12:00    SUBJECTIVE / OVERNIGHT EVENTS: overnight events noted  conversation via granddaughter Katie  patient preferred instead of using  services     ROS:  Resp: No cough no sputum production  CVS: No chest pain no palpitations no orthopnea  GI: no N/V/D  : no dysuria, no hematuria  mild improvement in pain        MEDICATIONS  (STANDING):  allopurinol 100 milliGRAM(s) Oral daily  aspirin enteric coated 81 milliGRAM(s) Oral daily  atorvastatin 40 milliGRAM(s) Oral at bedtime  dextrose 40% Gel 15 Gram(s) Oral once  dextrose 5%. 1000 milliLiter(s) (50 mL/Hr) IV Continuous <Continuous>  dextrose 5%. 1000 milliLiter(s) (100 mL/Hr) IV Continuous <Continuous>  dextrose 50% Injectable 25 Gram(s) IV Push once  dextrose 50% Injectable 12.5 Gram(s) IV Push once  dextrose 50% Injectable 25 Gram(s) IV Push once  enalapril 20 milliGRAM(s) Oral daily  enoxaparin Injectable 40 milliGRAM(s) SubCutaneous daily  gabapentin 100 milliGRAM(s) Oral three times a day  glucagon  Injectable 1 milliGRAM(s) IntraMuscular once  insulin glargine Injectable (LANTUS) 10 Unit(s) SubCutaneous at bedtime  insulin lispro (ADMELOG) corrective regimen sliding scale   SubCutaneous three times a day before meals  insulin lispro (ADMELOG) corrective regimen sliding scale   SubCutaneous at bedtime  isosorbide   mononitrate ER Tablet (IMDUR) 60 milliGRAM(s) Oral daily  lidocaine   Patch 1 Patch Transdermal once  lidocaine   Patch 1 Patch Transdermal daily  metoprolol succinate ER 50 milliGRAM(s) Oral daily  pantoprazole    Tablet 40 milliGRAM(s) Oral before breakfast  polyethylene glycol 3350 17 Gram(s) Oral daily  senna 2 Tablet(s) Oral at bedtime  valACYclovir 1000 milliGRAM(s) Oral three times a day    MEDICATIONS  (PRN):  acetaminophen   Tablet .. 650 milliGRAM(s) Oral every 6 hours PRN Temp greater or equal to 38C (100.4F), Mild Pain (1 - 3)  cyclobenzaprine 5 milliGRAM(s) Oral three times a day PRN Muscle Spasm  magnesium hydroxide Suspension 30 milliLiter(s) Oral daily PRN Constipation  morphine  - Injectable 2 milliGRAM(s) IV Push every 6 hours PRN moderate to severe pain  ondansetron Injectable 4 milliGRAM(s) IV Push every 6 hours PRN Nausea        CAPILLARY BLOOD GLUCOSE      POCT Blood Glucose.: 222 mg/dL (15 Hermilo 2021 08:41)  POCT Blood Glucose.: 331 mg/dL (14 Jan 2021 21:04)  POCT Blood Glucose.: 266 mg/dL (14 Jan 2021 18:11)  POCT Blood Glucose.: 327 mg/dL (14 Jan 2021 12:32)    I&O's Summary      Vital Signs Last 24 Hrs  T(C): 36.7 (15 Hermilo 2021 05:40), Max: 36.7 (14 Jan 2021 21:30)  T(F): 98 (15 Hermilo 2021 05:40), Max: 98.1 (14 Jan 2021 21:30)  HR: 75 (15 Hermilo 2021 05:40) (65 - 75)  BP: 157/62 (15 Hermilo 2021 05:40) (135/68 - 157/62)  BP(mean): --  RR: 18 (15 Hermilo 2021 05:40) (18 - 18)  SpO2: 98% (15 Hermilo 2021 05:40) (97% - 98%)    PHYSICAL EXAM:  GENERAL: NAD  EYES: EOMI, PERRLA  NECK: Supple, No JVD  CHEST/LUNG: Clear No wheeze  HEART: S1S2; no murmurs  ABDOMEN: Soft, Nontender  EXTREMITIES:  no edema  NEUROLOGY: Alert, no focal motor or sensory deficits  SKIN:  herpetic rash evolving     LABS:    01-14    130<L>  |  94<L>  |  20  ----------------------------<  269<H>  4.7   |  22  |  0.67    Ca    10.1      14 Jan 2021 08:35                  All consultant(s) notes reviewed and care discussed with other providers        Contact Number, Dr Rod [phone]
Patient is a 85y old  Female who presents with a chief complaint of chest pain (13 Jan 2021 11:56)      DATE OF SERVICE: 01-14-21 @ 11:15    SUBJECTIVE / OVERNIGHT EVENTS: overnight events noted    ROS:  Resp: No cough no sputum production  CVS: No chest pain no palpitations no orthopnea  GI: no N/V/D  : no dysuria, no hematuria  Neuro: no weakness no paresthesias  Heme: No petechiae no easy bruising  Msk: No joint pain no swelling  continued left chest pain        MEDICATIONS  (STANDING):  allopurinol 100 milliGRAM(s) Oral daily  aspirin enteric coated 81 milliGRAM(s) Oral daily  atorvastatin 40 milliGRAM(s) Oral at bedtime  dextrose 40% Gel 15 Gram(s) Oral once  dextrose 5%. 1000 milliLiter(s) (50 mL/Hr) IV Continuous <Continuous>  dextrose 5%. 1000 milliLiter(s) (100 mL/Hr) IV Continuous <Continuous>  dextrose 50% Injectable 25 Gram(s) IV Push once  dextrose 50% Injectable 12.5 Gram(s) IV Push once  dextrose 50% Injectable 25 Gram(s) IV Push once  enalapril 20 milliGRAM(s) Oral daily  enoxaparin Injectable 40 milliGRAM(s) SubCutaneous daily  gabapentin 300 milliGRAM(s) Oral once  gabapentin 100 milliGRAM(s) Oral three times a day  glucagon  Injectable 1 milliGRAM(s) IntraMuscular once  insulin glargine Injectable (LANTUS) 10 Unit(s) SubCutaneous at bedtime  insulin lispro (ADMELOG) corrective regimen sliding scale   SubCutaneous three times a day before meals  insulin lispro (ADMELOG) corrective regimen sliding scale   SubCutaneous at bedtime  isosorbide   mononitrate ER Tablet (IMDUR) 60 milliGRAM(s) Oral daily  lidocaine   Patch 1 Patch Transdermal once  lidocaine   Patch 1 Patch Transdermal daily  metoprolol succinate ER 50 milliGRAM(s) Oral daily  pantoprazole    Tablet 40 milliGRAM(s) Oral before breakfast  polyethylene glycol 3350 17 Gram(s) Oral daily  senna 2 Tablet(s) Oral at bedtime  valACYclovir 1000 milliGRAM(s) Oral three times a day    MEDICATIONS  (PRN):  acetaminophen   Tablet .. 650 milliGRAM(s) Oral every 6 hours PRN Temp greater or equal to 38C (100.4F), Mild Pain (1 - 3)  cyclobenzaprine 5 milliGRAM(s) Oral three times a day PRN Muscle Spasm  magnesium hydroxide Suspension 30 milliLiter(s) Oral daily PRN Constipation  morphine  - Injectable 2 milliGRAM(s) IV Push every 6 hours PRN moderate to severe pain  ondansetron Injectable 4 milliGRAM(s) IV Push every 6 hours PRN Nausea        CAPILLARY BLOOD GLUCOSE      POCT Blood Glucose.: 275 mg/dL (14 Jan 2021 08:44)  POCT Blood Glucose.: 246 mg/dL (13 Jan 2021 21:18)  POCT Blood Glucose.: 270 mg/dL (13 Jan 2021 18:02)  POCT Blood Glucose.: 251 mg/dL (13 Jan 2021 12:32)    I&O's Summary      Vital Signs Last 24 Hrs  T(C): 36.7 (14 Jan 2021 06:27), Max: 37 (13 Jan 2021 21:25)  T(F): 98.1 (14 Jan 2021 06:27), Max: 98.6 (13 Jan 2021 21:25)  HR: 64 (14 Jan 2021 06:27) (63 - 67)  BP: 170/58 (14 Jan 2021 06:27) (124/60 - 170/58)  BP(mean): --  RR: 18 (14 Jan 2021 06:27) (18 - 19)  SpO2: 96% (14 Jan 2021 06:27) (96% - 98%)    PHYSICAL EXAM:  GENERAL: NAD  EYES: EOMI, PERRLA  NECK: Supple, No JVD  CHEST/LUNG: Clear No wheeze  HEART: S1S2; no murmurs  ABDOMEN: Soft, Nontender; RLQ abdominal nondraining ulcers  EXTREMITIES:  no edema  NEUROLOGY: Alert, no focal motor or sensory deficits  SKIN: new left chest single dermatome herpetic rash        LABS:                        10.6   6.21  )-----------( 191      ( 13 Jan 2021 07:39 )             32.4     01-14    130<L>  |  94<L>  |  20  ----------------------------<  269<H>  4.7   |  22  |  0.67    Ca    10.1      14 Jan 2021 08:35  Mg     2.0     01-13                  All consultant(s) notes reviewed and care discussed with other providers        Contact Number, Dr Rod [phone]
Patient is a 85y old  Female who presents with a chief complaint of chest pain (13 Jan 2021 10:36)      DATE OF SERVICE: 01-13-21 @ 11:56    SUBJECTIVE / OVERNIGHT EVENTS: overnight events noted    ROS:  Resp: No cough no sputum production  CVS: No chest pain no palpitations no orthopnea  GI: no N/V/D  : no dysuria, no hematuria  Neuro: no weakness no paresthesias  Heme: No petechiae no easy bruising  Msk: still c/o left rib pain       MEDICATIONS  (STANDING):  allopurinol 100 milliGRAM(s) Oral daily  aspirin enteric coated 81 milliGRAM(s) Oral daily  atorvastatin 40 milliGRAM(s) Oral at bedtime  dextrose 40% Gel 15 Gram(s) Oral once  dextrose 5%. 1000 milliLiter(s) (50 mL/Hr) IV Continuous <Continuous>  dextrose 5%. 1000 milliLiter(s) (100 mL/Hr) IV Continuous <Continuous>  dextrose 50% Injectable 25 Gram(s) IV Push once  dextrose 50% Injectable 12.5 Gram(s) IV Push once  dextrose 50% Injectable 25 Gram(s) IV Push once  enalapril 20 milliGRAM(s) Oral daily  enoxaparin Injectable 40 milliGRAM(s) SubCutaneous daily  glucagon  Injectable 1 milliGRAM(s) IntraMuscular once  insulin lispro (ADMELOG) corrective regimen sliding scale   SubCutaneous three times a day before meals  insulin lispro (ADMELOG) corrective regimen sliding scale   SubCutaneous at bedtime  isosorbide   mononitrate ER Tablet (IMDUR) 60 milliGRAM(s) Oral daily  lidocaine   Patch 1 Patch Transdermal daily  metoprolol succinate ER 50 milliGRAM(s) Oral daily  pantoprazole    Tablet 40 milliGRAM(s) Oral before breakfast  polyethylene glycol 3350 17 Gram(s) Oral daily  senna 2 Tablet(s) Oral at bedtime    MEDICATIONS  (PRN):  acetaminophen   Tablet .. 650 milliGRAM(s) Oral every 6 hours PRN Temp greater or equal to 38C (100.4F), Mild Pain (1 - 3)  cyclobenzaprine 5 milliGRAM(s) Oral three times a day PRN Muscle Spasm  magnesium hydroxide Suspension 30 milliLiter(s) Oral daily PRN Constipation  morphine  - Injectable 2 milliGRAM(s) IV Push every 6 hours PRN moderate to severe pain  ondansetron Injectable 4 milliGRAM(s) IV Push every 6 hours PRN Nausea        CAPILLARY BLOOD GLUCOSE      POCT Blood Glucose.: 282 mg/dL (13 Jan 2021 08:51)  POCT Blood Glucose.: 380 mg/dL (12 Jan 2021 21:09)  POCT Blood Glucose.: 224 mg/dL (12 Jan 2021 17:48)  POCT Blood Glucose.: 201 mg/dL (12 Jan 2021 13:46)  POCT Blood Glucose.: 241 mg/dL (12 Jan 2021 12:16)    I&O's Summary      Vital Signs Last 24 Hrs  T(C): 36.9 (13 Jan 2021 09:30), Max: 36.9 (13 Jan 2021 05:45)  T(F): 98.5 (13 Jan 2021 09:30), Max: 98.5 (13 Jan 2021 09:30)  HR: 68 (13 Jan 2021 09:30) (65 - 82)  BP: 141/68 (13 Jan 2021 09:30) (115/42 - 173/74)  BP(mean): --  RR: 17 (13 Jan 2021 09:30) (17 - 18)  SpO2: 97% (13 Jan 2021 09:30) (97% - 99%)    PHYSICAL EXAM:  GENERAL: NAD, lying flat  EYES: EOMI, PERRLA  NECK: Supple, No JVD  CHEST/LUNG: Clear No wheeze  HEART: S1S2; No rubs, or gallops, no murmurs  ABDOMEN: Soft, Nontender; RLQ abdominal nondraining wound  EXTREMITIES:  no edema  NEUROLOGY: Alert, no focal motor or sensory deficits  SKIN: right upper abdominal ulcerations  MSK: less severe reproducible pain left lower ribs and back    LABS:                        10.6   6.21  )-----------( 191      ( 13 Jan 2021 07:39 )             32.4     01-13    130<L>  |  91<L>  |  23  ----------------------------<  273<H>  4.2   |  25  |  0.80    Ca    10.0      13 Jan 2021 07:39  Mg     2.0     01-13                  All consultant(s) notes reviewed and care discussed with other providers        Contact Number, Dr Rod [phone]
Patient is a 85y old  Female who presents with a chief complaint of chest pain (12 Jan 2021 09:52)      DATE OF SERVICE: 01-12-21 @ 11:19    SUBJECTIVE / OVERNIGHT EVENTS: overnight events noted  still with reproducible left rib and back pain     ROS:  Resp: No cough no sputum production  CVS: No chest pain no palpitations no orthopnea  GI: no N/V/D  : no dysuria, no hematuria  Neuro: no weakness no paresthesias  Heme: No petechiae no easy bruising          MEDICATIONS  (STANDING):  allopurinol 100 milliGRAM(s) Oral daily  aspirin enteric coated 81 milliGRAM(s) Oral daily  atorvastatin 40 milliGRAM(s) Oral at bedtime  dextrose 40% Gel 15 Gram(s) Oral once  dextrose 5%. 1000 milliLiter(s) (50 mL/Hr) IV Continuous <Continuous>  dextrose 5%. 1000 milliLiter(s) (100 mL/Hr) IV Continuous <Continuous>  dextrose 50% Injectable 25 Gram(s) IV Push once  dextrose 50% Injectable 12.5 Gram(s) IV Push once  dextrose 50% Injectable 25 Gram(s) IV Push once  enalapril 20 milliGRAM(s) Oral daily  enoxaparin Injectable 40 milliGRAM(s) SubCutaneous daily  glucagon  Injectable 1 milliGRAM(s) IntraMuscular once  insulin lispro (ADMELOG) corrective regimen sliding scale   SubCutaneous three times a day before meals  insulin lispro (ADMELOG) corrective regimen sliding scale   SubCutaneous at bedtime  isosorbide   mononitrate ER Tablet (IMDUR) 60 milliGRAM(s) Oral daily  lidocaine   Patch 1 Patch Transdermal daily  metoprolol succinate ER 50 milliGRAM(s) Oral daily  pantoprazole    Tablet 40 milliGRAM(s) Oral before breakfast  senna 2 Tablet(s) Oral at bedtime    MEDICATIONS  (PRN):  acetaminophen   Tablet .. 650 milliGRAM(s) Oral every 6 hours PRN Temp greater or equal to 38C (100.4F), Mild Pain (1 - 3)  cyclobenzaprine 5 milliGRAM(s) Oral three times a day PRN Muscle Spasm  morphine  - Injectable 2 milliGRAM(s) IV Push every 6 hours PRN moderate to severe pain  ondansetron Injectable 4 milliGRAM(s) IV Push every 6 hours PRN Nausea        CAPILLARY BLOOD GLUCOSE      POCT Blood Glucose.: 246 mg/dL (12 Jan 2021 08:33)  POCT Blood Glucose.: 232 mg/dL (11 Jan 2021 21:02)  POCT Blood Glucose.: 192 mg/dL (11 Jan 2021 17:36)  POCT Blood Glucose.: 184 mg/dL (11 Jan 2021 13:18)    I&O's Summary      Vital Signs Last 24 Hrs  T(C): 37.2 (12 Jan 2021 05:25), Max: 37.2 (12 Jan 2021 05:25)  T(F): 98.9 (12 Jan 2021 05:25), Max: 98.9 (12 Jan 2021 05:25)  HR: 87 (12 Jan 2021 05:25) (69 - 87)  BP: 179/59 (12 Jan 2021 05:25) (130/62 - 180/81)  BP(mean): --  RR: 18 (12 Jan 2021 05:25) (17 - 18)  SpO2: 98% (12 Jan 2021 05:25) (97% - 99%)    PHYSICAL EXAM:  GENERAL: NAD, lying flat  EYES: EOMI, PERRLA  NECK: Supple, No JVD  CHEST/LUNG: Clear No wheeze  HEART: S1S2; No rubs, or gallops, no murmurs  ABDOMEN: Soft, Nontender; RLQ abdominal nondraining wound  EXTREMITIES:  no edema  NEUROLOGY: Alert, no focal motor or sensory deficits  SKIN: No rashes or lesions  MSK: reproducible pain left lower ribs and back    LABS:                        10.7   6.61  )-----------( 186      ( 12 Jan 2021 06:44 )             31.8     01-12    131<L>  |  94<L>  |  26<H>  ----------------------------<  243<H>  4.4   |  24  |  0.85    Ca    10.0      12 Jan 2021 06:44  Phos  3.1     01-11  Mg     1.6     01-12    TPro  6.9  /  Alb  4.0  /  TBili  0.4  /  DBili  x   /  AST  15  /  ALT  16  /  AlkPhos  71  01-11      CARDIAC MARKERS ( 11 Jan 2021 07:08 )  x     / x     / 50 U/L / x     / 3.0 ng/mL            All consultant(s) notes reviewed and care discussed with other providers        Contact Number, Dr Rod [phone]
NIKKY FRASER 85y [id number]    Patient is a 85y old  Female who presents with a chief complaint of chest pain (14 Jan 2021 13:27)      Follow Up/CC:  ID following for shingles     Interval History/ROS: some pain, no fever    Allergies    No Known Allergies    Intolerances        ANTIMICROBIALS:  valACYclovir 1000 three times a day      MEDICATIONS  (STANDING):  allopurinol 100 milliGRAM(s) Oral daily  aspirin enteric coated 81 milliGRAM(s) Oral daily  atorvastatin 40 milliGRAM(s) Oral at bedtime  dextrose 40% Gel 15 Gram(s) Oral once  dextrose 5%. 1000 milliLiter(s) (50 mL/Hr) IV Continuous <Continuous>  dextrose 5%. 1000 milliLiter(s) (100 mL/Hr) IV Continuous <Continuous>  dextrose 50% Injectable 25 Gram(s) IV Push once  dextrose 50% Injectable 12.5 Gram(s) IV Push once  dextrose 50% Injectable 25 Gram(s) IV Push once  enalapril 20 milliGRAM(s) Oral daily  enoxaparin Injectable 40 milliGRAM(s) SubCutaneous daily  gabapentin 100 milliGRAM(s) Oral three times a day  glucagon  Injectable 1 milliGRAM(s) IntraMuscular once  insulin glargine Injectable (LANTUS) 10 Unit(s) SubCutaneous at bedtime  insulin lispro (ADMELOG) corrective regimen sliding scale   SubCutaneous three times a day before meals  insulin lispro (ADMELOG) corrective regimen sliding scale   SubCutaneous at bedtime  isosorbide   mononitrate ER Tablet (IMDUR) 60 milliGRAM(s) Oral daily  lidocaine   Patch 1 Patch Transdermal once  lidocaine   Patch 1 Patch Transdermal daily  metoprolol succinate ER 50 milliGRAM(s) Oral daily  pantoprazole    Tablet 40 milliGRAM(s) Oral before breakfast  polyethylene glycol 3350 17 Gram(s) Oral daily  senna 2 Tablet(s) Oral at bedtime  valACYclovir 1000 milliGRAM(s) Oral three times a day    MEDICATIONS  (PRN):  acetaminophen   Tablet .. 650 milliGRAM(s) Oral every 6 hours PRN Temp greater or equal to 38C (100.4F), Mild Pain (1 - 3)  cyclobenzaprine 5 milliGRAM(s) Oral three times a day PRN Muscle Spasm  magnesium hydroxide Suspension 30 milliLiter(s) Oral daily PRN Constipation  morphine  - Injectable 2 milliGRAM(s) IV Push every 6 hours PRN moderate to severe pain  ondansetron Injectable 4 milliGRAM(s) IV Push every 6 hours PRN Nausea        Vital Signs Last 24 Hrs  T(C): 36.7 (15 Hermilo 2021 05:40), Max: 36.7 (14 Jan 2021 11:51)  T(F): 98 (15 Hermilo 2021 05:40), Max: 98.1 (14 Jan 2021 21:30)  HR: 75 (15 Hermilo 2021 05:40) (65 - 75)  BP: 157/62 (15 Hermilo 2021 05:40) (135/68 - 168/61)  BP(mean): --  RR: 18 (15 Hermilo 2021 05:40) (17 - 18)  SpO2: 98% (15 Hermilo 2021 05:40) (97% - 98%)      01-14    130<L>  |  94<L>  |  20  ----------------------------<  269<H>  4.7   |  22  |  0.67    Ca    10.1      14 Jan 2021 08:35            MICROBIOLOGY:  .Urine Clean Catch (Midstream)  01-10-21   <10,000 CFU/mL Normal Urogenital Chanelle  --  --        RADIOLOGY    < from: MR Thoracic Spine No Cont (01.13.21 @ 14:43) >  IMPRESSION: Degenerative changes involving the thoracic spine as described above.    < end of copied text >

## 2021-01-18 ENCOUNTER — INPATIENT (INPATIENT)
Facility: HOSPITAL | Age: 86
LOS: 9 days | Discharge: ANOTHER HOSPITAL | End: 2021-01-28
Attending: INTERNAL MEDICINE
Payer: MEDICARE

## 2021-01-18 VITALS
TEMPERATURE: 98 F | RESPIRATION RATE: 18 BRPM | OXYGEN SATURATION: 98 % | WEIGHT: 160.06 LBS | HEART RATE: 93 BPM | SYSTOLIC BLOOD PRESSURE: 122 MMHG | DIASTOLIC BLOOD PRESSURE: 60 MMHG

## 2021-01-18 DIAGNOSIS — Z92.89 PERSONAL HISTORY OF OTHER MEDICAL TREATMENT: Chronic | ICD-10-CM

## 2021-01-18 DIAGNOSIS — Z98.890 OTHER SPECIFIED POSTPROCEDURAL STATES: Chronic | ICD-10-CM

## 2021-01-18 DIAGNOSIS — Z95.5 PRESENCE OF CORONARY ANGIOPLASTY IMPLANT AND GRAFT: Chronic | ICD-10-CM

## 2021-01-18 LAB
A1C WITH ESTIMATED AVERAGE GLUCOSE RESULT: 7.2 % — HIGH (ref 4–5.6)
ALBUMIN SERPL ELPH-MCNC: 3.6 G/DL — SIGNIFICANT CHANGE UP (ref 3.5–5.2)
ALP SERPL-CCNC: 67 U/L — SIGNIFICANT CHANGE UP (ref 30–115)
ALT FLD-CCNC: 32 U/L — SIGNIFICANT CHANGE UP (ref 0–41)
ANION GAP SERPL CALC-SCNC: 12 MMOL/L — SIGNIFICANT CHANGE UP (ref 7–14)
APPEARANCE UR: CLEAR — SIGNIFICANT CHANGE UP
APTT BLD: 31.3 SEC — SIGNIFICANT CHANGE UP (ref 27–39.2)
AST SERPL-CCNC: 37 U/L — SIGNIFICANT CHANGE UP (ref 0–41)
BASOPHILS # BLD AUTO: 0.02 K/UL — SIGNIFICANT CHANGE UP (ref 0–0.2)
BASOPHILS NFR BLD AUTO: 0.4 % — SIGNIFICANT CHANGE UP (ref 0–1)
BILIRUB SERPL-MCNC: 0.3 MG/DL — SIGNIFICANT CHANGE UP (ref 0.2–1.2)
BILIRUB UR-MCNC: NEGATIVE — SIGNIFICANT CHANGE UP
BLD GP AB SCN SERPL QL: SIGNIFICANT CHANGE UP
BUN SERPL-MCNC: 13 MG/DL — SIGNIFICANT CHANGE UP (ref 10–20)
CALCIUM SERPL-MCNC: 9.1 MG/DL — SIGNIFICANT CHANGE UP (ref 8.5–10.1)
CHLORIDE SERPL-SCNC: 94 MMOL/L — LOW (ref 98–110)
CHOLEST SERPL-MCNC: 86 MG/DL — SIGNIFICANT CHANGE UP
CO2 SERPL-SCNC: 22 MMOL/L — SIGNIFICANT CHANGE UP (ref 17–32)
COLOR SPEC: SIGNIFICANT CHANGE UP
CREAT SERPL-MCNC: 0.9 MG/DL — SIGNIFICANT CHANGE UP (ref 0.7–1.5)
D DIMER BLD IA.RAPID-MCNC: 384 NG/ML DDU — HIGH (ref 0–230)
DIFF PNL FLD: NEGATIVE — SIGNIFICANT CHANGE UP
EOSINOPHIL # BLD AUTO: 0.01 K/UL — SIGNIFICANT CHANGE UP (ref 0–0.7)
EOSINOPHIL NFR BLD AUTO: 0.2 % — SIGNIFICANT CHANGE UP (ref 0–8)
ESTIMATED AVERAGE GLUCOSE: 160 MG/DL — HIGH (ref 68–114)
GLUCOSE SERPL-MCNC: 142 MG/DL — HIGH (ref 70–99)
GLUCOSE UR QL: NEGATIVE — SIGNIFICANT CHANGE UP
HCT VFR BLD CALC: 31 % — LOW (ref 37–47)
HDLC SERPL-MCNC: 32 MG/DL — LOW
HGB BLD-MCNC: 10.5 G/DL — LOW (ref 12–16)
IMM GRANULOCYTES NFR BLD AUTO: 1 % — HIGH (ref 0.1–0.3)
INR BLD: 1.23 RATIO — SIGNIFICANT CHANGE UP (ref 0.65–1.3)
KETONES UR-MCNC: NEGATIVE — SIGNIFICANT CHANGE UP
LACTATE SERPL-SCNC: 1.4 MMOL/L — SIGNIFICANT CHANGE UP (ref 0.7–2)
LEUKOCYTE ESTERASE UR-ACNC: NEGATIVE — SIGNIFICANT CHANGE UP
LIPID PNL WITH DIRECT LDL SERPL: 44 MG/DL — SIGNIFICANT CHANGE UP
LYMPHOCYTES # BLD AUTO: 1.07 K/UL — LOW (ref 1.2–3.4)
LYMPHOCYTES # BLD AUTO: 21 % — SIGNIFICANT CHANGE UP (ref 20.5–51.1)
MCHC RBC-ENTMCNC: 29.2 PG — SIGNIFICANT CHANGE UP (ref 27–31)
MCHC RBC-ENTMCNC: 33.9 G/DL — SIGNIFICANT CHANGE UP (ref 32–37)
MCV RBC AUTO: 86.4 FL — SIGNIFICANT CHANGE UP (ref 81–99)
MONOCYTES # BLD AUTO: 0.53 K/UL — SIGNIFICANT CHANGE UP (ref 0.1–0.6)
MONOCYTES NFR BLD AUTO: 10.4 % — HIGH (ref 1.7–9.3)
NEUTROPHILS # BLD AUTO: 3.41 K/UL — SIGNIFICANT CHANGE UP (ref 1.4–6.5)
NEUTROPHILS NFR BLD AUTO: 67 % — SIGNIFICANT CHANGE UP (ref 42.2–75.2)
NITRITE UR-MCNC: NEGATIVE — SIGNIFICANT CHANGE UP
NON HDL CHOLESTEROL: 54 MG/DL — SIGNIFICANT CHANGE UP
NRBC # BLD: 0 /100 WBCS — SIGNIFICANT CHANGE UP (ref 0–0)
PH UR: 6 — SIGNIFICANT CHANGE UP (ref 5–8)
PLATELET # BLD AUTO: 197 K/UL — SIGNIFICANT CHANGE UP (ref 130–400)
POTASSIUM SERPL-MCNC: 4.2 MMOL/L — SIGNIFICANT CHANGE UP (ref 3.5–5)
POTASSIUM SERPL-SCNC: 4.2 MMOL/L — SIGNIFICANT CHANGE UP (ref 3.5–5)
PROT SERPL-MCNC: 6.5 G/DL — SIGNIFICANT CHANGE UP (ref 6–8)
PROT UR-MCNC: NEGATIVE — SIGNIFICANT CHANGE UP
PROTHROM AB SERPL-ACNC: 14.1 SEC — HIGH (ref 9.95–12.87)
RBC # BLD: 3.59 M/UL — LOW (ref 4.2–5.4)
RBC # FLD: 12.5 % — SIGNIFICANT CHANGE UP (ref 11.5–14.5)
SARS-COV-2 RNA SPEC QL NAA+PROBE: DETECTED
SODIUM SERPL-SCNC: 128 MMOL/L — LOW (ref 135–146)
SP GR SPEC: 1.01 — SIGNIFICANT CHANGE UP (ref 1.01–1.03)
TRIGL SERPL-MCNC: 96 MG/DL — SIGNIFICANT CHANGE UP
TROPONIN T SERPL-MCNC: <0.01 NG/ML — SIGNIFICANT CHANGE UP
UROBILINOGEN FLD QL: SIGNIFICANT CHANGE UP
WBC # BLD: 5.09 K/UL — SIGNIFICANT CHANGE UP (ref 4.8–10.8)
WBC # FLD AUTO: 5.09 K/UL — SIGNIFICANT CHANGE UP (ref 4.8–10.8)

## 2021-01-18 PROCEDURE — 71260 CT THORAX DX C+: CPT | Mod: 26

## 2021-01-18 PROCEDURE — 99222 1ST HOSP IP/OBS MODERATE 55: CPT

## 2021-01-18 PROCEDURE — 71045 X-RAY EXAM CHEST 1 VIEW: CPT | Mod: 26

## 2021-01-18 PROCEDURE — 76937 US GUIDE VASCULAR ACCESS: CPT | Mod: 26,GC

## 2021-01-18 PROCEDURE — 99223 1ST HOSP IP/OBS HIGH 75: CPT | Mod: CS

## 2021-01-18 PROCEDURE — 74177 CT ABD & PELVIS W/CONTRAST: CPT | Mod: 26

## 2021-01-18 PROCEDURE — 72125 CT NECK SPINE W/O DYE: CPT | Mod: 26

## 2021-01-18 PROCEDURE — 70450 CT HEAD/BRAIN W/O DYE: CPT | Mod: 26

## 2021-01-18 PROCEDURE — 70551 MRI BRAIN STEM W/O DYE: CPT | Mod: 26

## 2021-01-18 PROCEDURE — 93010 ELECTROCARDIOGRAM REPORT: CPT

## 2021-01-18 PROCEDURE — 36000 PLACE NEEDLE IN VEIN: CPT | Mod: GC

## 2021-01-18 PROCEDURE — 72170 X-RAY EXAM OF PELVIS: CPT | Mod: 26

## 2021-01-18 PROCEDURE — 99285 EMERGENCY DEPT VISIT HI MDM: CPT | Mod: 25,GC

## 2021-01-18 RX ORDER — SODIUM CHLORIDE 9 MG/ML
1000 INJECTION INTRAMUSCULAR; INTRAVENOUS; SUBCUTANEOUS
Refills: 0 | Status: DISCONTINUED | OUTPATIENT
Start: 2021-01-18 | End: 2021-01-19

## 2021-01-18 RX ORDER — SODIUM CHLORIDE 9 MG/ML
500 INJECTION, SOLUTION INTRAVENOUS ONCE
Refills: 0 | Status: DISCONTINUED | OUTPATIENT
Start: 2021-01-18 | End: 2021-01-18

## 2021-01-18 RX ORDER — ACYCLOVIR SODIUM 500 MG
750 VIAL (EA) INTRAVENOUS EVERY 8 HOURS
Refills: 0 | Status: DISCONTINUED | OUTPATIENT
Start: 2021-01-18 | End: 2021-01-20

## 2021-01-18 RX ORDER — ACETAMINOPHEN 500 MG
650 TABLET ORAL EVERY 6 HOURS
Refills: 0 | Status: DISCONTINUED | OUTPATIENT
Start: 2021-01-18 | End: 2021-01-28

## 2021-01-18 RX ORDER — ACETAMINOPHEN 500 MG
975 TABLET ORAL ONCE
Refills: 0 | Status: COMPLETED | OUTPATIENT
Start: 2021-01-18 | End: 2021-01-18

## 2021-01-18 RX ORDER — METOPROLOL TARTRATE 50 MG
25 TABLET ORAL DAILY
Refills: 0 | Status: DISCONTINUED | OUTPATIENT
Start: 2021-01-18 | End: 2021-01-28

## 2021-01-18 RX ORDER — CEFEPIME 1 G/1
INJECTION, POWDER, FOR SOLUTION INTRAMUSCULAR; INTRAVENOUS
Refills: 0 | Status: DISCONTINUED | OUTPATIENT
Start: 2021-01-18 | End: 2021-01-18

## 2021-01-18 RX ORDER — ENOXAPARIN SODIUM 100 MG/ML
40 INJECTION SUBCUTANEOUS AT BEDTIME
Refills: 0 | Status: DISCONTINUED | OUTPATIENT
Start: 2021-01-18 | End: 2021-01-28

## 2021-01-18 RX ORDER — ACYCLOVIR SODIUM 500 MG
VIAL (EA) INTRAVENOUS
Refills: 0 | Status: DISCONTINUED | OUTPATIENT
Start: 2021-01-18 | End: 2021-01-20

## 2021-01-18 RX ORDER — ISOSORBIDE MONONITRATE 60 MG/1
60 TABLET, EXTENDED RELEASE ORAL DAILY
Refills: 0 | Status: DISCONTINUED | OUTPATIENT
Start: 2021-01-18 | End: 2021-01-28

## 2021-01-18 RX ORDER — VANCOMYCIN HCL 1 G
1000 VIAL (EA) INTRAVENOUS ONCE
Refills: 0 | Status: COMPLETED | OUTPATIENT
Start: 2021-01-18 | End: 2021-01-18

## 2021-01-18 RX ORDER — ACYCLOVIR SODIUM 500 MG
750 VIAL (EA) INTRAVENOUS ONCE
Refills: 0 | Status: COMPLETED | OUTPATIENT
Start: 2021-01-18 | End: 2021-01-18

## 2021-01-18 RX ORDER — ASPIRIN/CALCIUM CARB/MAGNESIUM 324 MG
81 TABLET ORAL DAILY
Refills: 0 | Status: DISCONTINUED | OUTPATIENT
Start: 2021-01-18 | End: 2021-01-28

## 2021-01-18 RX ORDER — CYCLOBENZAPRINE HYDROCHLORIDE 10 MG/1
5 TABLET, FILM COATED ORAL THREE TIMES A DAY
Refills: 0 | Status: DISCONTINUED | OUTPATIENT
Start: 2021-01-18 | End: 2021-01-28

## 2021-01-18 RX ORDER — CEFEPIME 1 G/1
2000 INJECTION, POWDER, FOR SOLUTION INTRAMUSCULAR; INTRAVENOUS ONCE
Refills: 0 | Status: COMPLETED | OUTPATIENT
Start: 2021-01-18 | End: 2021-01-18

## 2021-01-18 RX ORDER — CEFAZOLIN SODIUM 1 G
2000 VIAL (EA) INJECTION ONCE
Refills: 0 | Status: DISCONTINUED | OUTPATIENT
Start: 2021-01-18 | End: 2021-01-18

## 2021-01-18 RX ORDER — ATORVASTATIN CALCIUM 80 MG/1
40 TABLET, FILM COATED ORAL DAILY
Refills: 0 | Status: DISCONTINUED | OUTPATIENT
Start: 2021-01-18 | End: 2021-01-28

## 2021-01-18 RX ADMIN — Medication 975 MILLIGRAM(S): at 16:07

## 2021-01-18 RX ADMIN — SODIUM CHLORIDE 50 MILLILITER(S): 9 INJECTION INTRAMUSCULAR; INTRAVENOUS; SUBCUTANEOUS at 16:07

## 2021-01-18 RX ADMIN — CEFEPIME 100 MILLIGRAM(S): 1 INJECTION, POWDER, FOR SOLUTION INTRAMUSCULAR; INTRAVENOUS at 10:00

## 2021-01-18 RX ADMIN — Medication 250 MILLIGRAM(S): at 10:56

## 2021-01-18 RX ADMIN — CEFEPIME 2000 MILLIGRAM(S): 1 INJECTION, POWDER, FOR SOLUTION INTRAMUSCULAR; INTRAVENOUS at 10:58

## 2021-01-18 RX ADMIN — Medication 1000 MILLIGRAM(S): at 13:43

## 2021-01-18 NOTE — ED PROVIDER NOTE - CARE PLAN
Principal Discharge DX:	Fall, initial encounter  Secondary Diagnosis:	COVID-19  Secondary Diagnosis:	Hyponatremia  Secondary Diagnosis:	Cerebellar infarct

## 2021-01-18 NOTE — H&P ADULT - NSHPLABSRESULTS_GEN_ALL_CORE
LABS:                        10.5   5.09  )-----------( 197      ( 18 Jan 2021 06:50 )             31.0     18 Jan 2021 06:50    128    |  94     |  13     ----------------------------<  142    4.2     |  22     |  0.9      Ca    9.1        18 Jan 2021 06:50    TPro  6.5    /  Alb  3.6    /  TBili  0.3    /  DBili  x      /  AST  37     /  ALT  32     /  AlkPhos  67     18 Jan 2021 06:50    PT/INR - ( 18 Jan 2021 06:50 )   PT: 14.10 sec;   INR: 1.23 ratio         PTT - ( 18 Jan 2021 06:50 )  PTT:31.3 sec    COVID-19 PCR: Detected: You can help in the fight against COVID-19. [facility] may contact  you to see if you are interested in voluntarily participating in one of  our clinical trials.  This test has been validated by [employer] to be accurate;  though it has not been FDA cleared/approved by the usual pathway  As with all laboratory test, results should be correlated with clinical  findings.  https://www.fda.gov/media/703493/download  https://www.fda.gov/media/381629/download (01.18.21 @ 06:50)  < from: CT Head No Cont (01.18.21 @ 07:49) >    IMPRESSION:    1. 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  2. Chronic small vessel ischemic change.  3. No acute intracranial hemorrhage, mass effect, or midline shift.    < end of copied text >

## 2021-01-18 NOTE — ED PROVIDER NOTE - PHYSICAL EXAMINATION
CONSTITUTIONAL: Well-developed; elderly, uncomfortable appearing  SKIN: warm, dry  HEAD: Normocephalic; atraumatic.  EYES: PERRL, EOMI, no conjunctival erythema  ENT: No nasal discharge; airway clear.  NECK: Supple; non tender.  CARD: S1, S2 normal; no murmurs, gallops, or rubs. Regular rate and rhythm.   RESP: No wheezes, rales or rhonchi.  ABD: soft ntnd  EXT: Normal ROM.  No clubbing, cyanosis or edema.   LYMPH: No acute cervical adenopathy.  NEURO: Alert, oriented, grossly unremarkable  PSYCH: Cooperative, appropriate.

## 2021-01-18 NOTE — ED PROVIDER NOTE - OBJECTIVE STATEMENT
85 y.o. F Lib[place] speaking PMH HTN, DM, CAD with stent, Remote Colon Cancer with resection, Hernia repair with continued abdominal hernia recent admission for shingles with fall 26 hours ago, + fever chills, no CP no SOB, urinary ysmptoms. Pt was found down by her daughter, heard a thump and saw her on the ground.

## 2021-01-18 NOTE — H&P ADULT - NSHPPHYSICALEXAM_GEN_ALL_CORE
CONSTITUTIONAL: No acute distress, well-developed, well-groomed, AAOx3  HEAD: Atraumatic, normocephalic  EYES: EOM intact, PERRLA, conjunctiva and sclera clear  ENT: Supple, no masses, no thyromegaly, no bruits, no JVD; moist mucous membranes  PULMONARY: Clear to auscultation bilaterally; no wheezes, rales, or rhonchi  CARDIOVASCULAR: Regular rate and rhythm; no murmurs, rubs, or gallops  GASTROINTESTINAL: Soft, non-tender, non-distended; bowel sounds present, abd wall ulcer no signs of infection has dressing, abd wall hernia with no tenderness   MUSCULOSKELETAL: 2+ peripheral pulses; no clubbing, no cyanosis, no edema  NEUROLOGY: AO x 1-2, no meningeal signs , non-focal  SKIN: herpetic lesions on the back and right breast

## 2021-01-18 NOTE — ED ADULT NURSE NOTE - NSIMPLEMENTINTERV_GEN_ALL_ED
Implemented All Fall with Harm Risk Interventions:  [place] to call system. Call bell, personal items and telephone within reach. Instruct patient to call for assistance. Room bathroom lighting operational. Non-slip footwear when patient is off stretcher. Physically safe environment: no spills, clutter or unnecessary equipment. Stretcher in lowest position, wheels locked, appropriate side rails in place. Provide visual cue, wrist band, yellow gown, etc. Monitor gait and stability. Monitor for mental status changes and reorient to person, place, and time. Review medications for side effects contributing to fall risk. Reinforce activity limits and safety measures with patient and family. Provide visual clues: red socks.

## 2021-01-18 NOTE — CONSULT NOTE ADULT - SUBJECTIVE AND OBJECTIVE BOX
Neurology Consult    Patient is a 85y old  Female who presents with a chief complaint of AMS    HPI: 86 yo F of HTN, DM, CAD s/p PCI, remote hx of colon cancer s/p resection, recent hx of shingles on acyclovir who presents with fall 26 hrs prior to arrival and AMS. On xarelto. Exam: febrile, rest of vitals wnl. Head atraumatic. PERRL. Lungs clear. CV regular rate and rhythm. Abd soft, nontender. Moving all extremities, no tenderness. A&Ox3..  As per daughter at the bedside, patient's  LWK was 11 pm, at 2 am she fell off her bed and became confused,  did not recognize her own daughter, and her speech did not make sense. In ED she was found to be covid+ and her Na was 128. She appears somewhat confused but non focal. CTH shows questionable subacute  infarct in left cerebellum.    PAST MEDICAL & SURGICAL HISTORY:      FAMILY HISTORY:      Social History: (-) x 3    Allergies    No Known Allergies    Intolerances        MEDICATIONS  (STANDING):    MEDICATIONS  (PRN):      Review of systems:    Constitutional: No fever, weight loss or fatigue    Eyes: No eye pain or discharge  ENMT:  No difficulty hearing; No sinus or throat pain  Neck: No pain or stiffness  Respiratory: No cough, wheezing, chills or hemoptysis  Cardiovascular: No chest pain, palpitations, shortness of breath, dyspnea on exertion  Gastrointestinal: No abdominal pain, nausea, vomiting or hematemesis; No diarrhea or constipation.   Genitourinary: No dysuria, frequency, hematuria or incontinence  Neurological: As per HPI  Skin: No rashes or lesions   Endocrine: No heat or cold intolerance; No hair loss  Musculoskeletal: No joint pain or swelling  Psychiatric: No depression, anxiety, mood swings  Heme/Lymph: No easy bruising or bleeding gums    Vital Signs Last 24 Hrs  T(C): 38.4 (2021 08:27), Max: 38.4 (2021 08:27)  T(F): 101.1 (2021 08:27), Max: 101.1 (2021 08:27)  HR: 84 (2021 11:54) (84 - 93)  BP: 132/63 (2021 11:54) (122/60 - 132/63)  BP(mean): --  RR: 22 (2021 11:54) (18 - 22)  SpO2: 93% (2021 11:54) (93% - 98%)    Neurologic Examination:  General:  Appearance is consistent with chronologic age.  No abnormal facies.   General: The patient is oriented to person, place, and date.  Language is broken at times, unable to finish sentences. No dysarthria.  Cranial nerves: intact VA, VFF.  EOMI w/o nystagmus, skew or reported double vision.  PERRL.  No ptosis/weakness of eyelid closure.  Facial sensation is normal with normal bite.  No facial asymmetry.  Hearing grossly intact b/l.  Palate elevates midline.  Tongue midline.  Motor examination:   Normal tone, bulk and range of motion.  No tenderness, twitching, tremors or involuntary movements.  Formal Muscle Strength Testing: (MRC grade R/L) 5/5 UE; 5/5 LE.  No observable drift.  Reflexes:   2+ b/l pectoralis, biceps, triceps, brachioradialis, patella and Achilles.  Plantar response downgoing b/l.  Jaw jerk, Catrina, clonus absent.  Sensory examination:   Intact to light touch and pinprick, pain, temperature and proprioception and vibration in all extremities.  Cerebellum:   FTN/HKS intact with normal RALEIGH in all limbs.  No dysmetria or dysdiadokinesia.      Labs:   CBC Full  -  ( 2021 06:50 )  WBC Count : 5.09 K/uL  RBC Count : 3.59 M/uL  Hemoglobin : 10.5 g/dL  Hematocrit : 31.0 %  Platelet Count - Automated : 197 K/uL  Mean Cell Volume : 86.4 fL  Mean Cell Hemoglobin : 29.2 pg  Mean Cell Hemoglobin Concentration : 33.9 g/dL  Auto Neutrophil # : 3.41 K/uL  Auto Lymphocyte # : 1.07 K/uL  Auto Monocyte # : 0.53 K/uL  Auto Eosinophil # : 0.01 K/uL  Auto Basophil # : 0.02 K/uL  Auto Neutrophil % : 67.0 %  Auto Lymphocyte % : 21.0 %  Auto Monocyte % : 10.4 %  Auto Eosinophil % : 0.2 %  Auto Basophil % : 0.4 %    -18    128<L>  |  94<L>  |  13  ----------------------------<  142<H>  4.2   |  22  |  0.9    Ca    9.1      2021 06:50    TPro  6.5  /  Alb  3.6  /  TBili  0.3  /  DBili  x   /  AST  37  /  ALT  32  /  AlkPhos  67  -18    LIVER FUNCTIONS - ( 2021 06:50 )  Alb: 3.6 g/dL / Pro: 6.5 g/dL / ALK PHOS: 67 U/L / ALT: 32 U/L / AST: 37 U/L / GGT: x           PT/INR - ( 2021 06:50 )   PT: 14.10 sec;   INR: 1.23 ratio         PTT - ( 2021 06:50 )  PTT:31.3 sec  Urinalysis Basic - ( 2021 10:29 )    Color: Light Yellow / Appearance: Clear / S.010 / pH: x  Gluc: x / Ketone: Negative  / Bili: Negative / Urobili: <2 mg/dL   Blood: x / Protein: Negative / Nitrite: Negative   Leuk Esterase: Negative / RBC: x / WBC x   Sq Epi: x / Non Sq Epi: x / Bacteria: x          Neuroimaging:  NCHCT: CT Head No Cont:   EXAM:  CT BRAIN            PROCEDURE DATE:  2021            INTERPRETATION:  CLINICAL HISTORY / REASON FOR EXAM: Trauma.    TECHNIQUE: Multiple axial CT images of the head were obtained with sagittal and coronal reformats from the base of theskull to the vertex without the administration of IV contrast.    COMPARISON: None.      FINDINGS:    The ventricles and cerebral sulci are prominent consistent with age-related parenchymal volume loss. Foci of low attenuation in the periventricular and subcortical white matter are most consistent with chronic small vessel ischemic change. Focal area of low attenuation in the left cerebellum (series 4/) indicative of infarct.    There is no evidence of acute intracranial hemorrhage, mass effector midline shift.    Gray-white differentiation is maintained.    There is no fracture to the calvarium. Mastoid air cells are well aerated bilaterally. The visualized portions of the sinuses are unremarkable.    There is evidence of bilateral cataract surgery.    IMPRESSION:    1. 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  2. Chronic small vessel ischemic change.  3. No acute intracranial hemorrhage, mass effect, or midline shift.        Spoke with RICHARD BLACK MD on 2021 8:40 AM with readback.      JARED MALAVE M.D., RESIDENT RADIOLOGIST  This document has been electronically signed.  SINDY BRANTLEY MD; Attending Radiologist  This document has been electronically signed. 2021  8:41AM (21 @ 07:49)

## 2021-01-18 NOTE — CONSULT NOTE ADULT - ASSESSMENT
86 yo F of HTN, DM, CAD s/p PCI, remote hx of colon cancer s/p resection, recent hx of shingles on acyclovir who presents with fall 26 hrs prior to arrival and AMS. non focal with mild expressive aphasia likely related to toxic-metabolic/infectious etiology. LWN is 11 pm. CTH with questionable subacute infarct in left cerebellum    Plan:  Admit to medical floor  Replete electrolytes   Continue current medications  MRI brain NC if symptoms persist        Plan was discussed with neuroattending Dr. Larry

## 2021-01-18 NOTE — ED PROVIDER NOTE - CLINICAL SUMMARY MEDICAL DECISION MAKING FREE TEXT BOX
s/o to me. 84 y/o female on anticoagulation, presented after a fall. Imaging revealed no traumatic injuries, but + stroke in left cerebellum. Seen by neurology. Will admit.

## 2021-01-18 NOTE — ED ADULT NURSE NOTE - OBJECTIVE STATEMENT
Pt. status post fall near bed yesterday. Daughter states that pt at that time denied hitting head, and was alert and oriented, however at approx 2 am this morning pt was found to be confused. Pt. is on xarelto anticoagulant therapy.

## 2021-01-18 NOTE — H&P ADULT - ASSESSMENT
85 yr F with CAD s/p PCI 10 yr ago (2 stents), HTN, DM, abd wall hernia, hx of colon cancer s/p resection, recent discharge from [facility] due to shingle was brought due to confusion.      #AMS: DDx; infection related (covid VS encephaliatis),metabloic (Hypo Na), doubt stroke  - although no meningeal signs, given recent shingle and sudden change in mental status, empirically treat with IV acyclovir, ID eval, brain MRI  - Hypo Na; last level last week from [facility] 129, so its chronic, maybe SIADH from herpes pain?, send urien and serum osmolarity, NS IV  - doubt stroke, no gross motor or sensory deficit, CT age undetermined infarction of left cerebellum cant explain the AMS  - neurology to f/u     #Covid INFECTION;  - stable on RA  - f/u inflammatory makers    #recently discharged on xaerllto 10mg qd for DVT pro due to hospital admission,  Dc, start Lovenox     #CAD s/p PCI 2 stents 10 yr ago  - c/w ASA, LIPITOR, bb and Imdur  - ECHO FROM [facility] LAST WEEK WNL    #Hx of colon cancer: in remission, s/p resection    #abd wall ulcer and hernia: chronic, f/u outpt with surgery and burn, no signs of infection    #DM:  - hold leif gents, nonitor FS, insulin if > 180    #HTN:  - c/w home emds    #Diet: NPO, except meds, speech/swallow eval  #DVT pro: lovenox  #GI pro: not indicated  #Dispo: from home, acute for now

## 2021-01-18 NOTE — H&P ADULT - ATTENDING COMMENTS
HPI: [place]  Darcy [id number], daughter Mahogany at bedside also provided more history.   85 yr F with CAD s/p PCI 10 yr ago (2 stents), HTN, DM, abd wall hernia, hx of colon cancer s/p resection, recent discharge from [facility] due to shingles was brought in due to confusion. She returned home form [facility] on 1/15, discharged on valcyclovir. Her daughter noticed over the weekend she had slid and fallen out of her chair which is abnormal for her. Additionally, there are moments when she would be confused speaking non-sensibly and it was reported that she had a fever over the weekend as well (100.4). On the morning of presentation she found her mother increasingly confused minimally responding and no.  She was found to be COVID + on admission but the patient reports no CP, SOB, palpitations, diarrhea, loss of smell/taste, body aches, fevers, chills, or malaise at this time.   CTH showed possible subacute left cerebellar infarct     REVIEW OF SYSTEMS:  CONSTITUTIONAL:  No weakness, fevers, chills, night sweats, weight loss  EYES/ENT: No visual changes. No vertigo or dysphagia  NECK: No neck pain or stiffness  RESPIRATORY: No cough, wheezing, hemoptysis. No shortness of breath  CARDIOVASCULAR: No chest pain or palpitations. No lower extremity edema  GASTROINTESTINAL: No abdominal pain. No nausea, vomiting, diarrhea, or hematemesis  GENITOURINARY: No dysuria or hematuria   NEUROLOGICAL: No focal numbness or weakness. +confusion   SKIN: No rashes or itching  HEMATOLOGIC: No easy bruising or prolonged bleeding.      PHYSICAL EXAM:  GENERAL: NAD, obese, Non-toxic, elderly, stated age   HEAD:  Atraumatic, Normocephalic  EYES: EOMI, Sclera White   NECK: Supple, No JVD  CHEST/LUNG: Clear to auscultation bilaterally; No wheezing, rhonchi, or crackles  HEART: Regular rate and rhythm; s1, s2, No murmurs, rubs, or gallops  ABDOMEN: Soft, Nontender, Nondistended; Bowel sounds present, No rebound or guarding noted. large protuberant ventral hernia in the LLQ with bandage covering it. also smaller right sided ventral hernia. No evidence of incarceration/strangulation.     EXTREMITIES:  No lower extremity edema or calf tenderness to palpation.  No clubbing or cyanosis  PSYCH: AAOx2 (name, birthday, and location are correct, date was wrong, states "I forgot"), pleasant, cooperative, not anxious  NEUROLOGY: non-focal  SKIN: healing crusted/scabbed rash on the left mid back. Beneath the left breast there are is are open vesicular lesions on an erythematous base.        ASSESSMENT AND PLAN:  Metabolic encephalopathy: COVID vs aseptic/herpetic meningitis. ID consult, continue acyclovir IV for now. may require lumbar tap. Follow up blood cultures. mental status improved but as per daughter not fully returned to baseline. ID consulted. Follow up MRI results.     COVID-19 infection: SIRS not present on admission, though febrile. ID consulted no indication for RDM or Dexamethasone at this time. She is breathing well on RA. May benefit from plasma? Follow up inflammatory markers (Ferritin, LDH, CRP, ESR, D-Dimer) and procalcitonin. Supplemental O2 as needed.    Subacute cerebellar infract: continue with ASA and statin. Neuro following. Follow up MRI results. PT/Rehab eval     Shingles: continue with contact isolation and IV acyclovir. ID consulted.     Hyponatremia: follow up urine studies, d/c IVF for now, If SIADH could make it worse.     SARAH vs CKD III: trend Cr. Monitor closely for MAGI, received contrast.     CAD s/p PCI/HTN: cont with home medications    Diabetes: Basal bolus insulin, keep fingerstick glucose <180.     Vental hernia: wound care consult. Patient has [facility] wound care nursing treat at home. Family is not interested in surgical repair.   DVT ppx: Lovenox/Heparin  GI ppx: Not indicated  GOC: Full code for now. Discussed with daughter Mahogany and stated she would like a have a more In depth conversation with her mother and her sister before making any final decisions.   My note supersedes the residents in the event of a discrepancy. HPI: [place]  Darcy [id number], daughter Maohgany at bedside also provided more history.   85 yr F with CAD s/p PCI 10 yr ago (2 stents), HTN, DM, DVT on xarelto, abd wall hernia, hx of colon cancer s/p resection, recent discharge from [facility] due to shingles was brought in due to confusion. She returned home form [facility] on 1/15, discharged on valcyclovir. Her daughter noticed over the weekend she had slid and fallen out of her chair which is abnormal for her. Additionally, there are moments when she would be confused speaking non-sensibly and it was reported that she had a fever over the weekend as well (100.4). On the morning of presentation she found her mother increasingly confused minimally responding and no.  She was found to be COVID + on admission but the patient reports no CP, SOB, palpitations, diarrhea, loss of smell/taste, body aches, fevers, chills, or malaise at this time.   CTH showed possible subacute left cerebellar infarct     REVIEW OF SYSTEMS:  CONSTITUTIONAL:  No weakness, fevers, chills, night sweats, weight loss  EYES/ENT: No visual changes. No vertigo or dysphagia  NECK: No neck pain or stiffness  RESPIRATORY: No cough, wheezing, hemoptysis. No shortness of breath  CARDIOVASCULAR: No chest pain or palpitations. No lower extremity edema  GASTROINTESTINAL: No abdominal pain. No nausea, vomiting, diarrhea, or hematemesis  GENITOURINARY: No dysuria or hematuria   NEUROLOGICAL: No focal numbness or weakness. +confusion   SKIN: No rashes or itching  HEMATOLOGIC: No easy bruising or prolonged bleeding.      PHYSICAL EXAM:  GENERAL: NAD, obese, Non-toxic, elderly, stated age   HEAD:  Atraumatic, Normocephalic  EYES: EOMI, Sclera White   NECK: Supple, No JVD  CHEST/LUNG: Clear to auscultation bilaterally; No wheezing, rhonchi, or crackles  HEART: Regular rate and rhythm; s1, s2, No murmurs, rubs, or gallops  ABDOMEN: Soft, Nontender, Nondistended; Bowel sounds present, No rebound or guarding noted. large protuberant ventral hernia in the LLQ with bandage covering it. also smaller right sided ventral hernia. No evidence of incarceration/strangulation.     EXTREMITIES:  No lower extremity edema or calf tenderness to palpation.  No clubbing or cyanosis  PSYCH: AAOx2 (name, birthday, and location are correct, date was wrong, states "I forgot"), pleasant, cooperative, not anxious  NEUROLOGY: non-focal  SKIN: healing crusted/scabbed rash on the left mid back. Beneath the left breast there are is are open vesicular lesions on an erythematous base.        ASSESSMENT AND PLAN:  Metabolic encephalopathy: COVID vs aseptic/herpetic meningitis. ID consult, continue acyclovir IV for now. may require lumbar tap. Follow up blood cultures. mental status improved but as per daughter not fully returned to baseline. ID consulted. Follow up MRI results.     COVID-19 infection: SIRS not present on admission, though febrile. ID consulted no indication for RDM or Dexamethasone at this time. She is breathing well on RA. May benefit from plasma? Follow up inflammatory markers (Ferritin, LDH, CRP, ESR, D-Dimer) and procalcitonin. Supplemental O2 as needed.    Subacute cerebellar infract: continue with ASA and statin. Neuro following. Follow up MRI results. PT/Rehab eval     Shingles: continue with contact isolation and IV acyclovir. ID consulted.     Chronic Hyponatremia: follow up urine studies, serum and urine osmo. monitor Na closely, on IVF for MAGI ppx which could worsen SIADH.     SARAH: trend Cr. Monitor closely for MAGI, received contrast. Cont with IVF for now. Follow up urine sodium and pro:cr. If not improving will need a nephro consult. Previous GFR was 81 on 1/14/2021    CAD s/p PCI/HTN: cont with home medications    Diabetes: Basal bolus insulin, keep fingerstick glucose <180.     Vental hernia: wound care consult. Patient has [facility] wound care nursing treat at home. Family is not interested in surgical repair.     DVT ppx: Lovenox therapeutic   GI ppx: Not indicated  GOC: Full code for now. Discussed with daughter Mahogany and stated she would like a have a more In depth conversation with her mother and her sister before making any final decisions.   My note supersedes the residents in the event of a discrepancy.

## 2021-01-18 NOTE — ED ADULT TRIAGE NOTE - CHIEF COMPLAINT QUOTE
pt was found by daughter on the floor near bed yesterday, pt at that time denied hitting head, and was alert and oriented x 3. at 2 am this morning pt was found to be confused. pt is on Xarelto. sts had temperature and cough yesterday tmax 100.4

## 2021-01-18 NOTE — ED PROVIDER NOTE - PROGRESS NOTE DETAILS
SR: s/o received from dr. barker, will f/u imaging and labs and reassess AH - received s/o from Dr. Lance; pt stable, aware of plan; Pending labs, imaging SR: spoke with radiologistist about ct findings, spoke with neuro NP benoit aware of consult SR: s/o provided to Dr. Lazo to follow up completion scans and neuro eval. TC: PREATTENDING NOTE: 86 yo F of HTN, DM, CAD s/p PCI, remote hx of colon cancer s/p resection, recent hx of shingles on acyclovir who presents with fall 26 hrs prior to arrival and AMS. On xarelto. Exam: febrile, rest of vitals wnl. Head atraumatic. PERRL. Lungs clear. CV regular rate and rhythm. Abd soft, nontender. Moving all extremities, no tenderness. A&Ox3. Impression: infection, ams r/o stroke/ICH, traumatic injury. Plan: labs, ekg, xrays, CT, reassess.

## 2021-01-18 NOTE — ED PROVIDER NOTE - ATTENDING CONTRIBUTION TO CARE
d I personally evaluated patient. I agree with the findings and plan with all documentation on chart except as documented  in my note.    86 y/o F Emirati speaking female with PMH HTN, DM, CAD with stent, Remote Colon Cancer with resection, Hernia repair with continued abdominal hernia who presents to the ED for AMS.  Patient had a fall about 26 hours ago.  She is on Xarelto. Patient was fine after this fall and per daughter it seemed mild, however, patient was acting confused today and daughter was concerned.    Patient had a recent admission to [facility] for back pain and was found to have Shingles during hospital course. She was started on Acyclovir. Patient also was warm and concern for fever. She had recent negative COVID tests at [facility].    Large bore IV placed and sepsis labs sent. Will get trauma work up done. Will give antibiotics. WIll follow up work up and reassess.

## 2021-01-19 LAB
ALBUMIN SERPL ELPH-MCNC: 3.3 G/DL — LOW (ref 3.5–5.2)
ALP SERPL-CCNC: 54 U/L — SIGNIFICANT CHANGE UP (ref 30–115)
ALT FLD-CCNC: 26 U/L — SIGNIFICANT CHANGE UP (ref 0–41)
ANION GAP SERPL CALC-SCNC: 12 MMOL/L — SIGNIFICANT CHANGE UP (ref 7–14)
ANION GAP SERPL CALC-SCNC: 17 MMOL/L — HIGH (ref 7–14)
AST SERPL-CCNC: 25 U/L — SIGNIFICANT CHANGE UP (ref 0–41)
BASOPHILS # BLD AUTO: 0.01 K/UL — SIGNIFICANT CHANGE UP (ref 0–0.2)
BASOPHILS NFR BLD AUTO: 0.3 % — SIGNIFICANT CHANGE UP (ref 0–1)
BILIRUB SERPL-MCNC: 0.5 MG/DL — SIGNIFICANT CHANGE UP (ref 0.2–1.2)
BUN SERPL-MCNC: 12 MG/DL — SIGNIFICANT CHANGE UP (ref 10–20)
BUN SERPL-MCNC: 13 MG/DL — SIGNIFICANT CHANGE UP (ref 10–20)
CALCIUM SERPL-MCNC: 8.5 MG/DL — SIGNIFICANT CHANGE UP (ref 8.5–10.1)
CALCIUM SERPL-MCNC: 8.8 MG/DL — SIGNIFICANT CHANGE UP (ref 8.5–10.1)
CHLORIDE SERPL-SCNC: 93 MMOL/L — LOW (ref 98–110)
CHLORIDE SERPL-SCNC: 96 MMOL/L — LOW (ref 98–110)
CO2 SERPL-SCNC: 15 MMOL/L — LOW (ref 17–32)
CO2 SERPL-SCNC: 23 MMOL/L — SIGNIFICANT CHANGE UP (ref 17–32)
CREAT SERPL-MCNC: 0.8 MG/DL — SIGNIFICANT CHANGE UP (ref 0.7–1.5)
CREAT SERPL-MCNC: 1.1 MG/DL — SIGNIFICANT CHANGE UP (ref 0.7–1.5)
CRP SERPL-MCNC: 1.72 MG/DL — HIGH (ref 0–0.4)
EOSINOPHIL # BLD AUTO: 0.01 K/UL — SIGNIFICANT CHANGE UP (ref 0–0.7)
EOSINOPHIL NFR BLD AUTO: 0.3 % — SIGNIFICANT CHANGE UP (ref 0–8)
FERRITIN SERPL-MCNC: 85 NG/ML — SIGNIFICANT CHANGE UP (ref 15–150)
GLUCOSE SERPL-MCNC: 204 MG/DL — HIGH (ref 70–99)
GLUCOSE SERPL-MCNC: 363 MG/DL — HIGH (ref 70–99)
HCT VFR BLD CALC: 26.9 % — LOW (ref 37–47)
HGB BLD-MCNC: 9.2 G/DL — LOW (ref 12–16)
IMM GRANULOCYTES NFR BLD AUTO: 1 % — HIGH (ref 0.1–0.3)
LYMPHOCYTES # BLD AUTO: 0.72 K/UL — LOW (ref 1.2–3.4)
LYMPHOCYTES # BLD AUTO: 18.7 % — LOW (ref 20.5–51.1)
MAGNESIUM SERPL-MCNC: 1.1 MG/DL — LOW (ref 1.8–2.4)
MAGNESIUM SERPL-MCNC: 2.6 MG/DL — HIGH (ref 1.8–2.4)
MCHC RBC-ENTMCNC: 29.1 PG — SIGNIFICANT CHANGE UP (ref 27–31)
MCHC RBC-ENTMCNC: 34.2 G/DL — SIGNIFICANT CHANGE UP (ref 32–37)
MCV RBC AUTO: 85.1 FL — SIGNIFICANT CHANGE UP (ref 81–99)
MONOCYTES # BLD AUTO: 0.36 K/UL — SIGNIFICANT CHANGE UP (ref 0.1–0.6)
MONOCYTES NFR BLD AUTO: 9.4 % — HIGH (ref 1.7–9.3)
NEUTROPHILS # BLD AUTO: 2.71 K/UL — SIGNIFICANT CHANGE UP (ref 1.4–6.5)
NEUTROPHILS NFR BLD AUTO: 70.3 % — SIGNIFICANT CHANGE UP (ref 42.2–75.2)
NRBC # BLD: 0 /100 WBCS — SIGNIFICANT CHANGE UP (ref 0–0)
OSMOLALITY SERPL: 274 MOS/KG — LOW (ref 280–301)
PLATELET # BLD AUTO: 167 K/UL — SIGNIFICANT CHANGE UP (ref 130–400)
POTASSIUM SERPL-MCNC: 4 MMOL/L — SIGNIFICANT CHANGE UP (ref 3.5–5)
POTASSIUM SERPL-MCNC: 5.1 MMOL/L — HIGH (ref 3.5–5)
POTASSIUM SERPL-SCNC: 4 MMOL/L — SIGNIFICANT CHANGE UP (ref 3.5–5)
POTASSIUM SERPL-SCNC: 5.1 MMOL/L — HIGH (ref 3.5–5)
PROT SERPL-MCNC: 5.9 G/DL — LOW (ref 6–8)
RBC # BLD: 3.16 M/UL — LOW (ref 4.2–5.4)
RBC # FLD: 12.6 % — SIGNIFICANT CHANGE UP (ref 11.5–14.5)
SARS-COV-2 IGG SERPL QL IA: NEGATIVE — SIGNIFICANT CHANGE UP
SARS-COV-2 IGM SERPL IA-ACNC: 0.07 INDEX — SIGNIFICANT CHANGE UP
SODIUM SERPL-SCNC: 128 MMOL/L — LOW (ref 135–146)
SODIUM SERPL-SCNC: 128 MMOL/L — LOW (ref 135–146)
TROPONIN T SERPL-MCNC: <0.01 NG/ML — SIGNIFICANT CHANGE UP
WBC # BLD: 3.85 K/UL — LOW (ref 4.8–10.8)
WBC # FLD AUTO: 3.85 K/UL — LOW (ref 4.8–10.8)

## 2021-01-19 PROCEDURE — 99233 SBSQ HOSP IP/OBS HIGH 50: CPT | Mod: CS

## 2021-01-19 RX ORDER — SODIUM CHLORIDE 9 MG/ML
1000 INJECTION INTRAMUSCULAR; INTRAVENOUS; SUBCUTANEOUS
Refills: 0 | Status: DISCONTINUED | OUTPATIENT
Start: 2021-01-19 | End: 2021-01-20

## 2021-01-19 RX ORDER — MAGNESIUM SULFATE 500 MG/ML
2 VIAL (ML) INJECTION
Refills: 0 | Status: COMPLETED | OUTPATIENT
Start: 2021-01-19 | End: 2021-01-19

## 2021-01-19 RX ADMIN — Medication 50 MILLIGRAM(S): at 05:05

## 2021-01-19 RX ADMIN — Medication 265 MILLIGRAM(S): at 21:43

## 2021-01-19 RX ADMIN — ENOXAPARIN SODIUM 40 MILLIGRAM(S): 100 INJECTION SUBCUTANEOUS at 21:43

## 2021-01-19 RX ADMIN — Medication 50 MILLIGRAM(S): at 01:07

## 2021-01-19 RX ADMIN — Medication 25 GRAM(S): at 15:30

## 2021-01-19 RX ADMIN — ISOSORBIDE MONONITRATE 60 MILLIGRAM(S): 60 TABLET, EXTENDED RELEASE ORAL at 14:03

## 2021-01-19 RX ADMIN — ENOXAPARIN SODIUM 40 MILLIGRAM(S): 100 INJECTION SUBCUTANEOUS at 01:08

## 2021-01-19 RX ADMIN — CYCLOBENZAPRINE HYDROCHLORIDE 5 MILLIGRAM(S): 10 TABLET, FILM COATED ORAL at 14:03

## 2021-01-19 RX ADMIN — Medication 50 MILLIGRAM(S): at 16:53

## 2021-01-19 RX ADMIN — SODIUM CHLORIDE 75 MILLILITER(S): 9 INJECTION INTRAMUSCULAR; INTRAVENOUS; SUBCUTANEOUS at 13:05

## 2021-01-19 RX ADMIN — Medication 81 MILLIGRAM(S): at 14:03

## 2021-01-19 RX ADMIN — Medication 265 MILLIGRAM(S): at 14:03

## 2021-01-19 RX ADMIN — Medication 20 MILLIGRAM(S): at 05:06

## 2021-01-19 RX ADMIN — CYCLOBENZAPRINE HYDROCHLORIDE 5 MILLIGRAM(S): 10 TABLET, FILM COATED ORAL at 05:05

## 2021-01-19 RX ADMIN — Medication 265 MILLIGRAM(S): at 01:05

## 2021-01-19 RX ADMIN — Medication 25 GRAM(S): at 13:04

## 2021-01-19 RX ADMIN — CYCLOBENZAPRINE HYDROCHLORIDE 5 MILLIGRAM(S): 10 TABLET, FILM COATED ORAL at 01:08

## 2021-01-19 RX ADMIN — CYCLOBENZAPRINE HYDROCHLORIDE 5 MILLIGRAM(S): 10 TABLET, FILM COATED ORAL at 21:43

## 2021-01-19 RX ADMIN — ATORVASTATIN CALCIUM 40 MILLIGRAM(S): 80 TABLET, FILM COATED ORAL at 14:03

## 2021-01-19 RX ADMIN — Medication 265 MILLIGRAM(S): at 05:05

## 2021-01-19 RX ADMIN — Medication 25 MILLIGRAM(S): at 05:06

## 2021-01-19 NOTE — SWALLOW BEDSIDE ASSESSMENT ADULT - SLP PERTINENT HISTORY OF CURRENT PROBLEM
86 y/o F presented w/ AMS. pt w/ recent fall off couch, no LOC. CTH revealed possible subacute L cerebellar infarct. MR( (-) for acute infarct, abnormal parenchymal signal. COVID-19 positive, encephalitis improving. Pt w/ recent hospitalization at [facility] for shingles.

## 2021-01-19 NOTE — ADVANCED PRACTICE NURSE CONSULT - RECOMMEDATIONS
1. Full thickness ulcerations right abdominal hernia- Cleanse wound bed w/ normal saline, gently pat dry. Apply Cavilon no-sting barrier film to wound edges and periwound to prevent maceration. Apply thin layer of hydrogel to wound bed to promote moist wound healing. Cover w/ dry gauze and ABD pad, secure w/ foam tape.  Apply hydrogel and change dressings daily & prn for strike-through drainage or soiling.  -Continue tight glucose control for optimal wound healing.  -Assess skin/wound qshift, report changes to primary provider.    Plan of Care: Primary LAZARUS Dalal at bedside & aware of above recs. Spoke w/ covering/primary MD Aniya  (at [phone]) in regards to above. Signing off on patient, no further needs/recs from [facility] service at this time. Staff RN to perform routine skin/wound assessment and manage wound care. Questions or concerns or if wound worsens and reconsult needed, please contact [facility], [phone].

## 2021-01-19 NOTE — SWALLOW BEDSIDE ASSESSMENT ADULT - SLP GENERAL OBSERVATIONS
Pt received sitting at edge of bed, alert and oriented x2, no c/o pain. +room air. pt's daughter present at the b/s.

## 2021-01-19 NOTE — PROGRESS NOTE ADULT - SUBJECTIVE AND OBJECTIVE BOX
Progress Note  This morning patient was seen and examined at bedside.    Today is hospital day 1d.  Ms. Galicia is doing fine.   She was not very cooperative in the morning as she was in deep sleep. She seems comfortable on room air. Her appetite is adequate, and she denies nausea or vomiting. She denies any abdominal pain, diarrhea, or constipation. She is not ambulating and denies any shortness of breath, chest pain, palpitations, or light headedness. She is voiding freely and denies urinary symptoms (dysuria, urgency, frequency, intermittence).      Vital Signs in the last 24 hours   Vitals Summary T(C): 37.6 (21 @ 08:00), Max: 37.8 (21 @ 00:00)  HR: 74 (21 @ 08:00) (74 - 79)  BP: 131/63 (21 @ 08:00) (131/63 - 146/79)  RR: 19 (21 @ 08:00) (18 - 19)  SpO2: 100% (21 @ 08:00) (95% - 100%)  Vent Data   Intake/ Output       Physical Exam  * General Appearance: Alert, not cooperative, not interactive, well-groomed, couldn't asses whether oriented to time, place, and person, in no acute distress  * Back: Symmetric, no curvature, ROM normal, no CVA tenderness  * Lungs: Respirations unlabored, Good bilateral air entry, normal breath sounds (Clear to auscultation bilaterally, no audible wheezes, crackles, or rhonchi)  * Heart: Regular Rate and Rhythm, normal S1 and S2, no audible murmur, rub, or gallop  * Abdomen: Symmetric, non-distended, no scar, soft, non-tender, bowel sounds active all four quadrants, no masses, no organomegaly (no hepatosplenomegaly)  * Extremities: Extremities normal, atraumatic, no cyanosis, no lower extremity pitting edema bilaterally, adequate dorsalis pedis pulses  * Pulses: 2+ and symmetric all extremities  * Skin: Skin color, texture, turgor normal, no rashes or lesions  * Lymph nodes: Cervical, supraclavicular, and axillary nodes normal      Investigations   Laboratory Workup  - CBC:                        9.2    3.85  )-----------( 167      ( 2021 04:30 )             26.9     - Chemistry:      128<L>  |  93<L>  |  13  ----------------------------<  204<H>  4.0   |  23  |  0.8    Ca    8.5      2021 04:30  Mg     1.1         TPro  5.9<L>  /  Alb  3.3<L>  /  TBili  0.5  /  DBili  x   /  AST  25  /  ALT  26  /  AlkPhos  54      - Coagulation Studies:  PT/INR - ( 2021 06:50 )   PT: 14.10 sec;   INR: 1.23 ratio         PTT - ( 2021 06:50 )  PTT:31.3 sec  - ABG:    - Cardiac Markers:  CARDIAC MARKERS ( 2021 04:30 )  x     / <0.01 ng/mL / x     / x     / x      CARDIAC MARKERS ( 2021 16:00 )  x     / <0.01 ng/mL / x     / x     / x      CARDIAC MARKERS ( 2021 06:50 )  x     / x     / 36 U/L / x     / x            Microbiological Workup  Urinalysis Basic - ( 2021 10:29 )    Color: Light Yellow / Appearance: Clear / S.010 / pH: x  Gluc: x / Ketone: Negative  / Bili: Negative / Urobili: <2 mg/dL   Blood: x / Protein: Negative / Nitrite: Negative   Leuk Esterase: Negative / RBC: x / WBC x   Sq Epi: x / Non Sq Epi: x / Bacteria: x      Current Medications  Standing Medications  acyclovir IVPB      acyclovir IVPB 750 milliGRAM(s) IV Intermittent once  acyclovir IVPB 750 milliGRAM(s) IV Intermittent every 8 hours  aspirin enteric coated 81 milliGRAM(s) Oral daily  atorvastatin Oral Tab/Cap - Peds 40 milliGRAM(s) Oral daily  cyclobenzaprine Oral Tab/Cap - Peds 5 milliGRAM(s) Oral three times a day  enalapril 20 milliGRAM(s) Oral daily  enoxaparin Injectable 40 milliGRAM(s) SubCutaneous at bedtime  isosorbide   mononitrate ER Tablet (IMDUR) 60 milliGRAM(s) Oral daily  magnesium sulfate  IVPB 2 Gram(s) IV Intermittent every 2 hours  metoprolol succinate ER 25 milliGRAM(s) Oral daily  pregabalin 50 milliGRAM(s) Oral two times a day  sodium chloride 0.9%. 1000 milliLiter(s) (75 mL/Hr) IV Continuous <Continuous>    PRN Medications  acetaminophen   Tablet .. 650 milliGRAM(s) Oral every 6 hours PRN Temp greater or equal to 38C (100.4F)    Singles Doses Administered  (Floorstock) 2 each &lt;see task&gt; GiveOnce  (Floorstock) 3 each &lt;see task&gt; GiveOnce  acetaminophen   Tablet .. 975 milliGRAM(s) Oral once  cefepime   IVPB 2000 milliGRAM(s) IV Intermittent once  vancomycin  IVPB 1000 milliGRAM(s) IV Intermittent once     Progress Note  This morning patient was seen and examined at bedside.    Today is hospital day 1d.  Ms. Galicia is doing fine.   She was not very cooperative in the morning as she was in deep sleep. She seems comfortable on room air. Her appetite is adequate, and she denies nausea or vomiting. She denies any abdominal pain, diarrhea, or constipation. She is not ambulating and denies any shortness of breath, chest pain, palpitations, or light headedness. She is voiding freely and denies urinary symptoms (dysuria, urgency, frequency, intermittence).      Vital Signs in the last 24 hours   Vitals Summary T(C): 37.6 (21 @ 08:00), Max: 37.8 (21 @ 00:00)  HR: 74 (21 @ 08:00) (74 - 79)  BP: 131/63 (21 @ 08:00) (131/63 - 146/79)  RR: 19 (21 @ 08:00) (18 - 19)  SpO2: 100% (21 @ 08:00) (95% - 100%) on RA  Vent Data   Intake/ Output       Physical Exam  * General Appearance: Alert, not cooperative, not interactive, well-groomed, couldn't assess whether oriented to time, place, and person, in no acute distress  * Back: Symmetric, no curvature, ROM normal, no CVA tenderness  * Lungs: Respirations unlabored, Good bilateral air entry, normal breath sounds (Clear to auscultation bilaterally, no audible wheezes, crackles, or rhonchi)  * Heart: Regular Rate and Rhythm, normal S1 and S2, no audible murmur, rub, or gallop  * Abdomen: Symmetric, non-distended, no scar, soft, non-tender, bowel sounds active all four quadrants, no masses, no organomegaly (no hepatosplenomegaly)  * Extremities: Extremities normal, atraumatic, no cyanosis, no lower extremity pitting edema bilaterally, adequate dorsalis pedis pulses  * Pulses: 2+ and symmetric all extremities  * Skin: Skin color, texture, turgor normal, no rashes or lesions  * Lymph nodes: Cervical, supraclavicular, and axillary nodes normal      Investigations   Laboratory Workup  - CBC:                        9.2    3.85  )-----------( 167      ( 2021 04:30 )             26.9     - Chemistry:      128<L>  |  93<L>  |  13  ----------------------------<  204<H>  4.0   |  23  |  0.8    Ca    8.5      2021 04:30  Mg     1.1         TPro  5.9<L>  /  Alb  3.3<L>  /  TBili  0.5  /  DBili  x   /  AST  25  /  ALT  26  /  AlkPhos  54      - Coagulation Studies:  PT/INR - ( 2021 06:50 )   PT: 14.10 sec;   INR: 1.23 ratio         PTT - ( 2021 06:50 )  PTT:31.3 sec  - ABG:    - Cardiac Markers:  CARDIAC MARKERS ( 2021 04:30 )  x     / <0.01 ng/mL / x     / x     / x      CARDIAC MARKERS ( 2021 16:00 )  x     / <0.01 ng/mL / x     / x     / x      CARDIAC MARKERS ( 2021 06:50 )  x     / x     / 36 U/L / x     / x            Microbiological Workup  Urinalysis Basic - ( 2021 10:29 )    Color: Light Yellow / Appearance: Clear / S.010 / pH: x  Gluc: x / Ketone: Negative  / Bili: Negative / Urobili: <2 mg/dL   Blood: x / Protein: Negative / Nitrite: Negative   Leuk Esterase: Negative / RBC: x / WBC x   Sq Epi: x / Non Sq Epi: x / Bacteria: x      Current Medications  Standing Medications  acyclovir IVPB      acyclovir IVPB 750 milliGRAM(s) IV Intermittent once  acyclovir IVPB 750 milliGRAM(s) IV Intermittent every 8 hours  aspirin enteric coated 81 milliGRAM(s) Oral daily  atorvastatin Oral Tab/Cap - Peds 40 milliGRAM(s) Oral daily  cyclobenzaprine Oral Tab/Cap - Peds 5 milliGRAM(s) Oral three times a day  enalapril 20 milliGRAM(s) Oral daily  enoxaparin Injectable 40 milliGRAM(s) SubCutaneous at bedtime  isosorbide   mononitrate ER Tablet (IMDUR) 60 milliGRAM(s) Oral daily  magnesium sulfate  IVPB 2 Gram(s) IV Intermittent every 2 hours  metoprolol succinate ER 25 milliGRAM(s) Oral daily  pregabalin 50 milliGRAM(s) Oral two times a day  sodium chloride 0.9%. 1000 milliLiter(s) (75 mL/Hr) IV Continuous <Continuous>    PRN Medications  acetaminophen   Tablet .. 650 milliGRAM(s) Oral every 6 hours PRN Temp greater or equal to 38C (100.4F)    Singles Doses Administered  (Floorstock) 2 each &lt;see task&gt; GiveOnce  (Floorstock) 3 each &lt;see task&gt; GiveOnce  acetaminophen   Tablet .. 975 milliGRAM(s) Oral once  cefepime   IVPB 2000 milliGRAM(s) IV Intermittent once  vancomycin  IVPB 1000 milliGRAM(s) IV Intermittent once

## 2021-01-19 NOTE — PROGRESS NOTE ADULT - ATTENDING COMMENTS
Pt seen and examined independently. Case discussed with the resident and pt's daughter Mahogany today in detail.  Per Mahogany, the pt is now awake, alert and back to her baseline. She asked about her grandchildren.  She ate lunch.   Mahogany spoke to her re: the lumbar puncture recommended by ID - the pt does not want the procedure at this time.  Mahogany will discuss it with her sister later today.    VS reviewed Tmax 100.2  general - NAD in bed, sleeping but easily aroused (daughter states she is sleeping more than usual)  HEENT - anicteric  chest - CTA b/l  CVS - RRR no murmur appreciated  abdomen - soft, NT, ND, obese, + RLQ hernia with dressing, + BS  extremities - no edema  skin with vesicles along left chest wall under the left breast - thoracic region T6 or T7?  neuro - sleeping but easily aroused  moved all extremities    labs, CXR, CT scan and MRI reports reviewed    1. s/p fall and confusion in pt recently admitted for herpes zoster at [facility] and now + for COVID  pt was negative for COVID on 1/9/21  diff dx includes medication effect, encephalopathy due to COVID, herpes zoster encephalitits  stroke ruled out on MRI of brain  doubt seizure based on history and not due to 1st degree AV block  pt is now improved and back to baseline per daughter  fall precautions/PT consult  ID consult appreciated - continue IV acyclovir with IV hydration  for now, pt and family want to hold off lumbar puncture  monitor temps and f/u COVID ab  no need for specific COVID therapies at this time - stable on room air  airborne and contact isolation   blood cultures negative  guarded prognosis but pt is improving  full code status for now    Hyponatremia - SIADH per chart review  doubt the cause of her encephalopathy  f/u urine studies  on NS now for hydration with acyclovir  BMP in AM    Lymphopenia due to COVID    Ventral hernia - f/u wound care consult    Hypomagnesemia - repleted IV    I reviewed the resident's note and I agree with the assessment and plan with additions and exceptions as above.     PROGRESS NOTE HANDOFF    Pending: labs as above    Family discussion: with daughter Mahogany    Disposition: from home

## 2021-01-19 NOTE — CONSULT NOTE ADULT - ASSESSMENT
85 year old female patient who was brought on 01/18 following an episode of fall on Saturday that was followed by fever and confusion, found to have subacute left cerebellar stroke, hyponatremia, and COVID pneumonia, admitted for investigations, management, and monitoring. Currently hemodynamically stable.    IMPRESSION;  Left cerebellar subacute infarct possible related to recent VZV and her underlying chronic small vessel disease.  COVID-19 but timeline unclear  No significant elevation of the inflammatory markers  procalcitonin   Ferritin 85  CRP 1.72  Ddimers 384      RECOMMENDATIONS;  Diagnostic LP ( CSF for VZV PCR, HSV1/2 PCR )  Doubt Acyclovir of any significant benefit at this stage , but will recommend continuing for now  Acyclovir 700 mg iv q8h  COVID-19 antibodies  No steroids/RDV

## 2021-01-19 NOTE — CONSULT NOTE ADULT - SUBJECTIVE AND OBJECTIVE BOX
BRIA NIKKY  85y, Female  Allergy: No Known Allergies      All historical available data reviewed.    HPI:  85 yr F with CAD s/p PCI 10 yr ago (2 stents), HTN, DM, abd wall hernia, hx of colon cancer s/p resection, recent discharge from [facility] due to shingle was brought due to confusion.  Hx taken from, the duhter at bedside, Mahogany, she is [employer] tech in [employer]    the pt was discharged from [facility] on Friday, she was diagnosed with shingle and discharged on Valacyclovir 1gram qd for 7 days, on Saturday morning the pt  felt on the ground from the sofa while she was rtying to adjust her postilion, she was seen by her daughter and she didnt have LOC, syncope or seizure and remained stable, but during the day she  had fever, last night she didnt sleep well, and when the daughter checked her she was confused, pt as baseline was with normal mental status a/o x4, but the daughter found her confused,  she was making non comprehensive talk, and speaking slowly, didnt recognize her daughter, didnt know her name or where she was,   so the daughter brought her to ER.  there is no cough, SOB, headache, mneck stoffness, legs swelling, N/V, diarrhea, urinary symptoms,     in ER; labs showed covid +ve, UA wnl, CTH possible infarct in the left cerebellum, subacute in appearance, Na 128, admitted for evaluation of AMS (2021 15:11)    FAMILY HISTORY:    PAST MEDICAL & SURGICAL HISTORY:        VITALS:  T(F): 99.6, Max: 100.1 (21 @ 00:00)  HR: 74  BP: 131/63  RR: 19Vital Signs Last 24 Hrs  T(C): 37.6 (2021 08:00), Max: 37.8 (2021 00:00)  T(F): 99.6 (2021 08:00), Max: 100.1 (2021 00:00)  HR: 74 (2021 08:00) (74 - 84)  BP: 131/63 (2021 08:00) (131/63 - 146/79)  BP(mean): --  RR: 19 (2021 08:00) (18 - 22)  SpO2: 100% (2021 08:00) (93% - 100%)    TESTS & MEASUREMENTS:                        9.2    3.85  )-----------( 167      ( 2021 04:30 )             26.9     -    128<L>  |  93<L>  |  13  ----------------------------<  204<H>  4.0   |  23  |  0.8    Ca    8.5      2021 04:30  Mg     1.1         TPro  5.9<L>  /  Alb  3.3<L>  /  TBili  0.5  /  DBili  x   /  AST  25  /  ALT  26  /  AlkPhos  54      LIVER FUNCTIONS - ( 2021 04:30 )  Alb: 3.3 g/dL / Pro: 5.9 g/dL / ALK PHOS: 54 U/L / ALT: 26 U/L / AST: 25 U/L / GGT: x             Urinalysis Basic - ( 2021 10:29 )    Color: Light Yellow / Appearance: Clear / S.010 / pH: x  Gluc: x / Ketone: Negative  / Bili: Negative / Urobili: <2 mg/dL   Blood: x / Protein: Negative / Nitrite: Negative   Leuk Esterase: Negative / RBC: x / WBC x   Sq Epi: x / Non Sq Epi: x / Bacteria: x          RADIOLOGY & ADDITIONAL TESTS:  Personal review of radiological diagnostics performed  Echo and EKG results noted when applicable.     MEDICATIONS:  acetaminophen   Tablet .. 650 milliGRAM(s) Oral every 6 hours PRN  acyclovir IVPB      acyclovir IVPB 750 milliGRAM(s) IV Intermittent once  acyclovir IVPB 750 milliGRAM(s) IV Intermittent every 8 hours  aspirin enteric coated 81 milliGRAM(s) Oral daily  atorvastatin Oral Tab/Cap - Peds 40 milliGRAM(s) Oral daily  cyclobenzaprine Oral Tab/Cap - Peds 5 milliGRAM(s) Oral three times a day  enalapril 20 milliGRAM(s) Oral daily  enoxaparin Injectable 40 milliGRAM(s) SubCutaneous at bedtime  isosorbide   mononitrate ER Tablet (IMDUR) 60 milliGRAM(s) Oral daily  magnesium sulfate  IVPB 2 Gram(s) IV Intermittent every 2 hours  metoprolol succinate ER 25 milliGRAM(s) Oral daily  pregabalin 50 milliGRAM(s) Oral two times a day  sodium chloride 0.9%. 1000 milliLiter(s) IV Continuous <Continuous>      ANTIBIOTICS:  acyclovir IVPB      acyclovir IVPB 750 milliGRAM(s) IV Intermittent once  acyclovir IVPB 750 milliGRAM(s) IV Intermittent every 8 hours

## 2021-01-19 NOTE — PROGRESS NOTE ADULT - ASSESSMENT
Assessment and Plan  Case of an 85 year old female patient who was brought on 01/18 following an episode of fall on Saturday that was followed by fever and confusion, found to have subacute left cerebellar stroke, hyponatremia, and COVID pneumonia, admitted for investigations, management, and monitoring. Currently hemodynamically stable.      Altered Mental Status  Could be related to COVID encephalitis VS Hyponatremia VS Subacute L Cerebellar Stroke  For possible COVID Encephalitis  * SARS-COV2: positive 01/18  * Chest X Ray (01/18) clear  * CT chest IC (01/18) no consolidation or effusion  * Markers as below    - Infectious Disease consulted 01/18  - Monitor for fever: afebrile in last 24 hours  - Trend WBC: 5.09 on 01/18  - Monitor SaO2 and Oxygen Requirements: RA S95% 01/19  - Follow up Chest X Ray on 01/19. Last Chest X Ray on (01/18) clear  - Trend Inflammatory Markers:  --> ESR 01/20  --> D-dimer 394 on 01/18 -> FU repeat on 01/20  --> Procalcitonin 01/20  --> C-reactive protein 1.72 on 01/18-> FU repeat on 01/20  --> LDH 01/20  --> Ferritin 85 on 01/18-> FU repeat on 01/20  --> Fibrinogen 01/20  - COVID therapy:  --> No convalescent plasma, IV Tocilizumab, IV Remdesevir, or IV Dexamethasone administered yet  - Anticoagulation Lovenox 40mg QD      For Hyponatremia  * Na 128 01/18  - Monitor Na  - Continue IVF with NS at 100 ml/hour (01/18)  - FU Sosm, HENNA, and Upr/cr (ordered 01/19)      For Subacute L Cerebellar Stroke  * CT H wo contrast (01/18) subacute left cerebellar stroke, chronic small vessel ischemic disease  * Home aspirin 81mg QD, Lipitor 40mg QD  - Consider neurology consult  - Continue Aspirin 81mg QD  - Continue Atorvastatin 40mg PO QD. Last lipid profile (01/18): chol 86, TG 96, HDL 32, LDL 44  - FU MR H without contrast performed on 01/18      Fall  * Could be 2ry to COVID/ stroke/ 1st degree AV block  * Trauma Workup negative on 01/18: CT C-cpine, XR pelvis, CT CAP IC (01/18): no fractures or dislocations    - Continue DVT prophylaxis lovenox 40mg QD  - Left cerebellar stroke could be cause as per CT H wo (01/18):  FU MR H without contrast performed on 01/18  - Could be related to 1st degree AV block evident on ECG (01/18) in ED: monitor HR  - Consider R/O seizure  - Consider R/O structural heart disease: repeat TTE ordered on 01/18 but needs approval of Dr Charles per echo tech since COVID +      HTN  * Home: enalapril 20mg QD, Toprol 25mg QD, Lasix 20mg QD  - Monitor BP: 01/19 138/56, 146/79 mmHg  - Continue Enalapril 20mg QD and Toprol 25mg QD      DM II  * Last Hba1c (01/18) 7.2  * Home metformin 850mg BID, Januvia 100mg QD  * For Diabetic neuropathy: home Lyrica 50mg BID  - Monitor POCT premeals and at bedtime: 01/18 142  - Consider starting insulin if POCT > 180  - Continue Lyrica 50mg BID for diabetic neuropathy      CAD   * S/P PCI and 2 stents 2011  * Home Aspirin 81mg QD, Toprol, Lipitor 40mg QD, Imdur  * Troponin <0.01 (01/18)   * CK (01/18) 36    - Continue Aspirin 81mg QD  - Continue Atorvastatin 40mg QD. 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  * CT chest IC (01/18): Enlarged Left thyroid and Absent Right thyroid  - Consider OP FU      Others  - DVT Prophylaxis: Lovenox 40mg Subcutaneously daily  - GI Prophylaxis: no indication for Pantoprazole 40mg PO QD  - Diet: Dysphagia 2 mechanical soft thin liquids  - Code Status: Full Code      Barriers to learning: NO  Discharge Planning: Patient will be discharged once stable   Plan was communicated with medical team      Malachi Kwan MD  PGY - 1 Internal Medicine   [facility]   Assessment and Plan  Case of an 85 year old female patient who was brought on 01/18 following an episode of fall on Saturday that was followed by fever and confusion, found to have subacute left cerebellar stroke, hyponatremia, and COVID pneumonia, admitted for investigations, management, and monitoring. Currently hemodynamically stable.      Altered Mental Status  Could be related to COVID encephalitis VS Hyponatremia VS Subacute L Cerebellar Stroke  For possible COVID Encephalitis  * SARS-COV2: positive 01/18  * Chest X Ray (01/18) clear  * CT chest IC (01/18) no consolidation or effusion  * Markers as below    - Infectious Disease consulted 01/18: perform LP and send studies for VZV PCR and HSV1/2 PCR  - Monitor for fever: afebrile in last 24 hours  - Trend WBC: 5.09 on 01/18  - Monitor SaO2 and Oxygen Requirements: RA S95% 01/19  - Follow up Chest X Ray on 01/19. Last Chest X Ray on (01/18) clear  - Trend Inflammatory Markers:  --> ESR 01/20  --> D-dimer 394 on 01/18 -> FU repeat on 01/20  --> Procalcitonin 01/20  --> C-reactive protein 1.72 on 01/18-> FU repeat on 01/20  --> LDH 01/20  --> Ferritin 85 on 01/18-> FU repeat on 01/20  --> Fibrinogen 01/20  - COVID therapy:  --> No convalescent plasma, IV Tocilizumab, IV Remdesevir, or IV Dexamethasone administered yet  - Anticoagulation Lovenox 40mg QD      For Hyponatremia  * Na 128 01/18  - Monitor Na  - Continue IVF with NS at 100 ml/hour (01/18)  - FU Sosm, HENNA, and Upr/cr (ordered 01/19)      For Possible Subacute L Cerebellar Stroke  * CT H wo contrast (01/18) subacute left cerebellar stroke, chronic small vessel ischemic disease  * Home aspirin 81mg QD, Lipitor 40mg QD  - Consider neurology consult  - Continue Aspirin 81mg QD  - Continue Atorvastatin 40mg PO QD. Last lipid profile (01/18): chol 86, TG 96, HDL 32, LDL 44  - FU MR H without contrast performed on 01/18: no stroke       Fall  * Could be 2ry to COVID/ stroke/ 1st degree AV block  * Trauma Workup negative on 01/18: CT C-cpine, XR pelvis, CT CAP IC (01/18): no fractures or dislocations    - Continue DVT prophylaxis lovenox 40mg QD  - Ruled out Left cerebellar stroke on MR H without contrast performed on 01/18  - Could be related to 1st degree AV block evident on ECG (01/18) in ED: monitor HR  - Consider R/O seizure  - Consider R/O structural heart disease      HTN  * Home: enalapril 20mg QD, Toprol 25mg QD, Lasix 20mg QD  - Monitor BP: 01/19 138/56, 146/79 mmHg  - Continue Enalapril 20mg QD and Toprol 25mg QD      DM II  * Last Hba1c (01/18) 7.2  * Home metformin 850mg BID, Januvia 100mg QD  * For Diabetic neuropathy: home Lyrica 50mg BID  - Monitor POCT premeals and at bedtime: 01/18 142  - Consider starting insulin if POCT > 180  - Continue Lyrica 50mg BID for diabetic neuropathy      CAD   * S/P PCI and 2 stents 2011  * Home Aspirin 81mg QD, Toprol, Lipitor 40mg QD, Imdur  * Troponin <0.01 (01/18)   * CK (01/18) 36    - Continue Aspirin 81mg QD  - Continue Atorvastatin 40mg QD. 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

## 2021-01-19 NOTE — ADVANCED PRACTICE NURSE CONSULT - ASSESSMENT
85 yr F with CAD s/p PCI 10 yr ago (2 stents), HTN, DM, abd wall hernia, hx of colon cancer s/p resection, recent discharge from [facility] due to shingle was brought due to confusion. In ER; labs showed covid +ve, UA wnl, CTH possible infarct in the left cerebellum, subacute in appearance, Na 128, admitted for evaluation of AMS.    Received patient on 4B, sitting up in bed, HOB elevated 30 degrees. Pt awake but drowsy, daughter Mahogany at bedside (Echo tech at [facility] [facility];   (assisting in communication w/ patient), both  made aware of purpose of WOCN visit, agreeable to consult. Daughter reports pt w/ right abdominal hernia & skin breakdown present x 2years, managed at home w/ home care RN using "gel" & dressing 2x/week. ABD pad secured tape to right abdomen, dressing removed.    Large right abdominal hernia w/ scattered full thickness ulcerations, measured all together at 6cm x 9cm x 0.2cm, wound beds marbleized w/ dark pink and yellow subcutaneous tissue, edges attached, periwound intact with patches of dry peeling skin; small amount of serosanguinous drainage present on removed dressing; no odor, induration, erythema, warmth, or c/o pain present.

## 2021-01-20 LAB
ALBUMIN SERPL ELPH-MCNC: 3.1 G/DL — LOW (ref 3.5–5.2)
ALP SERPL-CCNC: 55 U/L — SIGNIFICANT CHANGE UP (ref 30–115)
ALT FLD-CCNC: 24 U/L — SIGNIFICANT CHANGE UP (ref 0–41)
ANION GAP SERPL CALC-SCNC: 9 MMOL/L — SIGNIFICANT CHANGE UP (ref 7–14)
APPEARANCE CSF: CLEAR — SIGNIFICANT CHANGE UP
APPEARANCE UR: CLEAR — SIGNIFICANT CHANGE UP
AST SERPL-CCNC: 29 U/L — SIGNIFICANT CHANGE UP (ref 0–41)
B-OH-BUTYR SERPL-SCNC: <0.2 MMOL/L — SIGNIFICANT CHANGE UP
BACTERIA # UR AUTO: NEGATIVE — SIGNIFICANT CHANGE UP
BILIRUB SERPL-MCNC: 0.3 MG/DL — SIGNIFICANT CHANGE UP (ref 0.2–1.2)
BILIRUB UR-MCNC: NEGATIVE — SIGNIFICANT CHANGE UP
BUN SERPL-MCNC: 11 MG/DL — SIGNIFICANT CHANGE UP (ref 10–20)
CALCIUM SERPL-MCNC: 8 MG/DL — LOW (ref 8.5–10.1)
CHLORIDE SERPL-SCNC: 94 MMOL/L — LOW (ref 98–110)
CO2 SERPL-SCNC: 23 MMOL/L — SIGNIFICANT CHANGE UP (ref 17–32)
COLOR CSF: SIGNIFICANT CHANGE UP
COLOR SPEC: YELLOW — SIGNIFICANT CHANGE UP
CREAT ?TM UR-MCNC: 66 MG/DL — SIGNIFICANT CHANGE UP
CREAT SERPL-MCNC: 0.8 MG/DL — SIGNIFICANT CHANGE UP (ref 0.7–1.5)
DIFF PNL FLD: NEGATIVE — SIGNIFICANT CHANGE UP
EPI CELLS # UR: 1 /HPF — SIGNIFICANT CHANGE UP (ref 0–5)
GLUCOSE BLDC GLUCOMTR-MCNC: 199 MG/DL — HIGH (ref 70–99)
GLUCOSE BLDC GLUCOMTR-MCNC: 223 MG/DL — HIGH (ref 70–99)
GLUCOSE BLDC GLUCOMTR-MCNC: 234 MG/DL — HIGH (ref 70–99)
GLUCOSE BLDC GLUCOMTR-MCNC: 268 MG/DL — HIGH (ref 70–99)
GLUCOSE BLDC GLUCOMTR-MCNC: 272 MG/DL — HIGH (ref 70–99)
GLUCOSE CSF-MCNC: 136 MG/DL — HIGH (ref 45–75)
GLUCOSE SERPL-MCNC: 233 MG/DL — HIGH (ref 70–99)
GLUCOSE UR QL: ABNORMAL
GRAM STN FLD: SIGNIFICANT CHANGE UP
HCT VFR BLD CALC: 27.8 % — LOW (ref 37–47)
HGB BLD-MCNC: 9.5 G/DL — LOW (ref 12–16)
HYALINE CASTS # UR AUTO: 2 /LPF — SIGNIFICANT CHANGE UP (ref 0–7)
KETONES UR-MCNC: NEGATIVE — SIGNIFICANT CHANGE UP
LEUKOCYTE ESTERASE UR-ACNC: NEGATIVE — SIGNIFICANT CHANGE UP
LYMPHOCYTES # CSF: 94 % — SIGNIFICANT CHANGE UP (ref 40–80)
MAGNESIUM SERPL-MCNC: 1.6 MG/DL — LOW (ref 1.8–2.4)
MCHC RBC-ENTMCNC: 29.2 PG — SIGNIFICANT CHANGE UP (ref 27–31)
MCHC RBC-ENTMCNC: 34.2 G/DL — SIGNIFICANT CHANGE UP (ref 32–37)
MCV RBC AUTO: 85.5 FL — SIGNIFICANT CHANGE UP (ref 81–99)
MONOS+MACROS NFR CSF: 6 % — LOW (ref 15–45)
NEUTROPHILS # CSF: 0 % — SIGNIFICANT CHANGE UP (ref 0–6)
NITRITE UR-MCNC: NEGATIVE — SIGNIFICANT CHANGE UP
NRBC # BLD: 0 /100 WBCS — SIGNIFICANT CHANGE UP (ref 0–0)
NRBC NFR CSF: 27 /UL — HIGH (ref 0–5)
OSMOLALITY UR: 335 MOS/KG — SIGNIFICANT CHANGE UP (ref 50–1200)
PH UR: 6 — SIGNIFICANT CHANGE UP (ref 5–8)
PLATELET # BLD AUTO: 157 K/UL — SIGNIFICANT CHANGE UP (ref 130–400)
POTASSIUM SERPL-MCNC: 3.9 MMOL/L — SIGNIFICANT CHANGE UP (ref 3.5–5)
POTASSIUM SERPL-SCNC: 3.9 MMOL/L — SIGNIFICANT CHANGE UP (ref 3.5–5)
PROT ?TM UR-MCNC: 36 MG/DLG/24H — SIGNIFICANT CHANGE UP
PROT CSF-MCNC: 26 MG/DL — SIGNIFICANT CHANGE UP (ref 15–45)
PROT SERPL-MCNC: 5 G/DL — LOW (ref 6–8)
PROT UR-MCNC: ABNORMAL
PROT/CREAT UR-RTO: 0.5 RATIO — HIGH (ref 0–0.2)
RBC # BLD: 3.25 M/UL — LOW (ref 4.2–5.4)
RBC # CSF: 130 /UL — SIGNIFICANT CHANGE UP (ref 0–0)
RBC # FLD: 12.7 % — SIGNIFICANT CHANGE UP (ref 11.5–14.5)
RBC CASTS # UR COMP ASSIST: 2 /HPF — SIGNIFICANT CHANGE UP (ref 0–4)
SODIUM SERPL-SCNC: 126 MMOL/L — LOW (ref 135–146)
SODIUM UR-SCNC: 45 MMOL/L — SIGNIFICANT CHANGE UP
SP GR SPEC: 1.01 — SIGNIFICANT CHANGE UP (ref 1.01–1.03)
SPECIMEN SOURCE: SIGNIFICANT CHANGE UP
TUBE TYPE: SIGNIFICANT CHANGE UP
UROBILINOGEN FLD QL: SIGNIFICANT CHANGE UP
WBC # BLD: 3.13 K/UL — LOW (ref 4.8–10.8)
WBC # FLD AUTO: 3.13 K/UL — LOW (ref 4.8–10.8)
WBC UR QL: 1 /HPF — SIGNIFICANT CHANGE UP (ref 0–5)

## 2021-01-20 PROCEDURE — 99233 SBSQ HOSP IP/OBS HIGH 50: CPT | Mod: CS

## 2021-01-20 PROCEDURE — 62328 DX LMBR SPI PNXR W/FLUOR/CT: CPT

## 2021-01-20 PROCEDURE — 71045 X-RAY EXAM CHEST 1 VIEW: CPT | Mod: 26

## 2021-01-20 RX ORDER — SODIUM CHLORIDE 9 MG/ML
1000 INJECTION, SOLUTION INTRAVENOUS
Refills: 0 | Status: DISCONTINUED | OUTPATIENT
Start: 2021-01-20 | End: 2021-01-20

## 2021-01-20 RX ORDER — DEXTROSE 50 % IN WATER 50 %
15 SYRINGE (ML) INTRAVENOUS ONCE
Refills: 0 | Status: DISCONTINUED | OUTPATIENT
Start: 2021-01-20 | End: 2021-01-28

## 2021-01-20 RX ORDER — DEXTROSE 50 % IN WATER 50 %
25 SYRINGE (ML) INTRAVENOUS ONCE
Refills: 0 | Status: DISCONTINUED | OUTPATIENT
Start: 2021-01-20 | End: 2021-01-28

## 2021-01-20 RX ORDER — INSULIN LISPRO 100/ML
5 VIAL (ML) SUBCUTANEOUS ONCE
Refills: 0 | Status: COMPLETED | OUTPATIENT
Start: 2021-01-20 | End: 2021-01-20

## 2021-01-20 RX ORDER — SODIUM CHLORIDE 9 MG/ML
3 INJECTION INTRAMUSCULAR; INTRAVENOUS; SUBCUTANEOUS THREE TIMES A DAY
Refills: 0 | Status: DISCONTINUED | OUTPATIENT
Start: 2021-01-20 | End: 2021-01-22

## 2021-01-20 RX ORDER — GLUCAGON INJECTION, SOLUTION 0.5 MG/.1ML
1 INJECTION, SOLUTION SUBCUTANEOUS ONCE
Refills: 0 | Status: DISCONTINUED | OUTPATIENT
Start: 2021-01-20 | End: 2021-01-28

## 2021-01-20 RX ORDER — INSULIN GLARGINE 100 [IU]/ML
15 INJECTION, SOLUTION SUBCUTANEOUS AT BEDTIME
Refills: 0 | Status: DISCONTINUED | OUTPATIENT
Start: 2021-01-20 | End: 2021-01-21

## 2021-01-20 RX ORDER — NYSTATIN CREAM 100000 [USP'U]/G
1 CREAM TOPICAL
Refills: 0 | Status: DISCONTINUED | OUTPATIENT
Start: 2021-01-20 | End: 2021-01-28

## 2021-01-20 RX ORDER — SODIUM CHLORIDE 9 MG/ML
1000 INJECTION, SOLUTION INTRAVENOUS
Refills: 0 | Status: DISCONTINUED | OUTPATIENT
Start: 2021-01-20 | End: 2021-01-28

## 2021-01-20 RX ORDER — DEXTROSE 50 % IN WATER 50 %
12.5 SYRINGE (ML) INTRAVENOUS ONCE
Refills: 0 | Status: DISCONTINUED | OUTPATIENT
Start: 2021-01-20 | End: 2021-01-28

## 2021-01-20 RX ORDER — INSULIN LISPRO 100/ML
5 VIAL (ML) SUBCUTANEOUS
Refills: 0 | Status: DISCONTINUED | OUTPATIENT
Start: 2021-01-20 | End: 2021-01-21

## 2021-01-20 RX ORDER — ACYCLOVIR SODIUM 500 MG
700 VIAL (EA) INTRAVENOUS EVERY 8 HOURS
Refills: 0 | Status: DISCONTINUED | OUTPATIENT
Start: 2021-01-20 | End: 2021-01-21

## 2021-01-20 RX ORDER — MAGNESIUM SULFATE 500 MG/ML
2 VIAL (ML) INJECTION ONCE
Refills: 0 | Status: COMPLETED | OUTPATIENT
Start: 2021-01-20 | End: 2021-01-20

## 2021-01-20 RX ORDER — SODIUM CHLORIDE 9 MG/ML
2 INJECTION INTRAMUSCULAR; INTRAVENOUS; SUBCUTANEOUS
Refills: 0 | Status: DISCONTINUED | OUTPATIENT
Start: 2021-01-20 | End: 2021-01-20

## 2021-01-20 RX ADMIN — Medication 114 MILLIGRAM(S): at 22:19

## 2021-01-20 RX ADMIN — ENOXAPARIN SODIUM 40 MILLIGRAM(S): 100 INJECTION SUBCUTANEOUS at 22:19

## 2021-01-20 RX ADMIN — NYSTATIN CREAM 1 APPLICATION(S): 100000 CREAM TOPICAL at 17:21

## 2021-01-20 RX ADMIN — SODIUM CHLORIDE 2 GRAM(S): 9 INJECTION INTRAMUSCULAR; INTRAVENOUS; SUBCUTANEOUS at 17:21

## 2021-01-20 RX ADMIN — ATORVASTATIN CALCIUM 40 MILLIGRAM(S): 80 TABLET, FILM COATED ORAL at 11:19

## 2021-01-20 RX ADMIN — CYCLOBENZAPRINE HYDROCHLORIDE 5 MILLIGRAM(S): 10 TABLET, FILM COATED ORAL at 22:19

## 2021-01-20 RX ADMIN — CYCLOBENZAPRINE HYDROCHLORIDE 5 MILLIGRAM(S): 10 TABLET, FILM COATED ORAL at 12:29

## 2021-01-20 RX ADMIN — Medication 81 MILLIGRAM(S): at 11:19

## 2021-01-20 RX ADMIN — Medication 5 UNIT(S): at 18:19

## 2021-01-20 RX ADMIN — INSULIN GLARGINE 15 UNIT(S): 100 INJECTION, SOLUTION SUBCUTANEOUS at 22:19

## 2021-01-20 RX ADMIN — Medication 5 UNIT(S): at 11:11

## 2021-01-20 RX ADMIN — CYCLOBENZAPRINE HYDROCHLORIDE 5 MILLIGRAM(S): 10 TABLET, FILM COATED ORAL at 06:27

## 2021-01-20 RX ADMIN — Medication 50 GRAM(S): at 22:19

## 2021-01-20 RX ADMIN — SODIUM CHLORIDE 50 MILLILITER(S): 9 INJECTION, SOLUTION INTRAVENOUS at 11:19

## 2021-01-20 RX ADMIN — Medication 265 MILLIGRAM(S): at 14:18

## 2021-01-20 RX ADMIN — Medication 50 MILLIGRAM(S): at 17:22

## 2021-01-20 RX ADMIN — Medication 114 MILLIGRAM(S): at 15:48

## 2021-01-20 RX ADMIN — ISOSORBIDE MONONITRATE 60 MILLIGRAM(S): 60 TABLET, EXTENDED RELEASE ORAL at 11:19

## 2021-01-20 RX ADMIN — Medication 20 MILLIGRAM(S): at 06:27

## 2021-01-20 RX ADMIN — Medication 50 MILLIGRAM(S): at 06:27

## 2021-01-20 RX ADMIN — Medication 265 MILLIGRAM(S): at 06:27

## 2021-01-20 RX ADMIN — Medication 25 MILLIGRAM(S): at 06:27

## 2021-01-20 RX ADMIN — Medication 5 UNIT(S): at 03:49

## 2021-01-20 NOTE — PROGRESS NOTE ADULT - SUBJECTIVE AND OBJECTIVE BOX
NEPHROLOGY CONSULTATION NOTE    85 yr F with CAD s/p PCI 10 yr ago (2 stents), HTN, DM, abd wall hernia, hx of colon cancer s/p resection, recent discharge from [facility] due to shingle was brought due to confusion.  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 [employer]    the pt was discharged from [facility] on Friday, she was diagnosed with shingle and discharged on Valacyclovir 1gram qd for 7 days, on Saturday morning the pt  felt on the ground from the sofa while she was rtying to adjust her postilion, she was seen by her daughter and she didnt have LOC, syncope or seizure and remained stable, but during the day she  had fever, last night she didnt sleep well, and when the daughter checked her she was confused, pt as baseline was with normal mental status a/o x4, but the daughter found her confused,  she was making non comprehensive talk, and speaking slowly, didnt recognize her daughter, didnt know her name or where she was,   so the daughter brought her to ER.  there is no cough, SOB, headache, mneck stoffness, legs swelling, N/V, diarrhea, urinary symptoms,     in ER; labs showed covid +ve, UA wnl, CTH possible infarct in the left cerebellum, subacute in appearance, Na 128, admitted for evaluation of AMS      PAST MEDICAL & SURGICAL HISTORY:    Allergies:  No Known Allergies    Home Medications Reviewed    SOCIAL HISTORY:  Denies ETOH,Smoking,   FAMILY HISTORY:        REVIEW OF SYSTEMS:  All other review of systems is negative unless indicated above.    PHYSICAL EXAM:  Constitutional: NAD  HEENT: anicteric sclera, oropharynx clear, MMM  Neck: No JVD  Respiratory: CTAB, no wheezes, rales or rhonchi  Cardiovascular: S1, S2, RRR  Gastrointestinal: BS+, soft, NT/ND  Extremities: No cyanosis or clubbing. No peripheral edema  Neurological: A/O x 3, no focal deficits  Psychiatric: Normal mood, normal affect  : No CVA tenderness. No masterson.   Skin: No rashes    Hospital Medications:   MEDICATIONS  (STANDING):  acyclovir IVPB 700 milliGRAM(s) IV Intermittent every 8 hours  aspirin enteric coated 81 milliGRAM(s) Oral daily  atorvastatin Oral Tab/Cap - Peds 40 milliGRAM(s) Oral daily  cyclobenzaprine Oral Tab/Cap - Peds 5 milliGRAM(s) Oral three times a day  dextrose 40% Gel 15 Gram(s) Oral once  dextrose 5%. 1000 milliLiter(s) (50 mL/Hr) IV Continuous <Continuous>  dextrose 5%. 1000 milliLiter(s) (100 mL/Hr) IV Continuous <Continuous>  dextrose 50% Injectable 25 Gram(s) IV Push once  dextrose 50% Injectable 12.5 Gram(s) IV Push once  dextrose 50% Injectable 25 Gram(s) IV Push once  enalapril 20 milliGRAM(s) Oral daily  enoxaparin Injectable 40 milliGRAM(s) SubCutaneous at bedtime  glucagon  Injectable 1 milliGRAM(s) IntraMuscular once  insulin glargine Injectable (LANTUS) 15 Unit(s) SubCutaneous at bedtime  insulin lispro Injectable (ADMELOG) 5 Unit(s) SubCutaneous three times a day before meals  isosorbide   mononitrate ER Tablet (IMDUR) 60 milliGRAM(s) Oral daily  lactated ringers. 1000 milliLiter(s) (50 mL/Hr) IV Continuous <Continuous>  metoprolol succinate ER 25 milliGRAM(s) Oral daily  nystatin Cream 1 Application(s) Topical two times a day  pregabalin 50 milliGRAM(s) Oral two times a day  sodium chloride 2 Gram(s) Oral two times a day        VITALS:  T(F): 99.3 (21 @ 16:49), Max: 100.3 (21 @ 13:15)  HR: 75 (21 @ 16:49)  BP: 129/62 (21 @ 16:49)  RR: 19 (21 @ 16:49)  SpO2: 96% (21 @ 16:49)  Wt(kg): --     @ 07:01  -   @ 07:00  --------------------------------------------------------  IN: 375 mL / OUT: 0 mL / NET: 375 mL          LABS:      128<L>  |  96<L>  |  12  ----------------------------<  363<H>  5.1<H>   |  15<L>  |  1.1    Ca    8.8      2021 16:00  Mg     2.6         TPro  5.9<L>  /  Alb  3.3<L>  /  TBili  0.5  /  DBili      /  AST  25  /  ALT  26  /  AlkPhos  54                            9.2    3.85  )-----------( 167      ( 2021 04:30 )             26.9       Urine Studies:  Urinalysis Basic - ( 2021 06:00 )    Color: Yellow / Appearance: Clear / S.013 / pH:   Gluc:  / Ketone: Negative  / Bili: Negative / Urobili: <2 mg/dL   Blood:  / Protein: 30 mg/dL / Nitrite: Negative   Leuk Esterase: Negative / RBC: 2 /HPF / WBC 1 /HPF   Sq Epi:  / Non Sq Epi: 1 /HPF / Bacteria: Negative      Osmolality, Random Urine: 335 mos/kg ( @ 06:00)  Sodium, Random Urine: 45.0 mmoL/L ( @ 06:00)  Protein/Creatinine Ratio Calculation: 0.5 Ratio ( @ 06:00)  Creatinine, Random Urine: 66 mg/dL ( @ 06:00)      RADIOLOGY & ADDITIONAL STUDIES:   NEPHROLOGY CONSULTATION NOTE    85 yr F with CAD s/p PCI 10 yr ago (2 stents), HTN, DM, abd wall hernia, hx of colon cancer s/p resection, recent discharge from [facility] due to shingle was brought due to confusion.  Hx taken from, the jyoti at bedside, Mahogany, she is [employer] tech in [employer]    the pt was discharged from [facility] on Friday, she was diagnosed with shingle and discharged on Valacyclovir 1gram qd for 7 days, on Saturday morning the pt  felt on the ground from the sofa while she was rtying to adjust her postilion, she was seen by her daughter and she didnt have LOC, syncope or seizure and remained stable, but during the day she  had fever, last night she didnt sleep well, and when the daughter checked her she was confused, pt as baseline was with normal mental status a/o x4, but the daughter found her confused,  she was making non comprehensive talk, and speaking slowly, didnt recognize her daughter, didnt know her name or where she was,   so the daughter brought her to ER.  there is no cough, SOB, headache, mneck stoffness, legs swelling, N/V, diarrhea, urinary symptoms,     in ER; labs showed covid +ve, UA wnl, CTH possible infarct in the left cerebellum, subacute in appearance, Na 128, admitted for evaluation of AMS      PAST MEDICAL & SURGICAL HISTORY:    Allergies:  No Known Allergies    Home Medications Reviewed    SOCIAL HISTORY:  Denies ETOH,Smoking,   FAMILY HISTORY:        REVIEW OF SYSTEMS:  All other review of systems is negative unless indicated above.    PHYSICAL EXAM:  Constitutional: NAD  HEENT: anicteric sclera, oropharynx clear, MMM  Neck: No JVD  Respiratory: CTAB, no wheezes, rales or rhonchi  Cardiovascular: S1, S2, RRR  Gastrointestinal: BS+, soft, NT/ND  Extremities: No cyanosis or clubbing. No peripheral edema  Neurological: A/O x 3, no focal deficits  Psychiatric: Normal mood, normal affect  : No CVA tenderness. No masterson.   Skin: No rashes    Hospital Medications:   MEDICATIONS  (STANDING):  acyclovir IVPB 700 milliGRAM(s) IV Intermittent every 8 hours  aspirin enteric coated 81 milliGRAM(s) Oral daily  atorvastatin Oral Tab/Cap - Peds 40 milliGRAM(s) Oral daily  cyclobenzaprine Oral Tab/Cap - Peds 5 milliGRAM(s) Oral three times a day  dextrose 40% Gel 15 Gram(s) Oral once  dextrose 5%. 1000 milliLiter(s) (50 mL/Hr) IV Continuous <Continuous>  dextrose 5%. 1000 milliLiter(s) (100 mL/Hr) IV Continuous <Continuous>  dextrose 50% Injectable 25 Gram(s) IV Push once  dextrose 50% Injectable 12.5 Gram(s) IV Push once  dextrose 50% Injectable 25 Gram(s) IV Push once  enoxaparin Injectable 40 milliGRAM(s) SubCutaneous at bedtime  glucagon  Injectable 1 milliGRAM(s) IntraMuscular once  insulin glargine Injectable (LANTUS) 15 Unit(s) SubCutaneous at bedtime  insulin lispro Injectable (ADMELOG) 5 Unit(s) SubCutaneous three times a day before meals  isosorbide   mononitrate ER Tablet (IMDUR) 60 milliGRAM(s) Oral daily  metoprolol succinate ER 25 milliGRAM(s) Oral daily  nystatin Cream 1 Application(s) Topical two times a day  pregabalin 50 milliGRAM(s) Oral two times a day  sodium chloride 3 Gram(s) Oral three times a day        VITALS:  T(F): 99.3 (21 @ 16:49), Max: 100.3 (21 @ 13:15)  HR: 75 (21 @ 16:49)  BP: 129/62 (21 @ 16:49)  RR: 19 (21 @ 16:49)  SpO2: 96% (21 @ 16:49)  Wt(kg): --     @ 07:01  -   @ 07:00  --------------------------------------------------------  IN: 375 mL / OUT: 0 mL / NET: 375 mL          LABS:      126<L>  |  94<L>  |  11  ----------------------------<  233<H>  3.9   |  23  |  0.8    Ca    8.0<L>      2021 19:00  Mg     1.6         TPro  5.0<L>  /  Alb  3.1<L>  /  TBili  0.3  /  DBili      /  AST  29  /  ALT  24  /  AlkPhos  55                            9.5    3.13  )-----------( 157      ( 2021 19:00 )             27.8       Urine Studies:  Urinalysis Basic - ( 2021 06:00 )    Color: Yellow / Appearance: Clear / S.013 / pH:   Gluc:  / Ketone: Negative  / Bili: Negative / Urobili: <2 mg/dL   Blood:  / Protein: 30 mg/dL / Nitrite: Negative   Leuk Esterase: Negative / RBC: 2 /HPF / WBC 1 /HPF   Sq Epi:  / Non Sq Epi: 1 /HPF / Bacteria: Negative      Osmolality, Random Urine: 335 mos/kg ( @ 06:00)  Sodium, Random Urine: 45.0 mmoL/L ( @ 06:00)  Protein/Creatinine Ratio Calculation: 0.5 Ratio ( @ 06:00)  Creatinine, Random Urine: 66 mg/dL ( @ 06:00)      RADIOLOGY & ADDITIONAL STUDIES:

## 2021-01-20 NOTE — PROGRESS NOTE ADULT - SUBJECTIVE AND OBJECTIVE BOX
Progress Note  This morning patient was seen and examined at bedside.    Today is hospital day 2d.  Ms. Galicia is doing fine.   She seems comfortable on room air. Her appetite is adequate (tolerating regular diet), and she denies nausea or vomiting. She denies any abdominal pain, diarrhea, or constipation. She is not ambulating and denies any shortness of breath, chest pain, palpitations, or light headedness. She is voiding freely and denies urinary symptoms (dysuria, urgency, frequency, intermittence).      Vital Signs in the last 24 hours   Vitals Summary T(C): 37.3 (21 @ 08:00), Max: 37.3 (21 @ 08:00)  HR: 72 (21 @ 08:00) (72 - 86)  BP: 137/63 (21 @ 08:00) (135/62 - 162/73)  RR: 19 (21 @ 08:00) (18 - 19)  SpO2: 97% (21 @ 08:00) (94% - 97%)  Vent Data   Intake/ Output   21 @ 07:01  -  21 @ 07:00  --------------------------------------------------------  IN: 375 mL / OUT: 0 mL / NET: 375 mL      Physical Exam  * General Appearance: Alert, not cooperative, not interactive, well-groomed, couldn't assess whether oriented to time, place, and person, in no acute distress  * Back: Symmetric, no curvature, ROM normal, no CVA tenderness  * Lungs: Respirations unlabored, Good bilateral air entry, normal breath sounds (Clear to auscultation bilaterally, no audible wheezes, crackles, or rhonchi)  * Heart: Regular Rate and Rhythm, normal S1 and S2, no audible murmur, rub, or gallop  * Abdomen: Symmetric, non-distended, no scar, soft, non-tender, bowel sounds active all four quadrants, no masses, no organomegaly (no hepatosplenomegaly)  * Extremities: Extremities normal, atraumatic, no cyanosis, no lower extremity pitting edema bilaterally, adequate dorsalis pedis pulses  * Pulses: 2+ and symmetric all extremities  * Skin: Skin color, texture, turgor normal, no rashes or lesions  * Lymph nodes: Cervical, supraclavicular, and axillary nodes normal      Investigations   Laboratory Workup  - CBC:                        9.2    3.85  )-----------( 167      ( 2021 04:30 )             26.9     - Chemistry:      128<L>  |  96<L>  |  12  ----------------------------<  363<H>  5.1<H>   |  15<L>  |  1.1    Ca    8.8      2021 16:00  Mg     2.6         TPro  5.9<L>  /  Alb  3.3<L>  /  TBili  0.5  /  DBili  x   /  AST  25  /  ALT  26  /  AlkPhos  54      - Coagulation Studies:    - ABG:    - Cardiac Markers:  CARDIAC MARKERS ( 2021 04:30 )  x     / <0.01 ng/mL / x     / x     / x      CARDIAC MARKERS ( 2021 16:00 )  x     / <0.01 ng/mL / x     / x     / x          Microbiological Workup  Urinalysis Basic - ( 2021 06:00 )    Color: Yellow / Appearance: Clear / S.013 / pH: x  Gluc: x / Ketone: Negative  / Bili: Negative / Urobili: <2 mg/dL   Blood: x / Protein: 30 mg/dL / Nitrite: Negative   Leuk Esterase: Negative / RBC: 2 /HPF / WBC 1 /HPF   Sq Epi: x / Non Sq Epi: 1 /HPF / Bacteria: Negative        Culture - Blood (collected 2021 07:00)  Source: .Blood Blood-Peripheral  Preliminary Report (2021 13:02):    No growth to date.    Culture - Blood (collected 2021 07:00)  Source: .Blood Blood-Peripheral  Preliminary Report (2021 13:02):    No growth to date.      Current Medications  Standing Medications  acyclovir IVPB 750 milliGRAM(s) IV Intermittent once  acyclovir IVPB 700 milliGRAM(s) IV Intermittent every 8 hours  aspirin enteric coated 81 milliGRAM(s) Oral daily  atorvastatin Oral Tab/Cap - Peds 40 milliGRAM(s) Oral daily  cyclobenzaprine Oral Tab/Cap - Peds 5 milliGRAM(s) Oral three times a day  dextrose 40% Gel 15 Gram(s) Oral once  dextrose 5%. 1000 milliLiter(s) (50 mL/Hr) IV Continuous <Continuous>  dextrose 5%. 1000 milliLiter(s) (100 mL/Hr) IV Continuous <Continuous>  dextrose 50% Injectable 25 Gram(s) IV Push once  dextrose 50% Injectable 12.5 Gram(s) IV Push once  dextrose 50% Injectable 25 Gram(s) IV Push once  enalapril 20 milliGRAM(s) Oral daily  enoxaparin Injectable 40 milliGRAM(s) SubCutaneous at bedtime  glucagon  Injectable 1 milliGRAM(s) IntraMuscular once  insulin glargine Injectable (LANTUS) 15 Unit(s) SubCutaneous at bedtime  insulin lispro Injectable (ADMELOG) 5 Unit(s) SubCutaneous three times a day before meals  isosorbide   mononitrate ER Tablet (IMDUR) 60 milliGRAM(s) Oral daily  lactated ringers. 1000 milliLiter(s) (50 mL/Hr) IV Continuous <Continuous>  metoprolol succinate ER 25 milliGRAM(s) Oral daily  pregabalin 50 milliGRAM(s) Oral two times a day    PRN Medications  acetaminophen   Tablet .. 650 milliGRAM(s) Oral every 6 hours PRN Temp greater or equal to 38C (100.4F)    Singles Doses Administered  (Floorstock) 2 each &lt;see task&gt; GiveOnce  (Floorstock) 3 each &lt;see task&gt; GiveOnce  acetaminophen   Tablet .. 975 milliGRAM(s) Oral once  cefepime   IVPB 2000 milliGRAM(s) IV Intermittent once  insulin lispro Injectable (ADMELOG). 5 Unit(s) SubCutaneous once  magnesium sulfate  IVPB 2 Gram(s) IV Intermittent every 2 hours  vancomycin  IVPB 1000 milliGRAM(s) IV Intermittent once

## 2021-01-20 NOTE — PROGRESS NOTE ADULT - ASSESSMENT
Assessment and Plan  Case of an 85 year old female patient who was brought on 01/18 following an episode of fall on Saturday that was followed by fever and confusion, found to have subacute left cerebellar stroke, hyponatremia, and COVID pneumonia, admitted for investigations, management, and monitoring. Currently hemodynamically stable.      Altered Mental Status  Could be related to HSV/VZV/COVID encephalitis VS Hyponatremia VS Subacute L Cerebellar Stroke  For possible  HSV/VZV Encephalitis  * Recent history of Shingles along T4 on left side (01/09)  * S/P Discharge from [facility] on Valacyclovir 1g QD for 7d  - ID on board  - IR on board (spectra [phone]) and planned for LP on 01/20 to rule out VZV/HSV encephalitis meningitis in setting of recent history of shingles and new onset confusion  - Follow up HSV 1/2 PCR and VZV PCR from CSF studies once collected today by IR  - Continue IV Acyclovir 750mg Q8h (01/18)      For possible  COVID Encephalitis  * SARS-COV2: positive 01/18  * Chest X Ray (01/18) clear  * CT chest IC (01/18) no consolidation or effusion  * Markers as below    - Infectious Disease on board  - Monitor for fever: afebrile in last 24 hours  - Trend WBC: 5.09 on 01/18 -> 3.85 01/19  - Monitor SaO2 and Oxygen Requirements: RA S94% 01/20  - Follow up Chest X Ray on 01/20. Last Chest X Ray on (01/19) clear  - Trend Inflammatory Markers:  --> D-dimer 394 on 01/18   --> C-reactive protein 1.72 on 01/18  --> Ferritin 85 on 01/18  - COVID therapy:  --> No convalescent plasma, IV Tocilizumab, IV Remdesevir, or IV Dexamethasone administered yet  - Anticoagulation Lovenox 40mg QD      For Hyponatremia: Possibly SIADH  * Na 128 01/18  - Monitor Na  - Continue IVF with LR at 50 ml/hour (01/20)  - FU Sosm 275, HENNA 45, and Upr/cr (01/19)  - FU TSH (01/20 11 AM)  - FU Serum Cortisol (01/21 AM)        Possible Subacute L Cerebellar Stroke on CT was ruled out on MRI  * CT H wo contrast (01/18) subacute left cerebellar stroke, chronic small vessel ischemic disease  * MR H without contrast on 01/18: no stroke   * Home aspirin 81mg QD, Lipitor 40mg QD  - Continue Aspirin 81mg QD  - Continue Atorvastatin 40mg PO QD. Last lipid profile (01/18): chol 86, TG 96, HDL 32, LDL 44        Fall  * Could be 2ry to COVID/ stroke/ 1st degree AV block  * Trauma Workup negative on 01/18: CT C-cpine, XR pelvis, CT CAP IC (01/18): no fractures or dislocations    - Continue DVT prophylaxis lovenox 40mg QD  - Ruled out Left cerebellar stroke on MR H without contrast performed on 01/18  - Ruled out 1st degree AV block evident on ECG (01/18) in ED: monitor HR  - No signs of seizure      HTN  * Home: enalapril 20mg QD, Toprol 25mg QD, Lasix 20mg QD  - Monitor BP: 01/19 138/56, 146/79 mmHg-> 155/69 mmHg 01/20  - Continue Enalapril 20mg QD and Toprol 25mg QD      DM II  * Last Hba1c (01/18) 7.2  * Home metformin 850mg BID, Januvia 100mg QD  * For Diabetic neuropathy: home Lyrica 50mg BID  - Monitor POCT premeals and at bedtime: 01/18 142 -> 01/19 204, 363 -> 01/20 268  - Started on Lantus 15u bedtime, Lispro 5u TID, and Apidra Scale B on 01/20.  - Continue Lyrica 50mg BID for diabetic neuropathy      CAD   * S/P PCI and 2 stents 2011  * Home Aspirin 81mg QD, Toprol, Lipitor 40mg QD, Imdur  * Troponin <0.01 (01/18)   * CK (01/18) 36    - Continue Aspirin 81mg QD  - Continue Atorvastatin 40mg QD. 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 PT 01/19  - GI Prophylaxis: no indication for Pantoprazole 40mg PO QD  - Diet: speech and swallow 01/19: regular diet  - Code Status: Full Code      Barriers to learning: NO  Discharge Planning: Patient will be discharged once stable   Plan was communicated with medical team      Malachi Kwan MD  PGY - 1 Internal Medicine   [facility] Assessment and Plan  Case of an 85 year old female patient who was brought on 01/18 following an episode of fall on Saturday that was followed by fever and confusion, found to have subacute left cerebellar stroke, hyponatremia, and COVID pneumonia, admitted for investigations, management, and monitoring. Currently hemodynamically stable.      Altered Mental Status  Could be related to HSV/VZV/COVID encephalitis VS Hyponatremia VS Subacute L Cerebellar Stroke  For possible  HSV/VZV Encephalitis  * Recent history of Shingles along T4 on left side (01/09)  * S/P Discharge from [facility] on Valacyclovir 1g QD for 7d  - ID on board  - IR on board (spectra [phone]) and planned for LP on 01/20 to rule out VZV/HSV encephalitis meningitis in setting of recent history of shingles and new onset confusion  - Follow up HSV 1/2 PCR and VZV PCR from CSF studies once collected today by IR  - Continue IV Acyclovir: switched from 750mg  (01/18) to 700 mg Q8h (01/20)      For possible  COVID Encephalitis  * SARS-COV2: positive 01/18  * Chest X Ray (01/18) clear  * CT chest IC (01/18) no consolidation or effusion  * Markers as below    - Infectious Disease on board  - Monitor for fever: afebrile in last 24 hours  - Trend WBC: 5.09 on 01/18 -> 3.85 01/19  - Monitor SaO2 and Oxygen Requirements: RA S94% 01/20  - Follow up Chest X Ray on 01/20. Last Chest X Ray on (01/19) clear  - Trend Inflammatory Markers:  --> D-dimer 394 on 01/18   --> C-reactive protein 1.72 on 01/18  --> Ferritin 85 on 01/18  - COVID therapy:  --> No convalescent plasma, IV Tocilizumab, IV Remdesevir, or IV Dexamethasone administered yet  - Anticoagulation Lovenox 40mg QD      For Hyponatremia: Possibly SIADH  * Na 128 01/18  - Monitor Na  - Continue IVF with LR at 50 ml/hour (01/20)  - FU Sosm 275, HENNA 45, and Upr/cr (01/19)  - FU TSH (01/20 11 AM)  - FU Serum Cortisol (01/21 AM)        Possible Subacute L Cerebellar Stroke on CT was ruled out on MRI  * CT H wo contrast (01/18) subacute left cerebellar stroke, chronic small vessel ischemic disease  * MR H without contrast on 01/18: no stroke   * Home aspirin 81mg QD, Lipitor 40mg QD  - Continue Aspirin 81mg QD  - Continue Atorvastatin 40mg PO QD. Last lipid profile (01/18): chol 86, TG 96, HDL 32, LDL 44        Fall  * Could be 2ry to COVID/ stroke/ 1st degree AV block  * Trauma Workup negative on 01/18: CT C-cpine, XR pelvis, CT CAP IC (01/18): no fractures or dislocations    - Continue DVT prophylaxis lovenox 40mg QD  - Ruled out Left cerebellar stroke on MR H without contrast performed on 01/18  - Ruled out 1st degree AV block evident on ECG (01/18) in ED: monitor HR  - No signs of seizure      HTN  * Home: enalapril 20mg QD, Toprol 25mg QD, Lasix 20mg QD  - Monitor BP: 01/19 138/56, 146/79 mmHg-> 155/69 mmHg 01/20  - Continue Enalapril 20mg QD and Toprol 25mg QD      DM II  * Last Hba1c (01/18) 7.2  * Home metformin 850mg BID, Januvia 100mg QD  * For Diabetic neuropathy: home Lyrica 50mg BID  - Monitor POCT premeals and at bedtime: 01/18 142 -> 01/19 204, 363 -> 01/20 268  - Started on Lantus 15u bedtime, Lispro 5u TID, and Apidra Scale B on 01/20.  - Continue Lyrica 50mg BID for diabetic neuropathy      CAD   * S/P PCI and 2 stents 2011  * Home Aspirin 81mg QD, Toprol, Lipitor 40mg QD, Imdur  * Troponin <0.01 (01/18)   * CK (01/18) 36    - Continue Aspirin 81mg QD  - Continue Atorvastatin 40mg QD. 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 PT 01/19  - GI Prophylaxis: no indication for Pantoprazole 40mg PO QD  - Diet: speech and swallow 01/19: regular diet  - Code Status: Full Code      Barriers to learning: NO  Discharge Planning: Patient will be discharged once stable   Plan was communicated with medical team      Malachi Kwan MD  PGY - 1 Internal Medicine   [facility]

## 2021-01-20 NOTE — PROGRESS NOTE ADULT - ASSESSMENT
· Assessment	   85 year old female patient who was brought on 01/18 following an episode of fall on Saturday that was followed by fever and confusion, found to have subacute left cerebellar stroke, hyponatremia, and COVID pneumonia, admitted for investigations, management, and monitoring. Currently hemodynamically stable.    IMPRESSION;  Left cerebellar subacute infarct possible related to recent VZV and her underlying chronic small vessel disease.  COVID-19 but timeline unclear  No significant elevation of the inflammatory markers  COVID-19 antibodies NG  procalcitonin   Ferritin 85  CRP 1.72  Ddimers 384      RECOMMENDATIONS;  Diagnostic LP ( CSF for VZV PCR, HSV1/2 PCR )  Doubt Acyclovir of any significant benefit at this stage , but will recommend continuing for now  Acyclovir 700 mg iv q8h  No steroids/RDV/plasma as not hypoxic

## 2021-01-20 NOTE — PROGRESS NOTE ADULT - SUBJECTIVE AND OBJECTIVE BOX
NIKKY FRASER  85y, Female    All available historical data reviewed    OVERNIGHT EVENTS:  no fevers  more alert    ROS:  General: Denies rigors, nightsweats  HEENT: Denies headache, rhinorrhea, sore throat, eye pain  CV: Denies CP, palpitations  PULM: Denies wheezing, hemoptysis  GI: Denies hematemesis, hematochezia, melena  : Denies discharge, hematuria  MSK: Denies arthralgias, myalgias  SKIN: Denies rash, lesions  NEURO: Denies paresthesias, weakness  PSYCH: Denies depression, anxiety    VITALS:  T(F): 99.1, Max: 99.1 (21 @ 08:00)  HR: 72  BP: 137/63  RR: 19Vital Signs Last 24 Hrs  T(C): 37.3 (2021 08:00), Max: 37.3 (2021 08:00)  T(F): 99.1 (2021 08:00), Max: 99.1 (2021 08:00)  HR: 72 (2021 08:00) (72 - 86)  BP: 137/63 (2021 08:00) (135/62 - 162/73)  BP(mean): --  RR: 19 (2021 08:00) (18 - 19)  SpO2: 97% (2021 08:00) (94% - 97%)    TESTS & MEASUREMENTS:                        9.2    3.85  )-----------( 167      ( 2021 04:30 )             26.9         128<L>  |  96<L>  |  12  ----------------------------<  363<H>  5.1<H>   |  15<L>  |  1.1    Ca    8.8      2021 16:00  Mg     2.6         TPro  5.9<L>  /  Alb  3.3<L>  /  TBili  0.5  /  DBili  x   /  AST  25  /  ALT  26  /  AlkPhos  54      LIVER FUNCTIONS - ( 2021 04:30 )  Alb: 3.3 g/dL / Pro: 5.9 g/dL / ALK PHOS: 54 U/L / ALT: 26 U/L / AST: 25 U/L / GGT: x             Culture - Blood (collected 21 @ 07:00)  Source: .Blood Blood-Peripheral  Preliminary Report (21 @ 13:02):    No growth to date.    Culture - Blood (collected 21 @ 07:00)  Source: .Blood Blood-Peripheral  Preliminary Report (21 @ 13:02):    No growth to date.      Urinalysis Basic - ( 2021 06:00 )    Color: Yellow / Appearance: Clear / S.013 / pH: x  Gluc: x / Ketone: Negative  / Bili: Negative / Urobili: <2 mg/dL   Blood: x / Protein: 30 mg/dL / Nitrite: Negative   Leuk Esterase: Negative / RBC: 2 /HPF / WBC 1 /HPF   Sq Epi: x / Non Sq Epi: 1 /HPF / Bacteria: Negative          RADIOLOGY & ADDITIONAL TESTS:  Personal review of radiological diagnostics performed  Echo and EKG results noted when applicable.     MEDICATIONS:  acetaminophen   Tablet .. 650 milliGRAM(s) Oral every 6 hours PRN  acyclovir IVPB      acyclovir IVPB 750 milliGRAM(s) IV Intermittent once  acyclovir IVPB 750 milliGRAM(s) IV Intermittent every 8 hours  aspirin enteric coated 81 milliGRAM(s) Oral daily  atorvastatin Oral Tab/Cap - Peds 40 milliGRAM(s) Oral daily  cyclobenzaprine Oral Tab/Cap - Peds 5 milliGRAM(s) Oral three times a day  dextrose 40% Gel 15 Gram(s) Oral once  dextrose 5%. 1000 milliLiter(s) IV Continuous <Continuous>  dextrose 5%. 1000 milliLiter(s) IV Continuous <Continuous>  dextrose 50% Injectable 25 Gram(s) IV Push once  dextrose 50% Injectable 12.5 Gram(s) IV Push once  dextrose 50% Injectable 25 Gram(s) IV Push once  enalapril 20 milliGRAM(s) Oral daily  enoxaparin Injectable 40 milliGRAM(s) SubCutaneous at bedtime  glucagon  Injectable 1 milliGRAM(s) IntraMuscular once  insulin glargine Injectable (LANTUS) 15 Unit(s) SubCutaneous at bedtime  insulin lispro Injectable (ADMELOG) 5 Unit(s) SubCutaneous three times a day before meals  isosorbide   mononitrate ER Tablet (IMDUR) 60 milliGRAM(s) Oral daily  lactated ringers. 1000 milliLiter(s) IV Continuous <Continuous>  metoprolol succinate ER 25 milliGRAM(s) Oral daily  pregabalin 50 milliGRAM(s) Oral two times a day      ANTIBIOTICS:  acyclovir IVPB      acyclovir IVPB 750 milliGRAM(s) IV Intermittent once  acyclovir IVPB 750 milliGRAM(s) IV Intermittent every 8 hours

## 2021-01-20 NOTE — PROGRESS NOTE ADULT - ATTENDING COMMENTS
Patient seen and examined independently. Agree with resident note.  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  # Morbid obesity present.

## 2021-01-20 NOTE — PROGRESS NOTE ADULT - NUTRITIONAL ASSESSMENT
chronic, mild, euvolemic hyponatremia   - on outpatient NaCl tabs  potential nephrotoxicity - s/p iv dye exposure and on iv valcyclovir  shingles  COVID-19  fall   altered mental status  CAD / PCI  HTN  DM2    plan:    daily BMP  cont LR @ 50cc/hr capri with iv acyclovir, but if Na worsens will readjust  glycemic control  cont NaCl 2g po tid  liberal salt intake  cont to hold lasix  cont toprol xl and imdur  hold enalapril until repeat BMP f/u  d/w daughter, resident and hospitalist

## 2021-01-20 NOTE — PROGRESS NOTE ADULT - ASSESSMENT
85 yr F with CAD s/p PCI 10 yr ago (2 stents), HTN, DM, abd wall hernia, hx of colon cancer s/p resection, recent discharge from [facility] due to shingle was brought due to confusion.      #AMS: DDx; infection related (covid VS encephaliatis),metabloic (Hypo Na), doubt stroke  - although no meningeal signs, given recent shingle and sudden change in mental status, empirically treat with IV acyclovir, ID eval, brain MRI  - Hypo Na; last level last week from [facility] 129, so its chronic, maybe SIADH from herpes pain?, send urien and serum osmolarity, NS IV  - doubt stroke, no gross motor or sensory deficit, CT age undetermined infarction of left cerebellum cant explain the AMS  - neurology to f/u     #Covid INFECTION;  - stable on RA  - f/u inflammatory makers    #recently discharged on xaerllto 10mg qd for DVT pro due to hospital admission,  Dc, start Lovenox     #CAD s/p PCI 2 stents 10 yr ago  - c/w ASA, LIPITOR, bb and Imdur  - ECHO FROM [facility] LAST WEEK WNL    #Hx of colon cancer: in remission, s/p resection    #abd wall ulcer and hernia: chronic, f/u outpt with surgery and burn, no signs of infection    #DM:  - hold leif gents, nonitor FS, insulin if > 180    #HTN:  - c/w home emds    #Diet: NPO, except meds, speech/swallow eval  #DVT pro: lovenox  #GI pro: not indicated  #Dispo: from home, acute for now         Attending Statement:  HPI: [place]  [place] [id number], daughter Mahogany at bedside also provided more history.   85 yr F with CAD s/p PCI 10 yr ago (2 stents), HTN, DM, DVT on xarelto, abd wall hernia, hx of colon cancer s/p resection, recent discharge from [facility] due to shingles was brought in due to confusion. She returned home form [facility] on 1/15, discharged on valcyclovir. Her daughter noticed over the weekend she had slid and fallen out of her chair which is abnormal for her. Additionally, there are moments when she would be confused speaking non-sensibly and it was reported that she had a fever over the weekend as well (100.4). On the morning of presentation she found her mother increasingly confused minimally responding and no.  She was found to be COVID + on admission but the patient reports no CP, SOB, palpitations, diarrhea, loss of smell/taste, body aches, fevers, chills, or malaise at this time.   CTH showed possible subacute left cerebellar infarct     REVIEW OF SYSTEMS:  CONSTITUTIONAL:  No weakness, fevers, chills, night sweats, weight loss  EYES/ENT: No visual changes. No vertigo or dysphagia  NECK: No neck pain or stiffness  RESPIRATORY: No cough, wheezing, hemoptysis. No shortness of breath  CARDIOVASCULAR: No chest pain or palpitations. No lower extremity edema  GASTROINTESTINAL: No abdominal pain. No nausea, vomiting, diarrhea, or hematemesis  GENITOURINARY: No dysuria or hematuria   NEUROLOGICAL: No focal numbness or weakness. +confusion   SKIN: No rashes or itching  HEMATOLOGIC: No easy bruising or prolonged bleeding.      PHYSICAL EXAM:  GENERAL: NAD, obese, Non-toxic, elderly, stated age   HEAD:  Atraumatic, Normocephalic  EYES: EOMI, Sclera White   NECK: Supple, No JVD  CHEST/LUNG: Clear to auscultation bilaterally; No wheezing, rhonchi, or crackles  HEART: Regular rate and rhythm; s1, s2, No murmurs, rubs, or gallops  ABDOMEN: Soft, Nontender, Nondistended; Bowel sounds present, No rebound or guarding noted. large protuberant ventral hernia in the LLQ with bandage covering it. also smaller right sided ventral hernia. No evidence of incarceration/strangulation.     EXTREMITIES:  No lower extremity edema or calf tenderness to palpation.  No clubbing or cyanosis  PSYCH: AAOx2 (name, birthday, and location are correct, date was wrong, states "I forgot"), pleasant, cooperative, not anxious  NEUROLOGY: non-focal  SKIN: healing crusted/scabbed rash on the left mid back. Beneath the left breast there are is are open vesicular lesions on an erythematous base.        ASSESSMENT AND PLAN:  Metabolic encephalopathy: COVID vs aseptic/herpetic meningitis. ID consult, continue acyclovir IV for now. may require lumbar tap. Follow up blood cultures. mental status improved but as per daughter not fully returned to baseline. ID consulted. Follow up MRI results.     COVID-19 infection: SIRS not present on admission, though febrile. ID consulted no indication for RDM or Dexamethasone at this time. She is breathing well on RA. May benefit from plasma? Follow up inflammatory markers (Ferritin, LDH, CRP, ESR, D-Dimer) and procalcitonin. Supplemental O2 as needed.    Subacute cerebellar infract: continue with ASA and statin. Neuro following. Follow up MRI results. PT/Rehab eval     Shingles: continue with contact isolation and IV acyclovir. ID consulted.     Chronic Hyponatremia: follow up urine studies, serum and urine osmo. monitor Na closely, on IVF for MAGI ppx which could worsen SIADH.     SARAH: trend Cr. Monitor closely for MAGI, received contrast. Cont with IVF for now. Follow up urine sodium and pro:cr. If not improving will need a nephro consult. Previous GFR was 81 on 1/14/2021    CAD s/p PCI/HTN: cont with home medications    Diabetes: Basal bolus insulin, keep fingerstick glucose <180.     Vental hernia: wound care consult. Patient has [facility] wound care nursing treat at home. Family is not interested in surgical repair.     DVT ppx: Lovenox therapeutic   GI ppx: Not indicated  GOC: Full code for now. Discussed with daughter Mahogany and stated she would like a have a more In depth conversation with her mother and her sister before making any final decisions.   My note supersedes the residents in the event of a discrepancy.     1. s/p fall and confusion in pt recently admitted for herpes zoster at [facility] and now + for COVID  pt was negative for COVID on 1/9/21  diff dx includes medication effect, encephalopathy due to COVID, herpes zoster encephalitits  stroke ruled out on MRI of brain  doubt seizure based on history and not due to 1st degree AV block  pt is now improved and back to baseline per daughter  fall precautions/PT consult  ID consult appreciated - continue IV acyclovir with IV hydration  for now, pt and family want to hold off lumbar puncture  monitor temps and f/u COVID ab  no need for specific COVID therapies at this time - stable on room air  airborne and contact isolation   blood cultures negative  guarded prognosis but pt is improving  full code status for now           chronic euvolemic hyponatremia   - on outpatient NaCl tabs   - probable SIADH or reset osmostat  s/p iv dye exposure and on iv valcyclovir, both with potential nephrotoxicity on LR IVF  low level proteinuria  shingles  COVID-19  fall   altered mental status  CAD / PCI  HTN  DM2    plan:    dc IVF, Na+ worse and bun/cr better  increase NaCl 3g po tid  glycemic control  liberal salt intake  cont to hold lasix  cont toprol xl and imdur  cont enalapril   change to po valtrex if no evidence of herpes encephalitis on LP  d/w daughter, resident and hospitalist

## 2021-01-21 ENCOUNTER — TRANSCRIPTION ENCOUNTER (OUTPATIENT)
Age: 86
End: 2021-01-21

## 2021-01-21 LAB
ALBUMIN SERPL ELPH-MCNC: 3.4 G/DL — LOW (ref 3.5–5.2)
ALP SERPL-CCNC: 57 U/L — SIGNIFICANT CHANGE UP (ref 30–115)
ALT FLD-CCNC: 26 U/L — SIGNIFICANT CHANGE UP (ref 0–41)
ANION GAP SERPL CALC-SCNC: 11 MMOL/L — SIGNIFICANT CHANGE UP (ref 7–14)
AST SERPL-CCNC: 38 U/L — SIGNIFICANT CHANGE UP (ref 0–41)
BASOPHILS # BLD AUTO: 0.01 K/UL — SIGNIFICANT CHANGE UP (ref 0–0.2)
BASOPHILS NFR BLD AUTO: 0.3 % — SIGNIFICANT CHANGE UP (ref 0–1)
BILIRUB SERPL-MCNC: 0.5 MG/DL — SIGNIFICANT CHANGE UP (ref 0.2–1.2)
BUN SERPL-MCNC: 10 MG/DL — SIGNIFICANT CHANGE UP (ref 10–20)
CALCIUM SERPL-MCNC: 8.6 MG/DL — SIGNIFICANT CHANGE UP (ref 8.5–10.1)
CHLORIDE SERPL-SCNC: 98 MMOL/L — SIGNIFICANT CHANGE UP (ref 98–110)
CO2 SERPL-SCNC: 20 MMOL/L — SIGNIFICANT CHANGE UP (ref 17–32)
CREAT SERPL-MCNC: 0.9 MG/DL — SIGNIFICANT CHANGE UP (ref 0.7–1.5)
EOSINOPHIL # BLD AUTO: 0.01 K/UL — SIGNIFICANT CHANGE UP (ref 0–0.7)
EOSINOPHIL NFR BLD AUTO: 0.3 % — SIGNIFICANT CHANGE UP (ref 0–8)
GLUCOSE BLDC GLUCOMTR-MCNC: 193 MG/DL — HIGH (ref 70–99)
GLUCOSE BLDC GLUCOMTR-MCNC: 215 MG/DL — HIGH (ref 70–99)
GLUCOSE BLDC GLUCOMTR-MCNC: 221 MG/DL — HIGH (ref 70–99)
GLUCOSE BLDC GLUCOMTR-MCNC: 224 MG/DL — HIGH (ref 70–99)
GLUCOSE SERPL-MCNC: 268 MG/DL — HIGH (ref 70–99)
HCT VFR BLD CALC: 32.6 % — LOW (ref 37–47)
HGB BLD-MCNC: 10.9 G/DL — LOW (ref 12–16)
IMM GRANULOCYTES NFR BLD AUTO: 1.4 % — HIGH (ref 0.1–0.3)
LABORATORY COMMENT REPORT: SIGNIFICANT CHANGE UP
LDH CSF L TO P-CCNC: 16 U/L — SIGNIFICANT CHANGE UP
LDH FLD-CCNC: 16 U/L — SIGNIFICANT CHANGE UP
LYMPHOCYTES # BLD AUTO: 1.23 K/UL — SIGNIFICANT CHANGE UP (ref 1.2–3.4)
LYMPHOCYTES # BLD AUTO: 34.4 % — SIGNIFICANT CHANGE UP (ref 20.5–51.1)
MAGNESIUM SERPL-MCNC: 1.7 MG/DL — LOW (ref 1.8–2.4)
MCHC RBC-ENTMCNC: 29.1 PG — SIGNIFICANT CHANGE UP (ref 27–31)
MCHC RBC-ENTMCNC: 33.4 G/DL — SIGNIFICANT CHANGE UP (ref 32–37)
MCV RBC AUTO: 86.9 FL — SIGNIFICANT CHANGE UP (ref 81–99)
MONOCYTES # BLD AUTO: 0.25 K/UL — SIGNIFICANT CHANGE UP (ref 0.1–0.6)
MONOCYTES NFR BLD AUTO: 7 % — SIGNIFICANT CHANGE UP (ref 1.7–9.3)
NEUTROPHILS # BLD AUTO: 2.03 K/UL — SIGNIFICANT CHANGE UP (ref 1.4–6.5)
NEUTROPHILS NFR BLD AUTO: 56.6 % — SIGNIFICANT CHANGE UP (ref 42.2–75.2)
NIGHT BLUE STAIN TISS: SIGNIFICANT CHANGE UP
NRBC # BLD: 0 /100 WBCS — SIGNIFICANT CHANGE UP (ref 0–0)
PLATELET # BLD AUTO: 134 K/UL — SIGNIFICANT CHANGE UP (ref 130–400)
POTASSIUM SERPL-MCNC: 4.3 MMOL/L — SIGNIFICANT CHANGE UP (ref 3.5–5)
POTASSIUM SERPL-SCNC: 4.3 MMOL/L — SIGNIFICANT CHANGE UP (ref 3.5–5)
PROT SERPL-MCNC: 5.8 G/DL — LOW (ref 6–8)
RBC # BLD: 3.75 M/UL — LOW (ref 4.2–5.4)
RBC # FLD: 12.8 % — SIGNIFICANT CHANGE UP (ref 11.5–14.5)
SODIUM SERPL-SCNC: 129 MMOL/L — LOW (ref 135–146)
SOURCE HSV 1/2: SIGNIFICANT CHANGE UP
SPECIMEN SOURCE: SIGNIFICANT CHANGE UP
TSH SERPL-MCNC: 0.65 UIU/ML — SIGNIFICANT CHANGE UP (ref 0.27–4.2)
WBC # BLD: 3.58 K/UL — LOW (ref 4.8–10.8)
WBC # FLD AUTO: 3.58 K/UL — LOW (ref 4.8–10.8)

## 2021-01-21 PROCEDURE — 71045 X-RAY EXAM CHEST 1 VIEW: CPT | Mod: 26

## 2021-01-21 PROCEDURE — 99233 SBSQ HOSP IP/OBS HIGH 50: CPT | Mod: CS

## 2021-01-21 RX ORDER — MAGNESIUM SULFATE 500 MG/ML
2 VIAL (ML) INJECTION ONCE
Refills: 0 | Status: COMPLETED | OUTPATIENT
Start: 2021-01-21 | End: 2021-01-21

## 2021-01-21 RX ORDER — INSULIN LISPRO 100/ML
6 VIAL (ML) SUBCUTANEOUS
Refills: 0 | Status: DISCONTINUED | OUTPATIENT
Start: 2021-01-21 | End: 2021-01-25

## 2021-01-21 RX ORDER — VALACYCLOVIR 500 MG/1
1000 TABLET, FILM COATED ORAL EVERY 8 HOURS
Refills: 0 | Status: DISCONTINUED | OUTPATIENT
Start: 2021-01-21 | End: 2021-01-25

## 2021-01-21 RX ORDER — INSULIN GLARGINE 100 [IU]/ML
18 INJECTION, SOLUTION SUBCUTANEOUS AT BEDTIME
Refills: 0 | Status: DISCONTINUED | OUTPATIENT
Start: 2021-01-21 | End: 2021-01-25

## 2021-01-21 RX ADMIN — Medication 25 GRAM(S): at 11:46

## 2021-01-21 RX ADMIN — SODIUM CHLORIDE 3 GRAM(S): 9 INJECTION INTRAMUSCULAR; INTRAVENOUS; SUBCUTANEOUS at 05:19

## 2021-01-21 RX ADMIN — SODIUM CHLORIDE 3 GRAM(S): 9 INJECTION INTRAMUSCULAR; INTRAVENOUS; SUBCUTANEOUS at 21:30

## 2021-01-21 RX ADMIN — NYSTATIN CREAM 1 APPLICATION(S): 100000 CREAM TOPICAL at 05:20

## 2021-01-21 RX ADMIN — Medication 50 MILLIGRAM(S): at 17:01

## 2021-01-21 RX ADMIN — CYCLOBENZAPRINE HYDROCHLORIDE 5 MILLIGRAM(S): 10 TABLET, FILM COATED ORAL at 05:20

## 2021-01-21 RX ADMIN — Medication 650 MILLIGRAM(S): at 00:19

## 2021-01-21 RX ADMIN — Medication 5 UNIT(S): at 17:01

## 2021-01-21 RX ADMIN — SODIUM CHLORIDE 3 GRAM(S): 9 INJECTION INTRAMUSCULAR; INTRAVENOUS; SUBCUTANEOUS at 13:19

## 2021-01-21 RX ADMIN — CYCLOBENZAPRINE HYDROCHLORIDE 5 MILLIGRAM(S): 10 TABLET, FILM COATED ORAL at 13:20

## 2021-01-21 RX ADMIN — CYCLOBENZAPRINE HYDROCHLORIDE 5 MILLIGRAM(S): 10 TABLET, FILM COATED ORAL at 21:30

## 2021-01-21 RX ADMIN — Medication 114 MILLIGRAM(S): at 05:20

## 2021-01-21 RX ADMIN — Medication 5 UNIT(S): at 11:46

## 2021-01-21 RX ADMIN — INSULIN GLARGINE 18 UNIT(S): 100 INJECTION, SOLUTION SUBCUTANEOUS at 21:45

## 2021-01-21 RX ADMIN — Medication 50 MILLIGRAM(S): at 05:21

## 2021-01-21 RX ADMIN — ISOSORBIDE MONONITRATE 60 MILLIGRAM(S): 60 TABLET, EXTENDED RELEASE ORAL at 11:46

## 2021-01-21 RX ADMIN — Medication 25 MILLIGRAM(S): at 05:19

## 2021-01-21 RX ADMIN — ATORVASTATIN CALCIUM 40 MILLIGRAM(S): 80 TABLET, FILM COATED ORAL at 11:46

## 2021-01-21 RX ADMIN — NYSTATIN CREAM 1 APPLICATION(S): 100000 CREAM TOPICAL at 17:05

## 2021-01-21 RX ADMIN — Medication 5 UNIT(S): at 09:33

## 2021-01-21 RX ADMIN — ENOXAPARIN SODIUM 40 MILLIGRAM(S): 100 INJECTION SUBCUTANEOUS at 21:30

## 2021-01-21 RX ADMIN — Medication 20 MILLIGRAM(S): at 05:19

## 2021-01-21 RX ADMIN — Medication 50 GRAM(S): at 13:20

## 2021-01-21 RX ADMIN — Medication 81 MILLIGRAM(S): at 11:46

## 2021-01-21 RX ADMIN — VALACYCLOVIR 1000 MILLIGRAM(S): 500 TABLET, FILM COATED ORAL at 22:02

## 2021-01-21 NOTE — PROGRESS NOTE ADULT - ASSESSMENT
Assessment and Plan  Case of an 85 year old female patient who was brought on 01/18 following an episode of fall on Saturday that was followed by fever and confusion, found to have subacute left cerebellar stroke, hyponatremia, and COVID pneumonia, admitted for investigations, management, and monitoring. Currently hemodynamically stable.      # Altered Mental Status  Could be related to HSV/VZV/COVID encephalitis VS Hyponatremia VS Subacute L Cerebellar Stroke  For possible  HSV/VZV Encephalitis  * Recent history of Shingles along T4 on left side (01/09)  * S/P Discharge from [facility] on Valacyclovir 1g QD for 7d  - ID on board - LP showed - CSF 1/20 : not suggestive of active infection. VZV PCR pending.  - Follow up HSV 1/2 PCR and VZV PCR from CSF studies once collected today by IR  - Continue IV Acyclovir: switched from 750mg  (01/18) to 700 mg Q8h (01/20) - can switch to oral valcyclovir 1 gm po q8h for 10 more days if PCR -ve      # For possible  COVID Encephalitis  * SARS-COV2: positive 01/18  * Chest X Ray (01/18) clear  * CT chest IC (01/18) no consolidation or effusion  - Infectious Disease on board  - Monitor for fever - Tylenol PRN  - Trend WBC: 5.09 on 01/18 -> 3.85 01/19  - Monitor SaO2 and Oxygen Requirements: RA S94% 01/20  - Follow up Chest X Ray on 01/20. 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 Last lipid profile (01/18): chol 86, TG 96, HDL 32, LDL 44        # Fall  * Could be 2ry to COVID/ stroke/ 1st degree AV block  * Trauma Workup negative on 01/18: CT C-cpine, XR pelvis, CT CAP IC (01/18): no fractures or dislocations  - Continue DVT prophylaxis, lovenox 40mg QD  - Ruled out Left cerebellar stroke on MR H without contrast performed on 01/18  - Ruled out 1st degree AV block evident on ECG (01/18) in ED: monitor HR  - No signs of seizure      # HTN  * Home: enalapril 20mg QD, Toprol 25mg QD, Lasix 20mg QD  - Monitor BP: 01/19 138/56, 146/79 mmHg-> 155/69 mmHg 01/20  - Continue Enalapril 20mg QD and Toprol 25mg QD      # DM II  * Last Hba1c (01/18) 7.2  * Home metformin 850mg BID, Januvia 100mg QD  * For Diabetic neuropathy: home Lyrica 50mg BID  - Monitor POCT premeals and at bedtime: 01/18 142 -> 01/19 204, 363 -> 01/20 268  - Started on Lantus 15u bedtime, Lispro 5u TID, and Apidra Scale B on 01/20.  - Continue Lyrica 50mg BID for diabetic neuropathy      # CAD   * S/P PCI and 2 stents 2011  * Home Aspirin 81mg QD, Toprol, Lipitor 40mg QD, Imdur  * Troponin <0.01 (01/18)   * CK (01/18) 36  - Continue Aspirin 81mg QD  - Continue Atorvastatin 40mg QD. 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 PT 01/19  - GI Prophylaxis: no indication for Pantoprazole 40mg PO QD  - Diet: speech and swallow 01/19: regular diet  - Code Status: Full Code     Assessment and Plan  Case of an 85 year old female patient who was brought on 01/18 following an episode of fall on Saturday that was followed by fever and confusion, found to have subacute left cerebellar stroke, hyponatremia, and COVID pneumonia, admitted for investigations, management, and monitoring. Currently hemodynamically stable.      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 VZV PCR pending.  - Follow up HSV 1/2 PCR and VZV PCR from CSF studies once collected today by IR  - Continue IV Acyclovir: switched from 750mg  (01/18) to 700 mg Q8h (01/20) - can switch to oral valcyclovir 1 gm po q8h for 10 more days if PCR -ve      # For possible  COVID Encephalitis  * SARS-COV2: positive 01/18  * Chest X Ray (01/18) clear  * CT chest IC (01/18) no consolidation or effusion  - Infectious Disease on board  - Monitor for fever - Tylenol PRN  - Trend WBC: 5.09 on 01/18 -> 3.85 01/19  - Monitor SaO2 and Oxygen Requirements: RA S94% 01/20  - Follow up Chest X Ray on 01/20. 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 Last lipid profile (01/18): chol 86, TG 96, HDL 32, LDL 44        # Fall  * Could be 2ry to COVID/ stroke/ 1st degree AV block  * Trauma Workup negative on 01/18: CT C-cpine, XR pelvis, CT CAP IC (01/18): no fractures or dislocations  - Continue DVT prophylaxis, lovenox 40mg QD  - Ruled out Left cerebellar stroke on MR H without contrast performed on 01/18  - Ruled out 1st degree AV block evident on ECG (01/18) in ED: monitor HR  - No signs of seizure      # HTN  * Home: enalapril 20mg QD, Toprol 25mg QD, Lasix 20mg QD  - Monitor BP: 01/19 138/56, 146/79 mmHg-> 155/69 mmHg 01/20  - Continue Enalapril 20mg QD and Toprol 25mg QD      # DM II  * Last Hba1c (01/18) 7.2  * Home metformin 850mg BID, Januvia 100mg QD  * For Diabetic neuropathy: home Lyrica 50mg BID  - Monitor POCT premeals and at bedtime: 01/18 142 -> 01/19 204, 363 -> 01/20 268  - Started on Lantus 15u bedtime, Lispro 5u TID, and Apidra Scale B on 01/20.  - Continue Lyrica 50mg BID for diabetic neuropathy      # CAD   * S/P PCI and 2 stents 2011  * Home Aspirin 81mg QD, Toprol, Lipitor 40mg QD, Imdur  * Troponin <0.01 (01/18)   * CK (01/18) 36  - Continue Aspirin 81mg QD  - Continue Atorvastatin 40mg QD. 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 PT 01/19  - GI Prophylaxis: no indication for Pantoprazole 40mg PO QD  - Diet: speech and swallow 01/19: regular diet  - Code Status: Full Code     Assessment and Plan  Case of an 85 year old female patient who was brought on 01/18 following an episode of fall on Saturday that was followed by fever and confusion, found to have subacute left cerebellar stroke, hyponatremia, and COVID pneumonia, admitted for investigations, management, and monitoring. Currently hemodynamically stable.      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 VZV PCR pending.  - Follow up HSV 1/2 PCR and VZV PCR from CSF studies once collected today by IR  - Continue IV Acyclovir: switched from 750mg  (01/18) to 700 mg Q8h (01/20) - can switch to oral valcyclovir 1 gm po q8h for 10 more days per ID      # For possible  COVID Encephalitis  * SARS-COV2: positive 01/18  * Chest X Ray (01/18) clear  * CT chest IC (01/18) no consolidation or effusion  - Infectious Disease on board  - Monitor for fever - Tylenol PRN  - Trend WBC: 5.09 on 01/18 -> 3.85 01/19  - Monitor SaO2 and Oxygen Requirements: RA S94% 01/20  - Follow up Chest X Ray on 01/20. 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 Last lipid profile (01/18): chol 86, TG 96, HDL 32, LDL 44        # Fall  * Could be 2ry to COVID/ stroke/ 1st degree AV block  * Trauma Workup negative on 01/18: CT C-cpine, XR pelvis, CT CAP IC (01/18): no fractures or dislocations  - Continue DVT prophylaxis, lovenox 40mg QD  - Ruled out Left cerebellar stroke on MR H without contrast performed on 01/18  - Ruled out 1st degree AV block evident on ECG (01/18) in ED: monitor HR  - No signs of seizure      # HTN  * Home: enalapril 20mg QD, Toprol 25mg QD, Lasix 20mg QD  - Monitor BP: 01/19 138/56, 146/79 mmHg-> 155/69 mmHg 01/20  - Continue Enalapril 20mg QD and Toprol 25mg QD      # DM II  * Last Hba1c (01/18) 7.2  * Home metformin 850mg BID, Januvia 100mg QD  * For Diabetic neuropathy: home Lyrica 50mg BID  - Monitor POCT premeals and at bedtime: 01/18 142 -> 01/19 204, 363 -> 01/20 268  - Started on Lantus 15u bedtime, Lispro 5u TID, and Apidra Scale B on 01/20.  - Continue Lyrica 50mg BID for diabetic neuropathy      # CAD   * S/P PCI and 2 stents 2011  * Home Aspirin 81mg QD, Toprol, Lipitor 40mg QD, Imdur  * Troponin <0.01 (01/18)   * CK (01/18) 36  - Continue Aspirin 81mg QD  - Continue Atorvastatin 40mg QD. 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 PT 01/19  - GI Prophylaxis: no indication for Pantoprazole 40mg PO QD  - Diet: speech and swallow 01/19: regular diet  - Code Status: Full Code

## 2021-01-21 NOTE — PHYSICAL THERAPY INITIAL EVALUATION ADULT - TRANSFER SAFETY CONCERNS NOTED: SIT/STAND, REHAB EVAL
decreased sequencing ability/stepping too close to front of assistive device/decreased weight-shifting ability

## 2021-01-21 NOTE — PHYSICAL THERAPY INITIAL EVALUATION ADULT - ADDITIONAL COMMENTS
As per daughter, pt lives in apartment building walk-in, no stairs. Has home attendant 11 hours/day, daughter stays  overnight. Attendant assists patient in ADL's and ambulation. Does not use walker but holds on to furniture or attendant when ambulating.

## 2021-01-21 NOTE — PHYSICAL THERAPY INITIAL EVALUATION ADULT - RANGE OF MOTION EXAMINATION, REHAB EVAL
B hip ROM limited secondary to abdominal rolf[pose tissue and hernia./no ROM deficits were identified

## 2021-01-21 NOTE — PROGRESS NOTE ADULT - ASSESSMENT
· Assessment	   85 year old female patient who was brought on 01/18 following an episode of fall on Saturday that was followed by fever and confusion, found to have subacute left cerebellar stroke, hyponatremia, and COVID pneumonia, admitted for investigations, management, and monitoring. Currently hemodynamically stable.    IMPRESSION;  Left cerebellar subacute infarct possible related to recent VZV and her underlying chronic small vessel disease.  Clinically alert with no deficits  VZV lesions have healed over  CSF 1/20 : not suggestive of active infection. VZV PCR p    COVID-19 antibodies NG  On RA  No significant elevation of the inflammatory markers   Ferritin 85  CRP 1.72  Ddimers 384      RECOMMENDATIONS;  Acyclovir 700 mg iv q8h. Could change to po Valcyclovir 1 gm po q8h for 10 more days  No steroids/RDV/plasma as not hypoxic  Good candidate for BAMLANIVIMAB  Call [phone]  Fill out form on line  recall prn please

## 2021-01-21 NOTE — PROGRESS NOTE ADULT - SUBJECTIVE AND OBJECTIVE BOX
NEPHROLOGY FOLLOW UP NOTE    pt seen and examined  Na+ better  still febrile  skin lesions crusting   less confused      PAST MEDICAL & SURGICAL HISTORY:    Allergies:  No Known Allergies    Home Medications Reviewed    SOCIAL HISTORY:  Denies ETOH,Smoking,   FAMILY HISTORY:        REVIEW OF SYSTEMS:  All other review of systems is negative unless indicated above.    PHYSICAL EXAM:  Constitutional: NAD  HEENT: anicteric sclera, oropharynx clear, MMM  Neck: No JVD  Respiratory: CTAB, no wheezes, rales or rhonchi  Cardiovascular: S1, S2, RRR  Gastrointestinal: BS+, soft, NT/ND  Extremities: No cyanosis or clubbing. No peripheral edema  Neurological: A/O x 3, no focal deficits  Psychiatric: Normal mood, normal affect  : No CVA tenderness. No masterson.   Skin: No rashes    advance care planning and directives reviewed     Hospital Medications:   MEDICATIONS  (STANDING):  aspirin enteric coated 81 milliGRAM(s) Oral daily  atorvastatin Oral Tab/Cap - Peds 40 milliGRAM(s) Oral daily  cyclobenzaprine Oral Tab/Cap - Peds 5 milliGRAM(s) Oral three times a day  dextrose 40% Gel 15 Gram(s) Oral once  dextrose 5%. 1000 milliLiter(s) (50 mL/Hr) IV Continuous <Continuous>  dextrose 5%. 1000 milliLiter(s) (100 mL/Hr) IV Continuous <Continuous>  dextrose 50% Injectable 25 Gram(s) IV Push once  dextrose 50% Injectable 12.5 Gram(s) IV Push once  dextrose 50% Injectable 25 Gram(s) IV Push once  enalapril 20 milliGRAM(s) Oral daily  enoxaparin Injectable 40 milliGRAM(s) SubCutaneous at bedtime  glucagon  Injectable 1 milliGRAM(s) IntraMuscular once  insulin glargine Injectable (LANTUS) 15 Unit(s) SubCutaneous at bedtime  insulin lispro Injectable (ADMELOG) 5 Unit(s) SubCutaneous three times a day before meals  isosorbide   mononitrate ER Tablet (IMDUR) 60 milliGRAM(s) Oral daily  metoprolol succinate ER 25 milliGRAM(s) Oral daily  nystatin Cream 1 Application(s) Topical two times a day  pregabalin 50 milliGRAM(s) Oral two times a day  sodium chloride 3 Gram(s) Oral three times a day  valACYclovir 1000 milliGRAM(s) Oral every 8 hours        VITALS:  T(F): 97.6 (01-21-21 @ 12:00), Max: 101.5 (21 @ 00:00)  HR: 85 (21 @ 12:00)  BP: 145/65 (21 @ 12:00)  RR: 19 (21 @ 12:00)  SpO2: 95% (21 @ 12:00)  Wt(kg): --     @ 07: @ 07:00  --------------------------------------------------------  IN: 375 mL / OUT: 0 mL / NET: 375 mL          LABS:      129<L>  |  98  |  10  ----------------------------<  268<H>  4.3   |  20  |  0.9    Ca    8.6      2021 08:41  Mg     1.7         TPro  5.8<L>  /  Alb  3.4<L>  /  TBili  0.5  /  DBili      /  AST  38  /  ALT  26  /  AlkPhos  57                            10.9   3.58  )-----------( 134      ( 2021 08:41 )             32.6       Urine Studies:  Urinalysis Basic - ( 2021 06:00 )    Color: Yellow / Appearance: Clear / S.013 / pH:   Gluc:  / Ketone: Negative  / Bili: Negative / Urobili: <2 mg/dL   Blood:  / Protein: 30 mg/dL / Nitrite: Negative   Leuk Esterase: Negative / RBC: 2 /HPF / WBC 1 /HPF   Sq Epi:  / Non Sq Epi: 1 /HPF / Bacteria: Negative      Osmolality, Random Urine: 335 mos/kg ( @ 06:00)  Sodium, Random Urine: 45.0 mmoL/L ( @ 06:00)  Protein/Creatinine Ratio Calculation: 0.5 Ratio ( 06:00)  Creatinine, Random Urine: 66 mg/dL ( @ 06:00)      RADIOLOGY & ADDITIONAL STUDIES:

## 2021-01-21 NOTE — PROGRESS NOTE ADULT - SUBJECTIVE AND OBJECTIVE BOX
NIKKY FRASER  85y, Female    All available historical data reviewed    OVERNIGHT EVENTS:  no fevers  alert, responsive  daughter at bedside  no HAs  has left thoracic pain  RA 98%    ROS:  General: Denies rigors, nightsweats  HEENT: Denies headache, rhinorrhea, sore throat, eye pain  CV: Denies CP, palpitations  PULM: Denies wheezing, hemoptysis  GI: Denies hematemesis, hematochezia, melena  : Denies discharge, hematuria  MSK: Denies arthralgias, myalgias  SKIN: Denies rash, lesions  NEURO: Denies paresthesias, weakness  PSYCH: Denies depression, anxiety    VITALS:  T(F): 99.9, Max: 101.5 (21 @ 00:00)  HR: 83  BP: 140/58  RR: 19Vital Signs Last 24 Hrs  T(C): 37.7 (2021 08:00), Max: 38.6 (2021 00:00)  T(F): 99.9 (2021 08:00), Max: 101.5 (2021 00:00)  HR: 83 (2021 08:00) (75 - 94)  BP: 140/58 (2021 08:00) (129/62 - 168/70)  BP(mean): --  RR: 19 (2021 08:00) (19 - 21)  SpO2: 96% (2021 08:00) (94% - 98%)    TESTS & MEASUREMENTS:                        10.9   3.58  )-----------( 134      ( 2021 08:41 )             32.6         126<L>  |  94<L>  |  11  ----------------------------<  233<H>  3.9   |  23  |  0.8    Ca    8.0<L>      2021 19:00  Mg     1.6         TPro  5.0<L>  /  Alb  3.1<L>  /  TBili  0.3  /  DBili  x   /  AST  29  /  ALT  24  /  AlkPhos  55      LIVER FUNCTIONS - ( 2021 19:00 )  Alb: 3.1 g/dL / Pro: 5.0 g/dL / ALK PHOS: 55 U/L / ALT: 24 U/L / AST: 29 U/L / GGT: x             Culture - CSF with Gram Stain (collected 21 @ 12:40)  Source: .CSF CSF  Gram Stain (21 @ 22:49):    No polymorphonuclear leukocytes seen    No organisms seen    by cytocentrifuge    Culture - Blood (collected 21 @ 07:00)  Source: .Blood Blood-Peripheral  Preliminary Report (21 @ 13:02):    No growth to date.    Culture - Blood (collected 21 @ 07:00)  Source: .Blood Blood-Peripheral  Preliminary Report (21 @ 13:02):    No growth to date.      Urinalysis Basic - ( 2021 06:00 )    Color: Yellow / Appearance: Clear / S.013 / pH: x  Gluc: x / Ketone: Negative  / Bili: Negative / Urobili: <2 mg/dL   Blood: x / Protein: 30 mg/dL / Nitrite: Negative   Leuk Esterase: Negative / RBC: 2 /HPF / WBC 1 /HPF   Sq Epi: x / Non Sq Epi: 1 /HPF / Bacteria: Negative          RADIOLOGY & ADDITIONAL TESTS:  Personal review of radiological diagnostics performed  Echo and EKG results noted when applicable.     MEDICATIONS:  acetaminophen   Tablet .. 650 milliGRAM(s) Oral every 6 hours PRN  acyclovir IVPB 700 milliGRAM(s) IV Intermittent every 8 hours  aspirin enteric coated 81 milliGRAM(s) Oral daily  atorvastatin Oral Tab/Cap - Peds 40 milliGRAM(s) Oral daily  cyclobenzaprine Oral Tab/Cap - Peds 5 milliGRAM(s) Oral three times a day  dextrose 40% Gel 15 Gram(s) Oral once  dextrose 5%. 1000 milliLiter(s) IV Continuous <Continuous>  dextrose 5%. 1000 milliLiter(s) IV Continuous <Continuous>  dextrose 50% Injectable 25 Gram(s) IV Push once  dextrose 50% Injectable 12.5 Gram(s) IV Push once  dextrose 50% Injectable 25 Gram(s) IV Push once  enalapril 20 milliGRAM(s) Oral daily  enoxaparin Injectable 40 milliGRAM(s) SubCutaneous at bedtime  glucagon  Injectable 1 milliGRAM(s) IntraMuscular once  insulin glargine Injectable (LANTUS) 15 Unit(s) SubCutaneous at bedtime  insulin lispro Injectable (ADMELOG) 5 Unit(s) SubCutaneous three times a day before meals  isosorbide   mononitrate ER Tablet (IMDUR) 60 milliGRAM(s) Oral daily  metoprolol succinate ER 25 milliGRAM(s) Oral daily  nystatin Cream 1 Application(s) Topical two times a day  pregabalin 50 milliGRAM(s) Oral two times a day  sodium chloride 3 Gram(s) Oral three times a day      ANTIBIOTICS:  acyclovir IVPB 700 milliGRAM(s) IV Intermittent every 8 hours

## 2021-01-21 NOTE — PROGRESS NOTE ADULT - ATTENDING COMMENTS
Patient seen and examined independently. Agree with resident note.  Patient seen and examined independently. Agree with resident note.  # metabolic encephalopathy with AMS sec to shingles and encephalitis-- continue   valacyclovir as per ID   CSF shows negative HSV PCR is negative.  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  # Shingles treated in [facility]-- visible  crusted lesions noted on her lt side.  # Covid --was offered no treatment on RA-- ID suggested bamlanivumab-- which will be given tomorrow  # stroke-- likely on lipitor and aspirin-- subacute cerebellar-- MRI was negative.  # Morbid obesity present.   patient will go home after HHA is restored. Patient seen and examined independently. Agree with resident note.    # metabolic encephalopathy with AMS sec to shingles and encephalitis-- continue   valacyclovir as per ID   CSF shows negative HSV PCR is negative.  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  # Shingles treated in [facility]-- visible  crusted lesions noted on her lt side.  # Covid --was offered no treatment on RA-- ID suggested bamlanivumab-- which will be given tomorrow  # stroke-- likely on lipitor and aspirin-- subacute cerebellar-- MRI was negative.  # Morbid obesity present.   patient will go home after HHA is restored.

## 2021-01-21 NOTE — PROGRESS NOTE ADULT - SUBJECTIVE AND OBJECTIVE BOX
SUBJECTIVE:   LENGTH OF HOSPITAL STAY: 3d    CHIEF COMPLAINT:  Patient is a 85y old  Female who presents with a chief complaint of altered mental status (2021 09:39)      Events over the past 24 hours:  Patient was seen and examined at the bedside this morning. She is lying comfortably on the bed. There were no acute events overnight. The daughter is at bedside and says the patient's mental status has much improved. She is alert and oriented x 3. Denies any headache, SOB. She still has pain on left side of chest and cough. She had fever overnight upto 101.5F.     REVIEW OF SYSTEMS  Negative Except as mentioned above.    ALLERGIES:  No Known Allergies        MEDICATIONS:  STANDING MEDICATIONS  acyclovir IVPB 700 milliGRAM(s) IV Intermittent every 8 hours  aspirin enteric coated 81 milliGRAM(s) Oral daily  atorvastatin Oral Tab/Cap - Peds 40 milliGRAM(s) Oral daily  cyclobenzaprine Oral Tab/Cap - Peds 5 milliGRAM(s) Oral three times a day  dextrose 40% Gel 15 Gram(s) Oral once  dextrose 5%. 1000 milliLiter(s) IV Continuous <Continuous>  dextrose 5%. 1000 milliLiter(s) IV Continuous <Continuous>  dextrose 50% Injectable 25 Gram(s) IV Push once  dextrose 50% Injectable 12.5 Gram(s) IV Push once  dextrose 50% Injectable 25 Gram(s) IV Push once  enalapril 20 milliGRAM(s) Oral daily  enoxaparin Injectable 40 milliGRAM(s) SubCutaneous at bedtime  glucagon  Injectable 1 milliGRAM(s) IntraMuscular once  insulin glargine Injectable (LANTUS) 15 Unit(s) SubCutaneous at bedtime  insulin lispro Injectable (ADMELOG) 5 Unit(s) SubCutaneous three times a day before meals  isosorbide   mononitrate ER Tablet (IMDUR) 60 milliGRAM(s) Oral daily  metoprolol succinate ER 25 milliGRAM(s) Oral daily  nystatin Cream 1 Application(s) Topical two times a day  pregabalin 50 milliGRAM(s) Oral two times a day  sodium chloride 3 Gram(s) Oral three times a day    PRN MEDICATIONS  acetaminophen   Tablet .. 650 milliGRAM(s) Oral every 6 hours PRN        OBJECTIVE:  VITALS:   T(F): 99.9 (21 @ 08:00), Max: 101.5 (21 @ 00:00)  HR: 83 (21 @ 08:00) (75 - 94)  BP: 140/58 (21 @ 08:00) (129/62 - 168/70)  RR: 19 (21 @ 08:00) (19 - 21)  SpO2: 96% (21 @ 08:00) (94% - 98%)    I&O's:   I&O's Summary      PHYSICAL EXAM:  General: Not in distress; Pallor (-), Icterus (-), Cyanosis (-), Clubbing (-)  HEENT: Pupils equal, round and reactive to light symmetrically, EOM - Normal bilaterally; Hearing - b/l normal; No external discharge noted, JVD (-), Lymphadenopathy(-)  PULM: Bilaterally equal and clear breath sounds, no wheeze, rubs or crackles.   CVS: Normal S1 and S2, no murmurs, rubs, or gallops.   GI: Soft, nondistended, nontender, BS +  MSK: Edema (-), no joint or muscle tenderness  SKIN: Warm and well perfused, crusted/weepy rashes + below left breast in dermatomal distribution  NEURO:  Alert and Oriented x 3; Cranial Nerves are all grossly intact; Normal strength and sensation in all four extremities.      LABS:                        10.9   3.58  )-----------( 134      ( 2021 08:41 )             32.6                 129<L>  |  98  |  10  ----------------------------<  268<H>  4.3   |  20  |  0.9    Ca    8.6      2021 08:41  Mg     1.7         TPro  5.8<L>  /  Alb  3.4<L>  /  TBili  0.5  /  DBili  x   /  AST  38  /  ALT  26  /  AlkPhos  57      LIVER FUNCTIONS - ( 2021 08:41 )  Alb: 3.4 g/dL / Pro: 5.8 g/dL / ALK PHOS: 57 U/L / ALT: 26 U/L / AST: 38 U/L / GGT: x                         Urinalysis Basic - ( 2021 06:00 )    Color: Yellow / Appearance: Clear / S.013 / pH: x  Gluc: x / Ketone: Negative  / Bili: Negative / Urobili: <2 mg/dL   Blood: x / Protein: 30 mg/dL / Nitrite: Negative   Leuk Esterase: Negative / RBC: 2 /HPF / WBC 1 /HPF   Sq Epi: x / Non Sq Epi: 1 /HPF / Bacteria: Negative        Culture - CSF with Gram Stain (collected 2021 12:40)  Source: .CSF CSF  Gram Stain (2021 22:49):    No polymorphonuclear leukocytes seen    No organisms seen    by cytocentrifuge      Blood Glucose:  221 (21 @ 08:14)  223 (21 @ 21:44)  199 (21 @ 16:59)  234 (21 @ 12:30)  272 (21 @ 08:01)  268 (21 @ 02:50)        RADIOLOGY & ADDITIONAL TESTS:  Xray Chest 1 View AP/PA:   EXAM:  XR CHEST 1 VIEW            PROCEDURE DATE:  2021            INTERPRETATION:  CLINICAL HISTORY: Pain after trauma.    COMPARISON: None.    TECHNIQUE/POSITIONING: Frontal view of the chest.    FINDINGS:    Support devices: None.    Cardiac/mediastinum/hilum: Aortic calcifications.    Lung parenchyma/Pleura: Within normal limits.    Skeleton/soft tissues: Degenerative change in the spine and shoulders.      IMPRESSION:    No radiographic evidence of acute cardiopulmonary disease.                  SINDY BRANTLEY MD; Attending Radiologist  This document has been electronically signed. 2021 12:03PM (21 @ 07:20)

## 2021-01-21 NOTE — PROGRESS NOTE ADULT - ASSESSMENT
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

## 2021-01-22 LAB
ALBUMIN SERPL ELPH-MCNC: 3.1 G/DL — LOW (ref 3.5–5.2)
ALP SERPL-CCNC: 55 U/L — SIGNIFICANT CHANGE UP (ref 30–115)
ALT FLD-CCNC: 26 U/L — SIGNIFICANT CHANGE UP (ref 0–41)
ANION GAP SERPL CALC-SCNC: 13 MMOL/L — SIGNIFICANT CHANGE UP (ref 7–14)
AST SERPL-CCNC: 41 U/L — SIGNIFICANT CHANGE UP (ref 0–41)
BILIRUB SERPL-MCNC: 0.4 MG/DL — SIGNIFICANT CHANGE UP (ref 0.2–1.2)
BUN SERPL-MCNC: 12 MG/DL — SIGNIFICANT CHANGE UP (ref 10–20)
CALCIUM SERPL-MCNC: 8.4 MG/DL — LOW (ref 8.5–10.1)
CHLORIDE SERPL-SCNC: 102 MMOL/L — SIGNIFICANT CHANGE UP (ref 98–110)
CO2 SERPL-SCNC: 19 MMOL/L — SIGNIFICANT CHANGE UP (ref 17–32)
CREAT SERPL-MCNC: 0.9 MG/DL — SIGNIFICANT CHANGE UP (ref 0.7–1.5)
GLUCOSE BLDC GLUCOMTR-MCNC: 143 MG/DL — HIGH (ref 70–99)
GLUCOSE BLDC GLUCOMTR-MCNC: 248 MG/DL — HIGH (ref 70–99)
GLUCOSE BLDC GLUCOMTR-MCNC: 295 MG/DL — HIGH (ref 70–99)
GLUCOSE BLDC GLUCOMTR-MCNC: 314 MG/DL — HIGH (ref 70–99)
GLUCOSE SERPL-MCNC: 154 MG/DL — HIGH (ref 70–99)
MAGNESIUM SERPL-MCNC: 1.9 MG/DL — SIGNIFICANT CHANGE UP (ref 1.8–2.4)
POTASSIUM SERPL-MCNC: 4.4 MMOL/L — SIGNIFICANT CHANGE UP (ref 3.5–5)
POTASSIUM SERPL-SCNC: 4.4 MMOL/L — SIGNIFICANT CHANGE UP (ref 3.5–5)
PROT SERPL-MCNC: 5.4 G/DL — LOW (ref 6–8)
SODIUM SERPL-SCNC: 134 MMOL/L — LOW (ref 135–146)
VZV BY PCR, AQUEOUS FLD: SIGNIFICANT CHANGE UP COPIES/ML

## 2021-01-22 PROCEDURE — 99233 SBSQ HOSP IP/OBS HIGH 50: CPT | Mod: CS

## 2021-01-22 PROCEDURE — 74018 RADEX ABDOMEN 1 VIEW: CPT | Mod: 26

## 2021-01-22 RX ORDER — SODIUM CHLORIDE 9 MG/ML
2 INJECTION INTRAMUSCULAR; INTRAVENOUS; SUBCUTANEOUS
Refills: 0 | Status: DISCONTINUED | OUTPATIENT
Start: 2021-01-22 | End: 2021-01-28

## 2021-01-22 RX ORDER — SENNA PLUS 8.6 MG/1
2 TABLET ORAL AT BEDTIME
Refills: 0 | Status: DISCONTINUED | OUTPATIENT
Start: 2021-01-22 | End: 2021-01-22

## 2021-01-22 RX ORDER — LACTULOSE 10 G/15ML
15 SOLUTION ORAL DAILY
Refills: 0 | Status: DISCONTINUED | OUTPATIENT
Start: 2021-01-22 | End: 2021-01-22

## 2021-01-22 RX ORDER — IOHEXOL 300 MG/ML
30 INJECTION, SOLUTION INTRAVENOUS ONCE
Refills: 0 | Status: COMPLETED | OUTPATIENT
Start: 2021-01-22 | End: 2021-01-22

## 2021-01-22 RX ORDER — ONDANSETRON 8 MG/1
2 TABLET, FILM COATED ORAL ONCE
Refills: 0 | Status: COMPLETED | OUTPATIENT
Start: 2021-01-22 | End: 2021-01-22

## 2021-01-22 RX ADMIN — ONDANSETRON 2 MILLIGRAM(S): 8 TABLET, FILM COATED ORAL at 22:33

## 2021-01-22 RX ADMIN — Medication 650 MILLIGRAM(S): at 00:10

## 2021-01-22 RX ADMIN — VALACYCLOVIR 1000 MILLIGRAM(S): 500 TABLET, FILM COATED ORAL at 21:22

## 2021-01-22 RX ADMIN — CYCLOBENZAPRINE HYDROCHLORIDE 5 MILLIGRAM(S): 10 TABLET, FILM COATED ORAL at 13:12

## 2021-01-22 RX ADMIN — CYCLOBENZAPRINE HYDROCHLORIDE 5 MILLIGRAM(S): 10 TABLET, FILM COATED ORAL at 05:14

## 2021-01-22 RX ADMIN — CYCLOBENZAPRINE HYDROCHLORIDE 5 MILLIGRAM(S): 10 TABLET, FILM COATED ORAL at 21:21

## 2021-01-22 RX ADMIN — Medication 50 MILLIGRAM(S): at 05:14

## 2021-01-22 RX ADMIN — ENOXAPARIN SODIUM 40 MILLIGRAM(S): 100 INJECTION SUBCUTANEOUS at 21:22

## 2021-01-22 RX ADMIN — NYSTATIN CREAM 1 APPLICATION(S): 100000 CREAM TOPICAL at 05:14

## 2021-01-22 RX ADMIN — IOHEXOL 30 MILLILITER(S): 300 INJECTION, SOLUTION INTRAVENOUS at 21:21

## 2021-01-22 RX ADMIN — SODIUM CHLORIDE 3 GRAM(S): 9 INJECTION INTRAMUSCULAR; INTRAVENOUS; SUBCUTANEOUS at 05:14

## 2021-01-22 RX ADMIN — Medication 6 UNIT(S): at 17:02

## 2021-01-22 RX ADMIN — Medication 650 MILLIGRAM(S): at 08:35

## 2021-01-22 RX ADMIN — SODIUM CHLORIDE 3 GRAM(S): 9 INJECTION INTRAMUSCULAR; INTRAVENOUS; SUBCUTANEOUS at 11:34

## 2021-01-22 RX ADMIN — Medication 81 MILLIGRAM(S): at 11:35

## 2021-01-22 RX ADMIN — ATORVASTATIN CALCIUM 40 MILLIGRAM(S): 80 TABLET, FILM COATED ORAL at 11:35

## 2021-01-22 RX ADMIN — ISOSORBIDE MONONITRATE 60 MILLIGRAM(S): 60 TABLET, EXTENDED RELEASE ORAL at 11:34

## 2021-01-22 RX ADMIN — SENNA PLUS 2 TABLET(S): 8.6 TABLET ORAL at 21:21

## 2021-01-22 RX ADMIN — VALACYCLOVIR 1000 MILLIGRAM(S): 500 TABLET, FILM COATED ORAL at 05:14

## 2021-01-22 RX ADMIN — INSULIN GLARGINE 18 UNIT(S): 100 INJECTION, SOLUTION SUBCUTANEOUS at 21:21

## 2021-01-22 RX ADMIN — VALACYCLOVIR 1000 MILLIGRAM(S): 500 TABLET, FILM COATED ORAL at 13:12

## 2021-01-22 RX ADMIN — Medication 6 UNIT(S): at 11:34

## 2021-01-22 NOTE — PROGRESS NOTE ADULT - ATTENDING COMMENTS
Patient seen and examined independently. Agree with resident note.  #AMS resolving could be sec to pain from shingles and Covid-- low grade fever last night--  LP shoed wbc-- nonspecific and mental staus is improving.  # herpes zoster lesions on lt side under breast--crusted-- continue po valacyclovir  # Covid 19 positive on RA--ID recommended bamlanivumab-- which daughter schedule for tuesday-- maybe too late. I discussed with ID-  # Hx of colon ca--s/p surgery  # hx of CAD s/p PCI on aspirin and lipitor.  # Hyponatremia-- improved on salt tablets.  # severe groin rash -- fungal on nystatin.  # Morbid obesity is present.   Transfer to [facility]-- case will be discussed with daughter-- currently she is busy-- she has HHA and she needs to ne negative for DC.  daughter to get infusion date for monoclonal Ab changed for better effects on Mom.

## 2021-01-22 NOTE — PROGRESS NOTE ADULT - SUBJECTIVE AND OBJECTIVE BOX
NEPHROLOGY FOLLOW UP NOTE    Na+ better  low grade fever  no overnight events  bp's fair       PAST MEDICAL & SURGICAL HISTORY:    Allergies:  No Known Allergies    Home Medications Reviewed    SOCIAL HISTORY:  Denies ETOH,Smoking,   FAMILY HISTORY:        REVIEW OF SYSTEMS:  All other review of systems is negative unless indicated above.    PHYSICAL EXAM:  Constitutional: NAD  HEENT: anicteric sclera, oropharynx clear, MMM  Neck: No JVD  Respiratory: CTAB, no wheezes, rales or rhonchi  Cardiovascular: S1, S2, RRR  Gastrointestinal: BS+, soft, NT/ND  Extremities: No cyanosis or clubbing. No peripheral edema  Neurological: A/O x 3, no focal deficits  Psychiatric: Normal mood, normal affect  : No CVA tenderness. No masterson.   Skin: No rashes    Hospital Medications:   MEDICATIONS  (STANDING):  aspirin enteric coated 81 milliGRAM(s) Oral daily  atorvastatin Oral Tab/Cap - Peds 40 milliGRAM(s) Oral daily  cyclobenzaprine Oral Tab/Cap - Peds 5 milliGRAM(s) Oral three times a day  dextrose 40% Gel 15 Gram(s) Oral once  dextrose 5%. 1000 milliLiter(s) (50 mL/Hr) IV Continuous <Continuous>  dextrose 5%. 1000 milliLiter(s) (100 mL/Hr) IV Continuous <Continuous>  dextrose 50% Injectable 25 Gram(s) IV Push once  dextrose 50% Injectable 12.5 Gram(s) IV Push once  dextrose 50% Injectable 25 Gram(s) IV Push once  enalapril 20 milliGRAM(s) Oral daily  enoxaparin Injectable 40 milliGRAM(s) SubCutaneous at bedtime  glucagon  Injectable 1 milliGRAM(s) IntraMuscular once  insulin glargine Injectable (LANTUS) 18 Unit(s) SubCutaneous at bedtime  insulin lispro Injectable (ADMELOG) 6 Unit(s) SubCutaneous three times a day before meals  isosorbide   mononitrate ER Tablet (IMDUR) 60 milliGRAM(s) Oral daily  lactulose Syrup 15 Gram(s) Oral daily  metoprolol succinate ER 25 milliGRAM(s) Oral daily  nystatin Cream 1 Application(s) Topical two times a day  pregabalin 50 milliGRAM(s) Oral two times a day  senna 2 Tablet(s) Oral at bedtime  sodium chloride 2 Gram(s) Oral two times a day  valACYclovir 1000 milliGRAM(s) Oral every 8 hours        VITALS:  T(F): 96.3 (21 @ 05:03), Max: 100.4 (21 @ 00:23)  HR: 73 (21 @ 08:27)  BP: 146/66 (21 @ 08:27)  RR: 22 (21 @ 08:27)  SpO2: 94% (21 @ 08:27)  Wt(kg): --     @ 07:  -   @ 07:00  --------------------------------------------------------  IN: 240 mL / OUT: 0 mL / NET: 240 mL          LABS:      134<L>  |  102  |  12  ----------------------------<  154<H>  4.4   |  19  |  0.9    Ca    8.4<L>      2021 07:30  Mg     1.9         TPro  5.4<L>  /  Alb  3.1<L>  /  TBili  0.4  /  DBili      /  AST  41  /  ALT  26  /  AlkPhos  55                            10.9   3.58  )-----------( 134      ( 2021 08:41 )             32.6       Urine Studies:  Urinalysis Basic - ( 2021 06:00 )    Color: Yellow / Appearance: Clear / S.013 / pH:   Gluc:  / Ketone: Negative  / Bili: Negative / Urobili: <2 mg/dL   Blood:  / Protein: 30 mg/dL / Nitrite: Negative   Leuk Esterase: Negative / RBC: 2 /HPF / WBC 1 /HPF   Sq Epi:  / Non Sq Epi: 1 /HPF / Bacteria: Negative      Osmolality, Random Urine: 335 mos/kg ( @ 06:00)  Sodium, Random Urine: 45.0 mmoL/L ( @ 06:00)  Protein/Creatinine Ratio Calculation: 0.5 Ratio ( @ 06:00)  Creatinine, Random Urine: 66 mg/dL ( @ 06:00)      RADIOLOGY & ADDITIONAL STUDIES:

## 2021-01-22 NOTE — PROGRESS NOTE ADULT - ASSESSMENT
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

## 2021-01-22 NOTE — PROGRESS NOTE ADULT - SUBJECTIVE AND OBJECTIVE BOX
** This is an incomplete note - pending AM rounds**   SUBJECTIVE:   LENGTH OF HOSPITAL STAY: 4d    CHIEF COMPLAINT:  Patient is a 85y old  Female who presents with a chief complaint of altered mental status (21 Jan 2021 16:39)      Events over the past 24 hours:  Patient was seen and examined at the bedside this morning.    REVIEW OF SYSTEMS  Negative Except as mentioned above.    ALLERGIES:  No Known Allergies        MEDICATIONS:  STANDING MEDICATIONS  aspirin enteric coated 81 milliGRAM(s) Oral daily  atorvastatin Oral Tab/Cap - Peds 40 milliGRAM(s) Oral daily  cyclobenzaprine Oral Tab/Cap - Peds 5 milliGRAM(s) Oral three times a day  dextrose 40% Gel 15 Gram(s) Oral once  dextrose 5%. 1000 milliLiter(s) IV Continuous <Continuous>  dextrose 5%. 1000 milliLiter(s) IV Continuous <Continuous>  dextrose 50% Injectable 25 Gram(s) IV Push once  dextrose 50% Injectable 12.5 Gram(s) IV Push once  dextrose 50% Injectable 25 Gram(s) IV Push once  enalapril 20 milliGRAM(s) Oral daily  enoxaparin Injectable 40 milliGRAM(s) SubCutaneous at bedtime  glucagon  Injectable 1 milliGRAM(s) IntraMuscular once  insulin glargine Injectable (LANTUS) 18 Unit(s) SubCutaneous at bedtime  insulin lispro Injectable (ADMELOG) 6 Unit(s) SubCutaneous three times a day before meals  isosorbide   mononitrate ER Tablet (IMDUR) 60 milliGRAM(s) Oral daily  metoprolol succinate ER 25 milliGRAM(s) Oral daily  nystatin Cream 1 Application(s) Topical two times a day  pregabalin 50 milliGRAM(s) Oral two times a day  sodium chloride 3 Gram(s) Oral three times a day  valACYclovir 1000 milliGRAM(s) Oral every 8 hours    PRN MEDICATIONS  acetaminophen   Tablet .. 650 milliGRAM(s) Oral every 6 hours PRN        OBJECTIVE:  VITALS:   T(F): 96.3 (01-22-21 @ 05:03), Max: 100.4 (01-22-21 @ 00:23)  HR: 61 (01-22-21 @ 05:03) (61 - 85)  BP: 105/51 (01-22-21 @ 05:03) (105/51 - 147/66)  BP(mean): --  RR: 18 (01-22-21 @ 05:03) (18 - 19)  SpO2: 94% (01-22-21 @ 05:03) (92% - 96%)    I&O's:   I&O's Summary    21 Jan 2021 07:01  -  22 Jan 2021 06:13  --------------------------------------------------------  IN: 240 mL / OUT: 0 mL / NET: 240 mL        PHYSICAL EXAM:  General: Not in distress; Pallor (-), Icterus (-), Cyanosis (-), Clubbing (-)  HEENT: Pupils equal, round and reactive to light symmetrically, EOM - Normal bilaterally; Hearing - b/l normal; No external discharge noted, JVD (-), Lymphadenopathy(-)  PULM: Bilaterally equal and clear breath sounds, no wheeze, rubs or crackles.   CVS: Normal S1 and S2, no murmurs, rubs, or gallops.   GI: Soft, nondistended, nontender, BS +  MSK: Edema (-), no joint or muscle tenderness  SKIN: Warm and well perfused, no rashes noted  NEURO:  Alert and Oriented x 3; Cranial Nerves are all grossly intact; Normal strength and sensation in all four extremities.      LABS:                        10.9   3.58  )-----------( 134      ( 21 Jan 2021 08:41 )             32.6             01-21    129<L>  |  98  |  10  ----------------------------<  268<H>  4.3   |  20  |  0.9    Ca    8.6      21 Jan 2021 08:41  Mg     1.7     01-21    TPro  5.8<L>  /  Alb  3.4<L>  /  TBili  0.5  /  DBili  x   /  AST  38  /  ALT  26  /  AlkPhos  57  01-21    LIVER FUNCTIONS - ( 21 Jan 2021 08:41 )  Alb: 3.4 g/dL / Pro: 5.8 g/dL / ALK PHOS: 57 U/L / ALT: 26 U/L / AST: 38 U/L / GGT: x                             Culture - Acid Fast - CSF (collected 20 Jan 2021 12:40)  Source: .CSF CSF    Culture - CSF with Gram Stain (collected 20 Jan 2021 12:40)  Source: .CSF CSF  Gram Stain (20 Jan 2021 22:49):    No polymorphonuclear leukocytes seen    No organisms seen    by cytocentrifuge  Preliminary Report (21 Jan 2021 17:06):    No growth      Blood Glucose:  193 (01-21-21 @ 20:52)  224 (01-21-21 @ 16:58)  215 (01-21-21 @ 11:31)  221 (01-21-21 @ 08:14)  223 (01-20-21 @ 21:44)  199 (01-20-21 @ 16:59)  234 (01-20-21 @ 12:30)  272 (01-20-21 @ 08:01)  268 (01-20-21 @ 02:50)        RADIOLOGY & ADDITIONAL TESTS:  Xray Chest 1 View AP/PA:   EXAM:  XR CHEST 1 VIEW            PROCEDURE DATE:  01/18/2021            INTERPRETATION:  CLINICAL HISTORY: Pain after trauma.    COMPARISON: None.    TECHNIQUE/POSITIONING: Frontal view of the chest.    FINDINGS:    Support devices: None.    Cardiac/mediastinum/hilum: Aortic calcifications.    Lung parenchyma/Pleura: Within normal limits.    Skeleton/soft tissues: Degenerative change in the spine and shoulders.      IMPRESSION:    No radiographic evidence of acute cardiopulmonary disease.                  SINDY BRANTLEY MD; Attending Radiologist  This document has been electronically signed. Jan 18 2021 12:03PM (01-18-21 @ 07:20)                       ** This is an incomplete note - pending AM rounds**   SUBJECTIVE:   LENGTH OF HOSPITAL STAY: 4d    CHIEF COMPLAINT:  Patient is a 85y old  Female who presents with a chief complaint of altered mental status (21 Jan 2021 16:39)      Events over the past 24 hours:  Fever overnight upto 100.4F    REVIEW OF SYSTEMS  Negative Except as mentioned above.    ALLERGIES:  No Known Allergies        MEDICATIONS:  STANDING MEDICATIONS  aspirin enteric coated 81 milliGRAM(s) Oral daily  atorvastatin Oral Tab/Cap - Peds 40 milliGRAM(s) Oral daily  cyclobenzaprine Oral Tab/Cap - Peds 5 milliGRAM(s) Oral three times a day  dextrose 40% Gel 15 Gram(s) Oral once  dextrose 5%. 1000 milliLiter(s) IV Continuous <Continuous>  dextrose 5%. 1000 milliLiter(s) IV Continuous <Continuous>  dextrose 50% Injectable 25 Gram(s) IV Push once  dextrose 50% Injectable 12.5 Gram(s) IV Push once  dextrose 50% Injectable 25 Gram(s) IV Push once  enalapril 20 milliGRAM(s) Oral daily  enoxaparin Injectable 40 milliGRAM(s) SubCutaneous at bedtime  glucagon  Injectable 1 milliGRAM(s) IntraMuscular once  insulin glargine Injectable (LANTUS) 18 Unit(s) SubCutaneous at bedtime  insulin lispro Injectable (ADMELOG) 6 Unit(s) SubCutaneous three times a day before meals  isosorbide   mononitrate ER Tablet (IMDUR) 60 milliGRAM(s) Oral daily  metoprolol succinate ER 25 milliGRAM(s) Oral daily  nystatin Cream 1 Application(s) Topical two times a day  pregabalin 50 milliGRAM(s) Oral two times a day  sodium chloride 3 Gram(s) Oral three times a day  valACYclovir 1000 milliGRAM(s) Oral every 8 hours    PRN MEDICATIONS  acetaminophen   Tablet .. 650 milliGRAM(s) Oral every 6 hours PRN        OBJECTIVE:  VITALS:   T(F): 96.3 (01-22-21 @ 05:03), Max: 100.4 (01-22-21 @ 00:23)  HR: 61 (01-22-21 @ 05:03) (61 - 85)  BP: 105/51 (01-22-21 @ 05:03) (105/51 - 147/66)  BP(mean): --  RR: 18 (01-22-21 @ 05:03) (18 - 19)  SpO2: 94% (01-22-21 @ 05:03) (92% - 96%)    I&O's:   I&O's Summary    21 Jan 2021 07:01  -  22 Jan 2021 06:13  --------------------------------------------------------  IN: 240 mL / OUT: 0 mL / NET: 240 mL        PHYSICAL EXAM:  General: Not in distress; Pallor (-), Icterus (-), Cyanosis (-), Clubbing (-)  HEENT: Pupils equal, round and reactive to light symmetrically, EOM - Normal bilaterally; Hearing - b/l normal; No external discharge noted, JVD (-), Lymphadenopathy(-)  PULM: Bilaterally equal and clear breath sounds, no wheeze, rubs or crackles.   CVS: Normal S1 and S2, no murmurs, rubs, or gallops.   GI: Soft, nondistended, nontender, BS +  MSK: Edema (-), no joint or muscle tenderness  SKIN: Warm and well perfused, no rashes noted  NEURO:  Alert and Oriented x 3; Cranial Nerves are all grossly intact; Normal strength and sensation in all four extremities.      LABS:                        10.9   3.58  )-----------( 134      ( 21 Jan 2021 08:41 )             32.6             01-21    129<L>  |  98  |  10  ----------------------------<  268<H>  4.3   |  20  |  0.9    Ca    8.6      21 Jan 2021 08:41  Mg     1.7     01-21    TPro  5.8<L>  /  Alb  3.4<L>  /  TBili  0.5  /  DBili  x   /  AST  38  /  ALT  26  /  AlkPhos  57  01-21    LIVER FUNCTIONS - ( 21 Jan 2021 08:41 )  Alb: 3.4 g/dL / Pro: 5.8 g/dL / ALK PHOS: 57 U/L / ALT: 26 U/L / AST: 38 U/L / GGT: x                             Culture - Acid Fast - CSF (collected 20 Jan 2021 12:40)  Source: .CSF CSF    Culture - CSF with Gram Stain (collected 20 Jan 2021 12:40)  Source: .CSF CSF  Gram Stain (20 Jan 2021 22:49):    No polymorphonuclear leukocytes seen    No organisms seen    by cytocentrifuge  Preliminary Report (21 Jan 2021 17:06):    No growth      Blood Glucose:  193 (01-21-21 @ 20:52)  224 (01-21-21 @ 16:58)  215 (01-21-21 @ 11:31)  221 (01-21-21 @ 08:14)  223 (01-20-21 @ 21:44)  199 (01-20-21 @ 16:59)  234 (01-20-21 @ 12:30)  272 (01-20-21 @ 08:01)  268 (01-20-21 @ 02:50)        RADIOLOGY & ADDITIONAL TESTS:  Xray Chest 1 View AP/PA:   EXAM:  XR CHEST 1 VIEW            PROCEDURE DATE:  01/18/2021            INTERPRETATION:  CLINICAL HISTORY: Pain after trauma.    COMPARISON: None.    TECHNIQUE/POSITIONING: Frontal view of the chest.    FINDINGS:    Support devices: None.    Cardiac/mediastinum/hilum: Aortic calcifications.    Lung parenchyma/Pleura: Within normal limits.    Skeleton/soft tissues: Degenerative change in the spine and shoulders.      IMPRESSION:    No radiographic evidence of acute cardiopulmonary disease.                  SINDY BRANTLEY MD; Attending Radiologist  This document has been electronically signed. Jan 18 2021 12:03PM (01-18-21 @ 07:20)                         SUBJECTIVE:   LENGTH OF HOSPITAL STAY: 4d    CHIEF COMPLAINT:  Patient is a 85y old  Female who presents with a chief complaint of altered mental status (21 Jan 2021 16:39)      Events over the past 24 hours:  Patient is lying comfortably on the bed. There were no acute events overnight. Fever overnight upto 100.4F    REVIEW OF SYSTEMS  Negative Except as mentioned above.    ALLERGIES:  No Known Allergies        MEDICATIONS:  STANDING MEDICATIONS  aspirin enteric coated 81 milliGRAM(s) Oral daily  atorvastatin Oral Tab/Cap - Peds 40 milliGRAM(s) Oral daily  cyclobenzaprine Oral Tab/Cap - Peds 5 milliGRAM(s) Oral three times a day  dextrose 40% Gel 15 Gram(s) Oral once  dextrose 5%. 1000 milliLiter(s) IV Continuous <Continuous>  dextrose 5%. 1000 milliLiter(s) IV Continuous <Continuous>  dextrose 50% Injectable 25 Gram(s) IV Push once  dextrose 50% Injectable 12.5 Gram(s) IV Push once  dextrose 50% Injectable 25 Gram(s) IV Push once  enalapril 20 milliGRAM(s) Oral daily  enoxaparin Injectable 40 milliGRAM(s) SubCutaneous at bedtime  glucagon  Injectable 1 milliGRAM(s) IntraMuscular once  insulin glargine Injectable (LANTUS) 18 Unit(s) SubCutaneous at bedtime  insulin lispro Injectable (ADMELOG) 6 Unit(s) SubCutaneous three times a day before meals  isosorbide   mononitrate ER Tablet (IMDUR) 60 milliGRAM(s) Oral daily  metoprolol succinate ER 25 milliGRAM(s) Oral daily  nystatin Cream 1 Application(s) Topical two times a day  pregabalin 50 milliGRAM(s) Oral two times a day  sodium chloride 3 Gram(s) Oral three times a day  valACYclovir 1000 milliGRAM(s) Oral every 8 hours    PRN MEDICATIONS  acetaminophen   Tablet .. 650 milliGRAM(s) Oral every 6 hours PRN        OBJECTIVE:  VITALS:   T(F): 96.3 (01-22-21 @ 05:03), Max: 100.4 (01-22-21 @ 00:23)  HR: 61 (01-22-21 @ 05:03) (61 - 85)  BP: 105/51 (01-22-21 @ 05:03) (105/51 - 147/66)  BP(mean): --  RR: 18 (01-22-21 @ 05:03) (18 - 19)  SpO2: 94% (01-22-21 @ 05:03) (92% - 96%)    I&O's:   I&O's Summary    21 Jan 2021 07:01  -  22 Jan 2021 06:13  --------------------------------------------------------  IN: 240 mL / OUT: 0 mL / NET: 240 mL        PHYSICAL EXAM:  General: Not in distress; Pallor (-), Icterus (-), Cyanosis (-), Clubbing (-)  HEENT: Pupils equal, round and reactive to light symmetrically, EOM - Normal bilaterally; Hearing - b/l normal; No external discharge noted, JVD (-), Lymphadenopathy(-)  PULM: Bilaterally equal and clear breath sounds, no wheeze, rubs or crackles.   CVS: Normal S1 and S2, no murmurs, rubs, or gallops.   GI: Soft, nondistended, nontender, BS +  MSK: Edema (-), no joint or muscle tenderness  SKIN: Warm and well perfused, crusted/weepy rashes + below left breast in dermatomal distribution  NEURO:  Alert and Oriented x 3; Cranial Nerves are all grossly intact; Normal strength and sensation in all four extremities.      LABS:                        10.9   3.58  )-----------( 134      ( 21 Jan 2021 08:41 )             32.6             01-21    129<L>  |  98  |  10  ----------------------------<  268<H>  4.3   |  20  |  0.9    Ca    8.6      21 Jan 2021 08:41  Mg     1.7     01-21    TPro  5.8<L>  /  Alb  3.4<L>  /  TBili  0.5  /  DBili  x   /  AST  38  /  ALT  26  /  AlkPhos  57  01-21    LIVER FUNCTIONS - ( 21 Jan 2021 08:41 )  Alb: 3.4 g/dL / Pro: 5.8 g/dL / ALK PHOS: 57 U/L / ALT: 26 U/L / AST: 38 U/L / GGT: x                             Culture - Acid Fast - CSF (collected 20 Jan 2021 12:40)  Source: .CSF CSF    Culture - CSF with Gram Stain (collected 20 Jan 2021 12:40)  Source: .CSF CSF  Gram Stain (20 Jan 2021 22:49):    No polymorphonuclear leukocytes seen    No organisms seen    by cytocentrifuge  Preliminary Report (21 Jan 2021 17:06):    No growth      Blood Glucose:  193 (01-21-21 @ 20:52)  224 (01-21-21 @ 16:58)  215 (01-21-21 @ 11:31)  221 (01-21-21 @ 08:14)  223 (01-20-21 @ 21:44)  199 (01-20-21 @ 16:59)  234 (01-20-21 @ 12:30)  272 (01-20-21 @ 08:01)  268 (01-20-21 @ 02:50)        RADIOLOGY & ADDITIONAL TESTS:  Xray Chest 1 View AP/PA:   EXAM:  XR CHEST 1 VIEW            PROCEDURE DATE:  01/18/2021            INTERPRETATION:  CLINICAL HISTORY: Pain after trauma.    COMPARISON: None.    TECHNIQUE/POSITIONING: Frontal view of the chest.    FINDINGS:    Support devices: None.    Cardiac/mediastinum/hilum: Aortic calcifications.    Lung parenchyma/Pleura: Within normal limits.    Skeleton/soft tissues: Degenerative change in the spine and shoulders.      IMPRESSION:    No radiographic evidence of acute cardiopulmonary disease.                  SINDY BRANTLEY MD; Attending Radiologist  This document has been electronically signed. Jan 18 2021 12:03PM (01-18-21 @ 07:20)

## 2021-01-22 NOTE — PROGRESS NOTE ADULT - ASSESSMENT
Case of an 85 year old female patient who was brought on 01/18 following an episode of fall on Saturday that was followed by fever and confusion, found to have subacute left cerebellar stroke, hyponatremia, and COVID pneumonia, admitted for investigations, management, and monitoring. Currently hemodynamically stable.    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 VZV PCR pending.  - Follow up HSV 1/2 PCR and VZV PCR from CSF studies once collected today by IR  - Continue IV Acyclovir: switched from 750mg  (01/18) to 700 mg Q8h (01/20) - can switch to oral valcyclovir 1 gm po q8h for 10 more days per ID    # For possible  COVID Encephalitis  * SARS-COV2: positive 01/18  * Chest X Ray (01/18) clear  * CT chest IC (01/18) no consolidation or effusion  - Infectious Disease on board  - Monitor for fever - Tylenol PRN  - Trend WBC: 5.09 on 01/18 -> 3.85 01/19  - Monitor SaO2 and Oxygen Requirements: RA S94% 01/20  - Follow up Chest X Ray on 01/20. 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 Last lipid profile (01/18): chol 86, TG 96, HDL 32, LDL 44      # Fall  * Could be 2ry to COVID/ stroke/ 1st degree AV block  * Trauma Workup negative on 01/18: CT C-cpine, XR pelvis, CT CAP IC (01/18): no fractures or dislocations  - Continue DVT prophylaxis, lovenox 40mg QD  - Ruled out Left cerebellar stroke on MR H without contrast performed on 01/18  - Ruled out 1st degree AV block evident on ECG (01/18) in ED: monitor HR  - No signs of seizure      # HTN  * Home: enalapril 20mg QD, Toprol 25mg QD, Lasix 20mg QD  - Monitor BP: 01/19 138/56, 146/79 mmHg-> 155/69 mmHg 01/20  - Continue Enalapril 20mg QD and Toprol 25mg QD      # DM II  * Last Hba1c (01/18) 7.2  * Home metformin 850mg BID, Januvia 100mg QD  * For Diabetic neuropathy: home Lyrica 50mg BID  - Monitor POCT premeals and at bedtime: 01/18 142 -> 01/19 204, 363 -> 01/20 268  - Started on Lantus 15u bedtime, Lispro 5u TID, and Apidra Scale B on 01/20.  - Continue Lyrica 50mg BID for diabetic neuropathy      # CAD   * S/P PCI and 2 stents 2011  * Home Aspirin 81mg QD, Toprol, Lipitor 40mg QD, Imdur  * Troponin <0.01 (01/18)   * CK (01/18) 36  - Continue Aspirin 81mg QD  - Continue Atorvastatin 40mg QD. 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 PT 01/19  - GI Prophylaxis: no indication for Pantoprazole 40mg PO QD  - Diet: speech and swallow 01/19: regular diet  - Code Status: Full Code

## 2021-01-23 LAB
ALBUMIN SERPL ELPH-MCNC: 3.6 G/DL — SIGNIFICANT CHANGE UP (ref 3.5–5.2)
ALP SERPL-CCNC: 69 U/L — SIGNIFICANT CHANGE UP (ref 30–115)
ALT FLD-CCNC: 28 U/L — SIGNIFICANT CHANGE UP (ref 0–41)
ANION GAP SERPL CALC-SCNC: 12 MMOL/L — SIGNIFICANT CHANGE UP (ref 7–14)
ANION GAP SERPL CALC-SCNC: 19 MMOL/L — HIGH (ref 7–14)
AST SERPL-CCNC: 40 U/L — SIGNIFICANT CHANGE UP (ref 0–41)
BASOPHILS # BLD AUTO: 0.01 K/UL — SIGNIFICANT CHANGE UP (ref 0–0.2)
BASOPHILS # BLD AUTO: 0.02 K/UL — SIGNIFICANT CHANGE UP (ref 0–0.2)
BASOPHILS NFR BLD AUTO: 0.1 % — SIGNIFICANT CHANGE UP (ref 0–1)
BASOPHILS NFR BLD AUTO: 0.2 % — SIGNIFICANT CHANGE UP (ref 0–1)
BILIRUB SERPL-MCNC: 0.4 MG/DL — SIGNIFICANT CHANGE UP (ref 0.2–1.2)
BUN SERPL-MCNC: 20 MG/DL — SIGNIFICANT CHANGE UP (ref 10–20)
BUN SERPL-MCNC: 22 MG/DL — HIGH (ref 10–20)
CALCIUM SERPL-MCNC: 8.9 MG/DL — SIGNIFICANT CHANGE UP (ref 8.5–10.1)
CALCIUM SERPL-MCNC: 9.1 MG/DL — SIGNIFICANT CHANGE UP (ref 8.5–10.1)
CHLORIDE SERPL-SCNC: 88 MMOL/L — LOW (ref 98–110)
CHLORIDE SERPL-SCNC: 95 MMOL/L — LOW (ref 98–110)
CO2 SERPL-SCNC: 20 MMOL/L — SIGNIFICANT CHANGE UP (ref 17–32)
CO2 SERPL-SCNC: 21 MMOL/L — SIGNIFICANT CHANGE UP (ref 17–32)
CREAT SERPL-MCNC: 0.8 MG/DL — SIGNIFICANT CHANGE UP (ref 0.7–1.5)
CREAT SERPL-MCNC: 1 MG/DL — SIGNIFICANT CHANGE UP (ref 0.7–1.5)
CULTURE RESULTS: NO GROWTH — SIGNIFICANT CHANGE UP
CULTURE RESULTS: SIGNIFICANT CHANGE UP
CULTURE RESULTS: SIGNIFICANT CHANGE UP
D DIMER BLD IA.RAPID-MCNC: 494 NG/ML DDU — HIGH (ref 0–230)
EOSINOPHIL # BLD AUTO: 0 K/UL — SIGNIFICANT CHANGE UP (ref 0–0.7)
EOSINOPHIL # BLD AUTO: 0 K/UL — SIGNIFICANT CHANGE UP (ref 0–0.7)
EOSINOPHIL NFR BLD AUTO: 0 % — SIGNIFICANT CHANGE UP (ref 0–8)
EOSINOPHIL NFR BLD AUTO: 0 % — SIGNIFICANT CHANGE UP (ref 0–8)
GLUCOSE BLDC GLUCOMTR-MCNC: 235 MG/DL — HIGH (ref 70–99)
GLUCOSE BLDC GLUCOMTR-MCNC: 287 MG/DL — HIGH (ref 70–99)
GLUCOSE BLDC GLUCOMTR-MCNC: 298 MG/DL — HIGH (ref 70–99)
GLUCOSE BLDC GLUCOMTR-MCNC: 338 MG/DL — HIGH (ref 70–99)
GLUCOSE SERPL-MCNC: 232 MG/DL — HIGH (ref 70–99)
GLUCOSE SERPL-MCNC: 321 MG/DL — HIGH (ref 70–99)
HCT VFR BLD CALC: 33.1 % — LOW (ref 37–47)
HCT VFR BLD CALC: 37.5 % — SIGNIFICANT CHANGE UP (ref 37–47)
HGB BLD-MCNC: 11.4 G/DL — LOW (ref 12–16)
HGB BLD-MCNC: 12.4 G/DL — SIGNIFICANT CHANGE UP (ref 12–16)
IMM GRANULOCYTES NFR BLD AUTO: 0.4 % — HIGH (ref 0.1–0.3)
IMM GRANULOCYTES NFR BLD AUTO: 0.8 % — HIGH (ref 0.1–0.3)
LYMPHOCYTES # BLD AUTO: 0.94 K/UL — LOW (ref 1.2–3.4)
LYMPHOCYTES # BLD AUTO: 1.43 K/UL — SIGNIFICANT CHANGE UP (ref 1.2–3.4)
LYMPHOCYTES # BLD AUTO: 13.9 % — LOW (ref 20.5–51.1)
LYMPHOCYTES # BLD AUTO: 7.5 % — LOW (ref 20.5–51.1)
MAGNESIUM SERPL-MCNC: 1.4 MG/DL — LOW (ref 1.8–2.4)
MAGNESIUM SERPL-MCNC: 1.8 MG/DL — SIGNIFICANT CHANGE UP (ref 1.8–2.4)
MCHC RBC-ENTMCNC: 28.6 PG — SIGNIFICANT CHANGE UP (ref 27–31)
MCHC RBC-ENTMCNC: 29.2 PG — SIGNIFICANT CHANGE UP (ref 27–31)
MCHC RBC-ENTMCNC: 33.1 G/DL — SIGNIFICANT CHANGE UP (ref 32–37)
MCHC RBC-ENTMCNC: 34.4 G/DL — SIGNIFICANT CHANGE UP (ref 32–37)
MCV RBC AUTO: 84.7 FL — SIGNIFICANT CHANGE UP (ref 81–99)
MCV RBC AUTO: 86.6 FL — SIGNIFICANT CHANGE UP (ref 81–99)
MONOCYTES # BLD AUTO: 0.34 K/UL — SIGNIFICANT CHANGE UP (ref 0.1–0.6)
MONOCYTES # BLD AUTO: 0.52 K/UL — SIGNIFICANT CHANGE UP (ref 0.1–0.6)
MONOCYTES NFR BLD AUTO: 2.7 % — SIGNIFICANT CHANGE UP (ref 1.7–9.3)
MONOCYTES NFR BLD AUTO: 5 % — SIGNIFICANT CHANGE UP (ref 1.7–9.3)
NEUTROPHILS # BLD AUTO: 11.07 K/UL — HIGH (ref 1.4–6.5)
NEUTROPHILS # BLD AUTO: 8.3 K/UL — HIGH (ref 1.4–6.5)
NEUTROPHILS NFR BLD AUTO: 80.6 % — HIGH (ref 42.2–75.2)
NEUTROPHILS NFR BLD AUTO: 88.8 % — HIGH (ref 42.2–75.2)
NRBC # BLD: 0 /100 WBCS — SIGNIFICANT CHANGE UP (ref 0–0)
NRBC # BLD: 0 /100 WBCS — SIGNIFICANT CHANGE UP (ref 0–0)
PHOSPHATE SERPL-MCNC: 2.4 MG/DL — SIGNIFICANT CHANGE UP (ref 2.1–4.9)
PLATELET # BLD AUTO: 208 K/UL — SIGNIFICANT CHANGE UP (ref 130–400)
PLATELET # BLD AUTO: 212 K/UL — SIGNIFICANT CHANGE UP (ref 130–400)
POTASSIUM SERPL-MCNC: 4.4 MMOL/L — SIGNIFICANT CHANGE UP (ref 3.5–5)
POTASSIUM SERPL-MCNC: 5.2 MMOL/L — HIGH (ref 3.5–5)
POTASSIUM SERPL-SCNC: 4.4 MMOL/L — SIGNIFICANT CHANGE UP (ref 3.5–5)
POTASSIUM SERPL-SCNC: 5.2 MMOL/L — HIGH (ref 3.5–5)
PROT SERPL-MCNC: 6.7 G/DL — SIGNIFICANT CHANGE UP (ref 6–8)
RBC # BLD: 3.91 M/UL — LOW (ref 4.2–5.4)
RBC # BLD: 4.33 M/UL — SIGNIFICANT CHANGE UP (ref 4.2–5.4)
RBC # FLD: 13 % — SIGNIFICANT CHANGE UP (ref 11.5–14.5)
RBC # FLD: 13.1 % — SIGNIFICANT CHANGE UP (ref 11.5–14.5)
SODIUM SERPL-SCNC: 127 MMOL/L — LOW (ref 135–146)
SODIUM SERPL-SCNC: 128 MMOL/L — LOW (ref 135–146)
SPECIMEN SOURCE: SIGNIFICANT CHANGE UP
WBC # BLD: 10.3 K/UL — SIGNIFICANT CHANGE UP (ref 4.8–10.8)
WBC # BLD: 12.47 K/UL — HIGH (ref 4.8–10.8)
WBC # FLD AUTO: 10.3 K/UL — SIGNIFICANT CHANGE UP (ref 4.8–10.8)
WBC # FLD AUTO: 12.47 K/UL — HIGH (ref 4.8–10.8)

## 2021-01-23 PROCEDURE — 74021 RADEX ABDOMEN 3+ VIEWS: CPT | Mod: 26

## 2021-01-23 PROCEDURE — 74176 CT ABD & PELVIS W/O CONTRAST: CPT | Mod: 26

## 2021-01-23 PROCEDURE — 99233 SBSQ HOSP IP/OBS HIGH 50: CPT | Mod: CS

## 2021-01-23 PROCEDURE — 93010 ELECTROCARDIOGRAM REPORT: CPT | Mod: 76

## 2021-01-23 PROCEDURE — 93970 EXTREMITY STUDY: CPT | Mod: 26

## 2021-01-23 PROCEDURE — 99222 1ST HOSP IP/OBS MODERATE 55: CPT

## 2021-01-23 RX ORDER — METOPROLOL TARTRATE 50 MG
5 TABLET ORAL ONCE
Refills: 0 | Status: COMPLETED | OUTPATIENT
Start: 2021-01-23 | End: 2021-01-23

## 2021-01-23 RX ORDER — INSULIN LISPRO 100/ML
VIAL (ML) SUBCUTANEOUS
Refills: 0 | Status: DISCONTINUED | OUTPATIENT
Start: 2021-01-23 | End: 2021-01-25

## 2021-01-23 RX ORDER — LABETALOL HCL 100 MG
10 TABLET ORAL ONCE
Refills: 0 | Status: COMPLETED | OUTPATIENT
Start: 2021-01-23 | End: 2021-01-23

## 2021-01-23 RX ORDER — HYDRALAZINE HCL 50 MG
10 TABLET ORAL ONCE
Refills: 0 | Status: COMPLETED | OUTPATIENT
Start: 2021-01-23 | End: 2021-01-23

## 2021-01-23 RX ORDER — ONDANSETRON 8 MG/1
2 TABLET, FILM COATED ORAL ONCE
Refills: 0 | Status: COMPLETED | OUTPATIENT
Start: 2021-01-23 | End: 2021-01-23

## 2021-01-23 RX ORDER — SODIUM CHLORIDE 9 MG/ML
1000 INJECTION, SOLUTION INTRAVENOUS
Refills: 0 | Status: DISCONTINUED | OUTPATIENT
Start: 2021-01-23 | End: 2021-01-26

## 2021-01-23 RX ADMIN — ONDANSETRON 2 MILLIGRAM(S): 8 TABLET, FILM COATED ORAL at 12:35

## 2021-01-23 RX ADMIN — NYSTATIN CREAM 1 APPLICATION(S): 100000 CREAM TOPICAL at 17:36

## 2021-01-23 RX ADMIN — ENOXAPARIN SODIUM 40 MILLIGRAM(S): 100 INJECTION SUBCUTANEOUS at 20:47

## 2021-01-23 RX ADMIN — Medication 8: at 13:11

## 2021-01-23 RX ADMIN — INSULIN GLARGINE 18 UNIT(S): 100 INJECTION, SOLUTION SUBCUTANEOUS at 20:47

## 2021-01-23 RX ADMIN — Medication 5 MILLIGRAM(S): at 06:25

## 2021-01-23 RX ADMIN — NYSTATIN CREAM 1 APPLICATION(S): 100000 CREAM TOPICAL at 04:56

## 2021-01-23 RX ADMIN — Medication 10 MILLIGRAM(S): at 13:06

## 2021-01-23 RX ADMIN — Medication 10 MILLIGRAM(S): at 07:54

## 2021-01-23 RX ADMIN — Medication 10 MILLIGRAM(S): at 17:36

## 2021-01-23 RX ADMIN — Medication 6: at 17:36

## 2021-01-23 NOTE — GOALS OF CARE CONVERSATION - ADVANCED CARE PLANNING - CONVERSATION DETAILS
GOC conversation w/ patient at bedside in patients native language Ethiopian w/ assistance of medical staff. Pt understood her medical diagnosis and prognosis. Pt is alert and oriented x4 and is of sound mental capacity. Pt endorsed wishes to be DNR/DNI and endorsed no wishes for tube placement for medical mgmt. GOC conversation w/ patient at bedside in patients native language Montserratian w/ assistance of medical staff. Pt understood her medical diagnosis and prognosis. Pt is alert and oriented x4 and is of sound mental capacity. Pt endorsed wishes to be DNR/DNI and endorsed no wishes for tube placement for medical mgmt. MOLST form prepared, requires attending signature.

## 2021-01-23 NOTE — PROGRESS NOTE ADULT - SUBJECTIVE AND OBJECTIVE BOX
SUBJECTIVE:    Patient is a 85y old Female who presents with a chief complaint of altered mental status (23 Jan 2021 05:28)    Currently admitted to medicine with the primary diagnosis of Fall, initial encounter       Today is hospital day 5d.     PAST MEDICAL & SURGICAL HISTORY    ALLERGIES:  No Known Allergies    MEDICATIONS:  STANDING MEDICATIONS  aspirin enteric coated 81 milliGRAM(s) Oral daily  atorvastatin Oral Tab/Cap - Peds 40 milliGRAM(s) Oral daily  cyclobenzaprine Oral Tab/Cap - Peds 5 milliGRAM(s) Oral three times a day  dextrose 40% Gel 15 Gram(s) Oral once  dextrose 5%. 1000 milliLiter(s) IV Continuous <Continuous>  dextrose 5%. 1000 milliLiter(s) IV Continuous <Continuous>  dextrose 50% Injectable 25 Gram(s) IV Push once  dextrose 50% Injectable 12.5 Gram(s) IV Push once  dextrose 50% Injectable 25 Gram(s) IV Push once  enalapril 20 milliGRAM(s) Oral daily  enoxaparin Injectable 40 milliGRAM(s) SubCutaneous at bedtime  glucagon  Injectable 1 milliGRAM(s) IntraMuscular once  insulin glargine Injectable (LANTUS) 18 Unit(s) SubCutaneous at bedtime  insulin lispro (ADMELOG) corrective regimen sliding scale   SubCutaneous three times a day before meals  insulin lispro Injectable (ADMELOG) 6 Unit(s) SubCutaneous three times a day before meals  isosorbide   mononitrate ER Tablet (IMDUR) 60 milliGRAM(s) Oral daily  labetalol Injectable 10 milliGRAM(s) IV Push once  lactated ringers. 1000 milliLiter(s) IV Continuous <Continuous>  metoprolol succinate ER 25 milliGRAM(s) Oral daily  nystatin Cream 1 Application(s) Topical two times a day  pregabalin 50 milliGRAM(s) Oral two times a day  sodium chloride 2 Gram(s) Oral two times a day  valACYclovir 1000 milliGRAM(s) Oral every 8 hours    PRN MEDICATIONS  acetaminophen   Tablet .. 650 milliGRAM(s) Oral every 6 hours PRN    VITALS:   T(F): 98.3  HR: 120  BP: 190/89  RR: 19  SpO2: 96%    LABS:                        12.4   12.47 )-----------( 212      ( 23 Jan 2021 07:05 )             37.5     01-23    128<L>  |  88<L>  |  20  ----------------------------<  321<H>  5.2<H>   |  21  |  1.0    Ca    9.1      23 Jan 2021 07:05  Mg     1.8     01-23    TPro  6.7  /  Alb  3.6  /  TBili  0.4  /  DBili  x   /  AST  40  /  ALT  28  /  AlkPhos  69  01-23                  RADIOLOGY:    PHYSICAL EXAM:  GEN: No acute distress  LUNGS: Clear to auscultation bilaterally   HEART: S1/S2 present. RRR.   ABD/ GI: Soft, non-tender, non-distended. Bowel sounds present  EXT: NC/NC/NE/2+PP/EUGENE  NEURO: AAOX3     SUBJECTIVE:    Patient is a 85y old Female who presents with a chief complaint of altered mental status (23 Jan 2021 05:28)    Currently admitted to medicine with the primary diagnosis of Fall, initial encounter       Today is hospital day 5d.     PAST MEDICAL & SURGICAL HISTORY    ALLERGIES:  No Known Allergies    MEDICATIONS:  STANDING MEDICATIONS  aspirin enteric coated 81 milliGRAM(s) Oral daily  atorvastatin Oral Tab/Cap - Peds 40 milliGRAM(s) Oral daily  cyclobenzaprine Oral Tab/Cap - Peds 5 milliGRAM(s) Oral three times a day  dextrose 40% Gel 15 Gram(s) Oral once  dextrose 5%. 1000 milliLiter(s) IV Continuous <Continuous>  dextrose 5%. 1000 milliLiter(s) IV Continuous <Continuous>  dextrose 50% Injectable 25 Gram(s) IV Push once  dextrose 50% Injectable 12.5 Gram(s) IV Push once  dextrose 50% Injectable 25 Gram(s) IV Push once  enalapril 20 milliGRAM(s) Oral daily  enoxaparin Injectable 40 milliGRAM(s) SubCutaneous at bedtime  glucagon  Injectable 1 milliGRAM(s) IntraMuscular once  insulin glargine Injectable (LANTUS) 18 Unit(s) SubCutaneous at bedtime  insulin lispro (ADMELOG) corrective regimen sliding scale   SubCutaneous three times a day before meals  insulin lispro Injectable (ADMELOG) 6 Unit(s) SubCutaneous three times a day before meals  isosorbide   mononitrate ER Tablet (IMDUR) 60 milliGRAM(s) Oral daily  labetalol Injectable 10 milliGRAM(s) IV Push once  lactated ringers. 1000 milliLiter(s) IV Continuous <Continuous>  metoprolol succinate ER 25 milliGRAM(s) Oral daily  nystatin Cream 1 Application(s) Topical two times a day  pregabalin 50 milliGRAM(s) Oral two times a day  sodium chloride 2 Gram(s) Oral two times a day  valACYclovir 1000 milliGRAM(s) Oral every 8 hours    PRN MEDICATIONS  acetaminophen   Tablet .. 650 milliGRAM(s) Oral every 6 hours PRN    VITALS:   T(F): 98.3  HR: 120  BP: 190/89  RR: 19  SpO2: 96%    LABS:                        12.4   12.47 )-----------( 212      ( 23 Jan 2021 07:05 )             37.5     01-23    128<L>  |  88<L>  |  20  ----------------------------<  321<H>  5.2<H>   |  21  |  1.0    Ca    9.1      23 Jan 2021 07:05  Mg     1.8     01-23    TPro  6.7  /  Alb  3.6  /  TBili  0.4  /  DBili  x   /  AST  40  /  ALT  28  /  AlkPhos  69  01-23                  RADIOLOGY:    PHYSICAL EXAM:  GEN: No acute distress  LUNGS: Clear to auscultation bilaterally   HEART: S1/S2 present. RRR.   ABD/ GI: Soft, non-tender, distended. Bowel sounds reduced over large rt sided hernia  EXT: NC/NC/NE/2+PP/EUGENE  NEURO: AAOX3

## 2021-01-23 NOTE — CONSULT NOTE ADULT - ATTENDING COMMENTS
Patient seen and examined with surgery PA on rounds and discussed management plans with patient and daughter. Elderly obese female with long standing large non reducible incisional hernia with sup skin ulceration over the skin admitted for Covid+ and found to have nausea and pain . CT obtained shows SBO, Patient with multiple medical problems refused elective surgery in past and now refuses any intervention. Discussed in detail with patient and daughter who works in [employer] as [employer]. Patient instructed to ambulate and to lay on left to help gravity to align the hernia opening and prevent kinking. All options discussed. SBO not likely from the hernia incarceration

## 2021-01-23 NOTE — CONSULT NOTE ADULT - SUBJECTIVE AND OBJECTIVE BOX
NIKKY FRASER [id number]  85y Female  5d    HPI:  85 year old female with PMHx of HTN, DM, HLD, CAD s/p PCI with stentx2, large abdominal wall hernia, shingles, COVID+ and colon cancer s/p resection presented to the ED for AMS. Surgery was consulted s/p CT a/p found possible small bowel obstruction within hernia. Per medicine team, patient has been nauseous and vomiting since admission and refusing NGT placement. On physical exam patient has large right abdominal wall hernia with loss of domain with overlying chronic skin ulceration. Upon undergoing CT, medicine team spoke with patient at great length regarding goals of care after patient refused NGT placement with Burkinan interpretor and was subsequently made DNR/DNI. Surgery consulted for additional recommendations. Surgery spoke with patient with Burkinan  #[id number] and discussed extensively the risks of opting out of NGT placement including aspiration, difficulty breathing and death. Ultimately patient refusing further interventions at this time. Patient stating "she knows she will not make it out of any intervention offered" and is looking for pain control and to rest comfortably. Patient refusing all surgical intervention including if  it was life saving management. CT abdomen and pelvis significant for diffusely dilated small bowel loops within abdominal cavity and large right sided spigelian hernia suspicious for bowel obstruction with possible transition point along neck of hernia.         85 yr F with CAD s/p PCI 10 yr ago (2 stents), HTN, DM, abd wall hernia, hx of colon cancer s/p resection, recent discharge from [facility] due to shingle was brought due to confusion.  Hx taken from, the [facility] at bedside, [place], [facility]    the pt was discharged from [facility] on Friday, she was diagnosed with shingle and discharged on Valacyclovir 1gram qd for 7 days, on Saturday morning the pt  felt on the ground from the sofa while she was rtying to adjust her postilion, she was seen by her daughter and she didnt have LOC, syncope or seizure and remained stable, but during the day she  had fever, last night she didnt sleep well, and when the daughter checked her she was confused, pt as baseline was with normal mental status a/o x4, but the daughter found her confused,  she was making non comprehensive talk, and speaking slowly, didnt recognize her daughter, didnt know her name or where she was,   so the daughter brought her to ER.  there is no cough, SOB, headache, mneck stoffness, legs swelling, N/V, diarrhea, urinary symptoms,     in ER; labs showed covid +ve, UA wnl, CTH possible infarct in the left cerebellum, subacute in appearance, Na 128, admitted for evaluation of AMS (18 Jan 2021 15:11)      PAST MEDICAL & SURGICAL HISTORY:        MEDICATIONS  (STANDING):  aspirin enteric coated 81 milliGRAM(s) Oral daily  atorvastatin Oral Tab/Cap - Peds 40 milliGRAM(s) Oral daily  cyclobenzaprine Oral Tab/Cap - Peds 5 milliGRAM(s) Oral three times a day  dextrose 40% Gel 15 Gram(s) Oral once  dextrose 5%. 1000 milliLiter(s) (50 mL/Hr) IV Continuous <Continuous>  dextrose 5%. 1000 milliLiter(s) (100 mL/Hr) IV Continuous <Continuous>  dextrose 50% Injectable 25 Gram(s) IV Push once  dextrose 50% Injectable 12.5 Gram(s) IV Push once  dextrose 50% Injectable 25 Gram(s) IV Push once  enalapril 20 milliGRAM(s) Oral daily  enoxaparin Injectable 40 milliGRAM(s) SubCutaneous at bedtime  glucagon  Injectable 1 milliGRAM(s) IntraMuscular once  insulin glargine Injectable (LANTUS) 18 Unit(s) SubCutaneous at bedtime  insulin lispro Injectable (ADMELOG) 6 Unit(s) SubCutaneous three times a day before meals  isosorbide   mononitrate ER Tablet (IMDUR) 60 milliGRAM(s) Oral daily  lactated ringers. 1000 milliLiter(s) (75 mL/Hr) IV Continuous <Continuous>  metoprolol succinate ER 25 milliGRAM(s) Oral daily  metoprolol tartrate Injectable 5 milliGRAM(s) IV Push once  nystatin Cream 1 Application(s) Topical two times a day  pregabalin 50 milliGRAM(s) Oral two times a day  sodium chloride 2 Gram(s) Oral two times a day  valACYclovir 1000 milliGRAM(s) Oral every 8 hours    MEDICATIONS  (PRN):  acetaminophen   Tablet .. 650 milliGRAM(s) Oral every 6 hours PRN Temp greater or equal to 38C (100.4F)  ondansetron Injectable 2 milliGRAM(s) IV Push once PRN Nausea and/or Vomiting      Allergies: No Known Allergies    Intolerances        REVIEW OF SYSTEMS    [ ] A ten-point review of systems was otherwise negative except as noted.  [ ] Due to altered mental status/intubation, subjective information were not able to be obtained from the patient. History was obtained, to the extent possible, from review of the chart and collateral sources of information.      Vital Signs Last 24 Hrs  T(C): 35.7 (23 Jan 2021 04:00), Max: 36.4 (22 Jan 2021 16:00)  T(F): 96.3 (23 Jan 2021 04:00), Max: 97.6 (23 Jan 2021 00:00)  HR: 114 (23 Jan 2021 04:00) (73 - 117)  BP: 185/86 (23 Jan 2021 04:00) (130/62 - 185/86)  BP(mean): --  RR: 21 (23 Jan 2021 04:00) (18 - 22)  SpO2: 94% (23 Jan 2021 01:39) (94% - 98%)    PHYSICAL EXAM:  GENERAL: NAD, well-appearing  CHEST/LUNG: Clear to auscultation bilaterally  HEART: Regular rate and rhythm  ABDOMEN: Soft, Nontender, Nondistended;   EXTREMITIES:  No clubbing, cyanosis, or edema      LABS:  Labs:  CAPILLARY BLOOD GLUCOSE      POCT Blood Glucose.: 248 mg/dL (22 Jan 2021 21:09)  POCT Blood Glucose.: 314 mg/dL (22 Jan 2021 16:50)  POCT Blood Glucose.: 295 mg/dL (22 Jan 2021 11:12)  POCT Blood Glucose.: 143 mg/dL (22 Jan 2021 08:09)                          10.9   3.58  )-----------( 134      ( 21 Jan 2021 08:41 )             32.6         01-22    134<L>  |  102  |  12  ----------------------------<  154<H>  4.4   |  19  |  0.9      Calcium, Total Serum: 8.4 mg/dL (01-22-21 @ 07:30)      LFTs:             5.4  | 0.4  | 41       ------------------[55      ( 22 Jan 2021 07:30 )  3.1  | x    | 26          Lipase:x      Amylase:x             Coags:                Culture - Acid Fast - CSF (collected 20 Jan 2021 12:40)  Source: .CSF CSF    Culture - CSF with Gram Stain (collected 20 Jan 2021 12:40)  Source: .CSF CSF  Gram Stain (20 Jan 2021 22:49):    No polymorphonuclear leukocytes seen    No organisms seen    by cytocentrifuge  Preliminary Report (21 Jan 2021 17:06):    No growth          RADIOLOGY & ADDITIONAL STUDIES:   NIKKY FRASER [id number]  85y Female  5d    HPI:  85 year old female with PMHx of HTN, DM, HLD, CAD s/p PCI with stentx2, large abdominal wall hernia, shingles, COVID+ and colon cancer s/p resection presented to the ED for AMS. Surgery was consulted s/p CT a/p found possible small bowel obstruction within hernia. Per medicine team, patient has been nauseous and vomiting since admission and refusing NGT placement. On physical exam patient has large right abdominal wall hernia with loss of domain with overlying chronic skin ulceration. Upon undergoing CT, medicine team spoke with patient at great length regarding goals of care after patient refused NGT placement with [place]ian interpretor and was subsequently made DNR/DNI. Surgery consulted for additional recommendations. Surgery spoke with patient with [place]ian  #[id number] and discussed extensively the risks of opting out of NGT placement including aspiration, difficulty breathing and death. Ultimately patient refusing further interventions at this time. Patient stating "she knows she will not make it out of any intervention offered" and is looking for pain control and to rest comfortably. Patient refusing all surgical intervention including if  it was life saving management. CT abdomen and pelvis significant for diffusely dilated small bowel loops within abdominal cavity and large right sided spigelian hernia suspicious for bowel obstruction with possible transition point along neck of hernia.         85 yr F with CAD s/p PCI 10 yr ago (2 stents), HTN, DM, abd wall hernia, hx of colon cancer s/p resection, recent discharge from [facility] due to shingle was brought due to confusion.  Hx taken from, the [facility] at bedside, [place], [facility]    the pt was discharged from [facility] on Friday, she was diagnosed with shingle and discharged on Valacyclovir 1gram qd for 7 days, on Saturday morning the pt  felt on the ground from the sofa while she was rtying to adjust her postilion, she was seen by her daughter and she didnt have LOC, syncope or seizure and remained stable, but during the day she  had fever, last night she didnt sleep well, and when the daughter checked her she was confused, pt as baseline was with normal mental status a/o x4, but the daughter found her confused,  she was making non comprehensive talk, and speaking slowly, didnt recognize her daughter, didnt know her name or where she was,   so the daughter brought her to ER.  there is no cough, SOB, headache, mneck stoffness, legs swelling, N/V, diarrhea, urinary symptoms,     in ER; labs showed covid +ve, UA wnl, CTH possible infarct in the left cerebellum, subacute in appearance, Na 128, admitted for evaluation of AMS (18 Jan 2021 15:11)      PAST MEDICAL & SURGICAL HISTORY:        MEDICATIONS  (STANDING):  aspirin enteric coated 81 milliGRAM(s) Oral daily  atorvastatin Oral Tab/Cap - Peds 40 milliGRAM(s) Oral daily  cyclobenzaprine Oral Tab/Cap - Peds 5 milliGRAM(s) Oral three times a day  dextrose 40% Gel 15 Gram(s) Oral once  dextrose 5%. 1000 milliLiter(s) (50 mL/Hr) IV Continuous <Continuous>  dextrose 5%. 1000 milliLiter(s) (100 mL/Hr) IV Continuous <Continuous>  dextrose 50% Injectable 25 Gram(s) IV Push once  dextrose 50% Injectable 12.5 Gram(s) IV Push once  dextrose 50% Injectable 25 Gram(s) IV Push once  enalapril 20 milliGRAM(s) Oral daily  enoxaparin Injectable 40 milliGRAM(s) SubCutaneous at bedtime  glucagon  Injectable 1 milliGRAM(s) IntraMuscular once  insulin glargine Injectable (LANTUS) 18 Unit(s) SubCutaneous at bedtime  insulin lispro Injectable (ADMELOG) 6 Unit(s) SubCutaneous three times a day before meals  isosorbide   mononitrate ER Tablet (IMDUR) 60 milliGRAM(s) Oral daily  lactated ringers. 1000 milliLiter(s) (75 mL/Hr) IV Continuous <Continuous>  metoprolol succinate ER 25 milliGRAM(s) Oral daily  metoprolol tartrate Injectable 5 milliGRAM(s) IV Push once  nystatin Cream 1 Application(s) Topical two times a day  pregabalin 50 milliGRAM(s) Oral two times a day  sodium chloride 2 Gram(s) Oral two times a day  valACYclovir 1000 milliGRAM(s) Oral every 8 hours    MEDICATIONS  (PRN):  acetaminophen   Tablet .. 650 milliGRAM(s) Oral every 6 hours PRN Temp greater or equal to 38C (100.4F)  ondansetron Injectable 2 milliGRAM(s) IV Push once PRN Nausea and/or Vomiting      Allergies: No Known Allergies    Intolerances        REVIEW OF SYSTEMS    [ ] A ten-point review of systems was otherwise negative except as noted.  [ ] Due to altered mental status/intubation, subjective information were not able to be obtained from the patient. History was obtained, to the extent possible, from review of the chart and collateral sources of information.      Vital Signs Last 24 Hrs  T(C): 35.7 (23 Jan 2021 04:00), Max: 36.4 (22 Jan 2021 16:00)  T(F): 96.3 (23 Jan 2021 04:00), Max: 97.6 (23 Jan 2021 00:00)  HR: 114 (23 Jan 2021 04:00) (73 - 117)  BP: 185/86 (23 Jan 2021 04:00) (130/62 - 185/86)  BP(mean): --  RR: 21 (23 Jan 2021 04:00) (18 - 22)  SpO2: 94% (23 Jan 2021 01:39) (94% - 98%)    PHYSICAL EXAM:  GENERAL: appears uncomfortable  CHEST/LUNG: Increased work of breathing  HEART: Regular rate and rhythm, tachycardic  ABDOMEN: Soft, tender to LLQ and epigastrium, large right-sided abdominal hernia w/ ulceration, distended;   EXTREMITIES:  No clubbing, cyanosis, or edema      LABS:  Labs:  CAPILLARY BLOOD GLUCOSE      POCT Blood Glucose.: 248 mg/dL (22 Jan 2021 21:09)  POCT Blood Glucose.: 314 mg/dL (22 Jan 2021 16:50)  POCT Blood Glucose.: 295 mg/dL (22 Jan 2021 11:12)  POCT Blood Glucose.: 143 mg/dL (22 Jan 2021 08:09)                          10.9   3.58  )-----------( 134      ( 21 Jan 2021 08:41 )             32.6         01-22    134<L>  |  102  |  12  ----------------------------<  154<H>  4.4   |  19  |  0.9      Calcium, Total Serum: 8.4 mg/dL (01-22-21 @ 07:30)      LFTs:             5.4  | 0.4  | 41       ------------------[55      ( 22 Jan 2021 07:30 )  3.1  | x    | 26            Culture - Acid Fast - CSF (collected 20 Jan 2021 12:40)  Source: .CSF CSF    Culture - CSF with Gram Stain (collected 20 Jan 2021 12:40)  Source: .CSF CSF  Gram Stain (20 Jan 2021 22:49):    No polymorphonuclear leukocytes seen    No organisms seen    by cytocentrifuge  Preliminary Report (21 Jan 2021 17:06):    No growth          RADIOLOGY & ADDITIONAL STUDIES:  < from: CT Abdomen and Pelvis w/ Oral Cont (01.23.21 @ 00:46) >  IMPRESSION:    Diffusely dilated small bowel loops both within the abdominal cavity and within a partially imaged large right sided spigelian hernia suspicious for bowel obstruction. Suggestion of a transition point along the neck of the hernia defect (series 5, image 292).      < end of copied text >

## 2021-01-23 NOTE — CONSULT NOTE ADULT - ASSESSMENT
Assessment:   85 year old female with PMHx of HTN, DM, HLD, CAD s/p PCI with stentx2, large abdominal wall hernia, shingles, COVID+ and colon cancer s/p resection presented to the ED for AMS. Surgery was consulted s/p CT a/p found possible small bowel obstruction within hernia. Per medicine team, patient has been nauseous and vomiting since admission and refusing NGT placement. On physical exam patient has large right abdominal wall hernia with loss of domain with overlying chronic skin ulceration. Upon undergoing CT, medicine team spoke with patient at great length regarding goals of care after patient refused NGT placement with [place] interpretor and was subsequently made DNR/DNI. Surgery consulted for additional recommendations. Surgery spoke with patient with [place]  #[id number] and discussed extensively the risks of opting out of NGT placement including aspiration, difficulty breathing and death. Ultimately patient refusing further interventions at this time. Patient stating "she knows she will not make it out of any intervention offered" and is looking for pain control and to rest comfortably. Patient refusing all surgical intervention including if  it was life saving management. On physical exam patient is distended and tender overlying epigastric and midline region. CT abdomen and pelvis significant for diffusely dilated small bowel loops within abdominal cavity and large right sided spigelian hernia suspicious for bowel obstruction with possible transition point along neck of hernia.     Plan:  - Patient to benefit from NGT but refusing at this time  - May benefit from family meeting to discuss GOC  - IVF and zofran for nausea/vomiting  - Adequate pain control   Assessment:   85 year old female with PMHx of HTN, DM, HLD, CAD s/p PCI with stentx2, large abdominal wall hernia, shingles, COVID+ and colon cancer s/p resection presented to the ED for AMS. Surgery was consulted s/p CT a/p found possible small bowel obstruction within hernia. Per medicine team, patient has been nauseous and vomiting since admission and refusing NGT placement. On physical exam patient has large right abdominal wall hernia with loss of domain with overlying chronic skin ulceration. Upon undergoing CT, medicine team spoke with patient at great length regarding goals of care after patient refused NGT placement with Azerbaijani interpretor and was subsequently made DNR/DNI. Surgery consulted for additional recommendations. Surgery spoke with patient with [place]ijani  #[id number] and discussed extensively the risks of opting out of NGT placement including aspiration, difficulty breathing and death. Ultimately patient refusing further interventions at this time. Patient stating "she knows she will not make it out of any intervention offered" and is looking for pain control and to rest comfortably. Patient refusing all surgical intervention including if  it was life saving management. On physical exam patient is distended and tender overlying epigastric and midline region. CT abdomen and pelvis significant for diffusely dilated small bowel loops within abdominal cavity and large right sided spigelian hernia suspicious for bowel obstruction with possible transition point along neck of hernia.     Plan:  - Patient to benefit from NGT but refusing at this time  - May benefit from family meeting to discuss GOC  - IVF and zofran for nausea/vomiting  - Adequate pain control    Senior Resident Addendum  As discussed with the patient and nursing staff at bedside, there are two primary methods of managing a small bowel obstruction, the first of which is non-operative, constituted by NGT placed, strict NPO, and IVF. The second option, which in this patient, is best avoided, especially given her COVID+ status, is surgical intervention.   As reported in previous chart note and this consult note, the patient continues to refuse NGT despite absolute risk of clinical decompensation and possible death. She reports understanding this risk but states that she choked the last time one was placed and categorically refuses for another attempt. She also refuses to consider surgical intervention even if it were performed to save her life. Her hernia would pose significant technical difficulty to repair and is not one that could be safely repaired in the emergent setting. She therefore reiterates NGT refusal and any type of surgical intervention. Further recommendations as above.

## 2021-01-23 NOTE — PROGRESS NOTE ADULT - ASSESSMENT
# Suacute intestinal obstruction-- small bowel obstruction-- she could not put keep NG tube in place and had choking episodes-- patient tried to ambulate with help and she could do it-- has large rt sided hernia and that was not considered operable by her surgeon in the past.  patient is not able to tolerate NG tube-- surgery has seen her and detailed discussion done with daughter at bedside.    #AMS encephalopathy is resolving--  LP showed wbc-- nonspecific and mental status is stable currently    # herpes zoster lesions on lt side under breast--crusted-- continue po valacyclovir  # Covid 19 positive on RA--ID recommended bamlanivumab-- which daughter schedule for  monday-- maybe too late. I discussed with ID-  # Hx of colon ca--s/p surgery  # hx of CAD s/p PCI on aspirin and lipitor.  # Hyponatremia--  worsening now sec to SBO.  # severe groin rash -- fungal on nystatin.  # Morbid obesity is present.     Patient is currently very sick and daughter is aware of poor prognosis.

## 2021-01-24 LAB
ALBUMIN SERPL ELPH-MCNC: 2.9 G/DL — LOW (ref 3.5–5.2)
ALP SERPL-CCNC: 50 U/L — SIGNIFICANT CHANGE UP (ref 30–115)
ALT FLD-CCNC: 21 U/L — SIGNIFICANT CHANGE UP (ref 0–41)
ANION GAP SERPL CALC-SCNC: 10 MMOL/L — SIGNIFICANT CHANGE UP (ref 7–14)
AST SERPL-CCNC: 38 U/L — SIGNIFICANT CHANGE UP (ref 0–41)
BASOPHILS # BLD AUTO: 0.01 K/UL — SIGNIFICANT CHANGE UP (ref 0–0.2)
BASOPHILS NFR BLD AUTO: 0.1 % — SIGNIFICANT CHANGE UP (ref 0–1)
BILIRUB SERPL-MCNC: 0.4 MG/DL — SIGNIFICANT CHANGE UP (ref 0.2–1.2)
BUN SERPL-MCNC: 15 MG/DL — SIGNIFICANT CHANGE UP (ref 10–20)
CALCIUM SERPL-MCNC: 8.3 MG/DL — LOW (ref 8.5–10.1)
CHLORIDE SERPL-SCNC: 99 MMOL/L — SIGNIFICANT CHANGE UP (ref 98–110)
CO2 SERPL-SCNC: 21 MMOL/L — SIGNIFICANT CHANGE UP (ref 17–32)
CREAT SERPL-MCNC: 0.7 MG/DL — SIGNIFICANT CHANGE UP (ref 0.7–1.5)
EOSINOPHIL # BLD AUTO: 0 K/UL — SIGNIFICANT CHANGE UP (ref 0–0.7)
EOSINOPHIL NFR BLD AUTO: 0 % — SIGNIFICANT CHANGE UP (ref 0–8)
GLUCOSE BLDC GLUCOMTR-MCNC: 125 MG/DL — HIGH (ref 70–99)
GLUCOSE BLDC GLUCOMTR-MCNC: 131 MG/DL — HIGH (ref 70–99)
GLUCOSE BLDC GLUCOMTR-MCNC: 198 MG/DL — HIGH (ref 70–99)
GLUCOSE BLDC GLUCOMTR-MCNC: 201 MG/DL — HIGH (ref 70–99)
GLUCOSE SERPL-MCNC: 214 MG/DL — HIGH (ref 70–99)
HCT VFR BLD CALC: 29.5 % — LOW (ref 37–47)
HGB BLD-MCNC: 10.1 G/DL — LOW (ref 12–16)
IMM GRANULOCYTES NFR BLD AUTO: 0.5 % — HIGH (ref 0.1–0.3)
LYMPHOCYTES # BLD AUTO: 1.14 K/UL — LOW (ref 1.2–3.4)
LYMPHOCYTES # BLD AUTO: 12.4 % — LOW (ref 20.5–51.1)
MAGNESIUM SERPL-MCNC: 1.7 MG/DL — LOW (ref 1.8–2.4)
MCHC RBC-ENTMCNC: 29 PG — SIGNIFICANT CHANGE UP (ref 27–31)
MCHC RBC-ENTMCNC: 34.2 G/DL — SIGNIFICANT CHANGE UP (ref 32–37)
MCV RBC AUTO: 84.8 FL — SIGNIFICANT CHANGE UP (ref 81–99)
MONOCYTES # BLD AUTO: 0.38 K/UL — SIGNIFICANT CHANGE UP (ref 0.1–0.6)
MONOCYTES NFR BLD AUTO: 4.1 % — SIGNIFICANT CHANGE UP (ref 1.7–9.3)
NEUTROPHILS # BLD AUTO: 7.59 K/UL — HIGH (ref 1.4–6.5)
NEUTROPHILS NFR BLD AUTO: 82.9 % — HIGH (ref 42.2–75.2)
NRBC # BLD: 0 /100 WBCS — SIGNIFICANT CHANGE UP (ref 0–0)
PLATELET # BLD AUTO: 138 K/UL — SIGNIFICANT CHANGE UP (ref 130–400)
POTASSIUM SERPL-MCNC: 4.9 MMOL/L — SIGNIFICANT CHANGE UP (ref 3.5–5)
POTASSIUM SERPL-SCNC: 4.9 MMOL/L — SIGNIFICANT CHANGE UP (ref 3.5–5)
PROT SERPL-MCNC: 5 G/DL — LOW (ref 6–8)
RBC # BLD: 3.48 M/UL — LOW (ref 4.2–5.4)
RBC # FLD: 13.2 % — SIGNIFICANT CHANGE UP (ref 11.5–14.5)
SODIUM SERPL-SCNC: 130 MMOL/L — LOW (ref 135–146)
WBC # BLD: 9.17 K/UL — SIGNIFICANT CHANGE UP (ref 4.8–10.8)
WBC # FLD AUTO: 9.17 K/UL — SIGNIFICANT CHANGE UP (ref 4.8–10.8)

## 2021-01-24 PROCEDURE — 99233 SBSQ HOSP IP/OBS HIGH 50: CPT | Mod: CS

## 2021-01-24 PROCEDURE — 99232 SBSQ HOSP IP/OBS MODERATE 35: CPT | Mod: CS

## 2021-01-24 PROCEDURE — 74018 RADEX ABDOMEN 1 VIEW: CPT | Mod: 26

## 2021-01-24 RX ORDER — KETOROLAC TROMETHAMINE 30 MG/ML
15 SYRINGE (ML) INJECTION ONCE
Refills: 0 | Status: DISCONTINUED | OUTPATIENT
Start: 2021-01-24 | End: 2021-01-24

## 2021-01-24 RX ORDER — MAGNESIUM SULFATE 500 MG/ML
2 VIAL (ML) INJECTION ONCE
Refills: 0 | Status: COMPLETED | OUTPATIENT
Start: 2021-01-24 | End: 2021-01-24

## 2021-01-24 RX ORDER — POTASSIUM PHOSPHATE, MONOBASIC POTASSIUM PHOSPHATE, DIBASIC 236; 224 MG/ML; MG/ML
30 INJECTION, SOLUTION INTRAVENOUS ONCE
Refills: 0 | Status: COMPLETED | OUTPATIENT
Start: 2021-01-24 | End: 2021-01-24

## 2021-01-24 RX ADMIN — Medication 15 MILLIGRAM(S): at 16:13

## 2021-01-24 RX ADMIN — Medication 4: at 07:56

## 2021-01-24 RX ADMIN — Medication 6 UNIT(S): at 12:31

## 2021-01-24 RX ADMIN — ENOXAPARIN SODIUM 40 MILLIGRAM(S): 100 INJECTION SUBCUTANEOUS at 20:26

## 2021-01-24 RX ADMIN — INSULIN GLARGINE 18 UNIT(S): 100 INJECTION, SOLUTION SUBCUTANEOUS at 20:26

## 2021-01-24 RX ADMIN — Medication 50 GRAM(S): at 04:20

## 2021-01-24 RX ADMIN — NYSTATIN CREAM 1 APPLICATION(S): 100000 CREAM TOPICAL at 04:53

## 2021-01-24 RX ADMIN — Medication 6 UNIT(S): at 07:56

## 2021-01-24 RX ADMIN — NYSTATIN CREAM 1 APPLICATION(S): 100000 CREAM TOPICAL at 17:17

## 2021-01-24 RX ADMIN — POTASSIUM PHOSPHATE, MONOBASIC POTASSIUM PHOSPHATE, DIBASIC 83.33 MILLIMOLE(S): 236; 224 INJECTION, SOLUTION INTRAVENOUS at 04:20

## 2021-01-24 NOTE — PROGRESS NOTE ADULT - ASSESSMENT
85 years old female HD#6 admitted to medicine due to AMS, COVID+. Surgery consulted due to large recurrent incarcerated incisional hernia with SBO. Currently patient stable, no acute events, refused NGT. with the above physical exam and imaging findings      Plan:    - C/W NPO  - IVF  - Pain controlled as needed  - Patient will benefit of NGT but refusing at this time  - Strict control Is and Os  - CBC and BMP, replete imbalances as necessary  - Encourage ambulation and IS  - DVT and GI prophylaxis  - Continue current management

## 2021-01-24 NOTE — PROGRESS NOTE ADULT - ASSESSMENT
Case of an 85 year old female patient who was brought on 01/18 following an episode of fall on Saturday that was followed by fever and confusion, found to have subacute left cerebellar stroke, hyponatremia, and COVID pneumonia, admitted for investigations, management, and monitoring. Currently hemodynamically stable.    # Suacute intestinal obstruction  - small bowel obstruction on CT A&P  - Serial abdominal exam  - Pain control PRN  - IVF, strict I/O  - NPO  - has large rt sided hernia and that was not considered operable by her surgeon in the past.  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  - Improving after salt tablets  - Nephro following  - LP - negative for VZV, viral encephalitis ruled out  - Recent Zoster infection with rashes on left side of chest  - Continue valcyclovir 1 gm po q8h for 10 more days per ID (1/21 - 1/30)  - Possible Subacute L Cerebellar Stroke on CT was ruled out on MRI      # COVID Infection  - SARS-COV2: positive 01/18  - Chest X Ray (01/21) clear  - CT chest IC (01/18) no consolidation or effusion  - Infectious Disease on board  - Monitor for fever - Tylenol PRN  - Monitor SaO2 and Oxygen Requirements: on RA 93%  - Inflammatory Markers:  CRP: 1.72 (01-18-21)  Ferritin: 85 (01-18-21)  D-Dimer: 494 (01-23-21)<-- 384 (01-18-21)  CK: 36 (01-18-21)  - No convalescent plasma, IV Tocilizumab, IV Remdesevir, or IV Dexamethasone administered yet  - Anticoagulation Lovenox 40mg QD    # Fall  - Could be 2ry to COVID/ stroke/ 1st degree AV block  - Trauma Workup negative on 01/18: CT C-cpine, XR pelvis, CT CAP IC (01/18): no fractures or dislocations  - Continue DVT prophylaxis, lovenox 40mg QD  - Ruled out Left cerebellar stroke on MR H without contrast performed on 01/18  - Ruled out 1st degree AV block evident on ECG (01/18) in ED: monitor HR  - No signs of seizure    # HTN  * Home: enalapril 20mg QD, Toprol 25mg QD, Lasix 20mg QD  - Monitor BP: 01/19 138/56, 146/79 mmHg-> 155/69 mmHg 01/20  - Continue Enalapril 20mg QD and Toprol 25mg QD    # DM II  * Last Hba1c (01/18) 7.2  * Home metformin 850mg BID, Januvia 100mg QD  * For Diabetic neuropathy: home Lyrica 50mg BID  - Monitor POCT premeals and at bedtime: 01/18 142 -> 01/19 204, 363 -> 01/20 268  - Started on Lantus 15u bedtime, Lispro 5u TID, and Apidra Scale B on 01/20.  - Continue Lyrica 50mg BID for diabetic neuropathy    # CAD   * S/P PCI and 2 stents 2011  * Home Aspirin 81mg QD, Toprol, Lipitor 40mg QD, Imdur  * Troponin <0.01 (01/18)   * CK (01/18) 36  - Continue Aspirin 81mg QD  - Continue Atorvastatin 40mg QD. 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 PT 01/19  - GI Prophylaxis: no indication for Pantoprazole 40mg PO QD  - Diet: speech and swallow 01/19: regular diet  - Code Status: Full Code      Case of an 85 year old female patient who was brought on 01/18 following an episode of fall on Saturday that was followed by fever and confusion, found to have subacute left cerebellar stroke, hyponatremia, and COVID pneumonia, admitted for investigations, management, and monitoring. Currently hemodynamically stable.    # Suacute intestinal obstruction  - small bowel obstruction on CT A&P  - Serial abdominal exam  - Pain control PRN  - IVF, strict I/O  - NPO  - has large rt sided hernia and that was not considered operable by her surgeon in the past.  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  - Improving after salt tablets  - Nephro following  - LP - negative for VZV, viral encephalitis ruled out  - Recent Zoster infection with rashes on left side of chest  - Continue valcyclovir 1 gm po q8h for 10 more days per ID (1/21 - 1/30)  - Possible Subacute L Cerebellar Stroke on CT was ruled out on MRI      # COVID Infection  - SARS-COV2: positive 01/18  - Chest X Ray (01/21) clear  - CT chest IC (01/18) no consolidation or effusion  - Infectious Disease on board  - Monitor for fever - Tylenol PRN  - Monitor SaO2 and Oxygen Requirements: on RA 93%  - Inflammatory Markers:  CRP: 1.72 (01-18-21)  Ferritin: 85 (01-18-21)  D-Dimer: 494 (01-23-21)<-- 384 (01-18-21)  CK: 36 (01-18-21)  - No convalescent plasma, IV Tocilizumab, IV Remdesevir, or IV Dexamethasone administered yet  - Anticoagulation Lovenox 40mg QD    # Fall  - Could be 2ry to COVID/ stroke/ 1st degree AV block  - Trauma Workup negative on 01/18: CT C-cpine, XR pelvis, CT CAP IC (01/18): no fractures or dislocations  - Continue DVT prophylaxis, lovenox 40mg QD  - Ruled out Left cerebellar stroke on MR H without contrast performed on 01/18  - Ruled out 1st degree AV block evident on ECG (01/18) in ED: monitor HR  - No signs of seizure    # HTN  * Home: enalapril 20mg QD, Toprol 25mg QD, Lasix 20mg QD  - Monitor BP: 01/19 138/56, 146/79 mmHg-> 155/69 mmHg 01/20  - Continue Enalapril 20mg QD and Toprol 25mg QD    # DM II  * Last Hba1c (01/18) 7.2  * Home metformin 850mg BID, Januvia 100mg QD  * For Diabetic neuropathy: home Lyrica 50mg BID  - Monitor POCT premeals and at bedtime: 01/18 142 -> 01/19 204, 363 -> 01/20 268  - Started on Lantus 15u bedtime, Lispro 5u TID, and Apidra Scale B on 01/20.  - Continue Lyrica 50mg BID for diabetic neuropathy    # CAD   * S/P PCI and 2 stents 2011  * Home Aspirin 81mg QD, Toprol, Lipitor 40mg QD, Imdur  * Troponin <0.01 (01/18)   * CK (01/18) 36  - Continue Aspirin 81mg QD  - Continue Atorvastatin 40mg QD. 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 PT 01/19  - GI Prophylaxis: no indication for Pantoprazole 40mg PO QD  - Diet: speech and swallow 01/19: regular diet  - Code Status: Full Code

## 2021-01-24 NOTE — PROGRESS NOTE ADULT - ATTENDING COMMENTS
Patient seen and examined independently. Agree with resident note.    # Suacute intestinal obstruction-- small bowel obstruction-- she could not put keep NG tube in place and had choking episodes-- patient refusing NG tube and will attempt again today-- has large rt sided hernia and that was not considered operable by her surgeon in the past.  patient is not able to tolerate NG tube-- surgery has seen her and detailed discussion done with daughter at  on phone today.    #AMS encephalopathy is resolving--  LP showed wbc-- nonspecific and mental status is stable currently    # herpes zoster lesions on lt side under breast--crusted-- continue po valacyclovir  # Covid 19 positive on RA--ID recommended bamlanivumab-- which daughter schedule for  monday-- maybe too late. I discussed with ID-  # Hx of colon ca--s/p surgery  # hx of CAD s/p PCI on aspirin and lipitor.  # Hyponatremia--  worsening now sec to SBO.  # severe groin rash -- fungal on nystatin.  # Morbid obesity is present.  # HTN --on labetalol and hydralazine pushes.  spoke with daughter in detail today     Patient is currently very sick and daughter is aware of poor prognosis.

## 2021-01-24 NOTE — PROGRESS NOTE ADULT - SUBJECTIVE AND OBJECTIVE BOX
GENERAL SURGERY PROGRESS NOTE     NIKKY FRASER  85y  Female  Hospital day :6d    OVERNIGHT EVENTS: No acute events, patient stable, pain controlled      T(F): 96 (01-23-21 @ 21:00), Max: 98.3 (01-23-21 @ 12:00)  HR: 95 (01-23-21 @ 21:00) (89 - 120)  BP: 138/62 (01-23-21 @ 18:56) (138/62 - 210/102)  RR: 18 (01-23-21 @ 21:00) (18 - 22)  SpO2: 94% (01-23-21 @ 21:00) (92% - 96%)    PHYSICAL EXAM:  GENERAL: NAD, well-appearing  CHEST/LUNG: Nonlabored on room air  HEART: Regular rate and rhythm  ABDOMEN: Soft, tender to RUQ and epigastrium, large right-sided abdominal hernia w/ ulceration, distended;   EXTREMITIES:  No clubbing, cyanosis, or edema      DIET/FLUIDS: dextrose 5%. 1000 milliLiter(s) IV Continuous <Continuous>  dextrose 5%. 1000 milliLiter(s) IV Continuous <Continuous>  lactated ringers. 1000 milliLiter(s) IV Continuous <Continuous>  magnesium sulfate  IVPB 2 Gram(s) IV Intermittent once  potassium phosphate IVPB 30 milliMole(s) IV Intermittent once  sodium chloride 2 Gram(s) Oral two times a day       GI proph:    AC/ proph: aspirin enteric coated 81 milliGRAM(s) Oral daily  enoxaparin Injectable 40 milliGRAM(s) SubCutaneous at bedtime    ABx: valACYclovir 1000 milliGRAM(s) Oral every 8 hours      LABS  Labs:  CAPILLARY BLOOD GLUCOSE      POCT Blood Glucose.: 235 mg/dL (23 Jan 2021 20:41)  POCT Blood Glucose.: 287 mg/dL (23 Jan 2021 17:32)  POCT Blood Glucose.: 338 mg/dL (23 Jan 2021 12:40)  POCT Blood Glucose.: 298 mg/dL (23 Jan 2021 07:55)                          11.4   10.30 )-----------( 208      ( 23 Jan 2021 20:00 )             33.1       Auto Neutrophil %: 80.6 % (01-23-21 @ 20:00)  Auto Immature Granulocyte %: 0.4 % (01-23-21 @ 20:00)  Auto Neutrophil %: 88.8 % (01-23-21 @ 07:05)  Auto Immature Granulocyte %: 0.8 % (01-23-21 @ 07:05)    01-23    127<L>  |  95<L>  |  22<H>  ----------------------------<  232<H>  4.4   |  20  |  0.8      Calcium, Total Serum: 8.9 mg/dL (01-23-21 @ 20:00)      LFTs:             6.7  | 0.4  | 40       ------------------[69      ( 23 Jan 2021 07:05 )  3.6  | x    | 28          Lipase:x      Amylase:x

## 2021-01-24 NOTE — PROGRESS NOTE ADULT - ATTENDING COMMENTS
Pt seen and examined with surgical resident. Nurse reports that patient has not vomited overnight. No bowel function reported.  Pt lying in bed, resting in NAD  Abd obese, soft, tender over hernia    Pt is high risk for surgery.  I explained to medical team that we should try to reinsert NGT, as that is her best chance at potentially avoiding a very high risk surgical procedure.  They will attempt placing it again.   Follow up am AXR.

## 2021-01-24 NOTE — PROGRESS NOTE ADULT - SUBJECTIVE AND OBJECTIVE BOX
** This is an incomplete note - pending AM rounds**   SUBJECTIVE:   LENGTH OF HOSPITAL STAY: 6d    CHIEF COMPLAINT:  Patient is a 85y old  Female who presents with a chief complaint of altered mental status (24 Jan 2021 00:50)      Events over the past 24 hours:  Patient was seen and examined at the bedside this morning.    REVIEW OF SYSTEMS  Negative Except as mentioned above.    ALLERGIES:  No Known Allergies        MEDICATIONS:  STANDING MEDICATIONS  aspirin enteric coated 81 milliGRAM(s) Oral daily  atorvastatin Oral Tab/Cap - Peds 40 milliGRAM(s) Oral daily  cyclobenzaprine Oral Tab/Cap - Peds 5 milliGRAM(s) Oral three times a day  dextrose 40% Gel 15 Gram(s) Oral once  dextrose 5%. 1000 milliLiter(s) IV Continuous <Continuous>  dextrose 5%. 1000 milliLiter(s) IV Continuous <Continuous>  dextrose 50% Injectable 25 Gram(s) IV Push once  dextrose 50% Injectable 12.5 Gram(s) IV Push once  dextrose 50% Injectable 25 Gram(s) IV Push once  enalapril 20 milliGRAM(s) Oral daily  enoxaparin Injectable 40 milliGRAM(s) SubCutaneous at bedtime  glucagon  Injectable 1 milliGRAM(s) IntraMuscular once  insulin glargine Injectable (LANTUS) 18 Unit(s) SubCutaneous at bedtime  insulin lispro (ADMELOG) corrective regimen sliding scale   SubCutaneous three times a day before meals  insulin lispro Injectable (ADMELOG) 6 Unit(s) SubCutaneous three times a day before meals  isosorbide   mononitrate ER Tablet (IMDUR) 60 milliGRAM(s) Oral daily  lactated ringers. 1000 milliLiter(s) IV Continuous <Continuous>  metoprolol succinate ER 25 milliGRAM(s) Oral daily  nystatin Cream 1 Application(s) Topical two times a day  pregabalin 50 milliGRAM(s) Oral two times a day  sodium chloride 2 Gram(s) Oral two times a day  valACYclovir 1000 milliGRAM(s) Oral every 8 hours    PRN MEDICATIONS  acetaminophen   Tablet .. 650 milliGRAM(s) Oral every 6 hours PRN        OBJECTIVE:  VITALS:   T(F): 97.8 (01-24-21 @ 04:00), Max: 98.3 (01-23-21 @ 12:00)  HR: 95 (01-24-21 @ 04:00) (89 - 120)  BP: 143/65 (01-24-21 @ 04:00) (138/62 - 210/102)  BP(mean): --  RR: 18 (01-24-21 @ 04:00) (18 - 22)  SpO2: 93% (01-24-21 @ 04:00) (92% - 96%)    I&O's:   I&O's Summary    23 Jan 2021 07:01  -  24 Jan 2021 07:00  --------------------------------------------------------  IN: 1275 mL / OUT: 0 mL / NET: 1275 mL        PHYSICAL EXAM:  General: Not in distress; Pallor (-), Icterus (-), Cyanosis (-), Clubbing (-)  HEENT: Pupils equal, round and reactive to light symmetrically, EOM - Normal bilaterally; Hearing - b/l normal; No external discharge noted, JVD (-), Lymphadenopathy(-)  PULM: Bilaterally equal and clear breath sounds, no wheeze, rubs or crackles.   CVS: Normal S1 and S2, no murmurs, rubs, or gallops.   GI: Soft, nondistended, nontender, BS +  MSK: Edema (-), no joint or muscle tenderness  SKIN: Warm and well perfused, no rashes noted  NEURO:  Alert and Oriented x 3; Cranial Nerves are all grossly intact; Normal strength and sensation in all four extremities.      LABS:                        11.4   10.30 )-----------( 208      ( 23 Jan 2021 20:00 )             33.1             01-23    127<L>  |  95<L>  |  22<H>  ----------------------------<  232<H>  4.4   |  20  |  0.8    Ca    8.9      23 Jan 2021 20:00  Phos  2.4     01-23  Mg     1.4     01-23    TPro  6.7  /  Alb  3.6  /  TBili  0.4  /  DBili  x   /  AST  40  /  ALT  28  /  AlkPhos  69  01-23    LIVER FUNCTIONS - ( 23 Jan 2021 07:05 )  Alb: 3.6 g/dL / Pro: 6.7 g/dL / ALK PHOS: 69 U/L / ALT: 28 U/L / AST: 40 U/L / GGT: x                             Blood Glucose:  235 (01-23-21 @ 20:41)  287 (01-23-21 @ 17:32)  338 (01-23-21 @ 12:40)  298 (01-23-21 @ 07:55)          RADIOLOGY & ADDITIONAL TESTS:                       SUBJECTIVE:   LENGTH OF HOSPITAL STAY: 6d    CHIEF COMPLAINT:  Patient is a 85y old  Female who presents with a chief complaint of altered mental status (24 Jan 2021 00:50)      Events over the past 24 hours:  Patient was seen and examined at the bedside this morning. Patient is lying on the bed. C/o abdominal pain, she is in distress. There were no acute events overnight. She still hasn't passed stool.     REVIEW OF SYSTEMS  Negative Except as mentioned above.    ALLERGIES:  No Known Allergies        MEDICATIONS:  STANDING MEDICATIONS  aspirin enteric coated 81 milliGRAM(s) Oral daily  atorvastatin Oral Tab/Cap - Peds 40 milliGRAM(s) Oral daily  cyclobenzaprine Oral Tab/Cap - Peds 5 milliGRAM(s) Oral three times a day  dextrose 40% Gel 15 Gram(s) Oral once  dextrose 5%. 1000 milliLiter(s) IV Continuous <Continuous>  dextrose 5%. 1000 milliLiter(s) IV Continuous <Continuous>  dextrose 50% Injectable 25 Gram(s) IV Push once  dextrose 50% Injectable 12.5 Gram(s) IV Push once  dextrose 50% Injectable 25 Gram(s) IV Push once  enalapril 20 milliGRAM(s) Oral daily  enoxaparin Injectable 40 milliGRAM(s) SubCutaneous at bedtime  glucagon  Injectable 1 milliGRAM(s) IntraMuscular once  insulin glargine Injectable (LANTUS) 18 Unit(s) SubCutaneous at bedtime  insulin lispro (ADMELOG) corrective regimen sliding scale   SubCutaneous three times a day before meals  insulin lispro Injectable (ADMELOG) 6 Unit(s) SubCutaneous three times a day before meals  isosorbide   mononitrate ER Tablet (IMDUR) 60 milliGRAM(s) Oral daily  lactated ringers. 1000 milliLiter(s) IV Continuous <Continuous>  metoprolol succinate ER 25 milliGRAM(s) Oral daily  nystatin Cream 1 Application(s) Topical two times a day  pregabalin 50 milliGRAM(s) Oral two times a day  sodium chloride 2 Gram(s) Oral two times a day  valACYclovir 1000 milliGRAM(s) Oral every 8 hours    PRN MEDICATIONS  acetaminophen   Tablet .. 650 milliGRAM(s) Oral every 6 hours PRN        OBJECTIVE:  VITALS:   T(F): 97.8 (01-24-21 @ 04:00), Max: 98.3 (01-23-21 @ 12:00)  HR: 95 (01-24-21 @ 04:00) (89 - 120)  BP: 143/65 (01-24-21 @ 04:00) (138/62 - 210/102)  BP(mean): --  RR: 18 (01-24-21 @ 04:00) (18 - 22)  SpO2: 93% (01-24-21 @ 04:00) (92% - 96%)    I&O's:   I&O's Summary    23 Jan 2021 07:01  -  24 Jan 2021 07:00  --------------------------------------------------------  IN: 1275 mL / OUT: 0 mL / NET: 1275 mL        PHYSICAL EXAM:  General: in pain; Pallor (-), Icterus (-), Cyanosis (-), Clubbing (-)  HEENT: Pupils equal, round and reactive to light symmetrically, EOM - Normal bilaterally; Hearing - b/l normal; No external discharge noted, JVD (-), Lymphadenopathy(-)  PULM: Bilaterally equal but diminished breath sounds, no wheeze, rubs or crackles.   CVS: Normal S1 and S2, no murmurs, rubs, or gallops.   GI: Abdomen is distended, soft, sluggish bowel sounds (+), tender to deep palpation, large ventral hernia +  MSK: Edema (-), no joint or muscle tenderness  SKIN: Warm and well perfused, no rashes noted  NEURO:  Alert and Oriented x 3; Cranial Nerves are all grossly intact; Normal strength and sensation in all four extremities.      LABS:                        11.4   10.30 )-----------( 208      ( 23 Jan 2021 20:00 )             33.1             01-23    127<L>  |  95<L>  |  22<H>  ----------------------------<  232<H>  4.4   |  20  |  0.8    Ca    8.9      23 Jan 2021 20:00  Phos  2.4     01-23  Mg     1.4     01-23    TPro  6.7  /  Alb  3.6  /  TBili  0.4  /  DBili  x   /  AST  40  /  ALT  28  /  AlkPhos  69  01-23    LIVER FUNCTIONS - ( 23 Jan 2021 07:05 )  Alb: 3.6 g/dL / Pro: 6.7 g/dL / ALK PHOS: 69 U/L / ALT: 28 U/L / AST: 40 U/L / GGT: x                             Blood Glucose:  235 (01-23-21 @ 20:41)  287 (01-23-21 @ 17:32)  338 (01-23-21 @ 12:40)  298 (01-23-21 @ 07:55)          RADIOLOGY & ADDITIONAL TESTS:

## 2021-01-25 ENCOUNTER — TRANSCRIPTION ENCOUNTER (OUTPATIENT)
Age: 86
End: 2021-01-25

## 2021-01-25 ENCOUNTER — APPOINTMENT (OUTPATIENT)
Dept: DISASTER EMERGENCY | Facility: CLINIC | Age: 86
End: 2021-01-25

## 2021-01-25 LAB
ALBUMIN SERPL ELPH-MCNC: 2.8 G/DL — LOW (ref 3.5–5.2)
ALP SERPL-CCNC: 53 U/L — SIGNIFICANT CHANGE UP (ref 30–115)
ALT FLD-CCNC: 25 U/L — SIGNIFICANT CHANGE UP (ref 0–41)
ANION GAP SERPL CALC-SCNC: 15 MMOL/L — HIGH (ref 7–14)
AST SERPL-CCNC: 48 U/L — HIGH (ref 0–41)
BASOPHILS # BLD AUTO: 0.02 K/UL — SIGNIFICANT CHANGE UP (ref 0–0.2)
BASOPHILS NFR BLD AUTO: 0.3 % — SIGNIFICANT CHANGE UP (ref 0–1)
BILIRUB SERPL-MCNC: 0.6 MG/DL — SIGNIFICANT CHANGE UP (ref 0.2–1.2)
BUN SERPL-MCNC: 14 MG/DL — SIGNIFICANT CHANGE UP (ref 10–20)
CALCIUM SERPL-MCNC: 8.8 MG/DL — SIGNIFICANT CHANGE UP (ref 8.5–10.1)
CHLORIDE SERPL-SCNC: 100 MMOL/L — SIGNIFICANT CHANGE UP (ref 98–110)
CO2 SERPL-SCNC: 19 MMOL/L — SIGNIFICANT CHANGE UP (ref 17–32)
CREAT SERPL-MCNC: 0.7 MG/DL — SIGNIFICANT CHANGE UP (ref 0.7–1.5)
EOSINOPHIL # BLD AUTO: 0 K/UL — SIGNIFICANT CHANGE UP (ref 0–0.7)
EOSINOPHIL NFR BLD AUTO: 0 % — SIGNIFICANT CHANGE UP (ref 0–8)
GLUCOSE BLDC GLUCOMTR-MCNC: 131 MG/DL — HIGH (ref 70–99)
GLUCOSE BLDC GLUCOMTR-MCNC: 132 MG/DL — HIGH (ref 70–99)
GLUCOSE BLDC GLUCOMTR-MCNC: 141 MG/DL — HIGH (ref 70–99)
GLUCOSE BLDC GLUCOMTR-MCNC: 143 MG/DL — HIGH (ref 70–99)
GLUCOSE BLDC GLUCOMTR-MCNC: 144 MG/DL — HIGH (ref 70–99)
GLUCOSE SERPL-MCNC: 121 MG/DL — HIGH (ref 70–99)
HCT VFR BLD CALC: 28.9 % — LOW (ref 37–47)
HGB BLD-MCNC: 9.7 G/DL — LOW (ref 12–16)
IMM GRANULOCYTES NFR BLD AUTO: 0.8 % — HIGH (ref 0.1–0.3)
LYMPHOCYTES # BLD AUTO: 1.25 K/UL — SIGNIFICANT CHANGE UP (ref 1.2–3.4)
LYMPHOCYTES # BLD AUTO: 17.6 % — LOW (ref 20.5–51.1)
MAGNESIUM SERPL-MCNC: 1.6 MG/DL — LOW (ref 1.8–2.4)
MCHC RBC-ENTMCNC: 29.4 PG — SIGNIFICANT CHANGE UP (ref 27–31)
MCHC RBC-ENTMCNC: 33.6 G/DL — SIGNIFICANT CHANGE UP (ref 32–37)
MCV RBC AUTO: 87.6 FL — SIGNIFICANT CHANGE UP (ref 81–99)
MONOCYTES # BLD AUTO: 0.35 K/UL — SIGNIFICANT CHANGE UP (ref 0.1–0.6)
MONOCYTES NFR BLD AUTO: 4.9 % — SIGNIFICANT CHANGE UP (ref 1.7–9.3)
NEUTROPHILS # BLD AUTO: 5.41 K/UL — SIGNIFICANT CHANGE UP (ref 1.4–6.5)
NEUTROPHILS NFR BLD AUTO: 76.4 % — HIGH (ref 42.2–75.2)
NRBC # BLD: 0 /100 WBCS — SIGNIFICANT CHANGE UP (ref 0–0)
PLATELET # BLD AUTO: 97 K/UL — LOW (ref 130–400)
POTASSIUM SERPL-MCNC: 4.3 MMOL/L — SIGNIFICANT CHANGE UP (ref 3.5–5)
POTASSIUM SERPL-SCNC: 4.3 MMOL/L — SIGNIFICANT CHANGE UP (ref 3.5–5)
PROT SERPL-MCNC: 5.8 G/DL — LOW (ref 6–8)
RBC # BLD: 3.3 M/UL — LOW (ref 4.2–5.4)
RBC # FLD: 13.3 % — SIGNIFICANT CHANGE UP (ref 11.5–14.5)
SODIUM SERPL-SCNC: 134 MMOL/L — LOW (ref 135–146)
WBC # BLD: 7.09 K/UL — SIGNIFICANT CHANGE UP (ref 4.8–10.8)
WBC # FLD AUTO: 7.09 K/UL — SIGNIFICANT CHANGE UP (ref 4.8–10.8)

## 2021-01-25 PROCEDURE — 71045 X-RAY EXAM CHEST 1 VIEW: CPT | Mod: 26

## 2021-01-25 PROCEDURE — 74018 RADEX ABDOMEN 1 VIEW: CPT | Mod: 26

## 2021-01-25 PROCEDURE — 99233 SBSQ HOSP IP/OBS HIGH 50: CPT | Mod: CS

## 2021-01-25 RX ORDER — ACYCLOVIR SODIUM 500 MG
600 VIAL (EA) INTRAVENOUS ONCE
Refills: 0 | Status: COMPLETED | OUTPATIENT
Start: 2021-01-25 | End: 2021-01-25

## 2021-01-25 RX ORDER — SENNA PLUS 8.6 MG/1
2 TABLET ORAL AT BEDTIME
Refills: 0 | Status: DISCONTINUED | OUTPATIENT
Start: 2021-01-25 | End: 2021-01-28

## 2021-01-25 RX ORDER — ACYCLOVIR SODIUM 500 MG
600 VIAL (EA) INTRAVENOUS EVERY 8 HOURS
Refills: 0 | Status: DISCONTINUED | OUTPATIENT
Start: 2021-01-25 | End: 2021-01-25

## 2021-01-25 RX ORDER — LABETALOL HCL 100 MG
10 TABLET ORAL ONCE
Refills: 0 | Status: COMPLETED | OUTPATIENT
Start: 2021-01-25 | End: 2021-01-25

## 2021-01-25 RX ORDER — HYDRALAZINE HCL 50 MG
25 TABLET ORAL DAILY
Refills: 0 | Status: DISCONTINUED | OUTPATIENT
Start: 2021-01-25 | End: 2021-01-25

## 2021-01-25 RX ORDER — VALACYCLOVIR 500 MG/1
1000 TABLET, FILM COATED ORAL EVERY 8 HOURS
Refills: 0 | Status: DISCONTINUED | OUTPATIENT
Start: 2021-01-25 | End: 2021-01-28

## 2021-01-25 RX ORDER — MAGNESIUM SULFATE 500 MG/ML
2 VIAL (ML) INJECTION ONCE
Refills: 0 | Status: DISCONTINUED | OUTPATIENT
Start: 2021-01-25 | End: 2021-01-25

## 2021-01-25 RX ORDER — HYDRALAZINE HCL 50 MG
10 TABLET ORAL ONCE
Refills: 0 | Status: COMPLETED | OUTPATIENT
Start: 2021-01-25 | End: 2021-01-25

## 2021-01-25 RX ORDER — POLYETHYLENE GLYCOL 3350 17 G/17G
17 POWDER, FOR SOLUTION ORAL
Refills: 0 | Status: DISCONTINUED | OUTPATIENT
Start: 2021-01-25 | End: 2021-01-28

## 2021-01-25 RX ORDER — ACYCLOVIR SODIUM 500 MG
VIAL (EA) INTRAVENOUS
Refills: 0 | Status: DISCONTINUED | OUTPATIENT
Start: 2021-01-25 | End: 2021-01-25

## 2021-01-25 RX ORDER — MAGNESIUM SULFATE 500 MG/ML
2 VIAL (ML) INJECTION ONCE
Refills: 0 | Status: COMPLETED | OUTPATIENT
Start: 2021-01-25 | End: 2021-01-25

## 2021-01-25 RX ORDER — CHLORHEXIDINE GLUCONATE 213 G/1000ML
1 SOLUTION TOPICAL
Refills: 0 | Status: DISCONTINUED | OUTPATIENT
Start: 2021-01-25 | End: 2021-01-28

## 2021-01-25 RX ADMIN — SODIUM CHLORIDE 75 MILLILITER(S): 9 INJECTION, SOLUTION INTRAVENOUS at 17:35

## 2021-01-25 RX ADMIN — Medication 10 MILLIGRAM(S): at 05:55

## 2021-01-25 RX ADMIN — Medication 81 MILLIGRAM(S): at 14:27

## 2021-01-25 RX ADMIN — POLYETHYLENE GLYCOL 3350 17 GRAM(S): 17 POWDER, FOR SOLUTION ORAL at 14:19

## 2021-01-25 RX ADMIN — NYSTATIN CREAM 1 APPLICATION(S): 100000 CREAM TOPICAL at 17:33

## 2021-01-25 RX ADMIN — Medication 112 MILLIGRAM(S): at 13:27

## 2021-01-25 RX ADMIN — Medication 10 MILLIGRAM(S): at 13:27

## 2021-01-25 RX ADMIN — Medication 50 MILLIGRAM(S): at 17:33

## 2021-01-25 RX ADMIN — ENOXAPARIN SODIUM 40 MILLIGRAM(S): 100 INJECTION SUBCUTANEOUS at 22:02

## 2021-01-25 RX ADMIN — Medication 25 GRAM(S): at 11:22

## 2021-01-25 RX ADMIN — ISOSORBIDE MONONITRATE 60 MILLIGRAM(S): 60 TABLET, EXTENDED RELEASE ORAL at 14:27

## 2021-01-25 RX ADMIN — NYSTATIN CREAM 1 APPLICATION(S): 100000 CREAM TOPICAL at 04:17

## 2021-01-25 RX ADMIN — CHLORHEXIDINE GLUCONATE 1 APPLICATION(S): 213 SOLUTION TOPICAL at 21:59

## 2021-01-25 RX ADMIN — SODIUM CHLORIDE 2 GRAM(S): 9 INJECTION INTRAMUSCULAR; INTRAVENOUS; SUBCUTANEOUS at 17:34

## 2021-01-25 RX ADMIN — POLYETHYLENE GLYCOL 3350 17 GRAM(S): 17 POWDER, FOR SOLUTION ORAL at 17:33

## 2021-01-25 NOTE — PROGRESS NOTE ADULT - ASSESSMENT
85F HD#7 admitted to medicine due to AMS, COVID+.   Surgery consulted for large incisional hernia with SBO. Currently patient stable, no acute events, refusing NGT.   With the above physical exam and imaging findings  Patient seen and examined at bedside. NAD.    Diet, NPO (01-23-21 @ 01:45)    Plan:  - Keep NPO w/ IVF  - Pain controll as needed  - Patient will benefit from NGT for decompression but still refusing at this time  - Strict control Is and Os  - CBC and BMP, replete imbalances as necessary  - Encourage ambulation and IS  - DVT and GI prophylaxis  - primary management as per medical team    Date/Time: 01-25-21 @ 02:10

## 2021-01-25 NOTE — PROGRESS NOTE ADULT - SUBJECTIVE AND OBJECTIVE BOX
Progress Note: General Surgery  Patient: NIKKY FRASER , 85y (1935)Female   MRN: [id number]  Location: [address]  Visit: 01-18-21 Inpatient  Date: 01-25-21 @ 02:10      Events/ 24h: Patient seen and examined at bedside. No acute events overnight. Afebrile, VSS.    Vitals: T(F): 96.8 (01-25-21 @ 00:00), Max: 97.8 (01-24-21 @ 04:00)  HR: 86 (01-25-21 @ 00:00)  BP: 147/65 (01-25-21 @ 00:00) (124/65 - 160/74)  RR: 18 (01-25-21 @ 00:00)  SpO2: 92% (01-25-21 @ 00:00)    In:   01-23-21 @ 07:01  -  01-24-21 @ 07:00  --------------------------------------------------------  IN: 1275 mL      Out:   01-23-21 @ 07:01  -  01-24-21 @ 07:00  --------------------------------------------------------  OUT:  Total OUT: 0 mL        Net:   01-23-21 @ 07:01  -  01-24-21 @ 07:00  --------------------------------------------------------  NET: 1275 mL        Diet: Diet, NPO (01-23-21 @ 01:45)    IV Fluids: dextrose 5%. 1000 milliLiter(s) (50 mL/Hr) IV Continuous <Continuous>  dextrose 5%. 1000 milliLiter(s) (100 mL/Hr) IV Continuous <Continuous>  lactated ringers. 1000 milliLiter(s) (75 mL/Hr) IV Continuous <Continuous>  sodium chloride 2 Gram(s) Oral two times a day      Physical Examination:  General Appearance: NAD   HEENT: EOMI, sclera anicteric.  Heart: RRR   Lungs: Symmetric chest wall expansion, equal rise and fall.  Abdomen: Obese, exceptionally large right sided incisional hernia, not reducible, erythema and overlying skin changes, tenderness over hernia  MSK/Extremities: Warm & well-perfused.   Skin: Warm, dry. No jaundice.       Medications: [Standing]  aspirin enteric coated 81 milliGRAM(s) Oral daily  atorvastatin Oral Tab/Cap - Peds 40 milliGRAM(s) Oral daily  cyclobenzaprine Oral Tab/Cap - Peds 5 milliGRAM(s) Oral three times a day  dextrose 40% Gel 15 Gram(s) Oral once  dextrose 5%. 1000 milliLiter(s) (50 mL/Hr) IV Continuous <Continuous>  dextrose 5%. 1000 milliLiter(s) (100 mL/Hr) IV Continuous <Continuous>  dextrose 50% Injectable 25 Gram(s) IV Push once  dextrose 50% Injectable 12.5 Gram(s) IV Push once  dextrose 50% Injectable 25 Gram(s) IV Push once  enalapril 20 milliGRAM(s) Oral daily  enoxaparin Injectable 40 milliGRAM(s) SubCutaneous at bedtime  glucagon  Injectable 1 milliGRAM(s) IntraMuscular once  insulin glargine Injectable (LANTUS) 18 Unit(s) SubCutaneous at bedtime  insulin lispro (ADMELOG) corrective regimen sliding scale   SubCutaneous three times a day before meals  insulin lispro Injectable (ADMELOG) 6 Unit(s) SubCutaneous three times a day before meals  isosorbide   mononitrate ER Tablet (IMDUR) 60 milliGRAM(s) Oral daily  lactated ringers. 1000 milliLiter(s) (75 mL/Hr) IV Continuous <Continuous>  LORazepam   Injectable 1 milliGRAM(s) IV Push once  metoprolol succinate ER 25 milliGRAM(s) Oral daily  nystatin Cream 1 Application(s) Topical two times a day  pregabalin 50 milliGRAM(s) Oral two times a day  sodium chloride 2 Gram(s) Oral two times a day  valACYclovir 1000 milliGRAM(s) Oral every 8 hours    DVT Prophylaxis: enoxaparin Injectable 40 milliGRAM(s) SubCutaneous at bedtime    GI Prophylaxis:   Antibiotics: valACYclovir 1000 milliGRAM(s) Oral every 8 hours    Anticoagulation:   Medications:[PRN]  acetaminophen   Tablet .. 650 milliGRAM(s) Oral every 6 hours PRN      Labs:                        10.1   9.17  )-----------( 138      ( 24 Jan 2021 10:16 )             29.5     01-24    130<L>  |  99  |  15  ----------------------------<  214<H>  4.9   |  21  |  0.7    Ca    8.3<L>      24 Jan 2021 10:16  Phos  2.4     01-23  Mg     1.7     01-24    TPro  5.0<L>  /  Alb  2.9<L>  /  TBili  0.4  /  DBili  x   /  AST  38  /  ALT  21  /  AlkPhos  50  01-24    LIVER FUNCTIONS - ( 24 Jan 2021 10:16 )  Alb: 2.9 g/dL / Pro: 5.0 g/dL / ALK PHOS: 50 U/L / ALT: 21 U/L / AST: 38 U/L / GGT: x                 Imaging:   < from: Xray Kidney Ureter Bladder (01.24.21 @ 10:51) >  FINDINGS/  IMPRESSION:    Dilated loops of small bowel extending into right abdominal hernia, partially obscured as hernia extends beyond field of view.    Calcified uterine fibroids.  < end of copied text >    < from: Xray Abdomen Minimum 3 Views (01.23.21 @ 18:18) >  Impression:    Dilated loops of bowel extending into large right abdominal hernia, better evaluated on referenced CT.    New left midlung field opacity.  < end of copied text >

## 2021-01-25 NOTE — PROGRESS NOTE ADULT - ATTENDING COMMENTS
case discussed with surgery resident, No vomiting, Abd Xray reviewed contrast in Colon. No significant residual small bowel dilation. Restart po liquids and meds. No further need for surgical intervention/ followup.

## 2021-01-25 NOTE — DISCHARGE NOTE NURSING/CASE MANAGEMENT/SOCIAL WORK - PATIENT PORTAL LINK FT
You can access the FollowMyHealth Patient Portal offered by [facility] by registering at the following website: [email or web address]. By joining [employer]’s FollowMyHealth portal, you will also be able to view your health information using other applications (apps) compatible with our system.

## 2021-01-25 NOTE — PROGRESS NOTE ADULT - ATTENDING COMMENTS
Patient seen and examined independently. Agree with resident note.    # Suacute intestinal obstruction-- small bowel obstruction--  NG tube in place-- has large rt sided hernia and that was not considered operable by her surgeon in the past.  BLANCHE was seen by surgery-- started liquid diet-- will remove NG tube if stays stable till AM.    #AMS encephalopathy is resolving--  LP showed wbc-- nonspecific and mental status is stable currently    # herpes zoster lesions on lt side under breast--crusted-- continue po valacyclovir  # Covid 19 positive on RA--ID recommended bamlanivumab-- which daughter schedule for  monday-- maybe too late. I discussed with ID-  # Hx of colon ca--s/p surgery  # hx of CAD s/p PCI on aspirin and lipitor.  # Hyponatremia--   now sec to SBO-- stable.  # severe groin rash -- fungal on nystatin.  # Morbid obesity is present.  # HTN --on labetalol and hydralazine pushes.   case discussed in detail with daughter at bedside who helped us to put NG tube-- as he convinced her mother and help her relax.

## 2021-01-25 NOTE — PROGRESS NOTE ADULT - SUBJECTIVE AND OBJECTIVE BOX
NIKKY FRASER 85y Female  MRN#: [id number]         SUBJECTIVE  Patient is a 85y old Female who presents with a chief complaint of altered mental status (25 Jan 2021 02:10)  Currently admitted to medicine with the primary diagnosis of Fall, initial encounter    Patient still refusing NGT      OBJECTIVE  PAST MEDICAL & SURGICAL HISTORY    ALLERGIES:  No Known Allergies    MEDICATIONS:  STANDING MEDICATIONS  aspirin enteric coated 81 milliGRAM(s) Oral daily  atorvastatin Oral Tab/Cap - Peds 40 milliGRAM(s) Oral daily  cyclobenzaprine Oral Tab/Cap - Peds 5 milliGRAM(s) Oral three times a day  dextrose 40% Gel 15 Gram(s) Oral once  dextrose 5%. 1000 milliLiter(s) IV Continuous <Continuous>  dextrose 5%. 1000 milliLiter(s) IV Continuous <Continuous>  dextrose 50% Injectable 25 Gram(s) IV Push once  dextrose 50% Injectable 12.5 Gram(s) IV Push once  dextrose 50% Injectable 25 Gram(s) IV Push once  enalapril 20 milliGRAM(s) Oral daily  enoxaparin Injectable 40 milliGRAM(s) SubCutaneous at bedtime  glucagon  Injectable 1 milliGRAM(s) IntraMuscular once  insulin glargine Injectable (LANTUS) 18 Unit(s) SubCutaneous at bedtime  insulin lispro (ADMELOG) corrective regimen sliding scale   SubCutaneous three times a day before meals  insulin lispro Injectable (ADMELOG) 6 Unit(s) SubCutaneous three times a day before meals  isosorbide   mononitrate ER Tablet (IMDUR) 60 milliGRAM(s) Oral daily  lactated ringers. 1000 milliLiter(s) IV Continuous <Continuous>  LORazepam   Injectable 1 milliGRAM(s) IV Push once  magnesium sulfate  IVPB 2 Gram(s) IV Intermittent once  metoprolol succinate ER 25 milliGRAM(s) Oral daily  nystatin Cream 1 Application(s) Topical two times a day  pregabalin 50 milliGRAM(s) Oral two times a day  sodium chloride 2 Gram(s) Oral two times a day  valACYclovir 1000 milliGRAM(s) Oral every 8 hours    PRN MEDICATIONS  acetaminophen   Tablet .. 650 milliGRAM(s) Oral every 6 hours PRN      VITAL SIGNS: Last 24 Hours  T(C): 36.7 (25 Jan 2021 08:00), Max: 36.7 (25 Jan 2021 08:00)  T(F): 98 (25 Jan 2021 08:00), Max: 98 (25 Jan 2021 08:00)  HR: 80 (25 Jan 2021 08:00) (80 - 86)  BP: 187/79 (25 Jan 2021 08:00) (138/64 - 187/79)  BP(mean): --  RR: 18 (25 Jan 2021 08:00) (18 - 18)  SpO2: 95% (25 Jan 2021 08:00) (92% - 95%)    LABS:                        9.7    7.09  )-----------( 97       ( 25 Jan 2021 07:51 )             28.9     01-25    134<L>  |  100  |  14  ----------------------------<  121<H>  4.3   |  19  |  0.7    Ca    8.8      25 Jan 2021 07:51  Phos  2.4     01-23  Mg     1.6     01-25    TPro  5.8<L>  /  Alb  2.8<L>  /  TBili  0.6  /  DBili  x   /  AST  48<H>  /  ALT  25  /  AlkPhos  53  01-25                  RADIOLOGY:      PHYSICAL EXAM:  General: in pain; Pallor (-), Icterus (-), Cyanosis (-), Clubbing (-)  HEENT: Pupils equal, round and reactive to light symmetrically, EOM - Normal bilaterally; Hearing - b/l normal; No external discharge noted, JVD (-), Lymphadenopathy(-)  PULM: Bilaterally equal but diminished breath sounds, no wheeze, rubs or crackles.   CVS: Normal S1 and S2, no murmurs, rubs, or gallops.   GI: Abdomen is distended, soft, sluggish bowel sounds (+), tender to deep palpation, large ventral hernia +  MSK: Edema (-), no joint or muscle tenderness  SKIN: Warm and well perfused, no rashes noted  NEURO:  Alert and Oriented x 3; Cranial Nerves are all grossly intact; Normal strength and sensation in all four extremities.      ASSESSMENT & PLAN  Case of an 85 year old female patient who was brought on 01/18 following an episode of fall on Saturday that was followed by fever and confusion, found to have subacute left cerebellar stroke, hyponatremia, and COVID pneumonia, admitted for investigations, management, and monitoring. Currently hemodynamically stable.    # Suacute intestinal obstruction  - small bowel obstruction on CT A&P  - Serial abdominal exam  - Pain control PRN  - IVF, strict I/O  - NPO  - has large rt sided hernia and that was not considered operable by her surgeon in the past.  - will retry putting NG tube today but patient is refusing. will try to get the daughter to talk with her  - surgery f/u    # Altered Mental Status - improved  - Likely d/t hyponatremia secondary to SIADH - resolved now  - Nephro following  - LP - negative for VZV, viral encephalitis ruled out  - Recent Zoster infection with rashes on left side of chest  - Continue valcyclovir 1 gm IV q8h for 10 more days per ID (1/21 - 1/30). switched to IV acyclovir on 1/25 since patient cannot tolerate PO  - Possible Subacute L Cerebellar Stroke on CT was ruled out on MRI      # COVID Infection  - SARS-COV2: positive 01/18  - Chest X Ray (01/21) clear  - CT chest IC (01/18) no consolidation or effusion  - Infectious Disease on board  - Monitor for fever - Tylenol PRN  - Monitor SaO2 and Oxygen Requirements: on RA 93%  - Inflammatory Markers:  CRP: 1.72 (01-18-21)  Ferritin: 85 (01-18-21)  D-Dimer: 494 (01-23-21)<-- 384 (01-18-21)  CK: 36 (01-18-21)  - No convalescent plasma, IV Tocilizumab, IV Remdesevir, or IV Dexamethasone administered yet  - Anticoagulation Lovenox 40mg QD    # Fall  - Could be 2ry to COVID/ stroke/ 1st degree AV block  - Trauma Workup negative on 01/18: CT C-cpine, XR pelvis, CT CAP IC (01/18): no fractures or dislocations  - Continue DVT prophylaxis, lovenox 40mg QD  - Ruled out Left cerebellar stroke on MR H without contrast performed on 01/18  - Ruled out 1st degree AV block evident on ECG (01/18) in ED: monitor HR  - No signs of seizure    # HTN  * Home: enalapril 20mg QD, Toprol 25mg QD, Lasix 20mg QD  - will need IV bp meds since NPO    # DM II  * Last Hba1c (01/18) 7.2  * Home metformin 850mg BID, Januvia 100mg QD  * For Diabetic neuropathy: home Lyrica 50mg BID  - off insulin since NPO    # CAD   * S/P PCI and 2 stents 2011  * Home Aspirin 81mg QD, Toprol, Lipitor 40mg QD, Imdur      # History of Colon Cancer  * S/P resection  * Currently in remission    # Abdominal Ventral Hernia  * Since 2018  - Wound care consulted    # 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

## 2021-01-26 LAB
ALBUMIN SERPL ELPH-MCNC: 3 G/DL — LOW (ref 3.5–5.2)
ALP SERPL-CCNC: 45 U/L — SIGNIFICANT CHANGE UP (ref 30–115)
ALT FLD-CCNC: 26 U/L — SIGNIFICANT CHANGE UP (ref 0–41)
ANION GAP SERPL CALC-SCNC: 10 MMOL/L — SIGNIFICANT CHANGE UP (ref 7–14)
AST SERPL-CCNC: 37 U/L — SIGNIFICANT CHANGE UP (ref 0–41)
BILIRUB SERPL-MCNC: 0.6 MG/DL — SIGNIFICANT CHANGE UP (ref 0.2–1.2)
BUN SERPL-MCNC: 13 MG/DL — SIGNIFICANT CHANGE UP (ref 10–20)
CALCIUM SERPL-MCNC: 8.6 MG/DL — SIGNIFICANT CHANGE UP (ref 8.5–10.1)
CHLORIDE SERPL-SCNC: 101 MMOL/L — SIGNIFICANT CHANGE UP (ref 98–110)
CO2 SERPL-SCNC: 25 MMOL/L — SIGNIFICANT CHANGE UP (ref 17–32)
CREAT SERPL-MCNC: 0.7 MG/DL — SIGNIFICANT CHANGE UP (ref 0.7–1.5)
GLUCOSE SERPL-MCNC: 129 MG/DL — HIGH (ref 70–99)
HCT VFR BLD CALC: 27 % — LOW (ref 37–47)
HGB BLD-MCNC: 9.1 G/DL — LOW (ref 12–16)
MAGNESIUM SERPL-MCNC: 1.6 MG/DL — LOW (ref 1.8–2.4)
MCHC RBC-ENTMCNC: 29.4 PG — SIGNIFICANT CHANGE UP (ref 27–31)
MCHC RBC-ENTMCNC: 33.7 G/DL — SIGNIFICANT CHANGE UP (ref 32–37)
MCV RBC AUTO: 87.4 FL — SIGNIFICANT CHANGE UP (ref 81–99)
NRBC # BLD: 0 /100 WBCS — SIGNIFICANT CHANGE UP (ref 0–0)
PLATELET # BLD AUTO: 226 K/UL — SIGNIFICANT CHANGE UP (ref 130–400)
POTASSIUM SERPL-MCNC: 3.8 MMOL/L — SIGNIFICANT CHANGE UP (ref 3.5–5)
POTASSIUM SERPL-SCNC: 3.8 MMOL/L — SIGNIFICANT CHANGE UP (ref 3.5–5)
PROT SERPL-MCNC: 4.9 G/DL — LOW (ref 6–8)
RBC # BLD: 3.09 M/UL — LOW (ref 4.2–5.4)
RBC # FLD: 13.3 % — SIGNIFICANT CHANGE UP (ref 11.5–14.5)
SARS-COV-2 RNA SPEC QL NAA+PROBE: DETECTED
SODIUM SERPL-SCNC: 136 MMOL/L — SIGNIFICANT CHANGE UP (ref 135–146)
WBC # BLD: 6.74 K/UL — SIGNIFICANT CHANGE UP (ref 4.8–10.8)
WBC # FLD AUTO: 6.74 K/UL — SIGNIFICANT CHANGE UP (ref 4.8–10.8)

## 2021-01-26 PROCEDURE — 99233 SBSQ HOSP IP/OBS HIGH 50: CPT | Mod: CS

## 2021-01-26 PROCEDURE — 74018 RADEX ABDOMEN 1 VIEW: CPT | Mod: 26

## 2021-01-26 RX ORDER — MAGNESIUM SULFATE 500 MG/ML
2 VIAL (ML) INJECTION ONCE
Refills: 0 | Status: COMPLETED | OUTPATIENT
Start: 2021-01-26 | End: 2021-01-26

## 2021-01-26 RX ADMIN — Medication 81 MILLIGRAM(S): at 12:25

## 2021-01-26 RX ADMIN — ENOXAPARIN SODIUM 40 MILLIGRAM(S): 100 INJECTION SUBCUTANEOUS at 20:45

## 2021-01-26 RX ADMIN — SENNA PLUS 2 TABLET(S): 8.6 TABLET ORAL at 20:47

## 2021-01-26 RX ADMIN — Medication 112 MILLIGRAM(S): at 03:55

## 2021-01-26 RX ADMIN — CYCLOBENZAPRINE HYDROCHLORIDE 5 MILLIGRAM(S): 10 TABLET, FILM COATED ORAL at 20:46

## 2021-01-26 RX ADMIN — VALACYCLOVIR 1000 MILLIGRAM(S): 500 TABLET, FILM COATED ORAL at 12:28

## 2021-01-26 RX ADMIN — ISOSORBIDE MONONITRATE 60 MILLIGRAM(S): 60 TABLET, EXTENDED RELEASE ORAL at 12:26

## 2021-01-26 RX ADMIN — CYCLOBENZAPRINE HYDROCHLORIDE 5 MILLIGRAM(S): 10 TABLET, FILM COATED ORAL at 12:26

## 2021-01-26 RX ADMIN — CHLORHEXIDINE GLUCONATE 1 APPLICATION(S): 213 SOLUTION TOPICAL at 04:55

## 2021-01-26 RX ADMIN — Medication 50 GRAM(S): at 12:25

## 2021-01-26 RX ADMIN — POLYETHYLENE GLYCOL 3350 17 GRAM(S): 17 POWDER, FOR SOLUTION ORAL at 17:26

## 2021-01-26 RX ADMIN — SODIUM CHLORIDE 2 GRAM(S): 9 INJECTION INTRAMUSCULAR; INTRAVENOUS; SUBCUTANEOUS at 17:25

## 2021-01-26 RX ADMIN — VALACYCLOVIR 1000 MILLIGRAM(S): 500 TABLET, FILM COATED ORAL at 20:48

## 2021-01-26 RX ADMIN — ATORVASTATIN CALCIUM 40 MILLIGRAM(S): 80 TABLET, FILM COATED ORAL at 19:13

## 2021-01-26 RX ADMIN — NYSTATIN CREAM 1 APPLICATION(S): 100000 CREAM TOPICAL at 17:25

## 2021-01-26 NOTE — DIETITIAN INITIAL EVALUATION ADULT. - PERSON TAUGHT/METHOD
Discussed use of oral nutrition supplements to optimize po intake./verbal instruction/daughter instructed

## 2021-01-26 NOTE — DIETITIAN INITIAL EVALUATION ADULT. - NAME AND PHONE
Nutrition Interventions: meals and snacks, medical food supplements RD to monitor diet order, energy intake, body composition, NFPF, glucose/electrolyte profile

## 2021-01-26 NOTE — PROGRESS NOTE ADULT - SUBJECTIVE AND OBJECTIVE BOX
NEPHROLOGY FOLLOW UP NOTE    NGT out  Na+ acceptable  low grade fever  O2 sats acceptable      PAST MEDICAL & SURGICAL HISTORY:    Allergies:  No Known Allergies    Home Medications Reviewed    SOCIAL HISTORY:  Denies ETOH,Smoking,   FAMILY HISTORY:        REVIEW OF SYSTEMS:  All other review of systems is negative unless indicated above.    PHYSICAL EXAM:  Constitutional: NAD  HEENT: anicteric sclera, oropharynx clear, MMM  Neck: No JVD  Respiratory: CTAB, no wheezes, rales or rhonchi  Cardiovascular: S1, S2, RRR  Gastrointestinal: BS+, soft, NT/ND  Extremities: No cyanosis or clubbing. No peripheral edema  Neurological: A/O x 3, no focal deficits  Psychiatric: Normal mood, normal affect  : No CVA tenderness. No masterson.   Skin: No rashes    Hospital Medications:   MEDICATIONS  (STANDING):  aspirin enteric coated 81 milliGRAM(s) Oral daily  atorvastatin Oral Tab/Cap - Peds 40 milliGRAM(s) Oral daily  chlorhexidine 4% Liquid 1 Application(s) Topical <User Schedule>  cyclobenzaprine Oral Tab/Cap - Peds 5 milliGRAM(s) Oral three times a day  dextrose 40% Gel 15 Gram(s) Oral once  dextrose 5%. 1000 milliLiter(s) (50 mL/Hr) IV Continuous <Continuous>  dextrose 5%. 1000 milliLiter(s) (100 mL/Hr) IV Continuous <Continuous>  dextrose 50% Injectable 25 Gram(s) IV Push once  dextrose 50% Injectable 12.5 Gram(s) IV Push once  dextrose 50% Injectable 25 Gram(s) IV Push once  enalapril 20 milliGRAM(s) Oral daily  enoxaparin Injectable 40 milliGRAM(s) SubCutaneous at bedtime  glucagon  Injectable 1 milliGRAM(s) IntraMuscular once  isosorbide   mononitrate ER Tablet (IMDUR) 60 milliGRAM(s) Oral daily  LORazepam   Injectable 1 milliGRAM(s) IV Push once  metoprolol succinate ER 25 milliGRAM(s) Oral daily  nystatin Cream 1 Application(s) Topical two times a day  polyethylene glycol 3350 17 Gram(s) Oral two times a day  senna 2 Tablet(s) Oral at bedtime  sodium chloride 2 Gram(s) Oral two times a day  valACYclovir 1000 milliGRAM(s) Oral every 8 hours        VITALS:  T(F): 99.7 (21 @ 16:40), Max: 100 (21 @ 08:30)  HR: 84 (21 @ 16:40)  BP: 144/67 (21 @ 16:40)  RR: 18 (21 @ 16:40)  SpO2: 93% (21 @ 16:40)  Wt(kg): --     @ 07:  -   @ 07:00  --------------------------------------------------------  IN: 25 mL / OUT: 550 mL / NET: -525 mL     @ 07:  -   @ 20:03  --------------------------------------------------------  IN: 200 mL / OUT: 0 mL / NET: 200 mL      Height (cm): 170 ( @ 11:14)    LABS:      136  |  101  |  13  ----------------------------<  129<H>  3.8   |  25  |  0.7    Ca    8.6      2021 06:47  Mg     1.6         TPro  4.9<L>  /  Alb  3.0<L>  /  TBili  0.6  /  DBili      /  AST  37  /  ALT  26  /  AlkPhos  45                            9.1    6.74  )-----------( 226      ( 2021 06:47 )             27.0       Urine Studies:  Urinalysis Basic - ( 2021 06:00 )    Color: Yellow / Appearance: Clear / S.013 / pH:   Gluc:  / Ketone: Negative  / Bili: Negative / Urobili: <2 mg/dL   Blood:  / Protein: 30 mg/dL / Nitrite: Negative   Leuk Esterase: Negative / RBC: 2 /HPF / WBC 1 /HPF   Sq Epi:  / Non Sq Epi: 1 /HPF / Bacteria: Negative      Osmolality, Random Urine: 335 mos/kg ( @ 06:00)  Sodium, Random Urine: 45.0 mmoL/L ( @ 06:00)  Protein/Creatinine Ratio Calculation: 0.5 Ratio ( @ 06:00)  Creatinine, Random Urine: 66 mg/dL ( @ 06:00)      RADIOLOGY & ADDITIONAL STUDIES:

## 2021-01-26 NOTE — DIETITIAN INITIAL EVALUATION ADULT. - OTHER INFO
86 yo F with PMH HTN, DM, abd wall hernia, hx of colon cancer s/p resection brought in for confusion. Found to have subacute left cerebellar stroke, hyponatremia, and COVID pneumonia. Subacute intestinal obstruction also found, improving. NG tube to be removed 1/26 , advanced to clears 1/25. Noted A1c 7.2% (1/18)    Nutrition hx obtained from pt's daughter via phone number listed in EMR. Reports at baseline pt has a good appetite and follows a consistent carbohydrate diet at home. Denies use of oral nutrition supplements, took Multivitamin daily. Confirms NKFA. No Holiness or cultural food preferences. Denies difficulties chewing or swallowing.     In house pt has been npo/clears x 4 days. LBM 1/22, noted abdominal distention and constipation.     Daughter states UBW 180lbs and has been stable for years. ?accuracy as CBW 160lbs. No previous adm wts or RD notes to confirm.     Ht:   67"  Wt:  160lbs   IBW:  135lbs +/-10%    BMI:  25.1 kg/m2     Skin: No pressure injuries per RN flow sheets  Edema: None noted per RN flow sheets

## 2021-01-26 NOTE — DIETITIAN INITIAL EVALUATION ADULT. - ADD RECOMMEND
1. Advance diet as medically feasible to consistent carbohydrate with evening snack. While pt is on clears, add Prosourc gelatin sugar-free BID and Ensure Clear BID. Once diet advanced change Ensure Clear to Glucerna BID to optimize po intake.

## 2021-01-26 NOTE — PROGRESS NOTE ADULT - ASSESSMENT
chronic euvolemic hyponatremia   - NA WNL   - on outpatient NaCl tabs  shingles  COVID-19  resolving SBO  fall   altered mental status  CAD / PCI  HTN  DM2    plan:    cont NaCl tabs 2g po bid  liberal salt intake  can resume lasix upon discharge   cont toprol xl and imdur  cont enalapril   po valtrex  advancing diet

## 2021-01-26 NOTE — DIETITIAN INITIAL EVALUATION ADULT. - PERTINENT LABORATORY DATA
(1/26) H/H 9.1/27, , Mg 1.6, A1c 7.2% (1/18)  CAPILLARY BLOOD GLUCOSE  POCT Blood Glucose.: 144 mg/dL (25 Jan 2021 21:32)  POCT Blood Glucose.: 132 mg/dL (25 Jan 2021 20:53)  POCT Blood Glucose.: 143 mg/dL (25 Jan 2021 17:14)  POCT Blood Glucose.: 141 mg/dL (25 Jan 2021 11:30)

## 2021-01-26 NOTE — PROGRESS NOTE ADULT - ATTENDING COMMENTS
Patient seen and examined independently. Agree with resident note.    # Subacute intestinal obstruction--small bowel obstruction--  NG tube has been removed by patient-- has large rt sided hernia and that was not considered operable by her surgeon in the past.  KUB was  repeat showed stable small bowel dil with contrast to the level of rectum-- started liquid diet--  patient had no bowel movement. she is not eating - family to get food.    #AMS encephalopathy is resolving--  LP showed wbc-- nonspecific and mental status is stable currently    # herpes zoster lesions on lt side under breast--crusted-- continue po valacyclovir  # Covid 19 positive on RA--ID recommended bamlanivumab-- not possible now. I discussed with ID-  # Hx of colon ca--s/p surgery  # hx of CAD s/p PCI on aspirin and lipitor.  # Hyponatremia--   now sec to SBO-- was on salt tablets.  # severe groin rash -- fungal on nystatin.  # Morbid obesity is present.  # HTN --on labetalol and hydralazine pushes.   case discussed in detail with daughter at bedside who helped us to put NG tube-- as he convinced her mother and help her relax. Patient seen and examined independently. Agree with resident note.    # Subacute intestinal obstruction--small bowel obstruction--  NG tube has been removed by patient-- has large rt sided hernia and that was not considered operable by her surgeon in the past.  KUB was  repeat showed stable small bowel dil with contrast to the level of rectum-- started liquid diet--  patient had no bowel movement. she is not eating - family to get food.    #AMS encephalopathy is resolving--  LP showed wbc-- nonspecific and mental status is stable currently    # herpes zoster lesions on lt side under breast--crusted-- continue po valacyclovir  # Covid 19 positive on RA--ID recommended bamlanivumab-- not possible now. I discussed with ID-  # Hx of colon ca--s/p surgery  # hx of CAD s/p PCI on aspirin and lipitor.  # Hyponatremia--   now sec to SBO-- was on salt tablets.  # severe groin rash -- fungal on nystatin.  # Morbid obesity is present.  # HTN --on enalapril and metoprolol po.   case discussed in detail with daughter today

## 2021-01-26 NOTE — PROGRESS NOTE ADULT - SUBJECTIVE AND OBJECTIVE BOX
NIKKY FRASER 85y Female  MRN#: [id number]       SUBJECTIVE  Patient is a 85y old Female who presents with a chief complaint of altered mental status (25 Jan 2021 11:21)  Currently admitted to medicine with the primary diagnosis of Fall, initial encounter    Patient reports no complaints. wants the NG tube to be removed        OBJECTIVE  PAST MEDICAL & SURGICAL HISTORY    ALLERGIES:  No Known Allergies    MEDICATIONS:  STANDING MEDICATIONS  aspirin enteric coated 81 milliGRAM(s) Oral daily  atorvastatin Oral Tab/Cap - Peds 40 milliGRAM(s) Oral daily  chlorhexidine 4% Liquid 1 Application(s) Topical <User Schedule>  cyclobenzaprine Oral Tab/Cap - Peds 5 milliGRAM(s) Oral three times a day  dextrose 40% Gel 15 Gram(s) Oral once  dextrose 5%. 1000 milliLiter(s) IV Continuous <Continuous>  dextrose 5%. 1000 milliLiter(s) IV Continuous <Continuous>  dextrose 50% Injectable 25 Gram(s) IV Push once  dextrose 50% Injectable 12.5 Gram(s) IV Push once  dextrose 50% Injectable 25 Gram(s) IV Push once  enalapril 20 milliGRAM(s) Oral daily  enoxaparin Injectable 40 milliGRAM(s) SubCutaneous at bedtime  glucagon  Injectable 1 milliGRAM(s) IntraMuscular once  isosorbide   mononitrate ER Tablet (IMDUR) 60 milliGRAM(s) Oral daily  lactated ringers. 1000 milliLiter(s) IV Continuous <Continuous>  LORazepam   Injectable 1 milliGRAM(s) IV Push once  metoprolol succinate ER 25 milliGRAM(s) Oral daily  nystatin Cream 1 Application(s) Topical two times a day  polyethylene glycol 3350 17 Gram(s) Oral two times a day  senna 2 Tablet(s) Oral at bedtime  sodium chloride 2 Gram(s) Oral two times a day  valACYclovir 1000 milliGRAM(s) Oral every 8 hours    PRN MEDICATIONS  acetaminophen   Tablet .. 650 milliGRAM(s) Oral every 6 hours PRN      VITAL SIGNS: Last 24 Hours  T(C): 36.6 (26 Jan 2021 04:00), Max: 37.3 (25 Jan 2021 16:50)  T(F): 97.8 (26 Jan 2021 04:00), Max: 99.2 (25 Jan 2021 16:50)  HR: 88 (26 Jan 2021 04:00) (81 - 98)  BP: 135/64 (26 Jan 2021 04:00) (135/64 - 183/72)  BP(mean): --  RR: 17 (26 Jan 2021 04:00) (17 - 19)  SpO2: 95% (26 Jan 2021 04:00) (94% - 95%)    LABS:                        9.1    6.74  )-----------( 226      ( 26 Jan 2021 06:47 )             27.0     01-26    136  |  101  |  13  ----------------------------<  129<H>  3.8   |  25  |  0.7    Ca    8.6      26 Jan 2021 06:47  Mg     1.6     01-26    TPro  4.9<L>  /  Alb  3.0<L>  /  TBili  0.6  /  DBili  x   /  AST  37  /  ALT  26  /  AlkPhos  45  01-26                  RADIOLOGY:      PHYSICAL EXAM:  General: in distress; Pallor (-), Icterus (-), Cyanosis (-), Clubbing (-)  HEENT: Pupils equal, round and reactive to light symmetrically, EOM - Normal bilaterally; Hearing - b/l normal; No external discharge noted, JVD (-), Lymphadenopathy(-)  PULM: Bilaterally equal but diminished breath sounds, no wheeze, rubs or crackles.   CVS: Normal S1 and S2, no murmurs, rubs, or gallops.   GI: Abdomen is distended, soft, sluggish bowel sounds (+), tender to deep palpation, large ventral hernia +  MSK: Edema (-), no joint or muscle tenderness  SKIN: Warm and well perfused, no rashes noted  NEURO:  Alert and Oriented x 3; Cranial Nerves are all grossly intact; Normal strength and sensation in all four extremities.      ASSESSMENT & PLAN  Case of an 85 year old female patient who was brought on 01/18 following an episode of fall on Saturday that was followed by fever and confusion, found to have subacute left cerebellar stroke, hyponatremia, and COVID pneumonia, admitted for investigations, management, and monitoring. Currently hemodynamically stable.    # Suacute intestinal obstruction - improving  - small bowel obstruction on CT A&P  - Serial abdominal exam  - KUB 1/25 showed contrast reaching colon which is sign of improvement  - Pain control PRN  - IVF, started liquid diet on 1/25  - laxatives  - has large rt sided hernia and that was not considered operable by her surgeon in the past.  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  - Possible Subacute L Cerebellar Stroke on CT was ruled out on MRI      # COVID Infection  - SARS-COV2: positive 01/18  - Chest X Ray bilateral opacities  - CT chest IC (01/18) no consolidation or effusion  - Infectious Disease on board  - Monitor for fever - Tylenol PRN  - Monitor SaO2 and Oxygen Requirements: on RA 93%  - Inflammatory Markers:  CRP: 1.72 (01-18-21)  Ferritin: 85 (01-18-21)  D-Dimer: 494 (01-23-21)<-- 384 (01-18-21)  CK: 36 (01-18-21)  - No convalescent plasma, IV Tocilizumab, IV Remdesevir, or IV Dexamethasone administered  - Anticoagulation Lovenox 40mg QD    # Fall  - Could be 2ry to COVID/ stroke/ 1st degree AV block  - Trauma Workup negative on 01/18: CT C-cpine, XR pelvis, CT CAP IC (01/18): no fractures or dislocations  - Continue DVT prophylaxis, lovenox 40mg QD  - Ruled out Left cerebellar stroke on MR H without contrast performed on 01/18  - Ruled out 1st degree AV block evident on ECG (01/18) in ED: monitor HR  - No signs of seizure    # HTN  * Home: enalapril 20mg QD, Toprol 25mg QD, Lasix 20mg QD      # DM II  * Last Hba1c (01/18) 7.2  * Home metformin 850mg BID, Januvia 100mg QD  * For Diabetic neuropathy: home Lyrica 50mg BID      # CAD   * S/P PCI and 2 stents 2011  * Home Aspirin 81mg QD, Toprol, Lipitor 40mg QD, Imdur      # History of Colon Cancer  * S/P resection  * Currently in remission    # Abdominal Ventral Hernia  * Since 2018  - Wound care consulted    # 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

## 2021-01-26 NOTE — DIETITIAN INITIAL EVALUATION ADULT. - OTHER CALCULATIONS
wt used: 72.6kg CBW kcal: 1462-1584kcal (MSJ x 1.2-1.3 AF) protein: 73-87g (1-1.2g/kg CBW) fluids: 1ml/kcal or per LIP

## 2021-01-27 LAB
ALBUMIN SERPL ELPH-MCNC: 2.9 G/DL — LOW (ref 3.5–5.2)
ALP SERPL-CCNC: 51 U/L — SIGNIFICANT CHANGE UP (ref 30–115)
ALT FLD-CCNC: 26 U/L — SIGNIFICANT CHANGE UP (ref 0–41)
ANION GAP SERPL CALC-SCNC: 13 MMOL/L — SIGNIFICANT CHANGE UP (ref 7–14)
AST SERPL-CCNC: 33 U/L — SIGNIFICANT CHANGE UP (ref 0–41)
BILIRUB SERPL-MCNC: 0.7 MG/DL — SIGNIFICANT CHANGE UP (ref 0.2–1.2)
BUN SERPL-MCNC: 11 MG/DL — SIGNIFICANT CHANGE UP (ref 10–20)
CALCIUM SERPL-MCNC: 9 MG/DL — SIGNIFICANT CHANGE UP (ref 8.5–10.1)
CHLORIDE SERPL-SCNC: 97 MMOL/L — LOW (ref 98–110)
CO2 SERPL-SCNC: 24 MMOL/L — SIGNIFICANT CHANGE UP (ref 17–32)
CREAT SERPL-MCNC: 0.6 MG/DL — LOW (ref 0.7–1.5)
GLUCOSE BLDC GLUCOMTR-MCNC: 133 MG/DL — HIGH (ref 70–99)
GLUCOSE BLDC GLUCOMTR-MCNC: 144 MG/DL — HIGH (ref 70–99)
GLUCOSE BLDC GLUCOMTR-MCNC: 211 MG/DL — HIGH (ref 70–99)
GLUCOSE SERPL-MCNC: 179 MG/DL — HIGH (ref 70–99)
HCT VFR BLD CALC: 31.1 % — LOW (ref 37–47)
HGB BLD-MCNC: 10.3 G/DL — LOW (ref 12–16)
MAGNESIUM SERPL-MCNC: 1.7 MG/DL — LOW (ref 1.8–2.4)
MCHC RBC-ENTMCNC: 28.9 PG — SIGNIFICANT CHANGE UP (ref 27–31)
MCHC RBC-ENTMCNC: 33.1 G/DL — SIGNIFICANT CHANGE UP (ref 32–37)
MCV RBC AUTO: 87.1 FL — SIGNIFICANT CHANGE UP (ref 81–99)
NRBC # BLD: 0 /100 WBCS — SIGNIFICANT CHANGE UP (ref 0–0)
PLATELET # BLD AUTO: 242 K/UL — SIGNIFICANT CHANGE UP (ref 130–400)
POTASSIUM SERPL-MCNC: 3.9 MMOL/L — SIGNIFICANT CHANGE UP (ref 3.5–5)
POTASSIUM SERPL-SCNC: 3.9 MMOL/L — SIGNIFICANT CHANGE UP (ref 3.5–5)
PROT SERPL-MCNC: 5.2 G/DL — LOW (ref 6–8)
RBC # BLD: 3.57 M/UL — LOW (ref 4.2–5.4)
RBC # FLD: 13.2 % — SIGNIFICANT CHANGE UP (ref 11.5–14.5)
SODIUM SERPL-SCNC: 134 MMOL/L — LOW (ref 135–146)
WBC # BLD: 4.77 K/UL — LOW (ref 4.8–10.8)
WBC # FLD AUTO: 4.77 K/UL — LOW (ref 4.8–10.8)

## 2021-01-27 PROCEDURE — 99233 SBSQ HOSP IP/OBS HIGH 50: CPT | Mod: CS

## 2021-01-27 PROCEDURE — 71045 X-RAY EXAM CHEST 1 VIEW: CPT | Mod: 26

## 2021-01-27 PROCEDURE — 99233 SBSQ HOSP IP/OBS HIGH 50: CPT

## 2021-01-27 PROCEDURE — 74177 CT ABD & PELVIS W/CONTRAST: CPT | Mod: 26

## 2021-01-27 PROCEDURE — 74018 RADEX ABDOMEN 1 VIEW: CPT | Mod: 26

## 2021-01-27 RX ORDER — SODIUM CHLORIDE 9 MG/ML
1000 INJECTION INTRAMUSCULAR; INTRAVENOUS; SUBCUTANEOUS
Refills: 0 | Status: DISCONTINUED | OUTPATIENT
Start: 2021-01-27 | End: 2021-01-28

## 2021-01-27 RX ORDER — HYDRALAZINE HCL 50 MG
10 TABLET ORAL ONCE
Refills: 0 | Status: DISCONTINUED | OUTPATIENT
Start: 2021-01-27 | End: 2021-01-27

## 2021-01-27 RX ORDER — IOHEXOL 300 MG/ML
30 INJECTION, SOLUTION INTRAVENOUS ONCE
Refills: 0 | Status: COMPLETED | OUTPATIENT
Start: 2021-01-27 | End: 2021-01-27

## 2021-01-27 RX ORDER — LABETALOL HCL 100 MG
10 TABLET ORAL ONCE
Refills: 0 | Status: COMPLETED | OUTPATIENT
Start: 2021-01-27 | End: 2021-01-27

## 2021-01-27 RX ORDER — MAGNESIUM SULFATE 500 MG/ML
2 VIAL (ML) INJECTION ONCE
Refills: 0 | Status: COMPLETED | OUTPATIENT
Start: 2021-01-27 | End: 2021-01-27

## 2021-01-27 RX ADMIN — VALACYCLOVIR 1000 MILLIGRAM(S): 500 TABLET, FILM COATED ORAL at 13:11

## 2021-01-27 RX ADMIN — CYCLOBENZAPRINE HYDROCHLORIDE 5 MILLIGRAM(S): 10 TABLET, FILM COATED ORAL at 05:39

## 2021-01-27 RX ADMIN — IOHEXOL 30 MILLILITER(S): 300 INJECTION, SOLUTION INTRAVENOUS at 13:22

## 2021-01-27 RX ADMIN — Medication 25 MILLIGRAM(S): at 05:39

## 2021-01-27 RX ADMIN — ENOXAPARIN SODIUM 40 MILLIGRAM(S): 100 INJECTION SUBCUTANEOUS at 21:50

## 2021-01-27 RX ADMIN — NYSTATIN CREAM 1 APPLICATION(S): 100000 CREAM TOPICAL at 16:27

## 2021-01-27 RX ADMIN — Medication 50 GRAM(S): at 10:09

## 2021-01-27 RX ADMIN — Medication 10 MILLIGRAM(S): at 17:00

## 2021-01-27 RX ADMIN — ISOSORBIDE MONONITRATE 60 MILLIGRAM(S): 60 TABLET, EXTENDED RELEASE ORAL at 11:02

## 2021-01-27 RX ADMIN — NYSTATIN CREAM 1 APPLICATION(S): 100000 CREAM TOPICAL at 05:39

## 2021-01-27 RX ADMIN — POLYETHYLENE GLYCOL 3350 17 GRAM(S): 17 POWDER, FOR SOLUTION ORAL at 05:39

## 2021-01-27 RX ADMIN — CYCLOBENZAPRINE HYDROCHLORIDE 5 MILLIGRAM(S): 10 TABLET, FILM COATED ORAL at 13:10

## 2021-01-27 RX ADMIN — SODIUM CHLORIDE 2 GRAM(S): 9 INJECTION INTRAMUSCULAR; INTRAVENOUS; SUBCUTANEOUS at 05:39

## 2021-01-27 RX ADMIN — POLYETHYLENE GLYCOL 3350 17 GRAM(S): 17 POWDER, FOR SOLUTION ORAL at 16:27

## 2021-01-27 RX ADMIN — Medication 81 MILLIGRAM(S): at 11:02

## 2021-01-27 RX ADMIN — VALACYCLOVIR 1000 MILLIGRAM(S): 500 TABLET, FILM COATED ORAL at 05:39

## 2021-01-27 RX ADMIN — SODIUM CHLORIDE 2 GRAM(S): 9 INJECTION INTRAMUSCULAR; INTRAVENOUS; SUBCUTANEOUS at 16:27

## 2021-01-27 RX ADMIN — Medication 20 MILLIGRAM(S): at 05:39

## 2021-01-27 RX ADMIN — Medication 10 MILLIGRAM(S): at 18:24

## 2021-01-27 NOTE — PROGRESS NOTE ADULT - ASSESSMENT
84 yo F presented for evaluation of fall followed by fever and confusion. Pt was found to have CT head findings concerning for subacute left cerebellar stroke (later ruled out on MRI), hyponatremia, and COVID pneumonia, Course further complicated by SBO.    [place]  [place] [id number]    Subacute SBO  - pt complaining of significant abdominal pain  - KUB ordered-pending official read  - if KUB is unchanged will resume clear diet, laxatives, and if ok with surgery enemas (pt requesting as she takes them at home)  - I offered pt pain meds today. She states she cannot swallow them. When asked if she cannot swallow then how is she eating and drinking she became quiet.   - had NGT but pt removed it herself    AMS  - resolved  - likely multifactorial due to hyponatremia, COVID pneumonia  - LP done which excluded herpes encephalitis  - MRI unremarkable   - could be due to COVID-19 encephalitis    Herpes Zoster  - c/w valcyclovir until 2/3/2021    Mild COVID-19 pneumonia  - not requiring supplemental o2  - SARS-COV2: positive 01/18  - Chest X Ray bilateral opacities  - CT chest IC (01/18) no consolidation or effusion  - Infectious Disease on board  - Monitor for fever - Tylenol PRN  - Monitor SaO2 and Oxygen Requirements: on RA 93%  - Inflammatory Markers:  CRP: 1.72 (01-18-21)  Ferritin: 85 (01-18-21)  D-Dimer: 494 (01-23-21)<-- 384 (01-18-21)  CK: 36 (01-18-21)  - No convalescent plasma, IV Tocilizumab, IV Remdesevir, or IV Dexamethasone administered  - c/w Anticoagulation Lovenox 40mg QD    Fall  - no evidence of LOC  - c/w physical therapy      HTN  - uncontrolled  - c/w enalapril, toprol, lasix  - add amlodipine      DM II  - FS controlled off insulin  - start insulin if FS persistently >180    CAD s/p PCI  - c/w Aspirin 81mg QD, Toprol, Lipitor 40mg QD, Imdur      History of Colon Cancer  - S/P resection  - Currently in remission     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 I explained the KUB results are still pending and if it shows obstruction we will contact surgery. She then insisted on pt being transferred to [facility]. She has an accepting surgeon Jesús Tellez. I explained that her insurance may not approve this transfer. I told her that someone from the team will update her if anything changes.  Disposition: Home___/SNF___/Other________/Unknown at this time________

## 2021-01-27 NOTE — PROGRESS NOTE ADULT - SUBJECTIVE AND OBJECTIVE BOX
NIKKY FRASER 85y Female  MRN#: [id number]         SUBJECTIVE  Patient is a 85y old Female who presents with a chief complaint of altered mental status (26 Jan 2021 20:02)  Currently admitted to medicine with the primary diagnosis of Fall, initial encounter    Patient reports having some abdominal pain. she was started yesterday on clear liquid diet. no bowel movement yet    OBJECTIVE  PAST MEDICAL & SURGICAL HISTORY    ALLERGIES:  No Known Allergies    MEDICATIONS:  STANDING MEDICATIONS  aspirin enteric coated 81 milliGRAM(s) Oral daily  atorvastatin Oral Tab/Cap - Peds 40 milliGRAM(s) Oral daily  chlorhexidine 4% Liquid 1 Application(s) Topical <User Schedule>  cyclobenzaprine Oral Tab/Cap - Peds 5 milliGRAM(s) Oral three times a day  dextrose 40% Gel 15 Gram(s) Oral once  dextrose 5%. 1000 milliLiter(s) IV Continuous <Continuous>  dextrose 5%. 1000 milliLiter(s) IV Continuous <Continuous>  dextrose 50% Injectable 25 Gram(s) IV Push once  dextrose 50% Injectable 12.5 Gram(s) IV Push once  dextrose 50% Injectable 25 Gram(s) IV Push once  enalapril 20 milliGRAM(s) Oral daily  enoxaparin Injectable 40 milliGRAM(s) SubCutaneous at bedtime  glucagon  Injectable 1 milliGRAM(s) IntraMuscular once  isosorbide   mononitrate ER Tablet (IMDUR) 60 milliGRAM(s) Oral daily  LORazepam   Injectable 1 milliGRAM(s) IV Push once  metoprolol succinate ER 25 milliGRAM(s) Oral daily  nystatin Cream 1 Application(s) Topical two times a day  polyethylene glycol 3350 17 Gram(s) Oral two times a day  senna 2 Tablet(s) Oral at bedtime  sodium chloride 2 Gram(s) Oral two times a day  valACYclovir 1000 milliGRAM(s) Oral every 8 hours    PRN MEDICATIONS  acetaminophen   Tablet .. 650 milliGRAM(s) Oral every 6 hours PRN      VITAL SIGNS: Last 24 Hours  T(C): 35.6 (27 Jan 2021 08:00), Max: 37.6 (26 Jan 2021 16:40)  T(F): 96.1 (27 Jan 2021 08:00), Max: 99.7 (26 Jan 2021 16:40)  HR: 119 (27 Jan 2021 08:00) (79 - 119)  BP: 168/82 (27 Jan 2021 08:00) (130/60 - 168/82)  BP(mean): --  RR: 19 (27 Jan 2021 08:00) (18 - 19)  SpO2: 95% (27 Jan 2021 08:00) (93% - 98%)    LABS:                        10.3   4.77  )-----------( 242      ( 27 Jan 2021 06:29 )             31.1     01-27    134<L>  |  97<L>  |  11  ----------------------------<  179<H>  3.9   |  24  |  0.6<L>    Ca    9.0      27 Jan 2021 06:29  Mg     1.7     01-27    TPro  5.2<L>  /  Alb  2.9<L>  /  TBili  0.7  /  DBili  x   /  AST  33  /  ALT  26  /  AlkPhos  51  01-27                  RADIOLOGY:          PHYSICAL EXAM:  General: in distress; Pallor (-), Icterus (-), Cyanosis (-), Clubbing (-)  HEENT: Pupils equal, round and reactive to light symmetrically, EOM - Normal bilaterally; Hearing - b/l normal; No external discharge noted, JVD (-), Lymphadenopathy(-)  PULM: Bilaterally equal but diminished breath sounds, no wheeze, rubs or crackles.   CVS: Normal S1 and S2, no murmurs, rubs, or gallops.   GI: Abdomen is distended, soft, sluggish bowel sounds (+), tender to deep palpation, large ventral hernia +  MSK: Edema (-), no joint or muscle tenderness  SKIN: Warm and well perfused, no rashes noted  NEURO:  Alert and Oriented x 3; Cranial Nerves are all grossly intact; Normal strength and sensation in all four extremities.      ASSESSMENT & PLAN  Case of an 85 year old female patient who was brought on 01/18 following an episode of fall on Saturday that was followed by fever and confusion, found to have subacute left cerebellar stroke, hyponatremia, and COVID pneumonia, admitted for investigations, management, and monitoring. Currently hemodynamically stable.    # Suacute intestinal obstruction - improving  - small bowel obstruction on CT A&P  - Serial abdominal exam  - KUB 1/25 showed contrast reaching colon which is sign of improvement  - Pain control PRN  - IVF, started liquid diet on 1/25. tolerated it but still having abdominal pain. will call surgery again for opinion  - laxatives ( no bowel movement yet)  - has large rt sided hernia and that was not considered operable by her surgeon in the past.  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  - Possible Subacute L Cerebellar Stroke on CT was ruled out on MRI        # COVID Infection  - SARS-COV2: positive 01/18  - Chest X Ray bilateral opacities  - CT chest IC (01/18) no consolidation or effusion  - Infectious Disease on board  - Monitor for fever - Tylenol PRN  - Monitor SaO2 and Oxygen Requirements: on RA 93%  - Inflammatory Markers:  CRP: 1.72 (01-18-21)  Ferritin: 85 (01-18-21)  D-Dimer: 494 (01-23-21)<-- 384 (01-18-21)  CK: 36 (01-18-21)  - No convalescent plasma, IV Tocilizumab, IV Remdesevir, or IV Dexamethasone  - Anticoagulation Lovenox 40mg QD    # Fall  - Could be 2ry to COVID/ stroke/ 1st degree AV block  - Trauma Workup negative on 01/18: CT C-cpine, XR pelvis, CT CAP IC (01/18): no fractures or dislocations  - Continue DVT prophylaxis, lovenox 40mg QD  - Ruled out Left cerebellar stroke on MR H without contrast performed on 01/18  - Ruled out 1st degree AV block evident on ECG (01/18) in ED: monitor HR  - No signs of seizure    # HTN  * Home: enalapril 20mg QD, Toprol 25mg QD, Lasix 20mg QD      # DM II  * Last Hba1c (01/18) 7.2  * Home metformin 850mg BID, Januvia 100mg QD  * For Diabetic neuropathy: home Lyrica 50mg BID      # CAD   * S/P PCI and 2 stents 2011  * Home Aspirin 81mg QD, Toprol, Lipitor 40mg QD, Imdur      # History of Colon Cancer  * S/P resection  * Currently in remission    # Abdominal Ventral Hernia  * Since 2018  - Wound care consulted    # 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

## 2021-01-27 NOTE — PROGRESS NOTE ADULT - SUBJECTIVE AND OBJECTIVE BOX
CHIEF COMPLAINT:    Patient is a 85y old  Female who presents with a chief complaint of altered mental status     INTERVAL HPI/OVERNIGHT EVENTS:    Patient seen and examined at bedside. No acute overnight events occurred.    ROS: Reports abdominal pain. All other systems are negative.    Vital Signs:    T(F): 96.1 (01-27-21 @ 08:00), Max: 99.7 (01-26-21 @ 16:40)  HR: 119 (01-27-21 @ 08:00) (79 - 119)  BP: 168/82 (01-27-21 @ 08:00) (130/60 - 168/82)  RR: 19 (01-27-21 @ 08:00) (18 - 19)  SpO2: 95% (01-27-21 @ 08:00) (93% - 98%)  I&O's Summary    26 Jan 2021 07:01  -  27 Jan 2021 07:00  --------------------------------------------------------  IN: 200 mL / OUT: 0 mL / NET: 200 mL      Daily     Daily   CAPILLARY BLOOD GLUCOSE      POCT Blood Glucose.: 133 mg/dL (27 Jan 2021 11:31)  POCT Blood Glucose.: 211 mg/dL (27 Jan 2021 08:05)      PHYSICAL EXAM:  GENERAL:  NAD  SKIN: No rashes or lesions  HEENT: Atraumatic. Normocephalic. Anicteric  NECK:  No JVD.   PULMONARY: No acessory muscle use  CVS: Rapid rate  ABDOMEN/GI: Very large right sided abdominal hernia present, dressed, nonreducible  EXTREMITIES:  No edema B/L LE.  NEUROLOGIC:  Non particaptory  PSYCH: Alert & oriented x 3, normal affect    Consultant(s) Notes Reviewed:  [x ] YES  [ ] NO    LABS:                        10.3   4.77  )-----------( 242      ( 27 Jan 2021 06:29 )             31.1     01-27    134<L>  |  97<L>  |  11  ----------------------------<  179<H>  3.9   |  24  |  0.6<L>    Ca    9.0      27 Jan 2021 06:29  Mg     1.7     01-27    TPro  5.2<L>  /  Alb  2.9<L>  /  TBili  0.7  /  DBili  x   /  AST  33  /  ALT  26  /  AlkPhos  51  01-27              RADIOLOGY & ADDITIONAL TESTS:  KUB report pending    Medications:  Standing  aspirin enteric coated 81 milliGRAM(s) Oral daily  atorvastatin Oral Tab/Cap - Peds 40 milliGRAM(s) Oral daily  chlorhexidine 4% Liquid 1 Application(s) Topical <User Schedule>  cyclobenzaprine Oral Tab/Cap - Peds 5 milliGRAM(s) Oral three times a day  dextrose 40% Gel 15 Gram(s) Oral once  dextrose 5%. 1000 milliLiter(s) IV Continuous <Continuous>  dextrose 5%. 1000 milliLiter(s) IV Continuous <Continuous>  dextrose 50% Injectable 25 Gram(s) IV Push once  dextrose 50% Injectable 12.5 Gram(s) IV Push once  dextrose 50% Injectable 25 Gram(s) IV Push once  enalapril 20 milliGRAM(s) Oral daily  enoxaparin Injectable 40 milliGRAM(s) SubCutaneous at bedtime  glucagon  Injectable 1 milliGRAM(s) IntraMuscular once  isosorbide   mononitrate ER Tablet (IMDUR) 60 milliGRAM(s) Oral daily  LORazepam   Injectable 1 milliGRAM(s) IV Push once  metoprolol succinate ER 25 milliGRAM(s) Oral daily  nystatin Cream 1 Application(s) Topical two times a day  polyethylene glycol 3350 17 Gram(s) Oral two times a day  senna 2 Tablet(s) Oral at bedtime  sodium chloride 2 Gram(s) Oral two times a day  valACYclovir 1000 milliGRAM(s) Oral every 8 hours    PRN Meds  acetaminophen   Tablet .. 650 milliGRAM(s) Oral every 6 hours PRN      Case discussed with resident  Care discussed with pt

## 2021-01-27 NOTE — PROVIDER CONTACT NOTE (OTHER) - SITUATION
PT /79 HR 88. PT refusing oral medications.
Pt with /102 . Pt NPO, s/p Lopressor 5mg IVP at approx 0600.
orders acknowledged, pt has no access and needs to be done via ultrasound
Pt noted with elevated blood pressure and heart rate. Pt denies headache, c/o abdominal pain. Pt refused NGT despite education and encouragement from staff and family.

## 2021-01-28 ENCOUNTER — TRANSCRIPTION ENCOUNTER (OUTPATIENT)
Age: 86
End: 2021-01-28

## 2021-01-28 VITALS
TEMPERATURE: 98 F | OXYGEN SATURATION: 99 % | DIASTOLIC BLOOD PRESSURE: 66 MMHG | SYSTOLIC BLOOD PRESSURE: 150 MMHG | HEART RATE: 99 BPM

## 2021-01-28 LAB
BASE EXCESS BLDA CALC-SCNC: 2 MMOL/L — SIGNIFICANT CHANGE UP (ref -2–2)
GAS PNL BLDA: SIGNIFICANT CHANGE UP
GLUCOSE BLDC GLUCOMTR-MCNC: 120 MG/DL — HIGH (ref 70–99)
GLUCOSE BLDC GLUCOMTR-MCNC: 129 MG/DL — HIGH (ref 70–99)
GLUCOSE BLDC GLUCOMTR-MCNC: 203 MG/DL — HIGH (ref 70–99)
HCO3 BLDA-SCNC: 25 MMOL/L — SIGNIFICANT CHANGE UP (ref 21–29)
HOROWITZ INDEX BLDA+IHG-RTO: 21 — SIGNIFICANT CHANGE UP
PCO2 BLDA: 33 MMHG — LOW (ref 38–42)
PH BLDA: 7.49 — HIGH (ref 7.38–7.42)
PO2 BLDA: 66 MMHG — LOW (ref 78–95)
SAO2 % BLDA: 93 % — SIGNIFICANT CHANGE UP (ref 92–96)
SARS-COV-2 RNA SPEC QL NAA+PROBE: DETECTED

## 2021-01-28 PROCEDURE — 74021 RADEX ABDOMEN 3+ VIEWS: CPT | Mod: 26

## 2021-01-28 PROCEDURE — 99232 SBSQ HOSP IP/OBS MODERATE 35: CPT

## 2021-01-28 PROCEDURE — 99233 SBSQ HOSP IP/OBS HIGH 50: CPT | Mod: CS

## 2021-01-28 RX ORDER — SENNA PLUS 8.6 MG/1
2 TABLET ORAL
Qty: 0 | Refills: 0 | DISCHARGE
Start: 2021-01-28

## 2021-01-28 RX ORDER — VALACYCLOVIR 500 MG/1
1 TABLET, FILM COATED ORAL
Qty: 0 | Refills: 0 | DISCHARGE
Start: 2021-01-28

## 2021-01-28 RX ORDER — HYDRALAZINE HCL 50 MG
10 TABLET ORAL ONCE
Refills: 0 | Status: COMPLETED | OUTPATIENT
Start: 2021-01-28 | End: 2021-01-28

## 2021-01-28 RX ORDER — LABETALOL HCL 100 MG
10 TABLET ORAL ONCE
Refills: 0 | Status: COMPLETED | OUTPATIENT
Start: 2021-01-28 | End: 2021-01-28

## 2021-01-28 RX ORDER — NYSTATIN CREAM 100000 [USP'U]/G
1 CREAM TOPICAL
Qty: 0 | Refills: 0 | DISCHARGE
Start: 2021-01-28

## 2021-01-28 RX ORDER — POLYETHYLENE GLYCOL 3350 17 G/17G
17 POWDER, FOR SOLUTION ORAL
Qty: 0 | Refills: 0 | DISCHARGE
Start: 2021-01-28

## 2021-01-28 RX ORDER — ENOXAPARIN SODIUM 100 MG/ML
40 INJECTION SUBCUTANEOUS
Qty: 0 | Refills: 0 | DISCHARGE
Start: 2021-01-28

## 2021-01-28 RX ADMIN — Medication 10 MILLIGRAM(S): at 10:16

## 2021-01-28 RX ADMIN — POLYETHYLENE GLYCOL 3350 17 GRAM(S): 17 POWDER, FOR SOLUTION ORAL at 17:09

## 2021-01-28 RX ADMIN — NYSTATIN CREAM 1 APPLICATION(S): 100000 CREAM TOPICAL at 17:09

## 2021-01-28 RX ADMIN — Medication 10 MILLIGRAM(S): at 05:31

## 2021-01-28 RX ADMIN — Medication 10 MILLIGRAM(S): at 12:46

## 2021-01-28 NOTE — PROVIDER CONTACT NOTE (OTHER) - ASSESSMENT
Pt c/o malaise and abdominal pain.
/74, HR 78. O2 100% on 2L NC.
/80, HR 96. Pt on phone with daughter Mahogany, stating she does not have pain at this time and will take blood pressure medication.
states she needs a suppository to use the bathroom.

## 2021-01-28 NOTE — CHART NOTE - NSCHARTNOTEFT_GEN_A_CORE
Called family, david  to relay plan per primary team AM rounds.
Called familydavid  to relay plan per primary team AM rounds
Patient seen and examined.  SBO noted on CT A/P.  NGT refused overnight.  Attempted  today without success.  Refusing NGT at this time.  Will ambulate/keep NPO and assess for improvement. DNR rescinded by daughter.
Pt endorsed mild abdominal discomfort and experienced one episode of bilious vomiting prior to CT scan, nausea/vomiting resolved w/ IV zofran. CT abdomen/pelvis (prelim read) demonstrated Diffusely dilated small bowel loops both within the abdominal cavity and within a partially imaged large right sided spigelian hernia suspicious for bowel obstruction. Suggestion of a transition point along the neck of the hernia defect. Pt made NPO. NGT offered, patient adamantly refused. Pt, at that time, endorsed wishes to be DNR/DNI w/ no desire for administration of NGT. Surgery consulted. IVF started. Daughter, Mahogany, informed.
Talked with the patient with [place]  (#[id number]) - I told the patient about the current diagnosis of SBO and the management which requires her to be NPO, IV fluids and an NG tube to decompress the intestines, I told her the risk of perforation and sepsis and even death if the obstruction is not relieved. The patient understands the risk but still adamantly refusing NG tube. I called the daughter Mahogany ([phone]) and had her talk with the patient about the importance of putting NG tube and suction. The patient still refusing. The daughter is a [employer] staff and has been immunized against COVID 19, daughter is requesting that is she is allowed to the patient's room she will be able to convince her to get an NG tube. Tried calling ADN([phone])- for possible exception for patient's daughter - no pickup.
Admit date 2021    85 yr F Cambodian speaking with CAD s/p PCI 10 yr ago (2 stents), HTN, DM, abd wall hernia, hx of colon cancer s/p resection, recent discharge from [facility] due to shingle was brought due to confusion.  Hx taken from, the [facility] at bedside, Mahogany,  the pt was discharged from [facility] on Friday, she was diagnosed with shingle and discharged on Valacyclovir 1gram qd for 7 days, on Saturday morning the pt  fell on the ground from the sofa while she was rtying to adjust her postilion, she was seen by her daughter and she didnt have LOC, syncope or seizure and remained stable, but during the day she had fever, she didnt sleep well, and when the daughter checked her she was confused, pt as baseline was with normal mental status a/o x4, but the daughter found her confused,  she was making non comprehensive talk, and speaking slowly, didnt recognize her daughter, didnt know her name or where she was, so the daughter brought her to ER.  there is no cough, SOB, headache, mneck stoffness, legs swelling, N/V, diarrhea, urinary symptoms,   in ER; labs showed covid +ve, UA wnl, CTH negative, Na 128, admitted for evaluation of AMS    Hospital course significant for the following    AMS  - resolved  - likely multifactorial due to hyponatremia, COVID pneumonia  - LP done which excluded herpes encephalitis  - MRI unremarkable   - could be due to COVID-19 encephalitis    Subacute SBO (hx of hernia and colon ca s/p resection)  - pt complaining of significant abdominal pain  - KUB showed dilated loops of bowel  - CT abdomen with PO and IV contrast showed the followin.  Redemonstration of findings of ongoing small bowel obstruction. Large right anterolateral abdominal wall hernia with multiple bowel entry points. Decompressed distal small bowel also seen in the hernia sac. Discrete transition point is difficult to identify. Note is made of a separate more inferiorly very narrow necked hernia containing a loop of dilated small bowel. This may possibly be acting as the transition point as more distal small bowel appears to be decompressed but is difficult to trace. Oral contrast seen in the colon from prior administration. Oral contrast from the current study seen in proximal small bowel.  2.  Subcentimeter hepatic lesions, possibly hemangiomas but indeterminate on the basis of this CT. This can be evaluated with nonemergent outpatient MRI.    - Patient was refusing NG tube  - Surgery team on board --> no surgical intervention. start clear liquid diet and laxatives and suppository PRN. Patient tolerated clears, and had bowel movement on . daughter wants transfer to another hospital for a different surgical opinion    Herpes Zoster  - c/w valcyclovir until 2/3/2021  - no herpes encephelitis    Mild COVID-19 pneumonia  - PRN oxygen nasal canula  - SARS-COV2: positive   - Chest X Ray bilateral opacities  - CT chest IC () no consolidation or effusion  - Infectious Disease on board  - Monitor for fever - Tylenol PRN  - Monitor SaO2 and Oxygen Requirements: on RA 93%  - Inflammatory Markers:  CRP: 1.72 (21)  Ferritin: 85 (21)  D-Dimer: 494 (21)<-- 384 (21)  CK: 36 (21)  - No convalescent plasma, IV Tocilizumab, IV Remdesevir, or IV Dexamethasone administered since mild disease  - c/w Anticoagulation Lovenox 40mg QD    Fall  - no evidence of LOC  - c/w physical therapy  - trauma workup negative      HTN  - uncontrolled  - c/w enalapril, toprol, lasix  - add amlodipine  - patient not compliant with PO meds requiring IV pushes of labetolol at times      DM II  - FS controlled off insulin  - start insulin if FS persistently >180    CAD s/p PCI  - c/w Aspirin 81mg QD, Toprol, Lipitor 40mg QD, Imdur      History of Colon Cancer  - S/P resection  - Currently in remission     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
Called family, david  to relay plan per primary team AM rounds.
Received call by primary team for increasing abdominal pain. Patient was started on CLD and laxatives yesterday, and was tolerating per primary team. Then this morning patient started complaining of diffuse abdominal pain. Patient had not had any bowel movements, nausea or vomiting. Patient seen and examined with chief resident. Found to be tender in the hernia sac. Patient had repeat KUB this morning that showed an increase in bowel dilatation. Recommended obtaining a CT with PO and IV contrast and waiting 6 hours after starting oral contrast. CT reviewed with Dr. Taveras. Recommend suppository at this time and AM obstructive series. No acute surgical intervention.       < from: CT Abdomen and Pelvis w/ Oral Cont and w/ IV Cont (01.27.21 @ 16:23) >  IMPRESSION:  1.  Redemonstration of findings of ongoing small bowel obstruction. Large right anterolateral abdominal wall hernia with multiple bowel entry points. Decompressed distal small bowel also seen in the hernia sac. Discrete transition point is difficult to identify. Note is made of a separate more inferiorly very narrow necked hernia containing a loop of dilated small bowel. This may possibly be acting as the transition point as more distal small bowel appears to be decompressed but is difficult to trace. Oral contrast seen in the colon from prior administration. Oral contrast from the current study seen in proximal small bowel.  2.  Subcentimeter hepatic lesions, possibly hemangiomas but indeterminate on the basis of this CT. This can be evaluated with nonemergent outpatient MRI.  < end of copied text >
Received call from pt's daughter. Resident previously updated her regarding CT scan. She was agreeable to CT scan and staying at [facility] for CT scan. Now she wants pt to be transferred immediately. I explained we are currently awaiting CT scan and she already drank contrast so would be unwise to transfer with pending CT scan. SHe is agreeable and is aware I will call accepting physician in AM.
communication team      Called family, david  to relay plan per primary team AM rounds

## 2021-01-28 NOTE — PROGRESS NOTE ADULT - SUBJECTIVE AND OBJECTIVE BOX
NEPHROLOGY FOLLOW UP NOTE    events noted  for transfer   Na+ slightly low      PAST MEDICAL & SURGICAL HISTORY:    Allergies:  No Known Allergies    Home Medications Reviewed    SOCIAL HISTORY:  Denies ETOH,Smoking,   FAMILY HISTORY:        REVIEW OF SYSTEMS:  All other review of systems is negative unless indicated above.    PHYSICAL EXAM:  Constitutional: NAD  HEENT: anicteric sclera, oropharynx clear, MMM  Neck: No JVD  Respiratory: CTAB, no wheezes, rales or rhonchi  Cardiovascular: S1, S2, RRR  Gastrointestinal: BS+, soft, NT/ND  Extremities: No cyanosis or clubbing. No peripheral edema  Neurological: confused  Psychiatric: Normal mood, normal affect  : No CVA tenderness. No masterson.   Skin: No rashes    Hospital Medications:   MEDICATIONS  (STANDING):  aspirin enteric coated 81 milliGRAM(s) Oral daily  atorvastatin Oral Tab/Cap - Peds 40 milliGRAM(s) Oral daily  chlorhexidine 4% Liquid 1 Application(s) Topical <User Schedule>  cyclobenzaprine Oral Tab/Cap - Peds 5 milliGRAM(s) Oral three times a day  dextrose 40% Gel 15 Gram(s) Oral once  dextrose 5%. 1000 milliLiter(s) (50 mL/Hr) IV Continuous <Continuous>  dextrose 5%. 1000 milliLiter(s) (100 mL/Hr) IV Continuous <Continuous>  dextrose 50% Injectable 25 Gram(s) IV Push once  dextrose 50% Injectable 12.5 Gram(s) IV Push once  dextrose 50% Injectable 25 Gram(s) IV Push once  enalapril 20 milliGRAM(s) Oral daily  enoxaparin Injectable 40 milliGRAM(s) SubCutaneous at bedtime  glucagon  Injectable 1 milliGRAM(s) IntraMuscular once  isosorbide   mononitrate ER Tablet (IMDUR) 60 milliGRAM(s) Oral daily  LORazepam   Injectable 1 milliGRAM(s) IV Push once  metoprolol succinate ER 25 milliGRAM(s) Oral daily  nystatin Cream 1 Application(s) Topical two times a day  polyethylene glycol 3350 17 Gram(s) Oral two times a day  senna 2 Tablet(s) Oral at bedtime  sodium chloride 2 Gram(s) Oral two times a day  sodium chloride 0.9%. 1000 milliLiter(s) (75 mL/Hr) IV Continuous <Continuous>  valACYclovir 1000 milliGRAM(s) Oral every 8 hours        VITALS:  T(F): 98 (01-28-21 @ 16:49), Max: 98.9 (01-28-21 @ 12:00)  HR: 99 (01-28-21 @ 16:49)  BP: 150/66 (01-28-21 @ 16:49)  RR: 19 (01-28-21 @ 12:00)  SpO2: 99% (01-28-21 @ 16:49)  Wt(kg): --    01-26 @ 07:01  -  01-27 @ 07:00  --------------------------------------------------------  IN: 200 mL / OUT: 0 mL / NET: 200 mL    01-27 @ 07:01  -  01-28 @ 07:00  --------------------------------------------------------  IN: 570 mL / OUT: 0 mL / NET: 570 mL    01-28 @ 07:01  -  01-28 @ 18:12  --------------------------------------------------------  IN: 120 mL / OUT: 0 mL / NET: 120 mL          LABS:  01-27    134<L>  |  97<L>  |  11  ----------------------------<  179<H>  3.9   |  24  |  0.6<L>    Ca    9.0      27 Jan 2021 06:29  Mg     1.7     01-27    TPro  5.2<L>  /  Alb  2.9<L>  /  TBili  0.7  /  DBili      /  AST  33  /  ALT  26  /  AlkPhos  51  01-27                          10.3   4.77  )-----------( 242      ( 27 Jan 2021 06:29 )             31.1       Urine Studies:        RADIOLOGY & ADDITIONAL STUDIES:

## 2021-01-28 NOTE — DISCHARGE NOTE PROVIDER - CARE PROVIDER_API CALL
Fer Taveras)  Surgery  [address] [place] [address]  Phone: [phone]  Fax: [phone]  Follow Up Time: 1 week    MAP, Clinic  please call map clinic to get an appointment with primary care doctor  Phone: (   )    -  Fax: (   )    -  Follow Up Time: 1 week    Russel Doll  INTERNAL MEDICINE  [address] [address]  Phone: [phone]  Fax: [phone]  Follow Up Time: 1 week

## 2021-01-28 NOTE — DISCHARGE NOTE PROVIDER - CARE PROVIDERS DIRECT ADDRESSES
[email or web address],[email or web address],DirectAddress_Unknown

## 2021-01-28 NOTE — PROVIDER CONTACT NOTE (OTHER) - ACTION/TREATMENT ORDERED:
As per MD, reassess BP at midnight. Will continue to monitor.
MD stated that he would get access
MD to place order for IV BP medication.
MD to review chart and order interventions.
MD to order IVP anti-hypertensive.
MD to place order for IV BP medication.
MD to place order for hydralazine IV
KUB performed. Test needs to be read before putting in suppository

## 2021-01-28 NOTE — PROGRESS NOTE ADULT - ASSESSMENT
85y Female patient   Patient seen and examined at bedside. NAD.   Diet, NPO:   Except Medications (01-27-21 @ 12:22)  ambulating +  BM/g +  voiding +  pain, c/o pain    Plan:  - NPO with sips  - cont bowel reg  - stable/cleared for txf to other hospital, unlikely to fair well with a large open abdominal surgery though  - Monitor vitals  - Monitor labs and replete as necessary  - Monitor for bowel function  - Continue Pain Medications if necessary  - Continue Antibiotics if necessary  - Encourage ambulation as tolerated  - Monitor urine output  - DVT and GI Prophylaxis    Date/Time: 01-28-21 @ 10:43

## 2021-01-28 NOTE — PROVIDER CONTACT NOTE (OTHER) - DATE AND TIME:
19-Jan-2021 09:26
27-Jan-2021 08:45
23-Jan-2021 04:30
27-Jan-2021 16:00
23-Jan-2021 07:14
28-Jan-2021 12:33
27-Jan-2021 22:04
28-Jan-2021 09:19

## 2021-01-28 NOTE — PROVIDER CONTACT NOTE (OTHER) - REASON
BP
Refused oral medication
Abd discomfot
BP
PT BP
mag orders
Hypertension
Pt hypertensive and tachycardic, NPO with 0600 medication

## 2021-01-28 NOTE — DISCHARGE NOTE PROVIDER - NSDCMRMEDTOKEN_GEN_ALL_CORE_FT
aspirin 81 mg oral tablet: 1 tab(s) orally once a day  bisacodyl 10 mg rectal suppository: 1 suppository(ies) rectal once a day, As needed, Constipation  cyclobenzaprine 5 mg oral tablet: 1 tab(s) orally 3 times a day  enalapril 20 mg oral tablet: 1 tab(s) orally once a day  enoxaparin: 40 milligram(s) subcutaneous once a day (at bedtime)  isosorbide mononitrate 60 mg oral tablet, extended release: 1 tab(s) orally once a day (in the morning)  Januvia 100 mg oral tablet: 1 tab(s) orally once a day  Lasix 20 mg oral tablet: 1 tab(s) orally once a day, As Needed  legs edema  Lipitor 40 mg oral tablet: 1 tab(s) orally once a day  Lyrica 50 mg oral capsule: 1 cap(s) orally 2 times a day  metFORMIN 850 mg oral tablet: 1 tab(s) orally 2 times a day  metoprolol succinate 25 mg oral tablet, extended release: 1 tab(s) orally once a day  nystatin 100,000 units/g topical cream: 1 application topically 2 times a day  polyethylene glycol 3350 oral powder for reconstitution: 17 gram(s) orally 2 times a day  senna oral tablet: 2 tab(s) orally once a day (at bedtime)  valACYclovir 1 g oral tablet: 1 tab(s) orally every 8 hours   aspirin 81 mg oral tablet: 1 tab(s) orally once a day  bisacodyl 10 mg rectal suppository: 1 suppository(ies) rectal once a day, As needed, Constipation  cyclobenzaprine 5 mg oral tablet: 1 tab(s) orally 3 times a day  enalapril 20 mg oral tablet: 1 tab(s) orally once a day  enoxaparin: 40 milligram(s) subcutaneous once a day (at bedtime)  isosorbide mononitrate 60 mg oral tablet, extended release: 1 tab(s) orally once a day (in the morning)  Lasix 20 mg oral tablet: 1 tab(s) orally once a day, As Needed  legs edema  Lipitor 40 mg oral tablet: 1 tab(s) orally once a day  Lyrica 50 mg oral capsule: 1 cap(s) orally 2 times a day  metoprolol succinate 25 mg oral tablet, extended release: 1 tab(s) orally once a day  nystatin 100,000 units/g topical cream: 1 application topically 2 times a day  polyethylene glycol 3350 oral powder for reconstitution: 17 gram(s) orally 2 times a day  senna oral tablet: 2 tab(s) orally once a day (at bedtime)  valACYclovir 1 g oral tablet: 1 tab(s) orally every 8 hours

## 2021-01-28 NOTE — PROVIDER CONTACT NOTE (OTHER) - BACKGROUND
Pt with possible SBO, refusing NGT.
Russian  used to educate patient on importance of taking oral BP medications for high BP this morning. Patient refused to take oral medication after educating patient twice on taking meds.
h/o HTN
 phone utilized. Patient c/o of abdominal discomfort. Patient offered pain medication but patient is refusing to take medication. patient said she needs to use the bathroom. Pts daughter

## 2021-01-28 NOTE — DISCHARGE NOTE PROVIDER - HOSPITAL COURSE
Admit date 2021. Plan to transfer to [facility]    85 yr F [place] speaking with CAD s/p PCI 10 yr ago (2 stents), HTN, DM, abd wall hernia, hx of colon cancer s/p resection, recent discharge from [facility] due to shingle was brought due to confusion.  [facility] [place],  the pt was discharged from [facility] on Friday, she was diagnosed with shingle and discharged on Valacyclovir 1gram qd for 7 days, on Saturday morning the pt  fell on the ground from the sofa while she was rtying to adjust her postilion, she was seen by her daughter and she didnt have LOC, syncope or seizure and remained stable, but during the day she had fever, she didnt sleep well, and when the daughter checked her she was confused, pt as baseline was with normal mental status a/o x4, but the daughter found her confused,  she was making non comprehensive talk, and speaking slowly, didnt recognize her daughter, didnt know her name or where she was, so the daughter brought her to ER.  there is no cough, SOB, headache, mneck stoffness, legs swelling, N/V, diarrhea, urinary symptoms,   in ER; labs showed covid +ve, UA wnl, CTH negative, Na 128, admitted for evaluation of AMS    Hospital course significant for the following    AMS  - resolved  - likely multifactorial due to hyponatremia, COVID pneumonia  - LP done which excluded herpes encephalitis  - MRI unremarkable   - could be due to COVID-19 encephalitis    Subacute SBO (hx of hernia and colon ca s/p resection)  - pt complaining of significant abdominal pain  - KUB showed dilated loops of bowel  - CT abdomen with PO and IV contrast showed the followin.  Redemonstration of findings of ongoing small bowel obstruction. Large right anterolateral abdominal wall hernia with multiple bowel entry points. Decompressed distal small bowel also seen in the hernia sac. Discrete transition point is difficult to identify. Note is made of a separate more inferiorly very narrow necked hernia containing a loop of dilated small bowel. This may possibly be acting as the transition point as more distal small bowel appears to be decompressed but is difficult to trace. Oral contrast seen in the colon from prior administration. Oral contrast from the current study seen in proximal small bowel.  2.  Subcentimeter hepatic lesions, possibly hemangiomas but indeterminate on the basis of this CT. This can be evaluated with nonemergent outpatient MRI.    - Patient was refusing NG tube  - Surgery team on board --> no surgical intervention. start clear liquid diet and laxatives and suppository PRN. Patient tolerated clears, and had bowel movement on . daughter wants transfer to another hospital for a different surgical opinion    Herpes Zoster  - c/w valcyclovir until 2/3/2021  - no herpes encephelitis    Mild COVID-19 pneumonia  - PRN oxygen nasal canula  - SARS-COV2: positive   - Chest X Ray bilateral opacities  - CT chest IC () no consolidation or effusion  - Infectious Disease on board  - Monitor for fever - Tylenol PRN  - Monitor SaO2 and Oxygen Requirements: on RA 93%  - Inflammatory Markers:  CRP: 1.72 (21)  Ferritin: 85 (21)  D-Dimer: 494 (21)<-- 384 (21)  CK: 36 (21)  - No convalescent plasma, IV Tocilizumab, IV Remdesevir, or IV Dexamethasone administered since mild disease  - c/w Anticoagulation Lovenox 40mg QD    Fall  - no evidence of LOC  - c/w physical therapy  - trauma workup negative      HTN  - uncontrolled  - c/w enalapril, toprol, lasix  - add amlodipine  - patient not compliant with PO meds requiring IV pushes of labetolol at times      DM II  - FS controlled off insulin  - start insulin if FS persistently >180    CAD s/p PCI  - c/w Aspirin 81mg QD, Toprol, Lipitor 40mg QD, Imdur      History of Colon Cancer  - S/P resection  - Currently in remission     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 Plan to transfer to [facility] as per pt and daughter's request    85 yr F [place] speaking with CAD s/p PCI 10 yr ago (2 stents), HTN, DM, abd wall hernia, hx of colon cancer s/p resection, recent discharge from [facility] due to shingle was brought due to confusion.  Hx taken from, the [facility] at bedside, [place], the pt was discharged from [facility] on several days prior to presentation, she was diagnosed with shingle and discharged on Valacyclovir 1gram qd for 7 days, on Saturday morning the pt fell on the ground from the sofa while she was rtying to adjust her postilion, she was seen by her daughter and she didnt have LOC, syncope or seizure and remained stable, but during the day she had fever, she didnt sleep well, and when the daughter checked her she was confused, pt as baseline was with normal mental status a/o x4, but the daughter found her confused, she was making non comprehensive talk, and speaking slowly, didnt recognize her daughter, didnt know her name or where she was, so the daughter brought her to ER.there is no cough, SOB, headache, mneck stoffness, legs swelling, N/V, diarrhea, urinary symptoms,   in ER; labs showed covid +ve, UA wnl, CTH negative, Na 128, admitted for evaluation of AMS    Hospital course significant for the following    AMS  - resolved  - likely multifactorial due to hyponatremia, COVID pneumonia  - LP done which excluded herpes encephalitis  - MRI unremarkable   - could be due to COVID-19 encephalitis    Subacute SBO (hx of hernia and colon ca s/p resection)  - pt complaining of significant abdominal pain  - KUB showed dilated loops of bowel  - CT abdomen with PO and IV contrast showed the followin.  Redemonstration of findings of ongoing small bowel obstruction. Large right anterolateral abdominal wall hernia with multiple bowel entry points. Decompressed distal small bowel also seen in the hernia sac. Discrete transition point is difficult to identify. Note is made of a separate more inferiorly very narrow necked hernia containing a loop of dilated small bowel. This may possibly be acting as the transition point as more distal small bowel appears to be decompressed but is difficult to trace. Oral contrast seen in the colon from prior administration. Oral contrast from the current study seen in proximal small bowel.  2.  Subcentimeter hepatic lesions, possibly hemangiomas but indeterminate on the basis of this CT. This can be evaluated with nonemergent outpatient MRI.    - Patient was refusing NG tube  - Surgery team on board --> no surgical intervention. NPO and laxatives and suppository PRN. Patient tolerated clears, and had bowel movement on . daughter wants transfer to another hospital for a different surgical opinion    Herpes Zoster  - c/w valcyclovir until 2/3/2021  - no herpes encephelitis    Mild COVID-19 pneumonia  - PRN oxygen nasal canula  - SARS-COV2: positive   - Chest X Ray bilateral opacities  - CT chest IC () no consolidation or effusion  - Infectious Disease on board  - Monitor for fever - Tylenol PRN  - Monitor SaO2 and Oxygen Requirements: on RA 93%  - Inflammatory Markers:  CRP: 1.72 (21)  Ferritin: 85 (21)  D-Dimer: 494 (21)<-- 384 (21)  CK: 36 (21)  - No convalescent plasma, IV Tocilizumab, IV Remdesevir, or IV Dexamethasone administered since mild disease  - c/w Anticoagulation Lovenox 40mg QD    Fall  - no evidence of LOC  - c/w physical therapy  - trauma workup negative      HTN  - uncontrolled  - c/w enalapril, toprol, lasix  - add amlodipine  - patient not compliant with PO meds requiring IV pushes of labetolol at times      DM II  - FS controlled off insulin  - start insulin if FS persistently >180    CAD s/p PCI  - c/w Aspirin 81mg QD, Toprol, Lipitor 40mg QD, Imdur      History of Colon Cancer  - S/P resection  - Currently in remission     Enlarged Left thyroid and Absent Right thyroid  - outpt endocrine consult    Hyponatremia  - resolved  - c/w salt tabs    Cutaneous candida infection groin  - c/w nystatin

## 2021-01-28 NOTE — CHART NOTE - NSCHARTNOTESELECT_GEN_ALL_CORE
Event Note
Event Note
Transfer Note
communication team/Event Note
communication team/Event Note
Event Note
communication team/Event Note

## 2021-01-28 NOTE — PROGRESS NOTE ADULT - ATTENDING COMMENTS
Patient seen and examined with surgery PA on rounds and discussed management plans. patient states had 2 BM denies any abd pain. No vomiting. wants to eat. abd xray reviewed some residual small bowel. Hernia soft nontender. Spoke to patient daughter [place] on phone and with med resident. Patient does not need emergency surgery now as obstruction is resolving. Patient can be started on liquids. Patient states she wants to go home and not to other hospital. Patient was awaiting transfer to [facility].

## 2021-01-28 NOTE — PROGRESS NOTE ADULT - ASSESSMENT
84 yo F presented for evaluation of fall followed by fever and confusion. Pt was found to have CT head findings concerning for subacute left cerebellar stroke (later ruled out on MRI), hyponatremia, and COVID pneumonia, Course further complicated by SBO.    [place]  [place] [id number]    SBO  - confirmed on CT abdomen  - surgery follow up appreciated-laxatives and enemas  - family requesting transfer to [facility] for further surgical management--awaiting COVID + bed      AMS  - resolved  - likely multifactorial due to hyponatremia, COVID pneumonia  - LP done which excluded herpes encephalitis  - MRI unremarkable   - could be due to COVID-19 encephalitis    Herpes Zoster  - c/w valcyclovir until 2/3/2021    Mild COVID-19 pneumonia  - on 2L NC  - SARS-COV2: positive 01/18  - Chest X Ray bilateral opacities  - CT chest IC (01/18) no consolidation or effusion  - Infectious Disease on board  - Monitor for fever - Tylenol PRN  - Monitor SaO2 and Oxygen Requirements: on RA 93%  - Inflammatory Markers:  CRP: 1.72 (01-18-21)  Ferritin: 85 (01-18-21)  D-Dimer: 494 (01-23-21)<-- 384 (01-18-21)  CK: 36 (01-18-21)  - No convalescent plasma, IV Tocilizumab, IV Remdesevir, or IV Dexamethasone administered  - c/w Anticoagulation Lovenox 40mg QD    Fall  - no evidence of LOC  - c/w physical therapy      HTN  - improved control  - pt refusing all PO meds  - IV antihypertensives (labetolol) administered    DM II  - FS controlled off insulin  - start insulin if FS persistently >180    CAD s/p PCI  - c/w Aspirin 81mg QD, Toprol, Lipitor 40mg QD, Imdur    History of Colon Cancer  - S/P resection  - Currently in remission     Enlarged Left thyroid and Absent Right thyroid  - outpt endocrine consult    Hyponatremia  - resolved  - c/w salt tabs    Cutaneous candida infection groin  - c/w nystatin    Others  - DVT Prophylaxis: Lovenox 40mg   - GI Prophylaxis: PPI  - Diet: clears  - Code Status: Full Code      Progress Note Handoff:  Pending (specify):  Transfer to [facility]   Family discussion: Discussed with daughter david this morning-aware of CT results and pending transfer to [facility]  Disposition: Home___/SNF___/Other________/Unknown at this time____x____   84 yo F presented for evaluation of fall followed by fever and confusion. Pt was found to have CT head findings concerning for subacute left cerebellar stroke (later ruled out on MRI), hyponatremia, and COVID pneumonia, Course further complicated by SBO. ****Pt refusing to take medications and refused blood draw this morning****    [place]  [place] [id number]    SBO  - confirmed on CT abdomen  - surgery follow up appreciated-laxatives and enemas  - family requesting transfer to [facility] for further surgical management--awaiting COVID + bed      AMS  - resolved  - likely multifactorial due to hyponatremia, COVID pneumonia  - LP done which excluded herpes encephalitis  - MRI unremarkable   - could be due to COVID-19 encephalitis    Herpes Zoster  - c/w valcyclovir until 2/3/2021    Mild COVID-19 pneumonia  - on 2L NC  - SARS-COV2: positive 01/18  - Chest X Ray bilateral opacities  - CT chest IC (01/18) no consolidation or effusion  - Infectious Disease on board  - Monitor for fever - Tylenol PRN  - Monitor SaO2 and Oxygen Requirements: on RA 93%  - Inflammatory Markers:  CRP: 1.72 (01-18-21)  Ferritin: 85 (01-18-21)  D-Dimer: 494 (01-23-21)<-- 384 (01-18-21)  CK: 36 (01-18-21)  - No convalescent plasma, IV Tocilizumab, IV Remdesevir, or IV Dexamethasone administered  - c/w Anticoagulation Lovenox 40mg QD    Herpes Zoster  - c/w valacyclovir    Fall  - no evidence of LOC  - c/w physical therapy      HTN  - improved control  - pt refusing all PO meds  - IV antihypertensives (labetolol) administered    DM II  - FS controlled off insulin  - start insulin if FS persistently >180    CAD s/p PCI  - c/w Aspirin 81mg QD, Toprol, Lipitor 40mg QD, Imdur    History of Colon Cancer  - S/P resection  - Currently in remission     Enlarged Left thyroid and Absent Right thyroid  - outpt endocrine consult    Hyponatremia  - resolved  - c/w salt tabs    Cutaneous candida infection groin  - c/w nystatin    Others  - DVT Prophylaxis: Lovenox 40mg   - GI Prophylaxis: PPI  - Diet: clears  - Code Status: Full Code      Progress Note Handoff:  Pending (specify):  Transfer to [facility]   Family discussion: Discussed with daughter david this morning-aware of CT results and pending transfer to [facility]  Disposition: Home___/SNF___/Other________/Unknown at this time____x____

## 2021-01-28 NOTE — PROGRESS NOTE ADULT - PROVIDER SPECIALTY LIST ADULT
Hospitalist
Nephrology
Surgery
Internal Medicine
Surgery
Internal Medicine
Nephrology
Surgery
Nephrology
Infectious Disease
Infectious Disease

## 2021-01-28 NOTE — DISCHARGE NOTE PROVIDER - NSDCCPCAREPLAN_GEN_ALL_CORE_FT
PRINCIPAL DISCHARGE DIAGNOSIS  Diagnosis: Bowel obstruction  Assessment and Plan of Treatment: you were found to have small bowel obstruction as a complication from  CT scan done showed ongoing small bowel obstruction. Large right anterolateral abdominal wall hernia with multiple bowel entry points. Decompressed distal small bowel also seen in the hernia sac. Discrete transition point is difficult to identify. Note is made of a separate more inferiorly very narrow necked hernia containing a loop of dilated small bowel. This may possibly be acting as the transition point as more distal small bowel appears to be decompressed but is difficult to trace. Oral contrast seen in the colon from prior administration. Oral contrast from the current study seen in proximal small bowel.  Please fu with surgery for further recommendations. please continue your laxatives as tolerated      SECONDARY DISCHARGE DIAGNOSES  Diagnosis: HTN (hypertension)  Assessment and Plan of Treatment: Hypertension  Hypertension, commonly called high blood pressure, is when the force of blood pumping through your arteries is too strong. Hypertension forces your heart to work harder to pump blood. Your arteries may become narrow or stiff. Having untreated or uncontrolled hypertension for a long period of time can cause heart attack, stroke, kidney disease, and other problems. If started on a medication, take exactly as prescribed by your health care professional. Maintain a healthy lifestyle and follow up with your primary care physician.  SEEK IMMEDIATE MEDICAL CARE IF YOU HAVE ANY OF THE FOLLOWING SYMPTOMS: severe headache, confusion, chest pain, abdominal pain, vomiting, or shortness of breath.      Diagnosis: Fall  Assessment and Plan of Treatment: Fall prevention includes ways to make your home and other areas safer. It also includes ways you can move more carefully to prevent a fall. Health conditions that cause changes in your blood pressure, vision, or muscle strength and coordination may increase your risk for falls. Medicines may also increase your risk for falls if they make you dizzy, weak, or sleepy.  Seek Medical Attention If:  You have fallen and are unconscious.  fallen and cannot move part of your body.  You have fallen and have pain or a headache.  Fall prevention tips:  Stand or sit up slowly.  Use assistive devices as directed.   You may need to have grab bars put in your bathroom near the toilet or in the shower.  Wear shoes that fit well and have soles that . Wear shoes both inside and outside. Do not wear shoes with high heels.  Wear a personal alarm that can call 911 in an emergency.   Manage your medical conditions. Keep all appointments with your healthcare providers. Visit your eye doctor as directed.  Home safety tips:   Put nonslip strips on your bath or shower floor to prevent you from   Use a shower seat so you do not need to stand while you shower. Sit on the toilet or a chair in your bathroom to dry yourself and put on clothing. This will prevent you from losing your balance from drying or dressing yourself while you are standing.  Keep paths clear. Remove books, shoes, and other objects from walkways and stairs. Place cords for telephones and lamps out of the way so that you do not need to walk over them. Tape them down if you cannot move them. Remove small rugs or secure it with double-sided tape to prevent you from   Install bright lights in your home. Use night lights to help light paths to the bathroom or kitchen. Always turn on the light before you start walking.  Keep items you use often on shelves within reach. Do not use a step stool to help you reach an item.  Place reflective tape on the edges of your stairs. To see better.      Diagnosis: COVID-19  Assessment and Plan of Treatment: Coronavirus disease 2019 (COVID-19) is a respiratory illness  that can spread from person to person. The virus that causes  COVID-19 is a novel coronavirus that was first identified during  an investigation into an outbreak in [place], [place].  The virus that causes COVID-19 probably emerged from an  animal source, but is now spreading from person to person.  The virus is thought to spread mainly between people who  are in close contact with one another (within about 6 feet)  through respiratory droplets produced when an infected  person coughs or sneezes. It also may be possible that a person  can get COVID-19 by touching a surface or object that has  the virus on it and then touching their own mouth, nose, or  possibly their eyes, but this is not thought to be the main  way the virus spreads.  Please stay home and avoid contact with others for at least a week after symptoms resolve and follow government guidelines.   Patients with COVID-19 have had mild to severe respiratory  illness with symptoms of  • fever  • cough  • shortness of breath  People can help protect themselves from respiratory illness with  everyday preventive actions.    • Avoid close contact with people who are sick.  • Avoid touching your eyes, nose, and mouth with  unwashed hands.  • Wash your hands often with soap and water for at least 20   seconds. Use an alcohol-based hand  that contains at  least 60% alcohol if soap and water are not available.   Stay home when you are sick.  • Cover your cough or sneeze with a tissue, then throw the  tissue in the trash.  • Clean and disinfect frequently touched objects  and surfaces.  Call 911 and inform them you are covid positive before you decide to go to the emergency room if you have chest pain, difficulty breathing, high fevers, worsening of your symptoms, feel unwell, or have nausea and vomiting.       PRINCIPAL DISCHARGE DIAGNOSIS  Diagnosis: Bowel obstruction  Assessment and Plan of Treatment: you were found to have small bowel obstruction as a complication from  CT scan done showed ongoing small bowel obstruction. Large right anterolateral abdominal wall hernia with multiple bowel entry points. Decompressed distal small bowel also seen in the hernia sac. Discrete transition point is difficult to identify. Note is made of a separate more inferiorly very narrow necked hernia containing a loop of dilated small bowel. This may possibly be acting as the transition point as more distal small bowel appears to be decompressed but is difficult to trace. Oral contrast seen in the colon from prior administration. Oral contrast from the current study seen in proximal small bowel.  Please fu with surgery for further recommendations. please continue your laxatives as tolerated  Transfer to [facility] requested by family      SECONDARY DISCHARGE DIAGNOSES  Diagnosis: HTN (hypertension)  Assessment and Plan of Treatment: Hypertension  Hypertension, commonly called high blood pressure, is when the force of blood pumping through your arteries is too strong. Hypertension forces your heart to work harder to pump blood. Your arteries may become narrow or stiff. Having untreated or uncontrolled hypertension for a long period of time can cause heart attack, stroke, kidney disease, and other problems. If started on a medication, take exactly as prescribed by your health care professional. Maintain a healthy lifestyle and follow up with your primary care physician.  SEEK IMMEDIATE MEDICAL CARE IF YOU HAVE ANY OF THE FOLLOWING SYMPTOMS: severe headache, confusion, chest pain, abdominal pain, vomiting, or shortness of breath.      Diagnosis: Fall  Assessment and Plan of Treatment: Fall prevention includes ways to make your home and other areas safer. It also includes ways you can move more carefully to prevent a fall. Health conditions that cause changes in your blood pressure, vision, or muscle strength and coordination may increase your risk for falls. Medicines may also increase your risk for falls if they make you dizzy, weak, or sleepy.  Seek Medical Attention If:  You have fallen and are unconscious.  fallen and cannot move part of your body.  You have fallen and have pain or a headache.  Fall prevention tips:  Stand or sit up slowly.  Use assistive devices as directed.   You may need to have grab bars put in your bathroom near the toilet or in the shower.  Wear shoes that fit well and have soles that . Wear shoes both inside and outside. Do not wear shoes with high heels.  Wear a personal alarm that can call 911 in an emergency.   Manage your medical conditions. Keep all appointments with your healthcare providers. Visit your eye doctor as directed.  Home safety tips:   Put nonslip strips on your bath or shower floor to prevent you from   Use a shower seat so you do not need to stand while you shower. Sit on the toilet or a chair in your bathroom to dry yourself and put on clothing. This will prevent you from losing your balance from drying or dressing yourself while you are standing.  Keep paths clear. Remove books, shoes, and other objects from walkways and stairs. Place cords for telephones and lamps out of the way so that you do not need to walk over them. Tape them down if you cannot move them. Remove small rugs or secure it with double-sided tape to prevent you from   Install bright lights in your home. Use night lights to help light paths to the bathroom or kitchen. Always turn on the light before you start walking.  Keep items you use often on shelves within reach. Do not use a step stool to help you reach an item.  Place reflective tape on the edges of your stairs. To see better.      Diagnosis: COVID-19  Assessment and Plan of Treatment: Coronavirus disease 2019 (COVID-19) is a respiratory illness  that can spread from person to person. The virus that causes  COVID-19 is a novel coronavirus that was first identified during  an investigation into an outbreak in [place], [place].  The virus that causes COVID-19 probably emerged from an  animal source, but is now spreading from person to person.  The virus is thought to spread mainly between people who  are in close contact with one another (within about 6 feet)  through respiratory droplets produced when an infected  person coughs or sneezes. It also may be possible that a person  can get COVID-19 by touching a surface or object that has  the virus on it and then touching their own mouth, nose, or  possibly their eyes, but this is not thought to be the main  way the virus spreads.  Please stay home and avoid contact with others for at least a week after symptoms resolve and follow government guidelines.   Patients with COVID-19 have had mild to severe respiratory  illness with symptoms of  • fever  • cough  • shortness of breath  People can help protect themselves from respiratory illness with  everyday preventive actions.    • Avoid close contact with people who are sick.  • Avoid touching your eyes, nose, and mouth with  unwashed hands.  • Wash your hands often with soap and water for at least 20   seconds. Use an alcohol-based hand  that contains at  least 60% alcohol if soap and water are not available.   Stay home when you are sick.  • Cover your cough or sneeze with a tissue, then throw the  tissue in the trash.  • Clean and disinfect frequently touched objects  and surfaces.  Call 911 and inform them you are covid positive before you decide to go to the emergency room if you have chest pain, difficulty breathing, high fevers, worsening of your symptoms, feel unwell, or have nausea and vomiting.

## 2021-01-28 NOTE — PROGRESS NOTE ADULT - ASSESSMENT
chronic euvolemic hyponatremia   - on outpatient NaCl tabs  shingles  COVID-19  resolving SBO  fall   altered mental status  CAD / PCI  HTN  DM2    plan:    cont NaCl tabs 2g po bid  NS @ 75 cc/hr  liberal salt intake  lasix on hold  cont toprol xl and imdur  cont enalapril   for transfer to [facility]

## 2021-01-28 NOTE — PROGRESS NOTE ADULT - REASON FOR ADMISSION
altered mental status

## 2021-01-28 NOTE — PROGRESS NOTE ADULT - SUBJECTIVE AND OBJECTIVE BOX
Progress Note: General Surgery  Patient: NIKKY FRASER , 85y (1935)Female   MRN: [id number]  Location: [address]  Visit: 01-18-21 Inpatient  Date: 01-28-21 @ 10:43    Admit Diagnosis/Chief Complaint: large abdominal hernia    Events/ 24h: Patient seen and examined at bedside. Afebrile Hypertensive overnight, refusing PO meds. Labetalol IV was pushed mild decrease in pressue.    Vitals: T(F): 97.9 (01-28-21 @ 08:00), Max: 98.7 (01-27-21 @ 15:57)  HR: 112 (01-28-21 @ 08:00)  BP: 196/79 (01-28-21 @ 08:00) (128/81 - 196/79)  RR: 18 (01-28-21 @ 08:00)  SpO2: 97% (01-28-21 @ 08:00)    In:   01-27-21 @ 07:01  -  01-28-21 @ 07:00  --------------------------------------------------------  IN: 570 mL      Out:   01-27-21 @ 07:01 - 01-28-21 @ 07:00  --------------------------------------------------------  OUT:  Total OUT: 0 mL        Net:   01-27-21 @ 07:01  -  01-28-21 @ 07:00  --------------------------------------------------------  NET: 570 mL        Diet: Diet, NPO:   Except Medications (01-27-21 @ 12:22)    IV Fluids: dextrose 5%. 1000 milliLiter(s) (50 mL/Hr) IV Continuous <Continuous>  dextrose 5%. 1000 milliLiter(s) (100 mL/Hr) IV Continuous <Continuous>  sodium chloride 2 Gram(s) Oral two times a day  sodium chloride 0.9%. 1000 milliLiter(s) (75 mL/Hr) IV Continuous <Continuous>      Physical Examination:  General Appearance: NAD   HEENT: EOMI, sclera anicteric.  Heart: RRR   Lungs: Symmetric chest wall expansion, equal rise and fall.  Abdomen: Obese, exceptionally large right sided incisional hernia, not reducible, erythema and overlying skin changes, tenderness over hernia  MSK/Extremities: Warm & well-perfused.   Skin: Warm, dry. No jaundice.       Medications: [Standing]  aspirin enteric coated 81 milliGRAM(s) Oral daily  atorvastatin Oral Tab/Cap - Peds 40 milliGRAM(s) Oral daily  chlorhexidine 4% Liquid 1 Application(s) Topical <User Schedule>  cyclobenzaprine Oral Tab/Cap - Peds 5 milliGRAM(s) Oral three times a day  dextrose 40% Gel 15 Gram(s) Oral once  dextrose 5%. 1000 milliLiter(s) (50 mL/Hr) IV Continuous <Continuous>  dextrose 5%. 1000 milliLiter(s) (100 mL/Hr) IV Continuous <Continuous>  dextrose 50% Injectable 25 Gram(s) IV Push once  dextrose 50% Injectable 12.5 Gram(s) IV Push once  dextrose 50% Injectable 25 Gram(s) IV Push once  enalapril 20 milliGRAM(s) Oral daily  enoxaparin Injectable 40 milliGRAM(s) SubCutaneous at bedtime  glucagon  Injectable 1 milliGRAM(s) IntraMuscular once  isosorbide   mononitrate ER Tablet (IMDUR) 60 milliGRAM(s) Oral daily  LORazepam   Injectable 1 milliGRAM(s) IV Push once  metoprolol succinate ER 25 milliGRAM(s) Oral daily  nystatin Cream 1 Application(s) Topical two times a day  polyethylene glycol 3350 17 Gram(s) Oral two times a day  senna 2 Tablet(s) Oral at bedtime  sodium chloride 2 Gram(s) Oral two times a day  sodium chloride 0.9%. 1000 milliLiter(s) (75 mL/Hr) IV Continuous <Continuous>  valACYclovir 1000 milliGRAM(s) Oral every 8 hours    DVT Prophylaxis: enoxaparin Injectable 40 milliGRAM(s) SubCutaneous at bedtime    GI Prophylaxis:   Antibiotics: valACYclovir 1000 milliGRAM(s) Oral every 8 hours    Anticoagulation:   Medications:[PRN]  acetaminophen   Tablet .. 650 milliGRAM(s) Oral every 6 hours PRN  bisacodyl Suppository 10 milliGRAM(s) Rectal daily PRN      Labs:                        10.3   4.77  )-----------( 242      ( 27 Jan 2021 06:29 )             31.1     01-27    134<L>  |  97<L>  |  11  ----------------------------<  179<H>  3.9   |  24  |  0.6<L>    Ca    9.0      27 Jan 2021 06:29  Mg     1.7     01-27    TPro  5.2<L>  /  Alb  2.9<L>  /  TBili  0.7  /  DBili  x   /  AST  33  /  ALT  26  /  AlkPhos  51  01-27    LIVER FUNCTIONS - ( 27 Jan 2021 06:29 )  Alb: 2.9 g/dL / Pro: 5.2 g/dL / ALK PHOS: 51 U/L / ALT: 26 U/L / AST: 33 U/L / GGT: x                 Imaging:   CT Abdomen and Pelvis w/ Oral Cont and w/ IV Cont:   EXAM:  CT ABDOMEN AND PELVIS OC IC            PROCEDURE DATE:  01/27/2021    INTERPRETATION:  CLINICAL HISTORY: Abdominal pain.    TECHNIQUE: Contiguous axial CT images were obtained from the lower chest to the pubic symphysis following administration of Optiray intravenous contrast. Oral contrast was administered. Reformatted images in the coronal and sagittal planes were acquired.    COMPARISON: CT abdomen pelvis dated 1/23/2021.      FINDINGS:  LOWER CHEST: Small bilateral pleural effusions. Bibasilar atelectasis. Esophageal wall thickening redemonstrated.    HEPATOBILIARY: Hepatic steatosis. Indeterminate 0.9 cm right hepatic lobe lesion with question of peripheral enhancement (4/83) additional subcentimeter hyperenhancing left hepatic lobe lesion (4/86). Cholelithiasis.    SPLEEN: Unremarkable.    PANCREAS: Unremarkable.    ADRENAL GLANDS: Unremarkable.    KIDNEYS: No hydronephrosis.    ABDOMINOPELVIC NODES: No lymphadenopathy.    PELVIC ORGANS: Calcified uterine fibroids.    PERITONEUM/MESENTERY/BOWEL:    Redemonstration of large right anterolateral abdominal wall hernia containing multiple loops of small and large bowel. Diffuse small bowel dilatation including within the hernia sac. Multiple bowel entry points are noted within the hernia. Decompressed distal small bowel also seen in the hernia sac. Findings are compatible with small bowel obstruction. Discrete transition point is difficult to identify. There is note of a separate more inferior very narrow neck hernia containing a loop of dilated small bowel (601/25). This may possibly be acting as the transition point as more distal small bowel appears to be decompressed but is difficult to trace. The degree of small bowel dilatation is similar to prior CT.Oral contrast is seen within proximal small bowel. Oral contrast also seen within colon, from prior administration. No evidence of free air or fluid collection.    BONES/SOFT TISSUES: Degenerative changes of the spine. Additional right-sided fat-containing ventral hernia seen.    OTHER: Arteriosclerotic calcifications of the abdominal aorta.      IMPRESSION:    1.  Redemonstration of findings of ongoing small bowel obstruction. Large right anterolateral abdominal wall hernia with multiple bowel entry points. Decompressed distal small bowel also seen in the hernia sac. Discrete transition point is difficult to identify. Note is made of a separate more inferiorly very narrow necked hernia containing a loop of dilated small bowel. This may possibly be acting as the transition point as more distal small bowel appears to be decompressed but is difficult to trace. Oral contrast seen in the colon from prior administration. Oral contrast from the current study seen in proximal small bowel.  2.  Subcentimeter hepatic lesions, possibly hemangiomas but indeterminate on the basis of this CT. This can be evaluated with nonemergent outpatient MRI.        SILVANA VALENZUELA MD; Attending Radiologist  This document has been electronically signed. Jan 27 2021  5:08PM (01-27-21 @ 16:23)

## 2021-01-28 NOTE — DISCHARGE NOTE PROVIDER - PROVIDER TOKENS
PROVIDER:[TOKEN:[[id number]],FOLLOWUP:[1 week]],FREE:[LAST:[MAP],FIRST:[Clinic],PHONE:[(   )    -],FAX:[(   )    -],ADDRESS:[please call [facility] clinic to get an appointment with primary care doctor],FOLLOWUP:[1 week]],PROVIDER:[TOKEN:[[id number]],FOLLOWUP:[1 week]]

## 2021-01-28 NOTE — PROGRESS NOTE ADULT - SUBJECTIVE AND OBJECTIVE BOX
CHIEF COMPLAINT:    Patient is a 85y old  Female who presents with a chief complaint of altered mental status     INTERVAL HPI/OVERNIGHT EVENTS:    Patient seen and examined at bedside. No acute overnight events occurred.    ROS: All other systems are negative.    Vital Signs:    T(F): 98.9 (01-28-21 @ 12:00), Max: 98.9 (01-28-21 @ 12:00)  HR: 88 (01-28-21 @ 14:13) (78 - 113)  BP: 148/69 (01-28-21 @ 14:13) (128/81 - 196/79)  RR: 19 (01-28-21 @ 12:00) (18 - 19)  SpO2: 100% (01-28-21 @ 12:00) (93% - 100%)  I&O's Summary    27 Jan 2021 07:01  -  28 Jan 2021 07:00  --------------------------------------------------------  IN: 570 mL / OUT: 0 mL / NET: 570 mL      Daily     Daily   CAPILLARY BLOOD GLUCOSE      POCT Blood Glucose.: 129 mg/dL (28 Jan 2021 11:23)  POCT Blood Glucose.: 203 mg/dL (28 Jan 2021 07:42)  POCT Blood Glucose.: 144 mg/dL (27 Jan 2021 20:50)      PHYSICAL EXAM:  GENERAL:  NAD  SKIN: No rashes or lesions  HEENT: Atraumatic. Normocephalic. Anicteric  NECK:  No JVD.   PULMONARY: Clear to ausculation bilaterally. No wheezing. No rales  CVS: Normal S1, S2. Regular rate and rhythm. No murmurs.  ABDOMEN/GI: Soft, Nontender, Nondistended; Bowel sounds are present  EXTREMITIES:  No edema B/L LE.  NEUROLOGIC:  No motor deficit.  PSYCH: Alert & oriented x 3, normal affect    Consultant(s) Notes Reviewed:  [x ] YES  [ ] NO  Care Discussed with Consultants/Other Providers [ x] YES  [ ] NO    LABS:                        10.3   4.77  )-----------( 242      ( 27 Jan 2021 06:29 )             31.1     01-27    134<L>  |  97<L>  |  11  ----------------------------<  179<H>  3.9   |  24  |  0.6<L>    Ca    9.0      27 Jan 2021 06:29  Mg     1.7     01-27    TPro  5.2<L>  /  Alb  2.9<L>  /  TBili  0.7  /  DBili  x   /  AST  33  /  ALT  26  /  AlkPhos  51  01-27              RADIOLOGY & ADDITIONAL TESTS:  Imaging or report Personally Reviewed:  [ ] YES  [ ] NO    EKG reviewed independently    Medications:  Standing  aspirin enteric coated 81 milliGRAM(s) Oral daily  atorvastatin Oral Tab/Cap - Peds 40 milliGRAM(s) Oral daily  chlorhexidine 4% Liquid 1 Application(s) Topical <User Schedule>  cyclobenzaprine Oral Tab/Cap - Peds 5 milliGRAM(s) Oral three times a day  dextrose 40% Gel 15 Gram(s) Oral once  dextrose 5%. 1000 milliLiter(s) IV Continuous <Continuous>  dextrose 5%. 1000 milliLiter(s) IV Continuous <Continuous>  dextrose 50% Injectable 25 Gram(s) IV Push once  dextrose 50% Injectable 12.5 Gram(s) IV Push once  dextrose 50% Injectable 25 Gram(s) IV Push once  enalapril 20 milliGRAM(s) Oral daily  enoxaparin Injectable 40 milliGRAM(s) SubCutaneous at bedtime  glucagon  Injectable 1 milliGRAM(s) IntraMuscular once  isosorbide   mononitrate ER Tablet (IMDUR) 60 milliGRAM(s) Oral daily  LORazepam   Injectable 1 milliGRAM(s) IV Push once  metoprolol succinate ER 25 milliGRAM(s) Oral daily  nystatin Cream 1 Application(s) Topical two times a day  polyethylene glycol 3350 17 Gram(s) Oral two times a day  senna 2 Tablet(s) Oral at bedtime  sodium chloride 2 Gram(s) Oral two times a day  sodium chloride 0.9%. 1000 milliLiter(s) IV Continuous <Continuous>  valACYclovir 1000 milliGRAM(s) Oral every 8 hours    PRN Meds  acetaminophen   Tablet .. 650 milliGRAM(s) Oral every 6 hours PRN  bisacodyl Suppository 10 milliGRAM(s) Rectal daily PRN      Case discussed with resident  Care discussed with pt         CHIEF COMPLAINT:    Patient is a 85y old  Female who presents with a chief complaint of altered mental status     INTERVAL HPI/OVERNIGHT EVENTS:    Patient seen and examined at bedside. No acute overnight events occurred.    ROS: Denies abdominal pain. All other systems are negative.    Vital Signs:    T(F): 98.9 (01-28-21 @ 12:00), Max: 98.9 (01-28-21 @ 12:00)  HR: 88 (01-28-21 @ 14:13) (78 - 113)  BP: 148/69 (01-28-21 @ 14:13) (128/81 - 196/79)  RR: 19 (01-28-21 @ 12:00) (18 - 19)  SpO2: 100% (01-28-21 @ 12:00) (93% - 100%)  I&O's Summary    27 Jan 2021 07:01  -  28 Jan 2021 07:00  --------------------------------------------------------  IN: 570 mL / OUT: 0 mL / NET: 570 mL    POCT Blood Glucose.: 129 mg/dL (28 Jan 2021 11:23)  POCT Blood Glucose.: 203 mg/dL (28 Jan 2021 07:42)  POCT Blood Glucose.: 144 mg/dL (27 Jan 2021 20:50)      PHYSICAL EXAM:  GENERAL:  NAD  SKIN: No rashes or lesions  HEENT: Atraumatic. Normocephalic. Anicteric  NECK:  No lymphadenopathy   PULMONARY: No accessory muscle use  CVS: No JVD  ABDOMEN/GI: Soft, Nontender, large hernia  EXTREMITIES:  No edema B/L LE.  NEUROLOGIC:  Non participatory  PSYCH: calm    Consultant(s) Notes Reviewed:  [x ] YES  [ ] NO      LABS:                        10.3   4.77  )-----------( 242      ( 27 Jan 2021 06:29 )             31.1     01-27    134<L>  |  97<L>  |  11  ----------------------------<  179<H>  3.9   |  24  |  0.6<L>    Ca    9.0      27 Jan 2021 06:29  Mg     1.7     01-27    TPro  5.2<L>  /  Alb  2.9<L>  /  TBili  0.7  /  DBili  x   /  AST  33  /  ALT  26  /  AlkPhos  51  01-27              RADIOLOGY & ADDITIONAL TESTS:  CT abdomen show hernia, SBO    Medications:  Standing  aspirin enteric coated 81 milliGRAM(s) Oral daily  atorvastatin Oral Tab/Cap - Peds 40 milliGRAM(s) Oral daily  chlorhexidine 4% Liquid 1 Application(s) Topical <User Schedule>  cyclobenzaprine Oral Tab/Cap - Peds 5 milliGRAM(s) Oral three times a day  dextrose 40% Gel 15 Gram(s) Oral once  dextrose 5%. 1000 milliLiter(s) IV Continuous <Continuous>  dextrose 5%. 1000 milliLiter(s) IV Continuous <Continuous>  dextrose 50% Injectable 25 Gram(s) IV Push once  dextrose 50% Injectable 12.5 Gram(s) IV Push once  dextrose 50% Injectable 25 Gram(s) IV Push once  enalapril 20 milliGRAM(s) Oral daily  enoxaparin Injectable 40 milliGRAM(s) SubCutaneous at bedtime  glucagon  Injectable 1 milliGRAM(s) IntraMuscular once  isosorbide   mononitrate ER Tablet (IMDUR) 60 milliGRAM(s) Oral daily  LORazepam   Injectable 1 milliGRAM(s) IV Push once  metoprolol succinate ER 25 milliGRAM(s) Oral daily  nystatin Cream 1 Application(s) Topical two times a day  polyethylene glycol 3350 17 Gram(s) Oral two times a day  senna 2 Tablet(s) Oral at bedtime  sodium chloride 2 Gram(s) Oral two times a day  sodium chloride 0.9%. 1000 milliLiter(s) IV Continuous <Continuous>  valACYclovir 1000 milliGRAM(s) Oral every 8 hours    PRN Meds  acetaminophen   Tablet .. 650 milliGRAM(s) Oral every 6 hours PRN  bisacodyl Suppository 10 milliGRAM(s) Rectal daily PRN      Case discussed with resident  Care discussed with pt

## 2021-01-29 RX ORDER — METFORMIN HYDROCHLORIDE 850 MG/1
1 TABLET ORAL
Qty: 0 | Refills: 0 | DISCHARGE

## 2021-01-29 RX ORDER — SITAGLIPTIN 50 MG/1
1 TABLET, FILM COATED ORAL
Qty: 0 | Refills: 0 | DISCHARGE

## 2021-01-29 RX ORDER — ATORVASTATIN CALCIUM 80 MG/1
1 TABLET, FILM COATED ORAL
Qty: 0 | Refills: 0 | DISCHARGE

## 2021-02-05 DIAGNOSIS — E22.2 SYNDROME OF INAPPROPRIATE SECRETION OF ANTIDIURETIC HORMONE: ICD-10-CM

## 2021-02-05 DIAGNOSIS — Z85.038 PERSONAL HISTORY OF OTHER MALIGNANT NEOPLASM OF LARGE INTESTINE: ICD-10-CM

## 2021-02-05 DIAGNOSIS — Z66 DO NOT RESUSCITATE: ICD-10-CM

## 2021-02-05 DIAGNOSIS — J12.82 PNEUMONIA DUE TO CORONAVIRUS DISEASE 2019: ICD-10-CM

## 2021-02-05 DIAGNOSIS — E66.01 MORBID (SEVERE) OBESITY DUE TO EXCESS CALORIES: ICD-10-CM

## 2021-02-05 DIAGNOSIS — N17.9 ACUTE KIDNEY FAILURE, UNSPECIFIED: ICD-10-CM

## 2021-02-05 DIAGNOSIS — Z53.20 PROCEDURE AND TREATMENT NOT CARRIED OUT BECAUSE OF PATIENT'S DECISION FOR UNSPECIFIED REASONS: ICD-10-CM

## 2021-02-05 DIAGNOSIS — I10 ESSENTIAL (PRIMARY) HYPERTENSION: ICD-10-CM

## 2021-02-05 DIAGNOSIS — I25.10 ATHEROSCLEROTIC HEART DISEASE OF NATIVE CORONARY ARTERY WITHOUT ANGINA PECTORIS: ICD-10-CM

## 2021-02-05 DIAGNOSIS — L98.492 NON-PRESSURE CHRONIC ULCER OF SKIN OF OTHER SITES WITH FAT LAYER EXPOSED: ICD-10-CM

## 2021-02-05 DIAGNOSIS — G04.81 OTHER ENCEPHALITIS AND ENCEPHALOMYELITIS: ICD-10-CM

## 2021-02-05 DIAGNOSIS — B37.89 OTHER SITES OF CANDIDIASIS: ICD-10-CM

## 2021-02-05 DIAGNOSIS — E11.40 TYPE 2 DIABETES MELLITUS WITH DIABETIC NEUROPATHY, UNSPECIFIED: ICD-10-CM

## 2021-02-05 DIAGNOSIS — U07.1 COVID-19: ICD-10-CM

## 2021-02-05 DIAGNOSIS — B02.9 ZOSTER WITHOUT COMPLICATIONS: ICD-10-CM

## 2021-02-05 DIAGNOSIS — K43.0 INCISIONAL HERNIA WITH OBSTRUCTION, WITHOUT GANGRENE: ICD-10-CM

## 2021-02-05 DIAGNOSIS — E83.42 HYPOMAGNESEMIA: ICD-10-CM

## 2021-02-05 DIAGNOSIS — Z95.5 PRESENCE OF CORONARY ANGIOPLASTY IMPLANT AND GRAFT: ICD-10-CM

## 2021-03-13 LAB
CULTURE RESULTS: SIGNIFICANT CHANGE UP
SPECIMEN SOURCE: SIGNIFICANT CHANGE UP

## 2021-03-25 ENCOUNTER — APPOINTMENT (OUTPATIENT)
Dept: WOUND CARE | Facility: CLINIC | Age: 86
End: 2021-03-25
Payer: MEDICARE

## 2021-03-25 VITALS — WEIGHT: 168 LBS | BODY MASS INDEX: 29.76 KG/M2

## 2021-03-25 VITALS
SYSTOLIC BLOOD PRESSURE: 136 MMHG | HEIGHT: 63 IN | RESPIRATION RATE: 16 BRPM | BODY MASS INDEX: 32.25 KG/M2 | HEART RATE: 80 BPM | TEMPERATURE: 96 F | DIASTOLIC BLOOD PRESSURE: 84 MMHG | WEIGHT: 182 LBS

## 2021-03-25 DIAGNOSIS — U07.1 COVID-19: ICD-10-CM

## 2021-03-25 DIAGNOSIS — Z86.19 PERSONAL HISTORY OF OTHER INFECTIOUS AND PARASITIC DISEASES: ICD-10-CM

## 2021-03-25 PROCEDURE — 99214 OFFICE O/P EST MOD 30 MIN: CPT | Mod: CS

## 2021-03-25 RX ORDER — LIDOCAINE HCL 4 %
4 CREAM (GRAM) TOPICAL
Qty: 1 | Refills: 0 | Status: DISCONTINUED | COMMUNITY
Start: 2018-09-06 | End: 2021-03-25

## 2021-03-25 RX ORDER — NYSTATIN 100000 [USP'U]/ML
100000 SUSPENSION ORAL 3 TIMES DAILY
Qty: 105 | Refills: 1 | Status: DISCONTINUED | COMMUNITY
Start: 2020-03-12 | End: 2021-03-25

## 2021-03-25 RX ORDER — ENALAPRIL MALEATE 20 MG/1
20 TABLET ORAL
Qty: 60 | Refills: 0 | Status: DISCONTINUED | COMMUNITY
Start: 2018-08-18 | End: 2021-03-25

## 2021-03-25 RX ORDER — TRAZODONE HYDROCHLORIDE 50 MG/1
50 TABLET ORAL
Qty: 30 | Refills: 0 | Status: DISCONTINUED | COMMUNITY
Start: 2018-08-18 | End: 2021-03-25

## 2021-03-25 RX ORDER — ZINC OXIDE 25 %
25 PASTE (GRAM) TOPICAL
Qty: 1 | Refills: 0 | Status: DISCONTINUED | COMMUNITY
Start: 2019-06-13 | End: 2021-03-25

## 2021-03-30 PROBLEM — U07.1 COVID-19: Status: RESOLVED | Noted: 2021-03-25 | Resolved: 2021-03-30

## 2021-03-30 NOTE — ASSESSMENT
[FreeTextEntry1] : 86 yr old  DM HTNwith abdominal wall ulcer-  reopened lower ulcer, friction  s/p sbo and covid\par  génesis adaptic\par vna 3 x a week\par discussed diet\par  Wound has shown improvement through (increased granulation and or decreased size).\par Wound is free from infection drainage and necrotic tissue. \par  Patient is on comprehensive diabetic management program with a Hg A1c of () from (date).\par  \par    binder-    had closed/ progress with prior to protosan \par had responed to  grafix 1/16/20 primatrix placed  10/22 , sep12- aug 30 aug 8, also to oasis\par will reorder\par  discussed telehealth visit for next visit with nurse-  \par  complexlow-lab, xr asha,test reviewed\par risk- low\par

## 2021-03-30 NOTE — PHYSICAL EXAM
[Normal Breath Sounds] : Normal breath sounds [Skin Ulcer] : ulcer [Alert] : alert [Oriented to Person] : oriented to person [Oriented to Place] : oriented to place [Oriented to Time] : oriented to time [Calm] : calm [Please See PDF for [employer] Analytics] : Please See PDF for [employer]. [JVD] : no jugular venous distention  [de-identified] : nad [de-identified] : deisy [de-identified] : ulcer/midline scar; recurrent hernia reducible [FreeTextEntry1] : abd wound

## 2021-03-30 NOTE — HISTORY OF PRESENT ILLNESS
[FreeTextEntry1] : 85 yo woman with PHMx of DM and CAD has right lower abdominal wound, had oasis last visit in dec\par recent admit for sbo wound reopened\par jan 18-jan28 altered mental status, s/p shingles covid jan 18, treated with lovenox\par no fevers\par  using adaptic and foam, no fever\par hernia still reducible, here to have oasis for wound- \par \par was closed now reopened , had prontosan, andaptic and foam  , had some bleeding\par reopened upper ulcer- better this week\par adaptic/ prontosan foam\par needs new binder\par \par  had closed/ progress with prior to protosan \par  grafix 1/16/20 primatrix placed  10/22 , sep12- aug 30 aug 8\par  \par no fever- glucose ok, needs new binder\par prior used nystatin swish and swallow, doing better\par  \par l ast skin substitute in January\par using , no fevers has vna 3 times a week\par \par  had puraply april 9 may 7, may 30  jose m 13 and puraply july 19, sept 12 no fevers\par  here for reapplication of skin sub-  primatrix is here\par \par had july 2  surgery  visit , because of long surgery  with risk, she is afraid to do it\par \par still with drainage, no cellulitis, foam placed caused irritation (big square)\par  used silver nitrate 3/12, 3/19, 3/26;  had grafix  dec 11/ jan8 , jan22 and feb 26 with minimal improvement,\par  Has been using aquacel/ foam,  \par  speaks only Turkish/   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 Pt had been getting treated by an ID doctor , had initial abdominal surgery 1997, then 2017, has appt. in April with a surgeon from [facility] \par  She has not had any fever.  She is frustrated that the wound is not healing. She is receiving wound care at home every other day.   duoderm to periwound/ wears a hernia abd binder to reduce it\par  hernia (2 areas ) is large spreading skin open, is partly reducible\par \par Of Note, she:\par - Had colorectal cancer s/p bowel resection\par - Has ventral abdominal hernias - S/P repair in 2017\par -history for SBO.\par \par She was admitted to [facility] with subjective fever and pain over a right sided abdominal wall ulcer.  The patient states that the right sided abdominal wall ulcer has been present for over a year. \par   \par She had a CT that showed some inflammatory change in  the abdominal wall but no evidence of abscess or fistula.\par s/p mssa and enterbacter, treated with augmentin/ levaquin and fluconazole for vaginal candidiasis\par    She was treated with appropriate abx intravenously for 5 days and sent home on augmentin.  During her hospitalization, the redness to the abdominal wall improved but the ulcerated lesion continued to drain. She was discharged on Augmentin previously\par \par  . \par \par \par

## 2021-03-30 NOTE — PLAN
[FreeTextEntry1] : 86 yr old  DM HTNwith abdominal wall ulcer-  reopened upper ulcer,  oasis placed today 12/8/2020\par adaptic/ prontosan foam,   use small foam , or abd prontosan,\par vna 3 x a week génesis\par discussed diet\par \par  binder- ,  \par  had closed/ progress with prior to protosan \par   No clinical sign of infection or bowel content\par Recommendation:\par \par  may change foam on top of veil\par Wash wound with saline only this week\par  \par Change dressing  3 times per week.\par Encouraged ambulation or exercise.\par Optimization of nutrition.\par \par Homecare orders in place\par \par f/u in 3-4 weeks

## 2021-03-30 NOTE — REASON FOR VISIT
[Follow-Up: _____] : a [unfilled] follow-up visit [[employer] ] : provided by [employer]   [FreeTextEntry1] : [id number] [FreeTextEntry2] : Shruthi [TWNoteComboBox1] : [place]

## 2021-04-05 NOTE — PHYSICAL THERAPY INITIAL EVALUATION ADULT - PERSONAL SAFETY AND JUDGMENT, REHAB EVAL
Mom wants a call back in regards to trying something with the pt.  She wouldn't go into detail intact

## 2021-04-14 ENCOUNTER — NON-APPOINTMENT (OUTPATIENT)
Age: 86
End: 2021-04-14

## 2021-04-15 ENCOUNTER — APPOINTMENT (OUTPATIENT)
Dept: WOUND CARE | Facility: CLINIC | Age: 86
End: 2021-04-15
Payer: MEDICARE

## 2021-04-15 VITALS — WEIGHT: 168 LBS | TEMPERATURE: 96.5 F | BODY MASS INDEX: 29.77 KG/M2 | HEIGHT: 63 IN

## 2021-04-15 DIAGNOSIS — Z60.3 ACCULTURATION DIFFICULTY: ICD-10-CM

## 2021-04-15 DIAGNOSIS — Z87.898 PERSONAL HISTORY OF OTHER SPECIFIED CONDITIONS: ICD-10-CM

## 2021-04-15 PROCEDURE — 99213 OFFICE O/P EST LOW 20 MIN: CPT

## 2021-04-15 SDOH — SOCIAL STABILITY - SOCIAL INSECURITY: ACCULTURATION DIFFICULTY: Z60.3

## 2021-04-18 PROBLEM — Z87.898 HISTORY OF MORBID OBESITY: Status: RESOLVED | Noted: 2020-03-02 | Resolved: 2021-04-18

## 2021-04-18 PROBLEM — Z60.3 IMMIGRANT WITH LANGUAGE DIFFICULTY: Status: ACTIVE | Noted: 2018-08-17

## 2021-04-18 NOTE — HISTORY OF PRESENT ILLNESS
[FreeTextEntry1] : 85 yo woman with PHMx of DM and CAD has right lower abdominal wound, had oasis last visit in dec\par recent admit for sbo wound reopened, conservative care, vna started\par jan 18-jan28 altered mental status, s/p shingles covid jan 18, treated with lovenox\par no fevers\par  using adaptic and foam, no fever\par hernia still reducible, here to have oasis for wound- \par \par was closed now reopened , had prontosan, andaptic and foam  , had some bleeding\par reopened upper ulcer- better this week\par adaptic/ prontosan foam\par needs new binder\par \par  had closed/ progress with prior to protosan \par  grafix 1/16/20 primatrix placed  10/22 , sep12- aug 30 aug 8\par  \par no fever- glucose ok, needs new binder\par prior used nystatin swish and swallow, doing better\par  \par l ast skin substitute in January\par using , no fevers has vna 3 times a week\par \par  had puraply april 9 may 7, may 30  jose m 13 and puraply july 19, sept 12 no fevers\par  here for reapplication of skin sub-  primatrix is here\par \par had july 2  surgery  visit , because of long surgery  with risk, she is afraid to do it\par \par still with drainage, no cellulitis, foam placed caused irritation (big square)\par  used silver nitrate 3/12, 3/19, 3/26;  had grafix  dec 11/ jan8 , jan22 and feb 26 with minimal improvement,\par  Has been using aquacel/ foam,  \par  speaks only [place]ib[place]ian/   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 Pt had been getting treated by an ID doctor , had initial abdominal surgery 1997, then 2017, has appt. in April with a surgeon from [facility] \par  She has not had any fever.  She is frustrated that the wound is not healing. She is receiving wound care at home every other day.   duoderm to periwound/ wears a hernia abd binder to reduce it\par  hernia (2 areas ) is large spreading skin open, is partly reducible\par \par Of Note, she:\par - Had colorectal cancer s/p bowel resection\par - Has ventral abdominal hernias - S/P repair in 2017\par -history for SBO.\par \par She was admitted to [facility] with subjective fever and pain over a right sided abdominal wall ulcer.  The patient states that the right sided abdominal wall ulcer has been present for over a year. \par   \par She had a CT that showed some inflammatory change in  the abdominal wall but no evidence of abscess or fistula.\par s/p mssa and enterbacter, treated with augmentin/ levaquin and fluconazole for vaginal candidiasis\par    She was treated with appropriate abx intravenously for 5 days and sent home on augmentin.  During her hospitalization, the redness to the abdominal wall improved but the ulcerated lesion continued to drain. She was discharged on Augmentin previously\par \par  . \par \par \par

## 2021-04-18 NOTE — ASSESSMENT
[FreeTextEntry1] : 86 yr old  DM HTNwith abdominal wall ulcer-  reopened lower ulcer, friction  s/p sbo and covid\par  génesis adaptic continue\par vna 3 x a week\par discussed diet\par  Wound has shown improvement through (increased granulation and or decreased size).\par Wound is free from infection drainage and necrotic tissue. \par  Patient is on comprehensive diabetic management program with a Hg A1c of () from (date).\par     binder-    had closed/ progress with prior to protosan \par had responed to  grafix 1/16/20 primatrix placed  10/22 , sep12- aug 30 aug 8, also to oasis\par will reorder\par  discussed telehealth visit for next visit with nurse-  \par  complexlow-lab, xr asha,test reviewed\par risk- low\par  ) Plan for re application of  oasis or primatrix().\par \par Wound has shown improvement through (increased granulation and or decreased size).\par \par Wound is free from infection drainage and necrotic tissue. \par \par  Patient is on comprehensive diabetic management program with a Hg A1c of () from (date).\par \par Wound has been treated with standards of care for () weeks including (compression, offloading, debridement).

## 2021-04-18 NOTE — REASON FOR VISIT
[Follow-Up: _____] : a [unfilled] follow-up visit [Formal Caregiver] : formal caregiver [[employer] ] : provided by [employer]   [FreeTextEntry1] : [id number] [FreeTextEntry2] : Franca [TWNoteComboBox1] : [place]

## 2021-04-18 NOTE — PHYSICAL EXAM
[Normal Breath Sounds] : Normal breath sounds [Skin Ulcer] : ulcer [Alert] : alert [Oriented to Person] : oriented to person [Oriented to Place] : oriented to place [Oriented to Time] : oriented to time [Calm] : calm [Please See PDF for [employer]] : Please See PDF for [employer]. [JVD] : no jugular venous distention  [de-identified] : deisy [de-identified] : nad [de-identified] : ulcer/midline scar; recurrent hernia reducible [FreeTextEntry1] : abd wound

## 2021-04-18 NOTE — PLAN
[FreeTextEntry1] : 86 yr old  DM HTNwith abdominal wall ulcer-  reopened abdominal ulcer, \par adaptic/ prontosan foam,   use small foam , or abd prontosan,\par vna 3 x a week génesis\par discussed diet\par  binder- ,    No clinical sign of infection or bowel content\par Recommendation: Wash wound with saline only this week\par  Change dressing  3 times per week.\par Encouraged ambulation or exercise.\par Optimization of nutrition.\par Homecare orders in place\par \par f/u in 3-4 weeks

## 2021-05-10 ENCOUNTER — NON-APPOINTMENT (OUTPATIENT)
Age: 86
End: 2021-05-10

## 2021-05-11 ENCOUNTER — APPOINTMENT (OUTPATIENT)
Dept: WOUND CARE | Facility: CLINIC | Age: 86
End: 2021-05-11
Payer: MEDICARE

## 2021-05-11 VITALS
TEMPERATURE: 97 F | BODY MASS INDEX: 29.77 KG/M2 | HEIGHT: 63 IN | DIASTOLIC BLOOD PRESSURE: 75 MMHG | SYSTOLIC BLOOD PRESSURE: 123 MMHG | HEART RATE: 72 BPM | WEIGHT: 168 LBS

## 2021-05-11 PROCEDURE — 99213 OFFICE O/P EST LOW 20 MIN: CPT

## 2021-05-17 NOTE — PHYSICAL EXAM
[Normal Breath Sounds] : Normal breath sounds [Skin Ulcer] : ulcer [Alert] : alert [Oriented to Person] : oriented to person [Oriented to Place] : oriented to place [Oriented to Time] : oriented to time [Calm] : calm [Please See PDF for [employer] Analytics] : Please See PDF for [employer]. [JVD] : no jugular venous distention  [de-identified] : nad [de-identified] : deisy [de-identified] : ulcer/midline scar; recurrent hernia reducible [FreeTextEntry1] : abd wound

## 2021-05-17 NOTE — REASON FOR VISIT
[Follow-Up: _____] : a [unfilled] follow-up visit [Formal Caregiver] : formal caregiver [[employer] ] : provided by [employer]   [FreeTextEntry1] :  -right side abdomen ulcer

## 2021-05-17 NOTE — ASSESSMENT
[FreeTextEntry1] : 86 yr old  DM HTNwith abdominal wall ulcer-  reopened lower ulcer, friction  s/p sbo and covid\par  génesis adaptic continue\par vna 3 x a week\par discussed diet\par  Wound has shown improvement through (increased granulation and or decreased size).\par Wound is free from infection drainage and necrotic tissue. \par  Patient is on comprehensive diabetic management program  \par     binder-    had closed/ progress with prior to protosan \par  \par  complexlow-lab, xr asha,test reviewed\par risk- low\par  ) Plan for re application of  oasis or primatrix().\par Wound has shown improvement through (increased granulation and or decreased size).\par Wound is free from infection drainage and necrotic tissue. \par  Patient is on comprehensive diabetic management program with a Hg A1c of () from (date).\par Wound has been treated with standards of care for () weeks including (compression, offloading, debridement).\par \par 5/11/21\par Interperter #[id number] Brazilian  \par wounds do not look infected

## 2021-05-17 NOTE — HISTORY OF PRESENT ILLNESS
[FreeTextEntry1] : 87 yo woman with PHMx of DM and CAD has right lower abdominal wound, had oasis last visit in dec\par recent admit for sbo wound reopened, conservative care, vna started\par no fevers\par used ag nitrate last visit\par jan 18-jan28 altered mental status, s/p shingles covid jan 18, treated with lovenox\par no fevers\par  using adaptic and foam, no fever\par hernia still reducible, here to have oasis for wound- \par \par was closed now reopened , had prontosan, andaptic and foam  , had some bleeding\par reopened upper ulcer- better this week\par adaptic/ prontosan foam binder\par  had closed/ progress with prior to protosan \par  grafix 1/16/20 primatrix placed  10/22 , sep12- aug 30 aug 8\par  \par no fever- glucose ok, needs new binder\par prior used nystatin swish and swallow, doing better\par  \par l ast skin substitute in January\par using , no fevers has vna 3 times a week\par \par  had puraply april 9 may 7, may 30  jose m 13 and puraply july 19, sept 12 no fevers\par  here for reapplication of skin sub-  primatrix is here\par \par had july 2  surgery  visit , because of long surgery  with risk, she is afraid to do it\par \par still with drainage, no cellulitis, foam placed caused irritation (big square)\par  used silver nitrate 3/12, 3/19, 3/26;  had grafix  dec 11/ jan8 , jan22 and feb 26 with minimal improvement,\par  Has been using aquacel/ foam,  \par  speaks only Mongolian/   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 Pt had been getting treated by an ID doctor , had initial abdominal surgery 1997, then 2017, has appt. in April with a surgeon from [facility] \par  She has not had any fever.  She is frustrated that the wound is not healing. She is receiving wound care at home every other day.   duoderm to periwound/ wears a hernia abd binder to reduce it\par  hernia (2 areas ) is large spreading skin open, is partly reducible\par \par Of Note, she:\par - Had colorectal cancer s/p bowel resection\par - Has ventral abdominal hernias - S/P repair in 2017\par -history for SBO.\par \par She was admitted to [facility] with subjective fever and pain over a right sided abdominal wall ulcer.  The patient states that the right sided abdominal wall ulcer has been present for over a year. \par   \par She had a CT that showed some inflammatory change in  the abdominal wall but no evidence of abscess or fistula.\par s/p mssa and enterbacter, treated with augmentin/ levaquin and fluconazole for vaginal candidiasis\par    She was treated with appropriate abx intravenously for 5 days and sent home on augmentin.  During her hospitalization, the redness to the abdominal wall improved but the ulcerated lesion continued to drain. She was discharged on Augmentin previously\par \par  . \par \par \par

## 2021-06-03 ENCOUNTER — APPOINTMENT (OUTPATIENT)
Dept: WOUND CARE | Facility: CLINIC | Age: 86
End: 2021-06-03
Payer: MEDICARE

## 2021-06-03 VITALS
SYSTOLIC BLOOD PRESSURE: 179 MMHG | RESPIRATION RATE: 16 BRPM | DIASTOLIC BLOOD PRESSURE: 82 MMHG | TEMPERATURE: 96.3 F | HEART RATE: 79 BPM

## 2021-06-03 VITALS — SYSTOLIC BLOOD PRESSURE: 157 MMHG | DIASTOLIC BLOOD PRESSURE: 81 MMHG

## 2021-06-03 PROCEDURE — 99213 OFFICE O/P EST LOW 20 MIN: CPT

## 2021-06-03 RX ORDER — LIDOCAINE/TETRACAINE/BENZOCAIN 10-10-20 %
OINTMENT (GRAM) TOPICAL
Qty: 1 | Refills: 2 | Status: ACTIVE | COMMUNITY
Start: 2021-06-03 | End: 1900-01-01

## 2021-06-08 NOTE — HISTORY OF PRESENT ILLNESS
[FreeTextEntry1] : 85 yo woman with PHMx of DM and CAD has right lower abdominal wound, had oasis last visit in dec\par recent admit for sbo wound reopened, conservative care, vna started\par no fevers\par increased lyrica s/p shingles onher back\par used ag nitrate last visit\par jan 18-jan28 altered mental status, s/p shingles covid jan 18, treated with lovenox\par no fevers\par  using adaptic and foam, no fever\par hernia still reducible, here to have oasis for wound- \par 5/11/21Interperter #[id number] Gren[place]  \par wounds do not look infected\par was closed now reopened , had prontosan, andaptic and foam  , had some bleeding\par reopened upper ulcer- better this week\par adaptic/ prontosan foam binder\par  had closed/ progress with prior to protosan \par  grafix 1/16/20 primatrix placed  10/22 , sep12- aug 30 aug 8\par  \par no fever- glucose ok, needs new binder\par prior used nystatin swish and swallow, doing better\par  \par l ast skin substitute in January\par using , no fevers has vna 3 times a week\par \par  had puraply april 9 may 7, may 30  jose m 13 and puraply july 19, sept 12 no fevers\par  here for reapplication of skin sub-  primatrix is here\par \par had july 2  surgery  visit , because of long surgery  with risk, she is afraid to do it\par \par still with drainage, no cellulitis, foam placed caused irritation (big square)\par  used silver nitrate 3/12, 3/19, 3/26;  had grafix  dec 11/ jan8 , jan22 and feb 26 with minimal improvement,\par  Has been using aquacel/ foam,  \par  speaks only Gabonese/   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 Pt had been getting treated by an ID doctor , had initial abdominal surgery 1997, then 2017, has appt. in April with a surgeon from [facility] \par  She has not had any fever.  She is frustrated that the wound is not healing. She is receiving wound care at home every other day.   duoderm to periwound/ wears a hernia abd binder to reduce it\par  hernia (2 areas ) is large spreading skin open, is partly reducible\par \par Of Note, she:\par - Had colorectal cancer s/p bowel resection\par - Has ventral abdominal hernias - S/P repair in 2017\par -history for SBO.\par \par She was admitted to [facility] with subjective fever and pain over a right sided abdominal wall ulcer.  The patient states that the right sided abdominal wall ulcer has been present for over a year. \par   \par She had a CT that showed some inflammatory change in  the abdominal wall but no evidence of abscess or fistula.\par s/p mssa and enterbacter, treated with augmentin/ levaquin and fluconazole for vaginal candidiasis\par    She was treated with appropriate abx intravenously for 5 days and sent home on augmentin.  During her hospitalization, the redness to the abdominal wall improved but the ulcerated lesion continued to drain. She was discharged on Augmentin previously\par \par  . \par \par \par

## 2021-06-08 NOTE — PHYSICAL EXAM
[Normal Breath Sounds] : Normal breath sounds [Skin Ulcer] : ulcer [Alert] : alert [Oriented to Person] : oriented to person [Oriented to Place] : oriented to place [Oriented to Time] : oriented to time [Calm] : calm [Please See PDF for [employer] Analytics] : Please See PDF for [employer]. [JVD] : no jugular venous distention  [de-identified] : nad [de-identified] : deisy [de-identified] : ulcer/midline scar; recurrent hernia reducible [FreeTextEntry1] : abd wound

## 2021-06-08 NOTE — PLAN
[FreeTextEntry1] : 86 yr old  DM HTNwith abdominal wall ulcer-  reopened abdominal ulcer,  \par  wash with betadine , génesis,  adaptic on the periwound \par discussed diet\par  binder- ,   No clinical sign of infection or bowel content\par  Change dressing  3 times per week.\par Encouraged ambulation or exercise.\par Optimization of nutrition.\par Homecare orders in place\par f/u in 3-4 weeks

## 2021-06-08 NOTE — ASSESSMENT
[FreeTextEntry1] : 86 yr old  DM HTNwith abdominal wall ulcer-  reopened lower ulcer, friction  s/p sbo and covid\par  génesis adaptic continue- smaller\par vna 3 x a week\par discussed diet\par  Wound has shown improvement through (increased granulation and or decreased size).\par Wound is free from infection drainage and necrotic tissue. \par  Patient is on comprehensive diabetic management program  \par     binder-    had closed/ progress with prior to protosan \par  \par  complexlow-lab, xr asha,test reviewed\par risk- low\par  ) Plan for re application of  oasis or primatrix().\par Wound has shown improvement through (increased granulation and or decreased size).\par Wound is free from infection drainage and necrotic tissue. \par  Patient is on comprehensive diabetic management program with a Hg A1c of () from (date).\par Wound has been treated with standards of care for () weeks including (compression, offloading, debridement).\par \par 6/3/21\par 86 yr old  DM HTNwith abdominal wall ulcer-  reopened lower ulcer\par wash betadine, génesis, adaptic on the periwound, abd pad, abdominal binder\par \par

## 2021-06-09 ENCOUNTER — RX RENEWAL (OUTPATIENT)
Age: 86
End: 2021-06-09

## 2021-06-09 ENCOUNTER — APPOINTMENT (OUTPATIENT)
Dept: CARDIOLOGY | Facility: CLINIC | Age: 86
End: 2021-06-09
Payer: MEDICARE

## 2021-06-09 VITALS
DIASTOLIC BLOOD PRESSURE: 80 MMHG | HEIGHT: 63 IN | WEIGHT: 168 LBS | SYSTOLIC BLOOD PRESSURE: 140 MMHG | HEART RATE: 80 BPM | TEMPERATURE: 97 F | BODY MASS INDEX: 29.77 KG/M2 | OXYGEN SATURATION: 94 % | RESPIRATION RATE: 18 BRPM

## 2021-06-09 PROCEDURE — 93306 TTE W/DOPPLER COMPLETE: CPT

## 2021-06-09 PROCEDURE — 99214 OFFICE O/P EST MOD 30 MIN: CPT | Mod: 25

## 2021-06-09 NOTE — REVIEW OF SYSTEMS
[Feeling Fatigued] : feeling fatigued [Abdominal Pain] : abdominal pain [Negative] : Heme/Lymph [FreeTextEntry5] : see HPI [FreeTextEntry9] : walks with a walker [de-identified] : see HPI [de-identified] : shingles pain

## 2021-06-09 NOTE — ASSESSMENT
[FreeTextEntry1] : Diastolic CHF\par HTN\par DL\par DM type 2\par Abdominal wound.\par Palpitations\par Chest pain\par \par Plan:\par \par Obtain 2D echo\par Obtian event monitor\par C/w BP control.\par C/w BB\par Diuretics as needed - c/w TIW\par DM control. F/u with endocrine. Labs as scheduled.\par C/w statin.\par Wound care\par Weight loss.\par \par \par F/u in 3 months.

## 2021-06-09 NOTE — HISTORY OF PRESENT ILLNESS
[FreeTextEntry1] : 84 y/o female with history of HTN, DL, DM type 2, obesity, hernia, diastolic CHF, presenting for f/u. No anginal pains, but has palpitation, fluttering sensation on the left chest, which was associated with non-exertional pain. + dyspnea, + leg edema. Has shingles pain as well. No syncope.  She reports compliance with medical therapy. Using Lasix 3 x week.  Reports her FS are controlled.

## 2021-06-09 NOTE — PHYSICAL EXAM
[Normal Appearance] : normal appearance [Well Groomed] : well groomed [No Deformities] : no deformities [General Appearance - In No Acute Distress] : no acute distress [Normal Conjunctiva] : the conjunctiva exhibited no abnormalities [Eyelids - No Xanthelasma] : the eyelids demonstrated no xanthelasmas [Heart Rate And Rhythm] : heart rate and rhythm were normal [Heart Sounds] : normal S1 and S2 [Murmurs] : no murmurs present [Respiration, Rhythm And Depth] : normal respiratory rhythm and effort [Exaggerated Use Of Accessory Muscles For Inspiration] : no accessory muscle use [Auscultation Breath Sounds / Voice Sounds] : lungs were clear to auscultation bilaterally [Bowel Sounds] : normal bowel sounds [Abdomen Soft] : soft [Abdomen Tenderness] : non-tender [Abnormal Walk] : normal gait [Nail Clubbing] : no clubbing of the fingernails [Cyanosis, Localized] : no localized cyanosis [Skin Color & Pigmentation] : normal skin color and pigmentation [] : no rash [Oriented To Time, Place, And Person] : oriented to person, place, and time [Affect] : the affect was normal [FreeTextEntry1] : walks with a cane

## 2021-06-24 ENCOUNTER — APPOINTMENT (OUTPATIENT)
Dept: WOUND CARE | Facility: CLINIC | Age: 86
End: 2021-06-24
Payer: MEDICARE

## 2021-06-24 VITALS — WEIGHT: 168 LBS | HEIGHT: 63 IN | TEMPERATURE: 95.7 F | BODY MASS INDEX: 29.77 KG/M2

## 2021-06-24 DIAGNOSIS — L02.211 CUTANEOUS ABSCESS OF ABDOMINAL WALL: ICD-10-CM

## 2021-06-24 DIAGNOSIS — K43.9 VENTRAL HERNIA W/OUT OBSTRUCTION OR GANGRENE: ICD-10-CM

## 2021-06-24 PROCEDURE — 99213 OFFICE O/P EST LOW 20 MIN: CPT

## 2021-06-25 PROBLEM — K43.9 VENTRAL HERNIA WITHOUT OBSTRUCTION OR GANGRENE: Status: ACTIVE | Noted: 2019-02-04

## 2021-06-25 PROBLEM — L02.211 ABDOMINAL WALL ABSCESS: Status: ACTIVE | Noted: 2018-08-17

## 2021-06-25 NOTE — PHYSICAL EXAM
[Normal Breath Sounds] : Normal breath sounds [Skin Ulcer] : ulcer [Alert] : alert [Oriented to Person] : oriented to person [Oriented to Place] : oriented to place [Oriented to Time] : oriented to time [Calm] : calm [Please See PDF for [employer] Analytics] : Please See PDF for [employer]. [JVD] : no jugular venous distention  [de-identified] : nad [de-identified] : deisy [de-identified] : ulcer/midline scar; recurrent hernia reducible [FreeTextEntry1] : abd wound

## 2021-06-25 NOTE — HISTORY OF PRESENT ILLNESS
[FreeTextEntry1] : 87 yo woman with PHMx of DM and CAD has right lower abdominal wound, had oasis last visit in dec\par recent admit for sbo wound reopened, conservative care, vna started\par no fevers 6/3/21with abdominal wall ulcer-  reopened lower ulcer\par wash betadine, génesis, adaptic on the periwound, abd pad, abdominal binder\par \par increased lyrica s/p shingles onher back\par used ag nitrate last visit\par jan 18-jan28 altered mental status, s/p shingles covid jan 18, treated with lovenox\par no fevers\par  using adaptic and foam, no fever\par hernia still reducible, here to have oasis for wound- \par 5/11/21Interperter #[id number] [place]ic  \par wounds do not look infected\par was closed now reopened , had prontosan, andaptic and foam  , had some bleeding\par reopened upper ulcer- better this week\par adaptic/ prontosan foam binder\par  had closed/ progress with prior to protosan \par  grafix 1/16/20 primatrix placed  10/22 , sep12- aug 30 aug 8\par  \par no fever- glucose ok, needs new binder\par prior used nystatin swish and swallow, doing better\par  \par l ast skin substitute in January\par using , no fevers has vna 3 times a week\par \par  had puraply april 9 may 7, may 30  jose m 13 and puraply july 19, sept 12 no fevers\par  here for reapplication of skin sub-  primatrix is here\par \par had july 2  surgery  visit , because of long surgery  with risk, she is afraid to do it\par \par still with drainage, no cellulitis, foam placed caused irritation (big square)\par  used silver nitrate 3/12, 3/19, 3/26;  had grafix  dec 11/ jan8 , jan22 and feb 26 with minimal improvement,\par  Has been using aquacel/ foam,  \par  speaks only Northern Irish/   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 Pt had been getting treated by an ID doctor , had initial abdominal surgery 1997, then 2017, has appt. in April with a surgeon from [facility] \par  She has not had any fever.  She is frustrated that the wound is not healing. She is receiving wound care at home every other day.   duoderm to periwound/ wears a hernia abd binder to reduce it\par  hernia (2 areas ) is large spreading skin open, is partly reducible\par \par Of Note, she:\par - Had colorectal cancer s/p bowel resection\par - Has ventral abdominal hernias - S/P repair in 2017\par -history for SBO.\par \par She was admitted to [facility] with subjective fever and pain over a right sided abdominal wall ulcer.  The patient states that the right sided abdominal wall ulcer has been present for over a year. \par   \par She had a CT that showed some inflammatory change in  the abdominal wall but no evidence of abscess or fistula.\par s/p mssa and enterbacter, treated with augmentin/ levaquin and fluconazole for vaginal candidiasis\par    She was treated with appropriate abx intravenously for 5 days and sent home on augmentin.  During her hospitalization, the redness to the abdominal wall improved but the ulcerated lesion continued to drain. She was discharged on Augmentin previously\par \par  . \par \par \par

## 2021-06-25 NOTE — REASON FOR VISIT
[Follow-Up: _____] : a [unfilled] follow-up visit [Formal Caregiver] : formal caregiver [[employer] ] : provided by [employer]   [Family Member] : family member [FreeTextEntry1] : [id number] [FreeTextEntry2] : Morena [TWNoteComboBox1] : Belizean

## 2021-06-25 NOTE — ASSESSMENT
[FreeTextEntry1] : 86 yr old  DM HTNwith abdominal wall ulcer-  reopened lower ulcer, friction  s/p sbo and covid\par  génesis adaptic continue- smaller\par continue present rx\par vna 3 x a week\par discussed diet\par  Wound has shown improvement through (increased granulation and or decreased size).\par Wound is free from infection drainage and necrotic tissue. \par  Patient is on comprehensive diabetic management program  \par     binder-    had closed/ progress with prior to protosan \par  \par  complexlow-lab, xr asha,test reviewed\par risk- low\par  ) Plan for re application of  oasis or primatrix().\par Wound has shown improvement through (increased granulation and or decreased size).\par Wound is free from infection drainage and necrotic tissue. \par  Patient is on comprehensive diabetic management program with a Hg A1c of () from (date).\par Wound has been treated with standards of care for () weeks including (compression, offloading, debridement).\par \par \par

## 2021-07-15 ENCOUNTER — APPOINTMENT (OUTPATIENT)
Dept: WOUND CARE | Facility: CLINIC | Age: 86
End: 2021-07-15
Payer: MEDICARE

## 2021-07-15 VITALS
HEART RATE: 65 BPM | DIASTOLIC BLOOD PRESSURE: 81 MMHG | TEMPERATURE: 95 F | SYSTOLIC BLOOD PRESSURE: 151 MMHG | RESPIRATION RATE: 16 BRPM

## 2021-07-15 PROCEDURE — 99213 OFFICE O/P EST LOW 20 MIN: CPT

## 2021-07-15 RX ORDER — MUPIROCIN 20 MG/G
2 OINTMENT TOPICAL
Qty: 1 | Refills: 2 | Status: ACTIVE | COMMUNITY
Start: 2021-07-15 | End: 1900-01-01

## 2021-07-19 NOTE — HISTORY OF PRESENT ILLNESS
[FreeTextEntry1] : 87 yo woman with PHMx of DM and CAD has right lower abdominal wound, had oasis last visit in dec\par recent admit for sbo wound reopened, conservative care, vna started\par no fevers 6/3/21with abdominal wall ulcer-  reopened lower ulcer\par wash betadine, génesis, adaptic on the periwound, abd pad, abdominal binder\par \par increased lyrica s/p shingles onher back\par used ag nitrate last visit\par jan 18-jan28 altered mental status, s/p shingles covid jan 18, treated with lovenox\par no fevers\par  using adaptic and foam, no fever\par hernia still reducible, here to have oasis for wound- \par 5/11/21Interperter #[id number] [place]  \par wounds do not look infected\par was closed now reopened , had prontosan, andaptic and foam  , had some bleeding\par reopened upper ulcer- better this week\par adaptic/ prontosan foam binder\par  had closed/ progress with prior to protosan \par  grafix 1/16/20 primatrix placed  10/22 , sep12- aug 30 aug 8\par  \par no fever- glucose ok, needs new binder\par prior used nystatin swish and swallow, doing better\par  \par l ast skin substitute in January\par using , no fevers has vna 3 times a week\par \par  had puraply april 9 may 7, may 30  jose m 13 and puraply july 19, sept 12 no fevers\par  here for reapplication of skin sub-  primatrix is here\par \par had july 2  surgery  visit , because of long surgery  with risk, she is afraid to do it\par \par still with drainage, no cellulitis, foam placed caused irritation (big square)\par  used silver nitrate 3/12, 3/19, 3/26;  had grafix  dec 11/ jan8 , jan22 and feb 26 with minimal improvement,\par  Has been using aquacel/ foam,  \par  speaks only Greenlandic/   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 Pt had been getting treated by an ID doctor , had initial abdominal surgery 1997, then 2017, has appt. in April with a surgeon from [facility] \par  She has not had any fever.  She is frustrated that the wound is not healing. She is receiving wound care at home every other day.   duoderm to periwound/ wears a hernia abd binder to reduce it\par  hernia (2 areas ) is large spreading skin open, is partly reducible\par \par Of Note, she:\par - Had colorectal cancer s/p bowel resection\par - Has ventral abdominal hernias - S/P repair in 2017\par -history for SBO.\par \par She was admitted to [facility] with subjective fever and pain over a right sided abdominal wall ulcer.  The patient states that the right sided abdominal wall ulcer has been present for over a year. \par   \par She had a CT that showed some inflammatory change in  the abdominal wall but no evidence of abscess or fistula.\par s/p mssa and enterbacter, treated with augmentin/ levaquin and fluconazole for vaginal candidiasis\par    She was treated with appropriate abx intravenously for 5 days and sent home on augmentin.  During her hospitalization, the redness to the abdominal wall improved but the ulcerated lesion continued to drain. She was discharged on Augmentin previously\par \par  . \par \par \par

## 2021-07-19 NOTE — PHYSICAL EXAM
[Normal Breath Sounds] : Normal breath sounds [Skin Ulcer] : ulcer [Alert] : alert [Oriented to Person] : oriented to person [Oriented to Place] : oriented to place [Oriented to Time] : oriented to time [Calm] : calm [Please See PDF for [employer] Analytics] : Please See PDF for [employer]. [JVD] : no jugular venous distention  [de-identified] : nad [de-identified] : deisy [de-identified] : ulcer/midline scar; recurrent hernia reducible [FreeTextEntry1] : abd wound

## 2021-07-19 NOTE — PLAN
[FreeTextEntry1] : 86 yr old  DM HTNwith abdominal wall ulcer-  reopened abdominal ulcer,  \par  wash with betadine , génesis,  adaptic on the periwound \par discussed diet\par  binder- ,   No clinical sign of infection or bowel content\par  Change dressing  3 times per week.\par Encouraged ambulation or exercise.\par Optimization of nutrition.\par \par f/u in 3-4 weeks

## 2021-07-19 NOTE — REASON FOR VISIT
[Follow-Up: _____] : a [unfilled] follow-up visit [Formal Caregiver] : formal caregiver [Family Member] : family member [[employer] ] : provided by [employer]   [FreeTextEntry1] : [id number] [FreeTextEntry2] : Morena [TWNoteComboBox1] : Argentine

## 2021-08-05 ENCOUNTER — APPOINTMENT (OUTPATIENT)
Dept: WOUND CARE | Facility: CLINIC | Age: 86
End: 2021-08-05
Payer: MEDICARE

## 2021-08-05 VITALS
DIASTOLIC BLOOD PRESSURE: 83 MMHG | TEMPERATURE: 95.9 F | SYSTOLIC BLOOD PRESSURE: 135 MMHG | RESPIRATION RATE: 16 BRPM | HEART RATE: 88 BPM

## 2021-08-05 DIAGNOSIS — L98.492 NON-PRESSURE CHRONIC ULCER OF SKIN OF OTHER SITES WITH FAT LAYER EXPOSED: ICD-10-CM

## 2021-08-05 PROCEDURE — 99213 OFFICE O/P EST LOW 20 MIN: CPT

## 2021-08-05 NOTE — PLAN
[FreeTextEntry1] : 86 yr old  DM HTNwith abdominal wall ulcer-  reopened abdominal ulcer,  \par  wash with betadine , génesis,  adaptic on the periwound \par discussed diet\par  binder- ,   No clinical sign of infection or bowel content\par encouraged lasix\par  Change dressing  3 times per week.\par Encouraged ambulation or exercise.\par Optimization of nutrition.\par \par f/u in 3-4 weeks

## 2021-08-05 NOTE — HISTORY OF PRESENT ILLNESS
[FreeTextEntry1] : 85 yo woman with PHMx of DM and CAD has right lower abdominal wound, had oasis last visit in dec\par recent admit for sbo wound reopened, conservative care, vna started\par no fevers 6/3/21with abdominal wall ulcer-  reopened lower ulcer\par wash betadine, génesis, adaptic on the periwound, abd pad, abdominal binder\par \par increased lyrica s/p shingles onher back\par used ag nitrate last visit\par jan 18-jan28 altered mental status, s/p shingles covid jan 18, treated with lovenox\par no fevers\par  using adaptic and foam, no fever\par hernia still reducible, here to have oasis for wound- \par 5/11/21Interperter #[id number] Danish  \par wounds do not look infected\par was closed now reopened , had prontosan, andaptic and foam  , had some bleeding\par reopened upper ulcer- better this week\par adaptic/ prontosan foam binder\par  had closed/ progress with prior to protosan \par  grafix 1/16/20 primatrix placed  10/22 , sep12- aug 30 aug 8\par  \par no fever- glucose ok, needs new binder\par prior used nystatin swish and swallow, doing better\par  \par l ast skin substitute in January\par using , no fevers has vna 3 times a week\par \par  had puraply april 9 may 7, may 30  jose m 13 and puraply july 19, sept 12 no fevers\par  here for reapplication of skin sub-  primatrix is here\par \par had july 2  surgery  visit , because of long surgery  with risk, she is afraid to do it\par \par still with drainage, no cellulitis, foam placed caused irritation (big square)\par  used silver nitrate 3/12, 3/19, 3/26;  had grafix  dec 11/ jan8 , jan22 and feb 26 with minimal improvement,\par  Has been using aquacel/ foam,  \par  speaks only Mexican/   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 Pt had been getting treated by an ID doctor , had initial abdominal surgery 1997, then 2017, has appt. in April with a surgeon from [facility] \par  She has not had any fever.  She is frustrated that the wound is not healing. She is receiving wound care at home every other day.   duoderm to periwound/ wears a hernia abd binder to reduce it\par  hernia (2 areas ) is large spreading skin open, is partly reducible\par \par Of Note, she:\par - Had colorectal cancer s/p bowel resection\par - Has ventral abdominal hernias - S/P repair in 2017\par -history for SBO.\par \par She was admitted to [facility] with subjective fever and pain over a right sided abdominal wall ulcer.  The patient states that the right sided abdominal wall ulcer has been present for over a year. \par   \par She had a CT that showed some inflammatory change in  the abdominal wall but no evidence of abscess or fistula.\par s/p mssa and enterbacter, treated with augmentin/ levaquin and fluconazole for vaginal candidiasis\par    She was treated with appropriate abx intravenously for 5 days and sent home on augmentin.  During her hospitalization, the redness to the abdominal wall improved but the ulcerated lesion continued to drain. She was discharged on Augmentin previously\par \par  . \par \par \par

## 2021-08-05 NOTE — ASSESSMENT
[FreeTextEntry1] : 86 yr old  DM HTNwith abdominal wall ulcer-  reopened lower ulcer, friction  s/p sbo and covid\par  génesis adaptic continue  present rx\par  family helpng\par discussed diet/ furosemide\par  Wound has shown improvement through (increased granulation and or decreased size).\par Wound is free from infection drainage and necrotic tissue. \par  Patient is on comprehensive diabetic management program  \par     binder-    had closed/ progress with prior to protosan \par  \par  complexlow-lab, xr asha,test reviewed\par risk- low\par  ) Plan for re application of  oasis or primatrix().\par Wound has shown improvement through (increased granulation and or decreased size).\par Wound is free from infection drainage and necrotic tissue. \par  Patient is on comprehensive diabetic management program with a Hg A1c of () from (date).\par Wound has been treated with standards of care for () weeks including (compression, offloading, debridement).\par \par \par

## 2021-08-05 NOTE — REASON FOR VISIT
[Follow-Up: _____] : a [unfilled] follow-up visit [Formal Caregiver] : formal caregiver [Family Member] : family member [[employer] ] : provided by [employer]   [FreeTextEntry1] : [id number] [FreeTextEntry2] : Morena [TWNoteComboBox1] : Italian

## 2021-08-05 NOTE — PHYSICAL EXAM
[JVD] : no jugular venous distention  [Normal Breath Sounds] : Normal breath sounds [Skin Ulcer] : ulcer [Alert] : alert [Oriented to Person] : oriented to person [Oriented to Place] : oriented to place [Oriented to Time] : oriented to time [Calm] : calm [de-identified] : nad [de-identified] : deisy [de-identified] : ulcer/midline scar; recurrent hernia reducible [Please See PDF for [employer]] : Please See PDF for [employer]. [FreeTextEntry1] : abd wound

## 2021-08-10 NOTE — PATIENT PROFILE ADULT - NSPROPOAURINARYCATHETER_GEN_A_NUR
----- Message from Hola Sadler MD sent at 8/10/2021  8:28 AM CDT -----  Please let patient know results.    SHABBIR       no

## 2021-08-26 ENCOUNTER — APPOINTMENT (OUTPATIENT)
Dept: WOUND CARE | Facility: CLINIC | Age: 86
End: 2021-08-26
Payer: MEDICARE

## 2021-08-26 VITALS — WEIGHT: 168 LBS | HEIGHT: 63 IN | BODY MASS INDEX: 29.77 KG/M2 | TEMPERATURE: 96.2 F

## 2021-08-26 DIAGNOSIS — S31.109A UNSPECIFIED OPEN WOUND OF ABDOMINAL WALL, UNSPECIFIED QUADRANT W/OUT PENETRATION INTO PERITONEAL CAVITY, INITIAL ENCOUNTER: ICD-10-CM

## 2021-08-26 PROCEDURE — 99213 OFFICE O/P EST LOW 20 MIN: CPT

## 2021-08-30 PROBLEM — S31.109A WOUND, OPEN, ABDOMINAL WALL, LATERAL: Status: ACTIVE | Noted: 2019-02-04

## 2021-08-30 NOTE — HISTORY OF PRESENT ILLNESS
[FreeTextEntry1] : 87 yo woman with PHMx of DM and CAD has right lower abdominal wound, had oasis last visit in dec\par recent admit for sbo wound reopened, conservative care,  \par no new changes\par no fevers 6/3/21with abdominal wall ulcer-  reopened lower ulcer\par wash betadine, génesis, adaptic on the periwound, abd pad, abdominal binder\par \par increased lyrica s/p shingles onher back\par used ag nitrate last visit\par jan 18-jan28 altered mental status, s/p shingles covid jan 18, treated with lovenox\par no fevers\par  using adaptic and foam, no fever\par hernia still reducible, here to have oasis for wound- \par 5/11/21Interperter #[id number] Italian  \par wounds do not look infected\par was closed now reopened , had prontosan, andaptic and foam  , had some bleeding\par reopened upper ulcer- better this week\par adaptic/ prontosan foam binder\par  had closed/ progress with prior to protosan \par  grafix 1/16/20 primatrix placed  10/22 , sep12- aug 30 aug 8\par  \par no fever- glucose ok, needs new binder\par prior used nystatin swish and swallow, doing better\par  \par l ast skin substitute in January\par using , no fevers has vna 3 times a week\par \par  had puraply april 9 may 7, may 30  jose m 13 and puraply july 19, sept 12 no fevers\par  here for reapplication of skin sub-  primatrix is here\par \par had july 2  surgery  visit , because of long surgery  with risk, she is afraid to do it\par \par still with drainage, no cellulitis, foam placed caused irritation (big square)\par  used silver nitrate 3/12, 3/19, 3/26;  had grafix  dec 11/ jan8 , jan22 and feb 26 with minimal improvement,\par  Has been using aquacel/ foam,  \par  speaks only [place]an/   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 Pt had been getting treated by an ID doctor , had initial abdominal surgery 1997, then 2017, has appt. in April with a surgeon from [facility] \par  She has not had any fever.  She is frustrated that the wound is not healing. She is receiving wound care at home every other day.   duoderm to periwound/ wears a hernia abd binder to reduce it\par  hernia (2 areas ) is large spreading skin open, is partly reducible\par \par Of Note, she:\par - Had colorectal cancer s/p bowel resection\par - Has ventral abdominal hernias - S/P repair in 2017\par -history for SBO.\par \par She was admitted to [facility] with subjective fever and pain over a right sided abdominal wall ulcer.  The patient states that the right sided abdominal wall ulcer has been present for over a year. \par   \par She had a CT that showed some inflammatory change in  the abdominal wall but no evidence of abscess or fistula.\par s/p mssa and enterbacter, treated with augmentin/ levaquin and fluconazole for vaginal candidiasis\par    She was treated with appropriate abx intravenously for 5 days and sent home on augmentin.  During her hospitalization, the redness to the abdominal wall improved but the ulcerated lesion continued to drain. She was discharged on Augmentin previously\par \par  . \par \par \par

## 2021-08-30 NOTE — ASSESSMENT
[FreeTextEntry1] : 86 yr old  DM HTNwith abdominal wall ulcer-  reopened lower ulcer, friction  s/p sbo and covid\par  génesis adaptic continue- smaller continue present treatment\par continue present rx\par vna 3 x a week\par discussed diet\par  Wound has shown improvement through (increased granulation and or decreased size).\par Wound is free from infection drainage and necrotic tissue. \par  Patient is on comprehensive diabetic management program  \par     binder-    had closed/ progress with prior to protosan \par  \par  complexlow-lab, xr asha,test reviewed\par risk- low\par   \par

## 2021-08-30 NOTE — PHYSICAL EXAM
[JVD] : no jugular venous distention  [de-identified] : nad [de-identified] : deisy [de-identified] : ulcer/midline scar; recurrent hernia reducible [FreeTextEntry1] : abd wound

## 2021-09-09 ENCOUNTER — INPATIENT (INPATIENT)
Facility: HOSPITAL | Age: 86
LOS: 2 days | Discharge: HOME | End: 2021-09-12
Attending: INTERNAL MEDICINE | Admitting: INTERNAL MEDICINE
Payer: MEDICARE

## 2021-09-09 VITALS
SYSTOLIC BLOOD PRESSURE: 119 MMHG | RESPIRATION RATE: 16 BRPM | HEIGHT: 61 IN | HEART RATE: 89 BPM | OXYGEN SATURATION: 96 % | TEMPERATURE: 98 F | WEIGHT: 179.9 LBS | DIASTOLIC BLOOD PRESSURE: 60 MMHG

## 2021-09-09 DIAGNOSIS — Z95.5 PRESENCE OF CORONARY ANGIOPLASTY IMPLANT AND GRAFT: Chronic | ICD-10-CM

## 2021-09-09 DIAGNOSIS — Z92.89 PERSONAL HISTORY OF OTHER MEDICAL TREATMENT: Chronic | ICD-10-CM

## 2021-09-09 DIAGNOSIS — Z98.890 OTHER SPECIFIED POSTPROCEDURAL STATES: Chronic | ICD-10-CM

## 2021-09-09 LAB
ALBUMIN SERPL ELPH-MCNC: 3.3 G/DL — LOW (ref 3.5–5.2)
ALBUMIN SERPL ELPH-MCNC: 3.4 G/DL — LOW (ref 3.5–5.2)
ALP SERPL-CCNC: 56 U/L — SIGNIFICANT CHANGE UP (ref 30–115)
ALP SERPL-CCNC: 67 U/L — SIGNIFICANT CHANGE UP (ref 30–115)
ALT FLD-CCNC: 16 U/L — SIGNIFICANT CHANGE UP (ref 0–41)
ALT FLD-CCNC: 19 U/L — SIGNIFICANT CHANGE UP (ref 0–41)
ANION GAP SERPL CALC-SCNC: 13 MMOL/L — SIGNIFICANT CHANGE UP (ref 7–14)
ANION GAP SERPL CALC-SCNC: 14 MMOL/L — SIGNIFICANT CHANGE UP (ref 7–14)
APPEARANCE UR: CLEAR — SIGNIFICANT CHANGE UP
AST SERPL-CCNC: 15 U/L — SIGNIFICANT CHANGE UP (ref 0–41)
AST SERPL-CCNC: 17 U/L — SIGNIFICANT CHANGE UP (ref 0–41)
BASE EXCESS BLDV CALC-SCNC: 0.7 MMOL/L — SIGNIFICANT CHANGE UP (ref -2–3)
BASOPHILS # BLD AUTO: 0.04 K/UL — SIGNIFICANT CHANGE UP (ref 0–0.2)
BASOPHILS NFR BLD AUTO: 0.5 % — SIGNIFICANT CHANGE UP (ref 0–1)
BILIRUB SERPL-MCNC: 0.2 MG/DL — SIGNIFICANT CHANGE UP (ref 0.2–1.2)
BILIRUB SERPL-MCNC: 0.3 MG/DL — SIGNIFICANT CHANGE UP (ref 0.2–1.2)
BILIRUB UR-MCNC: NEGATIVE — SIGNIFICANT CHANGE UP
BUN SERPL-MCNC: 26 MG/DL — HIGH (ref 10–20)
BUN SERPL-MCNC: 38 MG/DL — HIGH (ref 10–20)
CA-I SERPL-SCNC: 1.34 MMOL/L — HIGH (ref 1.15–1.33)
CALCIUM SERPL-MCNC: 9.2 MG/DL — SIGNIFICANT CHANGE UP (ref 8.5–10.1)
CALCIUM SERPL-MCNC: 9.5 MG/DL — SIGNIFICANT CHANGE UP (ref 8.5–10.1)
CHLORIDE SERPL-SCNC: 96 MMOL/L — LOW (ref 98–110)
CHLORIDE SERPL-SCNC: 98 MMOL/L — SIGNIFICANT CHANGE UP (ref 98–110)
CO2 SERPL-SCNC: 21 MMOL/L — SIGNIFICANT CHANGE UP (ref 17–32)
CO2 SERPL-SCNC: 22 MMOL/L — SIGNIFICANT CHANGE UP (ref 17–32)
COLOR SPEC: SIGNIFICANT CHANGE UP
CREAT SERPL-MCNC: 0.9 MG/DL — SIGNIFICANT CHANGE UP (ref 0.7–1.5)
CREAT SERPL-MCNC: 1 MG/DL — SIGNIFICANT CHANGE UP (ref 0.7–1.5)
DIFF PNL FLD: NEGATIVE — SIGNIFICANT CHANGE UP
EOSINOPHIL # BLD AUTO: 0.03 K/UL — SIGNIFICANT CHANGE UP (ref 0–0.7)
EOSINOPHIL NFR BLD AUTO: 0.3 % — SIGNIFICANT CHANGE UP (ref 0–8)
GAS PNL BLDV: 131 MMOL/L — LOW (ref 136–145)
GAS PNL BLDV: SIGNIFICANT CHANGE UP
GLUCOSE SERPL-MCNC: 119 MG/DL — HIGH (ref 70–99)
GLUCOSE SERPL-MCNC: 188 MG/DL — HIGH (ref 70–99)
GLUCOSE UR QL: NEGATIVE — SIGNIFICANT CHANGE UP
HCO3 BLDV-SCNC: 26 MMOL/L — SIGNIFICANT CHANGE UP (ref 22–29)
HCT VFR BLD CALC: 27.6 % — LOW (ref 37–47)
HCT VFR BLD CALC: 28.4 % — LOW (ref 37–47)
HCT VFR BLDA CALC: 27 % — LOW (ref 34.5–46.5)
HGB BLD CALC-MCNC: 9 G/DL — LOW (ref 11.7–16.1)
HGB BLD-MCNC: 9.1 G/DL — LOW (ref 12–16)
HGB BLD-MCNC: 9.4 G/DL — LOW (ref 12–16)
IMM GRANULOCYTES NFR BLD AUTO: 0.6 % — HIGH (ref 0.1–0.3)
KETONES UR-MCNC: NEGATIVE — SIGNIFICANT CHANGE UP
LACTATE BLDV-MCNC: 1.2 MMOL/L — SIGNIFICANT CHANGE UP (ref 0.5–2)
LACTATE SERPL-SCNC: 3.3 MMOL/L — HIGH (ref 0.7–2)
LEUKOCYTE ESTERASE UR-ACNC: NEGATIVE — SIGNIFICANT CHANGE UP
LIDOCAIN IGE QN: 26 U/L — SIGNIFICANT CHANGE UP (ref 7–60)
LYMPHOCYTES # BLD AUTO: 1.26 K/UL — SIGNIFICANT CHANGE UP (ref 1.2–3.4)
LYMPHOCYTES # BLD AUTO: 14.2 % — LOW (ref 20.5–51.1)
MCHC RBC-ENTMCNC: 27.6 PG — SIGNIFICANT CHANGE UP (ref 27–31)
MCHC RBC-ENTMCNC: 27.9 PG — SIGNIFICANT CHANGE UP (ref 27–31)
MCHC RBC-ENTMCNC: 33 G/DL — SIGNIFICANT CHANGE UP (ref 32–37)
MCHC RBC-ENTMCNC: 33.1 G/DL — SIGNIFICANT CHANGE UP (ref 32–37)
MCV RBC AUTO: 83.5 FL — SIGNIFICANT CHANGE UP (ref 81–99)
MCV RBC AUTO: 84.7 FL — SIGNIFICANT CHANGE UP (ref 81–99)
MONOCYTES # BLD AUTO: 0.8 K/UL — HIGH (ref 0.1–0.6)
MONOCYTES NFR BLD AUTO: 9 % — SIGNIFICANT CHANGE UP (ref 1.7–9.3)
NEUTROPHILS # BLD AUTO: 6.68 K/UL — HIGH (ref 1.4–6.5)
NEUTROPHILS NFR BLD AUTO: 75.4 % — HIGH (ref 42.2–75.2)
NITRITE UR-MCNC: NEGATIVE — SIGNIFICANT CHANGE UP
NRBC # BLD: 0 /100 WBCS — SIGNIFICANT CHANGE UP (ref 0–0)
NRBC # BLD: 0 /100 WBCS — SIGNIFICANT CHANGE UP (ref 0–0)
PCO2 BLDV: 44 MMHG — HIGH (ref 39–42)
PH BLDV: 7.38 — SIGNIFICANT CHANGE UP (ref 7.32–7.43)
PH UR: 6 — SIGNIFICANT CHANGE UP (ref 5–8)
PLATELET # BLD AUTO: 257 K/UL — SIGNIFICANT CHANGE UP (ref 130–400)
PLATELET # BLD AUTO: 259 K/UL — SIGNIFICANT CHANGE UP (ref 130–400)
PO2 BLDV: 32 MMHG — SIGNIFICANT CHANGE UP
POTASSIUM BLDV-SCNC: 4.5 MMOL/L — SIGNIFICANT CHANGE UP (ref 3.5–5.1)
POTASSIUM SERPL-MCNC: 4.6 MMOL/L — SIGNIFICANT CHANGE UP (ref 3.5–5)
POTASSIUM SERPL-MCNC: 4.9 MMOL/L — SIGNIFICANT CHANGE UP (ref 3.5–5)
POTASSIUM SERPL-SCNC: 4.6 MMOL/L — SIGNIFICANT CHANGE UP (ref 3.5–5)
POTASSIUM SERPL-SCNC: 4.9 MMOL/L — SIGNIFICANT CHANGE UP (ref 3.5–5)
PROT SERPL-MCNC: 5.8 G/DL — LOW (ref 6–8)
PROT SERPL-MCNC: 5.9 G/DL — LOW (ref 6–8)
PROT UR-MCNC: NEGATIVE — SIGNIFICANT CHANGE UP
RBC # BLD: 3.26 M/UL — LOW (ref 4.2–5.4)
RBC # BLD: 3.4 M/UL — LOW (ref 4.2–5.4)
RBC # FLD: 13.1 % — SIGNIFICANT CHANGE UP (ref 11.5–14.5)
RBC # FLD: 13.2 % — SIGNIFICANT CHANGE UP (ref 11.5–14.5)
SAO2 % BLDV: 51.6 % — SIGNIFICANT CHANGE UP
SARS-COV-2 RNA SPEC QL NAA+PROBE: SIGNIFICANT CHANGE UP
SODIUM SERPL-SCNC: 131 MMOL/L — LOW (ref 135–146)
SODIUM SERPL-SCNC: 133 MMOL/L — LOW (ref 135–146)
SP GR SPEC: 1.01 — SIGNIFICANT CHANGE UP (ref 1.01–1.03)
UROBILINOGEN FLD QL: SIGNIFICANT CHANGE UP
WBC # BLD: 8.14 K/UL — SIGNIFICANT CHANGE UP (ref 4.8–10.8)
WBC # BLD: 8.86 K/UL — SIGNIFICANT CHANGE UP (ref 4.8–10.8)
WBC # FLD AUTO: 8.14 K/UL — SIGNIFICANT CHANGE UP (ref 4.8–10.8)
WBC # FLD AUTO: 8.86 K/UL — SIGNIFICANT CHANGE UP (ref 4.8–10.8)

## 2021-09-09 PROCEDURE — 36000 PLACE NEEDLE IN VEIN: CPT

## 2021-09-09 PROCEDURE — 76937 US GUIDE VASCULAR ACCESS: CPT | Mod: 26

## 2021-09-09 PROCEDURE — 99223 1ST HOSP IP/OBS HIGH 75: CPT

## 2021-09-09 PROCEDURE — 93010 ELECTROCARDIOGRAM REPORT: CPT

## 2021-09-09 PROCEDURE — 99285 EMERGENCY DEPT VISIT HI MDM: CPT | Mod: 25

## 2021-09-09 PROCEDURE — 74177 CT ABD & PELVIS W/CONTRAST: CPT | Mod: 26,MA

## 2021-09-09 PROCEDURE — 74176 CT ABD & PELVIS W/O CONTRAST: CPT | Mod: 26,59,MA

## 2021-09-09 RX ORDER — SODIUM CHLORIDE 9 MG/ML
500 INJECTION INTRAMUSCULAR; INTRAVENOUS; SUBCUTANEOUS ONCE
Refills: 0 | Status: COMPLETED | OUTPATIENT
Start: 2021-09-09 | End: 2021-09-09

## 2021-09-09 RX ORDER — PIPERACILLIN AND TAZOBACTAM 4; .5 G/20ML; G/20ML
3.38 INJECTION, POWDER, LYOPHILIZED, FOR SOLUTION INTRAVENOUS ONCE
Refills: 0 | Status: COMPLETED | OUTPATIENT
Start: 2021-09-09 | End: 2021-09-09

## 2021-09-09 RX ORDER — SODIUM CHLORIDE 9 MG/ML
1000 INJECTION INTRAMUSCULAR; INTRAVENOUS; SUBCUTANEOUS
Refills: 0 | Status: DISCONTINUED | OUTPATIENT
Start: 2021-09-09 | End: 2021-09-12

## 2021-09-09 RX ORDER — MORPHINE SULFATE 50 MG/1
4 CAPSULE, EXTENDED RELEASE ORAL ONCE
Refills: 0 | Status: DISCONTINUED | OUTPATIENT
Start: 2021-09-09 | End: 2021-09-09

## 2021-09-09 RX ORDER — IOHEXOL 300 MG/ML
30 INJECTION, SOLUTION INTRAVENOUS ONCE
Refills: 0 | Status: COMPLETED | OUTPATIENT
Start: 2021-09-09 | End: 2021-09-09

## 2021-09-09 RX ORDER — ACETAMINOPHEN 500 MG
650 TABLET ORAL ONCE
Refills: 0 | Status: COMPLETED | OUTPATIENT
Start: 2021-09-09 | End: 2021-09-09

## 2021-09-09 RX ADMIN — SODIUM CHLORIDE 500 MILLILITER(S): 9 INJECTION INTRAMUSCULAR; INTRAVENOUS; SUBCUTANEOUS at 17:43

## 2021-09-09 RX ADMIN — MORPHINE SULFATE 4 MILLIGRAM(S): 50 CAPSULE, EXTENDED RELEASE ORAL at 11:48

## 2021-09-09 RX ADMIN — Medication 650 MILLIGRAM(S): at 22:03

## 2021-09-09 RX ADMIN — IOHEXOL 30 MILLILITER(S): 300 INJECTION, SOLUTION INTRAVENOUS at 18:46

## 2021-09-09 RX ADMIN — SODIUM CHLORIDE 100 MILLILITER(S): 9 INJECTION INTRAMUSCULAR; INTRAVENOUS; SUBCUTANEOUS at 22:03

## 2021-09-09 RX ADMIN — PIPERACILLIN AND TAZOBACTAM 200 GRAM(S): 4; .5 INJECTION, POWDER, LYOPHILIZED, FOR SOLUTION INTRAVENOUS at 16:57

## 2021-09-09 RX ADMIN — MORPHINE SULFATE 4 MILLIGRAM(S): 50 CAPSULE, EXTENDED RELEASE ORAL at 10:07

## 2021-09-09 RX ADMIN — SODIUM CHLORIDE 500 MILLILITER(S): 9 INJECTION INTRAMUSCULAR; INTRAVENOUS; SUBCUTANEOUS at 13:30

## 2021-09-09 NOTE — ED PROVIDER NOTE - OBJECTIVE STATEMENT
Patient is a 87 yo female with PMHx of colon ca s/p resection, CAD, CM, HTN c/o fever for past 4 days. Patient's daughter came back from her trip and visited patient at her home, had fever up to 100.4F, given tylenol as needed, last dose yesterday. COVID test 9/6 is negative. Was given levofloxacin by PMD starting yesterday. Denies chills, chest pain, SOB, cough, abdominal pain, n/v/d. Notes some sharp left sided flank pain, moderate to severe, new, radiating to front of abdomen, constant. Unvaccinated for COVID, but no sick contacts. Also notes some b/l increased leg swelling.

## 2021-09-09 NOTE — CONSULT NOTE ADULT - SUBJECTIVE AND OBJECTIVE BOX
GENERAL SURGERY CONSULT NOTE    Patient: NIKKY FRASER , 86y ([birth year])Female   MRN: [id number]  Location: [facility] ED  Visit: 21 Emergency  Date: 21 @ 18:48     Yajaira [id number]  HPI: Patient is an 86 year old female with extensive PMH including HTN, DM, CAD, OA, colorectal cancer with colon resection in the past, as well as multiple SBO that required surgery at [facility] with bowel resection who presented to ED today with daughter because patient has been having fevers at home for the past 5-6 days. While in the ED, patient had a CT scan done which showed concern for a 9+cm abscess in proximity to the patient's very large hernia containing bowel. Of note, this area over hernia with multiple small wounds, no drainage.     Surgery consulted for evaluation of this abscess for possible surgical drainage.       PAST MEDICAL & SURGICAL HISTORY:  HTN (hypertension)    Diabetes mellitus    CAD (coronary artery disease)    Primary osteoarthritis of both knees    Colorectal cancer  Remote hx; s/p colon resection    Hyperuricemia    H/O hernia repair    History of bowel resection    History of coronary artery stent placement        Home Medications:  acetaminophen 325 mg oral tablet: 2 tab(s) orally every 6 hours, As needed, Temp greater or equal to 38C (100.4F), Mild Pain (1 - 3) (15 Hermilo 2021 11:20)  allopurinol 100 mg oral tablet: 1 tab(s) orally once a day (11 Aug 2018 13:21)  aspirin 81 mg oral delayed release tablet: 1 tab(s) orally once a day (11 Aug 2018 13:21)  aspirin 81 mg oral tablet: 1 tab(s) orally once a day (2021 12:30)  bisacodyl 10 mg rectal suppository: 1 suppository(ies) rectal once a day, As needed, Constipation (2021 12:30)  Colace 100 mg oral capsule: 1 cap(s) orally 2 times a day (11 Aug 2018 13:21)  cyclobenzaprine 5 mg oral tablet: 1 tab(s) orally 3 times a day (2021 12:30)  enalapril 20 mg oral tablet: 1 tab(s) orally once a day (2021 12:30)  enoxaparin: 40 milligram(s) subcutaneous once a day (at bedtime) (2021 12:30)  Glucophage 850 mg oral tablet: 1 tab(s) orally 2 times a day (11 Aug 2018 13:21)  isosorbide mononitrate 60 mg oral tablet, extended release: 1 tab(s) orally once a day (in the morning) (2021 12:30)  Januvia 100 mg oral tablet: 1 tab(s) orally once a day (11 Aug 2018 13:21)  Lasix 20 mg oral tablet: 1 tab(s) orally once a day, As Needed for leg swelling (11 Aug 2018 13:21)  Lasix 20 mg oral tablet: 1 tab(s) orally once a day, As Needed  legs edema (2021 12:30)  lidocaine 4% topical film: 1 patch topically once a day (15 Hermilo 2021 11:53)  Lipitor 40 mg oral tablet: 1 tab(s) orally once a day (2021 12:30)  Lyrica 50 mg oral capsule: 1 cap(s) orally 2 times a day (15 Hermilo 2021 14:15)  Lyrica 50 mg oral capsule: 1 cap(s) orally 2 times a day (2021 12:30)  metoprolol succinate 25 mg oral tablet, extended release: 1 tab(s) orally once a day (2021 12:30)  nystatin 100,000 units/g topical cream: 1 application topically 2 times a day (2021 12:30)  polyethylene glycol 3350 oral powder for reconstitution: 17 gram(s) orally 2 times a day (2021 12:30)  senna oral tablet: 2 tab(s) orally once a day (at bedtime) (11 Aug 2018 13:21)  senna oral tablet: 2 tab(s) orally once a day (at bedtime) (2021 12:30)  valACYclovir 1 g oral tablet: 1 tab(s) orally every 8 hours (2021 12:30)        VITALS:  T(F): 97.1 (21 @ 16:23), Max: 98.4 (21 @ 08:28)  HR: 60 (21 @ 16:23) (60 - 89)  BP: 164/72 (21 @ 16:23) (119/60 - 164/72)  RR: 16 (21 @ 16:23) (16 - 16)  SpO2: 96% (21 @ 16:23) (96% - 96%)    PHYSICAL EXAM:  General: NAD, AAOx3, calm and cooperative  HEENT: NCAT, GONZALEZ, EOM  Cardiac: RRR S1, S2  Respiratory: CTAB, normal respiratory effort, breath sounds equal BL  Abdomen: Soft, non-distended, non-tender, no rebound, no guarding. Large hernia to R side of abdomen with thin erythematous skin containing multiple superficial wounds. Warmth over area, but there is absolutely no tenderness.         LAB/STUDIES:                        9.4    8.86  )-----------( 257      ( 09 Sep 2021 09:47 )             28.4         131<L>  |  96<L>  |  38<H>  ----------------------------<  188<H>  4.9   |  21  |  1.0    Ca    9.5      09 Sep 2021 09:47    TPro  5.9<L>  /  Alb  3.4<L>  /  TBili  0.2  /  DBili  x   /  AST  17  /  ALT  19  /  AlkPhos  67        LIVER FUNCTIONS - ( 09 Sep 2021 09:47 )  Alb: 3.4 g/dL / Pro: 5.9 g/dL / ALK PHOS: 67 U/L / ALT: 19 U/L / AST: 17 U/L / GGT: x           Urinalysis Basic - ( 09 Sep 2021 13:39 )    Color: Light Yellow / Appearance: Clear / S.013 / pH: x  Gluc: x / Ketone: Negative  / Bili: Negative / Urobili: <2 mg/dL   Blood: x / Protein: Negative / Nitrite: Negative   Leuk Esterase: Negative / RBC: x / WBC x   Sq Epi: x / Non Sq Epi: x / Bacteria: x        IMAGING:  < from: CT Abdomen and Pelvis w/ IV Cont (21 @ 14:11) >  IMPRESSION:    1. Since 2021, new 9.4 cm subcutaneous collection of fluid and air along the anterior abdominal wall just inferior to the large right anterolateral hernia sac, with adjacent cutaneous thickening and subcutaneous inflammatory stranding, compatible with cellulitis.    2. No significant change in large right anterolateral abdominal wall hernia sac with nonobstructed loops of small and large bowel; no significant change in an additional slightly more inferior narrow necked hernia containing a short segment of nonobstructed bowel.    3. Cholelithiasis.    --- End of Report ---    < end of copied text >       GENERAL SURGERY CONSULT NOTE    Patient: NIKKY FRASER , 86y ([birth year])Female   MRN: [id number]  Location: [facility] ED  Visit: 21 Emergency  Date: 21 @ 18:48     Yajaira [id number]    HPI: Patient is an 86 year old female with extensive PMH including HTN, DM, CAD, OA, colorectal cancer with colon resection in the past, as well as multiple SBO that required surgery at [facility] with bowel resection who presented to ED today with daughter because patient has been having fevers at home for the past 5-6 days. While in the ED, patient had a CT scan done which showed concern for a 9+cm abscess in proximity to the patient's very large hernia containing bowel. Of note, this area over hernia with multiple small wounds, no drainage.     Surgery consulted for evaluation of this abscess for possible surgical drainage.       PAST MEDICAL & SURGICAL HISTORY:  HTN (hypertension)  Diabetes mellitus  CAD (coronary artery disease)  Primary osteoarthritis of both knees  Colorectal cancer Remote hx; s/p colon resection  Hyperuricemia  H/O hernia repair  History of bowel resection  History of coronary artery stent placement    Home Medications:  acetaminophen 325 mg oral tablet: 2 tab(s) orally every 6 hours, As needed, Temp greater or equal to 38C (100.4F), Mild Pain (1 - 3) (15 Hermilo 2021 11:20)  allopurinol 100 mg oral tablet: 1 tab(s) orally once a day (11 Aug 2018 13:21)  aspirin 81 mg oral delayed release tablet: 1 tab(s) orally once a day (11 Aug 2018 13:21)  aspirin 81 mg oral tablet: 1 tab(s) orally once a day (2021 12:30)  bisacodyl 10 mg rectal suppository: 1 suppository(ies) rectal once a day, As needed, Constipation (2021 12:30)  Colace 100 mg oral capsule: 1 cap(s) orally 2 times a day (11 Aug 2018 13:21)  cyclobenzaprine 5 mg oral tablet: 1 tab(s) orally 3 times a day (2021 12:30)  enalapril 20 mg oral tablet: 1 tab(s) orally once a day (2021 12:30)  enoxaparin: 40 milligram(s) subcutaneous once a day (at bedtime) (2021 12:30)  Glucophage 850 mg oral tablet: 1 tab(s) orally 2 times a day (11 Aug 2018 13:21)  isosorbide mononitrate 60 mg oral tablet, extended release: 1 tab(s) orally once a day (in the morning) (2021 12:30)  Januvia 100 mg oral tablet: 1 tab(s) orally once a day (11 Aug 2018 13:21)  Lasix 20 mg oral tablet: 1 tab(s) orally once a day, As Needed for leg swelling (11 Aug 2018 13:21)  Lasix 20 mg oral tablet: 1 tab(s) orally once a day, As Needed  legs edema (2021 12:30)  lidocaine 4% topical film: 1 patch topically once a day (15 Hermilo 2021 11:53)  Lipitor 40 mg oral tablet: 1 tab(s) orally once a day (2021 12:30)  Lyrica 50 mg oral capsule: 1 cap(s) orally 2 times a day (15 Hermilo 2021 14:15)  Lyrica 50 mg oral capsule: 1 cap(s) orally 2 times a day (2021 12:30)  metoprolol succinate 25 mg oral tablet, extended release: 1 tab(s) orally once a day (2021 12:30)  nystatin 100,000 units/g topical cream: 1 application topically 2 times a day (2021 12:30)  polyethylene glycol 3350 oral powder for reconstitution: 17 gram(s) orally 2 times a day (2021 12:30)  senna oral tablet: 2 tab(s) orally once a day (at bedtime) (11 Aug 2018 13:21)  senna oral tablet: 2 tab(s) orally once a day (at bedtime) (2021 12:30)  valACYclovir 1 g oral tablet: 1 tab(s) orally every 8 hours (2021 12:30)    VITALS:  T(F): 97.1 (21 @ 16:23), Max: 98.4 (21 @ 08:28)  HR: 60 (21 @ 16:23) (60 - 89)  BP: 164/72 (21 @ 16:23) (119/60 - 164/72)  RR: 16 (21 @ 16:23) (16 - 16)  SpO2: 96% (21 @ 16:23) (96% - 96%)    PHYSICAL EXAM:  General: NAD, AAOx3, calm and cooperative  HEENT: NCAT, GONZALEZ, EOM  Cardiac: RRR S1, S2  Respiratory: CTAB, normal respiratory effort, breath sounds equal BL  Abdomen: Soft, non-distended, non-tender, no rebound, no guarding. Large hernia to R side of abdomen with thin erythematous skin containing multiple superficial wounds. Warmth over area, but there is absolutely no tenderness.     LAB/STUDIES:                        9.4    8.86  )-----------( 257      ( 09 Sep 2021 09:47 )             28.4         131<L>  |  96<L>  |  38<H>  ----------------------------<  188<H>  4.9   |  21  |  1.0    Ca    9.5      09 Sep 2021 09:47    TPro  5.9<L>  /  Alb  3.4<L>  /  TBili  0.2  /  DBili  x   /  AST  17  /  ALT  19  /  AlkPhos  67    LIVER FUNCTIONS - ( 09 Sep 2021 09:47 )  Alb: 3.4 g/dL / Pro: 5.9 g/dL / ALK PHOS: 67 U/L / ALT: 19 U/L / AST: 17 U/L / GGT: x           Urinalysis Basic - ( 09 Sep 2021 13:39 )    Color: Light Yellow / Appearance: Clear / S.013 / pH: x  Gluc: x / Ketone: Negative  / Bili: Negative / Urobili: <2 mg/dL   Blood: x / Protein: Negative / Nitrite: Negative   Leuk Esterase: Negative / RBC: x / WBC x   Sq Epi: x / Non Sq Epi: x / Bacteria: x      IMAGING:  < from: CT Abdomen and Pelvis w/ IV Cont (21 @ 14:11) >  IMPRESSION:  1. Since 2021, new 9.4 cm subcutaneous collection of fluid and air along the anterior abdominal wall just inferior to the large right anterolateral hernia sac, with adjacent cutaneous thickening and subcutaneous inflammatory stranding, compatible with cellulitis.  2. No significant change in large right anterolateral abdominal wall hernia sac with nonobstructed loops of small and large bowel; no significant change in an additional slightly more inferior narrow necked hernia containing a short segment of nonobstructed bowel.  3. Cholelithiasis.  < end of copied text >    < from: CT Abdomen and Pelvis w/ Oral Cont (21 @ 21:49) >  IMPRESSION:  Since prior exam at 1:52 PM,  No evidence of contrast opacification of the previously described subcutaneous air and fluid collection along the anterior abdominal wall, suggestive of an abscess.  No significant change of large right anterolateral abdominal wall hernia sac with opacified nonobstructed loops of small and large bowel.  < end of copied text >

## 2021-09-09 NOTE — ED PROVIDER NOTE - NS ED ROS FT
Review of Systems:  •	CONSTITUTIONAL - No fever, No diaphoresis, No weight change  •	SKIN - No rash  •	HEMATOLOGIC - No abnormal bleeding or bruising  •	EYES - No eye pain, No blurred vision  •	ENT - No change in hearing, No sore throat, No neck pain, No rhinorrhea, No ear pain  •	RESPIRATORY - No shortness of breath, No cough  •	CARDIAC -No chest pain, No palpitations  •	GI - No abdominal pain, No nausea, No vomiting, No diarrhea, No constipation, No bright red blood per rectum or melena. + flank pain  •                 - No dysuria, frequency, hematuria.   •	ENDO - No polydypsia, No polyuria, No heat/cold intolerance  •	MUSCULOSKELETAL - No joint paint, + b/l leg swelling, No back pain  •	NEUROLOGIC - No numbness, No focal weakness, No headache, No dizziness  All other systems negative, unless specified in HPI

## 2021-09-09 NOTE — CONSULT NOTE ADULT - ASSESSMENT
ASSESSMENT:  86yF w/ PMHx including HTN, DM, CAD, OA, colorectal cancer with colon resection in the past, as well as multiple SBO that required surgery at [facility] with bowel resection who presented to ED today with daughter because patient has been having fevers at home for the past 5-6 days. While in the ED, patient had a CT scan done which showed concern for a 9+cm abscess in proximity to the patient's very large hernia containing bowel. Of note, this area over hernia with multiple small wounds, no drainage.     Surgery consulted for evaluation of this abscess for possible surgical drainage.. Physical exam findings, imaging, and labs as documented above.       At this point it is difficult to differentiate abscess from bowel on CT w/ IV contrast. There was extensive discussion with daughter and patient about how if this requires surgery how this would be an extensive surgery if there bowel is involved, or if there is a fistula tract responsible for this abscess.    For this reason, patient needs further evaluation with CT PO contrast to differentiate bowel from abscess.    PLAN:  - CT w/ PO contrast  - Will f/u  -      Above plan discussed with Attending Surgeon Dr. Arreola  , patient, patient family, and Primary team  09-09-21 @ 18:48 ASSESSMENT:  86F w/ PMHx including HTN, DM, CAD, OA, colorectal cancer with colon resection in the past, as well as multiple SBO that required surgery at [facility] with bowel resection who presented to ED today with daughter because patient has been having fevers at home for the past 5-6 days. While in the ED, patient had a CT scan done which showed concern for a 9+cm abscess in proximity to the patient's very large hernia containing bowel. Of note, this area over hernia with multiple small wounds, no drainage.     Surgery consulted for evaluation of this abscess for possible surgical drainage. Physical exam findings, imaging, and labs as documented above.     There was extensive discussion with daughter and patient about how if this requires surgery how this would be an extensive surgery if there bowel is involved, or if there is a fistula tract responsible for this abscess. Repeat CT A/P w/ PO contrast shows no evidence of contrast in abscess/air-fluid collection. Per discussion with radiology, abscess appear extraperitoneal.     PLAN:  - no acute surgical intervention  - would recommend admission for IV antibiotics  - given location of abscess in relation to physical exam/overlying skin changes, consider IR consult for possible image-guided aspiration/drain placement    Above plan discussed with Attending Surgeon Dr. Arreola, patient, patient family, and Primary team  09-09-21 @ 18:48 ASSESSMENT:  86F w/ PMHx including HTN, DM, CAD, OA, colorectal cancer with colon resection in the past, as well as multiple SBO that required surgery at [facility] with bowel resection who presented to ED today with daughter because patient has been having fevers at home for the past 5-6 days. While in the ED, patient had a CT scan done which showed concern for a 9+cm abscess in proximity to the patient's very large hernia containing bowel. Of note, this area over hernia with multiple small wounds, no drainage.     Surgery consulted for evaluation of this abscess for possible surgical drainage. Physical exam findings, imaging, and labs as documented above.     There was extensive discussion with daughter and patient about how if this requires surgery how this would be an extensive surgery if there bowel is involved, or if there is a fistula tract responsible for this abscess. Repeat CT A/P w/ PO contrast shows no evidence of contrast in abscess/air-fluid collection. Per discussion with radiology, abscess appear extraperitoneal.     PLAN:  - no acute surgical intervention  - would recommend admission for IV antibiotics  - given location of abscess in relation to physical exam/overlying skin changes,      Above plan discussed with Attending Surgeon Dr. Arreola, patient, patient family, and Primary team  09-09-21 @ 18:48

## 2021-09-09 NOTE — ED PROVIDER NOTE - NS ED MD DISPO ISOLATION TYPES
Quality 110: Preventive Care And Screening: Influenza Immunization: Influenza Immunization not Administered for Documented Reasons.
Detail Level: Detailed
None

## 2021-09-09 NOTE — ED ADULT NURSE NOTE - NSSUHOSCREENINGYN_ED_ALL_ED
Message   Recorded as Task   Date: 02/14/2018 04:09 PM, Created By: Sada Mullen R.N.   Task Name: 4. Patient Message   Assigned To: Sada Mullen R.N.   Regarding Patient: AP RAMOS, Status: Active   Comment:    Sada Mullen CST    
Yes - the patient is able to be screened

## 2021-09-09 NOTE — ED ADULT TRIAGE NOTE - CHIEF COMPLAINT QUOTE
Pt brought in for fever x4 days; pt is afebrile in triage and has not taken meds today. Pt denies any other symptoms, had covid 6 months ago & had negative COVID test Tuesday. Pt also reports her FS has been in the 200s whenever she has fever, which is abnormal for her. MD prescribed abx for a possible infected hernia wound in her R abdomen that she gets wound care x3 years.

## 2021-09-09 NOTE — ED PROVIDER NOTE - ATTENDING CONTRIBUTION TO CARE
87yo woman h/o colorectal surgery s/p colon resection with complicated wound management over 3 years presents with fever and . Pt is Kazakh speaking and a poor historian, most history is by daughter. Pt c/o L abd/flank pain, no vomiting. VS, exam as noted, pt with low grade temp, appears frail, abd soft, L sided abd tenderness; R sided abd wound looks clean, no drainage, pt's daughter says no change in appearance. Labs, imaging, reassess.

## 2021-09-09 NOTE — ED PROVIDER NOTE - PROGRESS NOTE DETAILS
MQ: Left flank pain, will obtain labs and urine, ct abdomen/pelvis, pain control, ultrasound guided IV for difficult peripheral access MQ: Left flank pain, will obtain labs and urine, ct abdomen/pelvis, pain control, ultrasound guided IV for difficult peripheral access, patient with daughter and refused oral contrast with past surgical hx of colon resection Labs ok, awaiting CT read. Endorsed to Dr Anna to f/u imaging and dispo. MQ: CT shows abdominal abscess 9.4 cm, will give IV abx and general surgery consulted MQ: CT shows abdominal abscess 9.4 cm, will give IV abx and general surgery consulted  Given only total 1 L of fluids, as patient with increased leg swelling and careful not to put patient into fluid overload MQ: General surgery recommends CT abdomen/pelvis with oral contrast to differentiate bowel from abscess Kaleb: Received signout from Dr. Perkins. Pt with 9.4cm abd abscess on CT abd/pelv with IV contrast. Surgery was consulted and requested CT with PO contrast. Pt drank contrast ~7:15PM.   On my exam, pt in NAD, abd soft, nontender, nondistended at this time.   Pending CT, then will reassess with surgery. EP: patient endorsed to me to f/u CT scan , surgery consult and dispo.  Ct scan is pending at this time. Kaleb: spoke to surgery team Dr. Burgess - no plan for OR, requests IR for drainage. Recommend admission to medicine for futher management  Endorsed to MAR

## 2021-09-09 NOTE — ED PROVIDER NOTE - CLINICAL SUMMARY MEDICAL DECISION MAKING FREE TEXT BOX
Patient with abdominal pain, found to have abdominal abscess, she was evaluated by  surgery, no acute intervention,, abscess to be drained by IR, admit to medicine.

## 2021-09-09 NOTE — ED PROVIDER NOTE - PHYSICAL EXAMINATION
CONSTITUTIONAL: Well-developed; well-nourished; in no acute distress.   SKIN: warm, dry  HEAD: Normocephalic; atraumatic.  EYES: no conjunctival injection. PERRL.   ENT: No nasal discharge; airway clear.  NECK: Supple; non tender.  CARD: S1, S2 normal; no murmurs, gallops, or rubs. Regular rate and rhythm.   RESP: No wheezes, rales or rhonchi.  ABD: soft, RLQ chronic hernia wound without surrounding erythema or pus, nontender, no rebound tenderness or guarding, left and right CVAT b/l  EXT: Normal ROM.  No clubbing, cyanosis or edema.   LYMPH: No acute cervical adenopathy.  NEURO: Alert, oriented x 2.  PSYCH: Cooperative, appropriate.

## 2021-09-10 LAB
ALBUMIN SERPL ELPH-MCNC: 3.1 G/DL — LOW (ref 3.5–5.2)
ALP SERPL-CCNC: 58 U/L — SIGNIFICANT CHANGE UP (ref 30–115)
ALT FLD-CCNC: 16 U/L — SIGNIFICANT CHANGE UP (ref 0–41)
ANION GAP SERPL CALC-SCNC: 10 MMOL/L — SIGNIFICANT CHANGE UP (ref 7–14)
ANION GAP SERPL CALC-SCNC: 11 MMOL/L — SIGNIFICANT CHANGE UP (ref 7–14)
AST SERPL-CCNC: 19 U/L — SIGNIFICANT CHANGE UP (ref 0–41)
BASOPHILS # BLD AUTO: 0.03 K/UL — SIGNIFICANT CHANGE UP (ref 0–0.2)
BASOPHILS # BLD AUTO: 0.03 K/UL — SIGNIFICANT CHANGE UP (ref 0–0.2)
BASOPHILS NFR BLD AUTO: 0.4 % — SIGNIFICANT CHANGE UP (ref 0–1)
BASOPHILS NFR BLD AUTO: 0.4 % — SIGNIFICANT CHANGE UP (ref 0–1)
BILIRUB SERPL-MCNC: 0.3 MG/DL — SIGNIFICANT CHANGE UP (ref 0.2–1.2)
BUN SERPL-MCNC: 20 MG/DL — SIGNIFICANT CHANGE UP (ref 10–20)
BUN SERPL-MCNC: 22 MG/DL — HIGH (ref 10–20)
CALCIUM SERPL-MCNC: 8.9 MG/DL — SIGNIFICANT CHANGE UP (ref 8.5–10.1)
CALCIUM SERPL-MCNC: 9.3 MG/DL — SIGNIFICANT CHANGE UP (ref 8.5–10.1)
CHLORIDE SERPL-SCNC: 104 MMOL/L — SIGNIFICANT CHANGE UP (ref 98–110)
CHLORIDE SERPL-SCNC: 105 MMOL/L — SIGNIFICANT CHANGE UP (ref 98–110)
CO2 SERPL-SCNC: 20 MMOL/L — SIGNIFICANT CHANGE UP (ref 17–32)
CO2 SERPL-SCNC: 21 MMOL/L — SIGNIFICANT CHANGE UP (ref 17–32)
CREAT SERPL-MCNC: 0.7 MG/DL — SIGNIFICANT CHANGE UP (ref 0.7–1.5)
CREAT SERPL-MCNC: 0.8 MG/DL — SIGNIFICANT CHANGE UP (ref 0.7–1.5)
CRP SERPL-MCNC: 176 MG/L — HIGH
CULTURE RESULTS: SIGNIFICANT CHANGE UP
EOSINOPHIL # BLD AUTO: 0.08 K/UL — SIGNIFICANT CHANGE UP (ref 0–0.7)
EOSINOPHIL # BLD AUTO: 0.12 K/UL — SIGNIFICANT CHANGE UP (ref 0–0.7)
EOSINOPHIL NFR BLD AUTO: 1.1 % — SIGNIFICANT CHANGE UP (ref 0–8)
EOSINOPHIL NFR BLD AUTO: 1.6 % — SIGNIFICANT CHANGE UP (ref 0–8)
ERYTHROCYTE [SEDIMENTATION RATE] IN BLOOD: 104 MM/HR — HIGH (ref 0–20)
GLUCOSE BLDC GLUCOMTR-MCNC: 272 MG/DL — HIGH (ref 70–99)
GLUCOSE SERPL-MCNC: 116 MG/DL — HIGH (ref 70–99)
GLUCOSE SERPL-MCNC: 128 MG/DL — HIGH (ref 70–99)
HCT VFR BLD CALC: 26.1 % — LOW (ref 37–47)
HCT VFR BLD CALC: 28.1 % — LOW (ref 37–47)
HGB BLD-MCNC: 8.4 G/DL — LOW (ref 12–16)
HGB BLD-MCNC: 9 G/DL — LOW (ref 12–16)
IMM GRANULOCYTES NFR BLD AUTO: 0.7 % — HIGH (ref 0.1–0.3)
IMM GRANULOCYTES NFR BLD AUTO: 0.8 % — HIGH (ref 0.1–0.3)
LYMPHOCYTES # BLD AUTO: 0.64 K/UL — LOW (ref 1.2–3.4)
LYMPHOCYTES # BLD AUTO: 0.99 K/UL — LOW (ref 1.2–3.4)
LYMPHOCYTES # BLD AUTO: 13 % — LOW (ref 20.5–51.1)
LYMPHOCYTES # BLD AUTO: 9 % — LOW (ref 20.5–51.1)
MAGNESIUM SERPL-MCNC: 1.5 MG/DL — LOW (ref 1.8–2.4)
MCHC RBC-ENTMCNC: 27.1 PG — SIGNIFICANT CHANGE UP (ref 27–31)
MCHC RBC-ENTMCNC: 27.2 PG — SIGNIFICANT CHANGE UP (ref 27–31)
MCHC RBC-ENTMCNC: 32 G/DL — SIGNIFICANT CHANGE UP (ref 32–37)
MCHC RBC-ENTMCNC: 32.2 G/DL — SIGNIFICANT CHANGE UP (ref 32–37)
MCV RBC AUTO: 84.2 FL — SIGNIFICANT CHANGE UP (ref 81–99)
MCV RBC AUTO: 84.9 FL — SIGNIFICANT CHANGE UP (ref 81–99)
MONOCYTES # BLD AUTO: 0.51 K/UL — SIGNIFICANT CHANGE UP (ref 0.1–0.6)
MONOCYTES # BLD AUTO: 0.62 K/UL — HIGH (ref 0.1–0.6)
MONOCYTES NFR BLD AUTO: 7.1 % — SIGNIFICANT CHANGE UP (ref 1.7–9.3)
MONOCYTES NFR BLD AUTO: 8.1 % — SIGNIFICANT CHANGE UP (ref 1.7–9.3)
NEUTROPHILS # BLD AUTO: 5.8 K/UL — SIGNIFICANT CHANGE UP (ref 1.4–6.5)
NEUTROPHILS # BLD AUTO: 5.83 K/UL — SIGNIFICANT CHANGE UP (ref 1.4–6.5)
NEUTROPHILS NFR BLD AUTO: 76.2 % — HIGH (ref 42.2–75.2)
NEUTROPHILS NFR BLD AUTO: 81.6 % — HIGH (ref 42.2–75.2)
NRBC # BLD: 0 /100 WBCS — SIGNIFICANT CHANGE UP (ref 0–0)
NRBC # BLD: 0 /100 WBCS — SIGNIFICANT CHANGE UP (ref 0–0)
PLATELET # BLD AUTO: 233 K/UL — SIGNIFICANT CHANGE UP (ref 130–400)
PLATELET # BLD AUTO: 244 K/UL — SIGNIFICANT CHANGE UP (ref 130–400)
POTASSIUM SERPL-MCNC: 4.5 MMOL/L — SIGNIFICANT CHANGE UP (ref 3.5–5)
POTASSIUM SERPL-MCNC: 4.7 MMOL/L — SIGNIFICANT CHANGE UP (ref 3.5–5)
POTASSIUM SERPL-SCNC: 4.5 MMOL/L — SIGNIFICANT CHANGE UP (ref 3.5–5)
POTASSIUM SERPL-SCNC: 4.7 MMOL/L — SIGNIFICANT CHANGE UP (ref 3.5–5)
PROT SERPL-MCNC: 5.6 G/DL — LOW (ref 6–8)
RBC # BLD: 3.1 M/UL — LOW (ref 4.2–5.4)
RBC # BLD: 3.31 M/UL — LOW (ref 4.2–5.4)
RBC # FLD: 13.3 % — SIGNIFICANT CHANGE UP (ref 11.5–14.5)
RBC # FLD: 13.4 % — SIGNIFICANT CHANGE UP (ref 11.5–14.5)
SODIUM SERPL-SCNC: 135 MMOL/L — SIGNIFICANT CHANGE UP (ref 135–146)
SODIUM SERPL-SCNC: 136 MMOL/L — SIGNIFICANT CHANGE UP (ref 135–146)
SPECIMEN SOURCE: SIGNIFICANT CHANGE UP
WBC # BLD: 7.15 K/UL — SIGNIFICANT CHANGE UP (ref 4.8–10.8)
WBC # BLD: 7.61 K/UL — SIGNIFICANT CHANGE UP (ref 4.8–10.8)
WBC # FLD AUTO: 7.15 K/UL — SIGNIFICANT CHANGE UP (ref 4.8–10.8)
WBC # FLD AUTO: 7.61 K/UL — SIGNIFICANT CHANGE UP (ref 4.8–10.8)

## 2021-09-10 PROCEDURE — 99232 SBSQ HOSP IP/OBS MODERATE 35: CPT

## 2021-09-10 PROCEDURE — 99221 1ST HOSP IP/OBS SF/LOW 40: CPT

## 2021-09-10 PROCEDURE — 99223 1ST HOSP IP/OBS HIGH 75: CPT

## 2021-09-10 RX ORDER — METRONIDAZOLE 500 MG
500 TABLET ORAL ONCE
Refills: 0 | Status: COMPLETED | OUTPATIENT
Start: 2021-09-10 | End: 2021-09-10

## 2021-09-10 RX ORDER — CIPROFLOXACIN LACTATE 400MG/40ML
400 VIAL (ML) INTRAVENOUS EVERY 12 HOURS
Refills: 0 | Status: DISCONTINUED | OUTPATIENT
Start: 2021-09-10 | End: 2021-09-11

## 2021-09-10 RX ORDER — ICOSAPENT ETHYL 500 MG/1
2 CAPSULE, LIQUID FILLED ORAL
Qty: 0 | Refills: 0 | DISCHARGE

## 2021-09-10 RX ORDER — METFORMIN HYDROCHLORIDE 850 MG/1
1 TABLET ORAL
Qty: 0 | Refills: 0 | DISCHARGE

## 2021-09-10 RX ORDER — ISOSORBIDE MONONITRATE 60 MG/1
1 TABLET, EXTENDED RELEASE ORAL
Qty: 0 | Refills: 0 | DISCHARGE

## 2021-09-10 RX ORDER — ENOXAPARIN SODIUM 100 MG/ML
40 INJECTION SUBCUTANEOUS DAILY
Refills: 0 | Status: DISCONTINUED | OUTPATIENT
Start: 2021-09-10 | End: 2021-09-12

## 2021-09-10 RX ORDER — SENNA PLUS 8.6 MG/1
2 TABLET ORAL AT BEDTIME
Refills: 0 | Status: DISCONTINUED | OUTPATIENT
Start: 2021-09-10 | End: 2021-09-12

## 2021-09-10 RX ORDER — INSULIN LISPRO 100/ML
VIAL (ML) SUBCUTANEOUS
Refills: 0 | Status: DISCONTINUED | OUTPATIENT
Start: 2021-09-10 | End: 2021-09-12

## 2021-09-10 RX ORDER — METOPROLOL TARTRATE 50 MG
25 TABLET ORAL
Refills: 0 | Status: DISCONTINUED | OUTPATIENT
Start: 2021-09-10 | End: 2021-09-12

## 2021-09-10 RX ORDER — METRONIDAZOLE 500 MG
TABLET ORAL
Refills: 0 | Status: DISCONTINUED | OUTPATIENT
Start: 2021-09-10 | End: 2021-09-11

## 2021-09-10 RX ORDER — PANTOPRAZOLE SODIUM 20 MG/1
40 TABLET, DELAYED RELEASE ORAL ONCE
Refills: 0 | Status: COMPLETED | OUTPATIENT
Start: 2021-09-10 | End: 2021-09-10

## 2021-09-10 RX ORDER — METOPROLOL TARTRATE 50 MG
1 TABLET ORAL
Qty: 0 | Refills: 0 | DISCHARGE

## 2021-09-10 RX ORDER — ASPIRIN/CALCIUM CARB/MAGNESIUM 324 MG
1 TABLET ORAL
Qty: 0 | Refills: 0 | DISCHARGE

## 2021-09-10 RX ORDER — ACETAMINOPHEN 500 MG
650 TABLET ORAL EVERY 6 HOURS
Refills: 0 | Status: DISCONTINUED | OUTPATIENT
Start: 2021-09-10 | End: 2021-09-12

## 2021-09-10 RX ORDER — METRONIDAZOLE 500 MG
500 TABLET ORAL EVERY 8 HOURS
Refills: 0 | Status: DISCONTINUED | OUTPATIENT
Start: 2021-09-10 | End: 2021-09-10

## 2021-09-10 RX ORDER — DEXTROSE 50 % IN WATER 50 %
25 SYRINGE (ML) INTRAVENOUS ONCE
Refills: 0 | Status: DISCONTINUED | OUTPATIENT
Start: 2021-09-10 | End: 2021-09-12

## 2021-09-10 RX ORDER — INSULIN GLARGINE 100 [IU]/ML
10 INJECTION, SOLUTION SUBCUTANEOUS AT BEDTIME
Refills: 0 | Status: DISCONTINUED | OUTPATIENT
Start: 2021-09-10 | End: 2021-09-12

## 2021-09-10 RX ORDER — CIPROFLOXACIN LACTATE 400MG/40ML
400 VIAL (ML) INTRAVENOUS ONCE
Refills: 0 | Status: COMPLETED | OUTPATIENT
Start: 2021-09-10 | End: 2021-09-10

## 2021-09-10 RX ORDER — METRONIDAZOLE 500 MG
TABLET ORAL
Refills: 0 | Status: DISCONTINUED | OUTPATIENT
Start: 2021-09-10 | End: 2021-09-10

## 2021-09-10 RX ORDER — SODIUM CHLORIDE 9 MG/ML
1000 INJECTION, SOLUTION INTRAVENOUS
Refills: 0 | Status: DISCONTINUED | OUTPATIENT
Start: 2021-09-10 | End: 2021-09-12

## 2021-09-10 RX ORDER — DEXTROSE 50 % IN WATER 50 %
12.5 SYRINGE (ML) INTRAVENOUS ONCE
Refills: 0 | Status: DISCONTINUED | OUTPATIENT
Start: 2021-09-10 | End: 2021-09-12

## 2021-09-10 RX ORDER — INSULIN LISPRO 100/ML
3 VIAL (ML) SUBCUTANEOUS ONCE
Refills: 0 | Status: COMPLETED | OUTPATIENT
Start: 2021-09-10 | End: 2021-09-10

## 2021-09-10 RX ORDER — DEXTROSE 50 % IN WATER 50 %
15 SYRINGE (ML) INTRAVENOUS ONCE
Refills: 0 | Status: DISCONTINUED | OUTPATIENT
Start: 2021-09-10 | End: 2021-09-12

## 2021-09-10 RX ORDER — GLYBURIDE 5 MG
1 TABLET ORAL
Qty: 0 | Refills: 0 | DISCHARGE

## 2021-09-10 RX ORDER — CIPROFLOXACIN LACTATE 400MG/40ML
VIAL (ML) INTRAVENOUS
Refills: 0 | Status: DISCONTINUED | OUTPATIENT
Start: 2021-09-10 | End: 2021-09-11

## 2021-09-10 RX ORDER — FUROSEMIDE 40 MG
1 TABLET ORAL
Qty: 0 | Refills: 0 | DISCHARGE

## 2021-09-10 RX ORDER — ALLOPURINOL 300 MG
100 TABLET ORAL DAILY
Refills: 0 | Status: DISCONTINUED | OUTPATIENT
Start: 2021-09-10 | End: 2021-09-12

## 2021-09-10 RX ORDER — ISOSORBIDE MONONITRATE 60 MG/1
30 TABLET, EXTENDED RELEASE ORAL DAILY
Refills: 0 | Status: DISCONTINUED | OUTPATIENT
Start: 2021-09-10 | End: 2021-09-12

## 2021-09-10 RX ORDER — POLYETHYLENE GLYCOL 3350 17 G/17G
17 POWDER, FOR SOLUTION ORAL
Refills: 0 | Status: DISCONTINUED | OUTPATIENT
Start: 2021-09-10 | End: 2021-09-12

## 2021-09-10 RX ORDER — GLUCAGON INJECTION, SOLUTION 0.5 MG/.1ML
1 INJECTION, SOLUTION SUBCUTANEOUS ONCE
Refills: 0 | Status: DISCONTINUED | OUTPATIENT
Start: 2021-09-10 | End: 2021-09-12

## 2021-09-10 RX ORDER — METRONIDAZOLE 500 MG
500 TABLET ORAL EVERY 8 HOURS
Refills: 0 | Status: DISCONTINUED | OUTPATIENT
Start: 2021-09-10 | End: 2021-09-11

## 2021-09-10 RX ORDER — METRONIDAZOLE 500 MG
500 TABLET ORAL ONCE
Refills: 0 | Status: DISCONTINUED | OUTPATIENT
Start: 2021-09-10 | End: 2021-09-10

## 2021-09-10 RX ORDER — ASPIRIN/CALCIUM CARB/MAGNESIUM 324 MG
81 TABLET ORAL DAILY
Refills: 0 | Status: DISCONTINUED | OUTPATIENT
Start: 2021-09-10 | End: 2021-09-12

## 2021-09-10 RX ADMIN — PANTOPRAZOLE SODIUM 40 MILLIGRAM(S): 20 TABLET, DELAYED RELEASE ORAL at 12:04

## 2021-09-10 RX ADMIN — Medication 100 MILLIGRAM(S): at 08:22

## 2021-09-10 RX ADMIN — Medication 3 UNIT(S): at 22:32

## 2021-09-10 RX ADMIN — Medication 100 MILLIGRAM(S): at 13:57

## 2021-09-10 RX ADMIN — Medication 200 MILLIGRAM(S): at 17:06

## 2021-09-10 RX ADMIN — Medication 100 MILLIGRAM(S): at 12:05

## 2021-09-10 RX ADMIN — Medication 200 MILLIGRAM(S): at 08:22

## 2021-09-10 RX ADMIN — ENOXAPARIN SODIUM 40 MILLIGRAM(S): 100 INJECTION SUBCUTANEOUS at 12:04

## 2021-09-10 RX ADMIN — POLYETHYLENE GLYCOL 3350 17 GRAM(S): 17 POWDER, FOR SOLUTION ORAL at 17:06

## 2021-09-10 RX ADMIN — SENNA PLUS 2 TABLET(S): 8.6 TABLET ORAL at 22:33

## 2021-09-10 RX ADMIN — Medication 25 MILLIGRAM(S): at 17:06

## 2021-09-10 RX ADMIN — Medication 100 MILLIGRAM(S): at 22:33

## 2021-09-10 RX ADMIN — Medication 200 MILLIGRAM(S): at 17:09

## 2021-09-10 RX ADMIN — Medication 81 MILLIGRAM(S): at 12:04

## 2021-09-10 RX ADMIN — Medication 650 MILLIGRAM(S): at 19:15

## 2021-09-10 RX ADMIN — INSULIN GLARGINE 10 UNIT(S): 100 INJECTION, SOLUTION SUBCUTANEOUS at 22:33

## 2021-09-10 RX ADMIN — Medication 650 MILLIGRAM(S): at 18:43

## 2021-09-10 RX ADMIN — ISOSORBIDE MONONITRATE 30 MILLIGRAM(S): 60 TABLET, EXTENDED RELEASE ORAL at 12:05

## 2021-09-10 NOTE — H&P ADULT - HISTORY OF PRESENT ILLNESS
85 y/o F w/ PMHx of Colon Ca s/p Resection, CAD, CM, COVID infection, HTN c/o fever for past 4 days. Patient's daughter came back from her trip and visited patient at her home, had fever up to 100.4F, given tylenol as needed, last dose yesterday. COVID test 9/6 is negative. Was given levofloxacin by PMD starting yesterday. Denies chills, chest pain, SOB, cough, abdominal pain, n/v/d. Notes some sharp left sided flank pain, moderate to severe, new, radiating to front of abdomen, constant. Unvaccinated for COVID, but no sick contacts. Also notes some b/l increased leg swelling.    In ED, Febrile to 101.2, remainder of VS wnl, Labs remarkable for Hg 9.1, Na 131. EKG NSR w/ 1st degree AV Block. CTAP w/ IVC; Since January 27, 2021, new 9.4 cm subcutaneous collection of fluid and air along the anterior abdominal wall just inferior to the large right anterolateral hernia sac, with adjacent cutaneous thickening and subcutaneous inflammatory stranding, compatible with cellulitis. No significant change in large right anterolateral abdominal wall hernia sac with non-obstructed loops of small and large bowel; no significant change in an additional slightly more inferior narrow necked hernia containing a short segment of non-obstructed bowel. Cholelithiasis. CTAP w/ PO Contrast redemonstration of an approximate 9 x 4 cm collection with air-fluid level in the subcutaneous fat situated between 2 ventral abdominal wall hernias and superior to the ventral pelvic wall hernia. Surrounding subcutaneous fat stranding and edema with overlying skin thickening. Surgery consulted, no surgical intervention, consider IR consult for possible image-guided aspiration/drain placement. IR consult placed. Received 1 dose of Zosyn, also received 1L of NS and continued on  cc/hr. Admitted to medicine            87 y/o F w/ PMHx of Colon Ca s/p Resection, CAD, CM, COVID infection, HTN c/o fever for past 4 days. Patient's daughter came back from her trip and visited patient at her home, had fever up to 100.4F, given tylenol as needed, last dose yesterday. Daughter took patient to  as she suspected COVID  which came back negative. Daughter than spoke to PCP which prescribed Levofloxacin however patient remained febrile prompting visit to ED. Denies chills, chest pain, SOB, cough, abdominal pain, n/v/d. Notes some sharp left sided flank pain, moderate to severe, new, radiating to front of abdomen, constant. Also notes some b/l increased leg swelling.    In ED, Febrile to 101.2, remainder of VS wnl, Labs remarkable for Hg 9.1, Na 131. EKG NSR w/ 1st degree AV Block. CTAP w/ IVC; Since January 27, 2021, new 9.4 cm subcutaneous collection of fluid and air along the anterior abdominal wall just inferior to the large right anterolateral hernia sac, with adjacent cutaneous thickening and subcutaneous inflammatory stranding, compatible with cellulitis. No significant change in large right anterolateral abdominal wall hernia sac with non-obstructed loops of small and large bowel; no significant change in an additional slightly more inferior narrow necked hernia containing a short segment of non-obstructed bowel. Cholelithiasis. CTAP w/ PO Contrast redemonstration of an approximate 9 x 4 cm collection with air-fluid level in the subcutaneous fat situated between 2 ventral abdominal wall hernias and superior to the ventral pelvic wall hernia. Surrounding subcutaneous fat stranding and edema with overlying skin thickening. Surgery consulted, no surgical intervention, consider IR consult for possible image-guided aspiration/drain placement. IR consult placed. Received 1 dose of Zosyn, also received 1L of NS and continued on  cc/hr. Admitted to medicine

## 2021-09-10 NOTE — CONSULT NOTE ADULT - SUBJECTIVE AND OBJECTIVE BOX
NIKKY FRASER  86y, Female  Allergy: No Known Allergies    CHIEF COMPLAINT:   HPI:  HPI:  85 y/o F w/ PMHx of Colon Ca s/p Resection, CAD, CM, COVID infection, HTN c/o fever for past 4 days. Patient's daughter came back from her trip and visited patient at her home, had fever up to 100.4F, given tylenol as needed, last dose yesterday. Daughter took patient to  as she suspected COVID  which came back negative. Daughter than spoke to PCP which prescribed Levofloxacin however patient remained febrile prompting visit to ED. Denies chills, chest pain, SOB, cough, abdominal pain, n/v/d. Notes some sharp left sided flank pain, moderate to severe, new, radiating to front of abdomen, constant. Also notes some b/l increased leg swelling.    In ED, Febrile to 101.2, remainder of VS wnl, Labs remarkable for Hg 9.1, Na 131. EKG NSR w/ 1st degree AV Block. CTAP w/ IVC; Since 2021, new 9.4 cm subcutaneous collection of fluid and air along the anterior abdominal wall just inferior to the large right anterolateral hernia sac, with adjacent cutaneous thickening and subcutaneous inflammatory stranding, compatible with cellulitis. No significant change in large right anterolateral abdominal wall hernia sac with non-obstructed loops of small and large bowel; no significant change in an additional slightly more inferior narrow necked hernia containing a short segment of non-obstructed bowel. Cholelithiasis. CTAP w/ PO Contrast redemonstration of an approximate 9 x 4 cm collection with air-fluid level in the subcutaneous fat situated between 2 ventral abdominal wall hernias and superior to the ventral pelvic wall hernia. Surrounding subcutaneous fat stranding and edema with overlying skin thickening. Surgery consulted, no surgical intervention, consider IR consult for possible image-guided aspiration/drain placement. IR consult placed. Received 1 dose of Zosyn, also received 1L of NS and continued on  cc/hr. Admitted to medicine  (10 Sep 2021 04:20)      Infectious Diseases History:  Old Micro Data/Cultures:     FAMILY HISTORY:  No pertinent family history in first degree relatives      PAST MEDICAL & SURGICAL HISTORY:  HTN (hypertension)    Diabetes mellitus    CAD (coronary artery disease)    Primary osteoarthritis of both knees    Colorectal cancer  Remote hx; s/p colon resection    Hyperuricemia    H/O hernia repair    History of bowel resection    History of coronary artery stent placement        SOCIAL HISTORY  Social History:  no smoking, alcohol drinking or use of recreational drugs, lives with her daughter and has home aid (2021 15:11)      Recent Travel:  Other Exposures:     ROS  General: Denies rigors, nightsweats  HEENT: Denies headache, rhinorrhea, sore throat, eye pain  CV: Denies CP, palpitations  PULM: Denies wheezing, hemoptysis  GI: Denies hematemesis, hematochezia, melena  : Denies discharge, hematuria  MSK: Denies arthralgias, myalgias  SKIN: Denies rash, lesions  NEURO: Denies paresthesias, weakness  PSYCH: Denies depression, anxiety    VITALS:  T(F): 100.5, Max: 101.2 (21 @ 22:04)  HR: 87  BP: 136/59  RR: 18Vital Signs Last 24 Hrs  T(C): 38.1 (10 Sep 2021 05:00), Max: 38.4 (09 Sep 2021 22:04)  T(F): 100.5 (10 Sep 2021 05:00), Max: 101.2 (09 Sep 2021 22:04)  HR: 87 (10 Sep 2021 05:00) (60 - 87)  BP: 136/59 (10 Sep 2021 05:00) (117/55 - 164/72)  BP(mean): --  RR: 18 (10 Sep 2021 05:00) (16 - 18)  SpO2: 98% (09 Sep 2021 21:24) (96% - 98%)    PHYSICAL EXAM:  Gen: NAD, resting in bed  HEENT: Normocephalic, atraumatic  Neck: supple, no lymphadenopathy  CV: Regular rate & regular rhythm  Lungs: CTAB  Abdomen: Soft, BS present  Ext: Warm, well perfused  Neuro: non focal, awake  Skin: no rash, no lesions  Lines: no phlebitis    TESTS & MEASUREMENTS:                        9.1    8.14  )-----------( 259      ( 09 Sep 2021 21:19 )             27.6         133<L>  |  98  |  26<H>  ----------------------------<  119<H>  4.6   |  22  |  0.9    Ca    9.2      09 Sep 2021 21:19    TPro  5.8<L>  /  Alb  3.3<L>  /  TBili  0.3  /  DBili  x   /  AST  15  /  ALT  16  /  AlkPhos  56      eGFR if Non African American: 59 mL/min/1.73M2 (21 @ 21:19)  eGFR if : 68 mL/min/1.73M2 (21 @ 21:19)  eGFR if Non African American: 52 mL/min/1.73M2 (21 @ 09:47)  eGFR if : 60 mL/min/1.73M2 (21 @ 09:47)    LIVER FUNCTIONS - ( 09 Sep 2021 21:19 )  Alb: 3.3 g/dL / Pro: 5.8 g/dL / ALK PHOS: 56 U/L / ALT: 16 U/L / AST: 15 U/L / GGT: x           Urinalysis Basic - ( 09 Sep 2021 13:39 )    Color: Light Yellow / Appearance: Clear / S.013 / pH: x  Gluc: x / Ketone: Negative  / Bili: Negative / Urobili: <2 mg/dL   Blood: x / Protein: Negative / Nitrite: Negative   Leuk Esterase: Negative / RBC: x / WBC x   Sq Epi: x / Non Sq Epi: x / Bacteria: x          Blood Gas Venous - Lactate: 1.20 mmol/L (21 @ 20:57)  Lactate, Blood: 3.3 mmol/L (21 @ 09:47)      INFECTIOUS DISEASES TESTING      RADIOLOGY & ADDITIONAL TESTS:  I have personally reviewed the last available Chest xray  CXR      CT  CT Abdomen and Pelvis w/ IV Cont:   EXAM:  CT ABDOMEN AND PELVIS IC            PROCEDURE DATE:  2021            INTERPRETATION:  CLINICAL HISTORY / REASON FOR EXAM: Left flank pain, fever.    TECHNIQUE: Contiguous axial CT images were obtained from the lower chest to the pubic symphysis following the administration of 100 mL Omnipaque 350 intravenous contrast. Oral contrast was not administered. Reformatted images in the coronal and sagittal planes were acquired.    COMPARISON CT: CT abdomen and pelvis with oral and IV contrast performed on 2021.    FINDINGS:    LOWER CHEST: Mild bibasilar atelectasis.    HEPATOBILIARY: Cholelithiasis.    SPLEEN: Unremarkable.    PANCREAS: Unremarkable.    ADRENAL GLANDS: Unremarkable.    KIDNEYS: Symmetric pattern of renal enhancement. No evidence of a mass, hydronephrosis or hydroureter.    ABDOMINOPELVIC NODES: No enlarged abdominal or pelvic lymph nodes.    PELVIC ORGANS: Calcified uterine fibroids.    PERITONEUM/MESENTERY/BOWEL: Redemonstrated is a large right anterolateral abdominal wall hernia containing multiple loops of nonobstructed small and large bowel and an additional slightly more inferior narrowed necked hernia containing a short segment of nonobstructed bowel.  New 9.4 x 3.7 x 6.2 cm subcutaneous collectionof fluid and air along the anterior abdominal wall just inferior to the large right anterolateral hernia sac, with adjacent cutaneous thickening and subcutaneous inflammatory stranding (.)    Additional overall unchanged smaller left paramidline ventral abdominal hernia contains fat and a short segment of nonobstructed small bowel.    BONES/SOFT TISSUES: Degenerative changes of the spine.. Redemonstrated right-sided fat containing ventral hernia.    VASCULAR: Atherosclerotic calcifications.      IMPRESSION:    1. Since 2021, new 9.4 cm subcutaneous collection of fluid and air along the anterior abdominal wall just inferior to the large right anterolateral hernia sac, with adjacent cutaneous thickening and subcutaneous inflammatory stranding, compatible with cellulitis.    2. No significant change in large right anterolateral abdominal wall hernia sac with nonobstructed loops of small and large bowel; no significant change in an additional slightly more inferior narrow necked hernia containing a short segment of nonobstructed bowel.    3. Cholelithiasis.    --- End of Report ---            GALILEA ZAMARRIPA MD; Resident Radiologist  This document has been electronically signed.  KODAK PRIDE MD; Attending Radiologist  This document has been electronically signed. Sep  9 2021  4:21PM (21 @ 14:11)      CARDIOLOGY TESTING  12 Lead ECG:   Ventricular Rate 79 BPM    Atrial Rate 79 BPM    P-R Interval 234 ms    QRS Duration 76 ms    Q-T Interval 354 ms    QTC Calculation(Bazett) 405 ms    P Axis 62 degrees    R Axis 113 degrees    T Axis 34 degrees    Diagnosis Line Sinus rhythm with 1st degree A-V block  Right axis deviation  Low voltage QRS  Cannot rule out Anteroseptal infarct , age undetermined  Abnormal ECG    Confirmed by Sohail Pitt ([id number]) on 2021 10:51:44 PM (21 @ 21:04)      All available historical records have been reviewed    MEDICATIONS  allopurinol 100  aspirin enteric coated 81  ciprofloxacin   IVPB   ciprofloxacin   IVPB 400  enalapril 20  enoxaparin Injectable 40  isosorbide   mononitrate ER Tablet (IMDUR) 30  metoprolol tartrate 25  metroNIDAZOLE  IVPB   metroNIDAZOLE  IVPB 500  pantoprazole  Injectable 40  polyethylene glycol 3350 17  pregabalin 200  senna 2  sodium chloride 0.9%. 1000      ANTIBIOTICS:  ciprofloxacin   IVPB 400 milliGRAM(s) IV Intermittent x 1 dose 9/10   metroNIDAZOLE  IVPB 500 milliGRAM(s) IV Intermittent x 1 dose 9/10 (active)  piperacillin/tazobactam IVPB 3.375 Gram(s) IV Intermittent x 1 dose 10      All available historical data has been reviewed    ASSESSMENT  85 y/o F w/ PMHx of Colon Ca s/p Resection, CAD, CM, COVID infection, HTN c/o fever for past 4 days.   CTAP w/ PO Contrast redemonstration of an approximate 9 x 4 cm collection with air-fluid level in the subcutaneous fat situated between 2 ventral abdominal wall hernias and superior to the ventral pelvic wall hernia. Surrounding subcutaneous fat stranding and edema with overlying skin thickening.     IMPRESSION  # (?) Sepsis on admission (2 or more of the following T<96.8F, T>101F, Pulse>90, Resp Rate>20, WBC>12, wbc<4, Bands>10%), PLUS (+) lactic acidosis, OR metabolic encephalopathy, OR SARAH, OR bacteremia . Otherwise just SIRS on admission, sepsis ruled out  #  #    RECOMMENDATIONS  - f/u pending cultures  - ***    This is a pended note. All final recommendations to follow pending discussion with ID Attending    NIKKY FRASER  86y, Female  Allergy: No Known Allergies    CHIEF COMPLAINT:   HPI:  HPI:  85 y/o F w/ PMHx of Colon Ca s/p Resection, CAD, CM, COVID infection, HTN c/o fever for past 4 days. Patient's daughter came back from her trip and visited patient at her home, had fever up to 100.4F, given tylenol as needed, last dose yesterday. Daughter took patient to  as she suspected COVID  which came back negative. Daughter than spoke to PCP which prescribed Levofloxacin however patient remained febrile prompting visit to ED. Denies chills, chest pain, SOB, cough, abdominal pain, n/v/d. Notes some sharp left sided flank pain, moderate to severe, new, radiating to front of abdomen, constant. Also notes some b/l increased leg swelling.    In ED, Febrile to 101.2, remainder of VS wnl, Labs remarkable for Hg 9.1, Na 131. EKG NSR w/ 1st degree AV Block. CTAP w/ IVC; Since 2021, new 9.4 cm subcutaneous collection of fluid and air along the anterior abdominal wall just inferior to the large right anterolateral hernia sac, with adjacent cutaneous thickening and subcutaneous inflammatory stranding, compatible with cellulitis. No significant change in large right anterolateral abdominal wall hernia sac with non-obstructed loops of small and large bowel; no significant change in an additional slightly more inferior narrow necked hernia containing a short segment of non-obstructed bowel. Cholelithiasis. CTAP w/ PO Contrast redemonstration of an approximate 9 x 4 cm collection with air-fluid level in the subcutaneous fat situated between 2 ventral abdominal wall hernias and superior to the ventral pelvic wall hernia. Surrounding subcutaneous fat stranding and edema with overlying skin thickening. Surgery consulted, no surgical intervention, consider IR consult for possible image-guided aspiration/drain placement. IR consult placed. Received 1 dose of Zosyn, also received 1L of NS and continued on  cc/hr. Admitted to medicine  (10 Sep 2021 04:20)      Infectious Diseases History:  Patient is [place] speaking, evaluation was performed with assistance of , Bita ID# [id number]  Patient began experiencing fevers about 1 week ago, eventually called PCP who prescribed Levofloxacin 500mg daily for likely UTI as source of infection. Patient took one dose on  and was brought in to the hospital on  by daughter, Mahogany.   Patient states that she has chronic skin wounds that come and go overlying her hernia, and that the current state is an improvement over prior wounds. Patient denies pain at the wound site. Nurse comes to clean wound dressing, daughter states that nurse will put excessive pressure on the wound sometimes and cause pain.     Old Micro Data/Cultures:     FAMILY HISTORY:  No pertinent family history in first degree relatives      PAST MEDICAL & SURGICAL HISTORY:  HTN (hypertension)    Diabetes mellitus    CAD (coronary artery disease)    Primary osteoarthritis of both knees    Colorectal cancer  Remote hx; s/p colon resection    Hyperuricemia    H/O hernia repair    History of bowel resection    History of coronary artery stent placement        SOCIAL HISTORY  Social History:  no smoking, alcohol drinking or use of recreational drugs, lives with her daughter and has home aid (2021 15:11)      Recent Travel:  Other Exposures:     ROS  General: Endorses fevers, chills, fatigue  HEENT: Denies headache, rhinorrhea  CV: Denies CP, palpitations  PULM: Denies wheezing, cough  GI: Denies nausea, vomiting, diarrhea  : Denies discharge, hematuria  MSK: Endorses pain in b/l knees  SKIN: Denies rash, lesions    VITALS:  T(F): 100.5, Max: 101.2 (21 @ 22:04)  HR: 87  BP: 136/59  RR: 18Vital Signs Last 24 Hrs  T(C): 38.1 (10 Sep 2021 05:00), Max: 38.4 (09 Sep 2021 22:04)  T(F): 100.5 (10 Sep 2021 05:00), Max: 101.2 (09 Sep 2021 22:04)  HR: 87 (10 Sep 2021 05:00) (60 - 87)  BP: 136/59 (10 Sep 2021 05:00) (117/55 - 164/72)  BP(mean): --  RR: 18 (10 Sep 2021 05:00) (16 - 18)  SpO2: 98% (09 Sep 2021 21:24) (96% - 98%)    PHYSICAL EXAM:  Gen: NAD, resting in bed  HEENT: Normocephalic, atraumatic  Neck: supple, no lymphadenopathy  CV: Regular rate & regular rhythm  Lungs: CTAB  Abdomen: Soft, BS present  Ext: Warm, well perfused  Neuro: non focal, awake  Skin: no rash, no lesions  Lines: no phlebitis    TESTS & MEASUREMENTS:                        9.1    8.14  )-----------( 259      ( 09 Sep 2021 21:19 )             27.6         133<L>  |  98  |  26<H>  ----------------------------<  119<H>  4.6   |  22  |  0.9    Ca    9.2      09 Sep 2021 21:19    TPro  5.8<L>  /  Alb  3.3<L>  /  TBili  0.3  /  DBili  x   /  AST  15  /  ALT  16  /  AlkPhos  56      eGFR if Non African American: 59 mL/min/1.73M2 (21 @ 21:19)  eGFR if : 68 mL/min/1.73M2 (21 @ 21:19)  eGFR if Non African American: 52 mL/min/1.73M2 (21 @ 09:47)  eGFR if : 60 mL/min/1.73M2 (21 @ 09:47)    LIVER FUNCTIONS - ( 09 Sep 2021 21:19 )  Alb: 3.3 g/dL / Pro: 5.8 g/dL / ALK PHOS: 56 U/L / ALT: 16 U/L / AST: 15 U/L / GGT: x           Urinalysis Basic - ( 09 Sep 2021 13:39 )    Color: Light Yellow / Appearance: Clear / S.013 / pH: x  Gluc: x / Ketone: Negative  / Bili: Negative / Urobili: <2 mg/dL   Blood: x / Protein: Negative / Nitrite: Negative   Leuk Esterase: Negative / RBC: x / WBC x   Sq Epi: x / Non Sq Epi: x / Bacteria: x          Blood Gas Venous - Lactate: 1.20 mmol/L (21 @ 20:57)  Lactate, Blood: 3.3 mmol/L (21 @ 09:47)      INFECTIOUS DISEASES TESTING      RADIOLOGY & ADDITIONAL TESTS:  I have personally reviewed the last available Chest xray  CXR      CT  CT Abdomen and Pelvis w/ IV Cont:   EXAM:  CT ABDOMEN AND PELVIS IC            PROCEDURE DATE:  2021            INTERPRETATION:  CLINICAL HISTORY / REASON FOR EXAM: Left flank pain, fever.    TECHNIQUE: Contiguous axial CT images were obtained from the lower chest to the pubic symphysis following the administration of 100 mL Omnipaque 350 intravenous contrast. Oral contrast was not administered. Reformatted images in the coronal and sagittal planes were acquired.    COMPARISON CT: CT abdomen and pelvis with oral and IV contrast performed on 2021.    FINDINGS:    LOWER CHEST: Mild bibasilar atelectasis.    HEPATOBILIARY: Cholelithiasis.    SPLEEN: Unremarkable.    PANCREAS: Unremarkable.    ADRENAL GLANDS: Unremarkable.    KIDNEYS: Symmetric pattern of renal enhancement. No evidence of a mass, hydronephrosis or hydroureter.    ABDOMINOPELVIC NODES: No enlarged abdominal or pelvic lymph nodes.    PELVIC ORGANS: Calcified uterine fibroids.    PERITONEUM/MESENTERY/BOWEL: Redemonstrated is a large right anterolateral abdominal wall hernia containing multiple loops of nonobstructed small and large bowel and an additional slightly more inferior narrowed necked hernia containing a short segment of nonobstructed bowel.  New 9.4 x 3.7 x 6.2 cm subcutaneous collectionof fluid and air along the anterior abdominal wall just inferior to the large right anterolateral hernia sac, with adjacent cutaneous thickening and subcutaneous inflammatory stranding (.)    Additional overall unchanged smaller left paramidline ventral abdominal hernia contains fat and a short segment of nonobstructed small bowel.    BONES/SOFT TISSUES: Degenerative changes of the spine.. Redemonstrated right-sided fat containing ventral hernia.    VASCULAR: Atherosclerotic calcifications.      IMPRESSION:    1. Since 2021, new 9.4 cm subcutaneous collection of fluid and air along the anterior abdominal wall just inferior to the large right anterolateral hernia sac, with adjacent cutaneous thickening and subcutaneous inflammatory stranding, compatible with cellulitis.    2. No significant change in large right anterolateral abdominal wall hernia sac with nonobstructed loops of small and large bowel; no significant change in an additional slightly more inferior narrow necked hernia containing a short segment of nonobstructed bowel.    3. Cholelithiasis.    --- End of Report ---            GALILEA ZAMARRIPA MD; Resident Radiologist  This document has been electronically signed.  KODAK PRIDE MD; Attending Radiologist  This document has been electronically signed. Sep  9 2021  4:21PM (09-09-21 @ 14:11)      CARDIOLOGY TESTING  12 Lead ECG:   Ventricular Rate 79 BPM    Atrial Rate 79 BPM    P-R Interval 234 ms    QRS Duration 76 ms    Q-T Interval 354 ms    QTC Calculation(Bazett) 405 ms    P Axis 62 degrees    R Axis 113 degrees    T Axis 34 degrees    Diagnosis Line Sinus rhythm with 1st degree A-V block  Right axis deviation  Low voltage QRS  Cannot rule out Anteroseptal infarct , age undetermined  Abnormal ECG    Confirmed by Sohail Pitt ([id number]) on 2021 10:51:44 PM (21 @ 21:04)      All available historical records have been reviewed    MEDICATIONS  allopurinol 100  aspirin enteric coated 81  ciprofloxacin   IVPB   ciprofloxacin   IVPB 400  enalapril 20  enoxaparin Injectable 40  isosorbide   mononitrate ER Tablet (IMDUR) 30  metoprolol tartrate 25  metroNIDAZOLE  IVPB   metroNIDAZOLE  IVPB 500  pantoprazole  Injectable 40  polyethylene glycol 3350 17  pregabalin 200  senna 2  sodium chloride 0.9%. 1000      ANTIBIOTICS:  ciprofloxacin   IVPB 400 milliGRAM(s) IV Intermittent x 1 dose 9/10   metroNIDAZOLE  IVPB 500 milliGRAM(s) IV Intermittent x 1 dose 9/10 (active)  piperacillin/tazobactam IVPB 3.375 Gram(s) IV Intermittent x 1 dose /10      All available historical data has been reviewed    ASSESSMENT  85 y/o F w/ PMHx of Colon Ca s/p Resection, CAD, CM, COVID infection, HTN c/o fever for past 4 days.   CTAP w/ PO Contrast redemonstration of an approximate 9 x 4 cm collection with air-fluid level in the subcutaneous fat situated between 2 ventral abdominal wall hernias and superior to the ventral pelvic wall hernia. Surrounding subcutaneous fat stranding and edema with overlying skin thickening.     IMPRESSION  # (?) Sepsis on admission (2 or more of the following T<96.8F, T>101F, Pulse>90, Resp Rate>20, WBC>12, wbc<4, Bands>10%), PLUS (+) lactic acidosis, OR metabolic encephalopathy, OR SARAH, OR bacteremia . Otherwise just SIRS on admission, sepsis ruled out  #  #    RECOMMENDATIONS  - f/u pending cultures  - ***    This is a pended note. All final recommendations to follow pending discussion with ID Attending    NIKKY FRASER  86y, Female  Allergy: No Known Allergies    CHIEF COMPLAINT:   HPI:  HPI:  85 y/o F w/ PMHx of Colon Ca s/p Resection, CAD, CM, COVID infection, HTN c/o fever for past 4 days. Patient's daughter came back from her trip and visited patient at her home, had fever up to 100.4F, given tylenol as needed, last dose yesterday. Daughter took patient to  as she suspected COVID  which came back negative. Daughter than spoke to PCP which prescribed Levofloxacin however patient remained febrile prompting visit to ED. Denies chills, chest pain, SOB, cough, abdominal pain, n/v/d. Notes some sharp left sided flank pain, moderate to severe, new, radiating to front of abdomen, constant. Also notes some b/l increased leg swelling.    In ED, Febrile to 101.2, remainder of VS wnl, Labs remarkable for Hg 9.1, Na 131. EKG NSR w/ 1st degree AV Block. CTAP w/ IVC; Since 2021, new 9.4 cm subcutaneous collection of fluid and air along the anterior abdominal wall just inferior to the large right anterolateral hernia sac, with adjacent cutaneous thickening and subcutaneous inflammatory stranding, compatible with cellulitis. No significant change in large right anterolateral abdominal wall hernia sac with non-obstructed loops of small and large bowel; no significant change in an additional slightly more inferior narrow necked hernia containing a short segment of non-obstructed bowel. Cholelithiasis. CTAP w/ PO Contrast redemonstration of an approximate 9 x 4 cm collection with air-fluid level in the subcutaneous fat situated between 2 ventral abdominal wall hernias and superior to the ventral pelvic wall hernia. Surrounding subcutaneous fat stranding and edema with overlying skin thickening. Surgery consulted, no surgical intervention, consider IR consult for possible image-guided aspiration/drain placement. IR consult placed. Received 1 dose of Zosyn, also received 1L of NS and continued on  cc/hr. Admitted to medicine  (10 Sep 2021 04:20)      Infectious Diseases History:  Patient is [place] speaking, evaluation was performed with assistance of , Bita ID# [id number]  Patient began experiencing fevers about 1 week ago, eventually called PCP who prescribed Levofloxacin 500mg daily for likely UTI as source of infection. Patient took one dose on  and was brought in to the hospital on  by daughter, Mahogany.   Patient states that she has chronic skin wounds that come and go overlying her hernia, and that the current state is an improvement over prior wounds. Patient denies pain at the wound site. Nurse comes to clean wound dressing, daughter states that nurse will put excessive pressure on the wound sometimes and cause pain.       Old Micro Data/Cultures:     FAMILY HISTORY:  No pertinent family history in first degree relatives      PAST MEDICAL & SURGICAL HISTORY:  HTN (hypertension)    Diabetes mellitus    CAD (coronary artery disease)    Primary osteoarthritis of both knees    Colorectal cancer  Remote hx; s/p colon resection    Hyperuricemia    H/O hernia repair    History of bowel resection    History of coronary artery stent placement        SOCIAL HISTORY  Social History:  no smoking, alcohol drinking or use of recreational drugs, lives with her daughter and has home aid (2021 15:11)      Recent Travel:  Other Exposures:     ROS  General: Endorses fevers, chills, fatigue  HEENT: Denies headache, rhinorrhea  CV: Denies CP, palpitations  PULM: Denies wheezing, cough  GI: Denies nausea, vomiting, diarrhea  : Denies discharge, hematuria  MSK: Endorses pain in b/l knees  SKIN: Denies rash, lesions    VITALS:  T(F): 100.5, Max: 101.2 (21 @ 22:04)  HR: 87  BP: 136/59  RR: 18Vital Signs Last 24 Hrs  T(C): 38.1 (10 Sep 2021 05:00), Max: 38.4 (09 Sep 2021 22:04)  T(F): 100.5 (10 Sep 2021 05:00), Max: 101.2 (09 Sep 2021 22:04)  HR: 87 (10 Sep 2021 05:00) (60 - 87)  BP: 136/59 (10 Sep 2021 05:00) (117/55 - 164/72)  BP(mean): --  RR: 18 (10 Sep 2021 05:00) (16 - 18)  SpO2: 98% (09 Sep 2021 21:24) (96% - 98%)    PHYSICAL EXAM:  Gen: NAD, resting in bed  HEENT: Normocephalic, atraumatic  Neck: supple, no lymphadenopathy  CV: RRR, S1+S2, no MRG  Lungs: CTAB, unlabored respirations  Abdomen: Soft, BS present, nontender, significant right sided hernia with overlying skin changes, purple-white discoloration, one lesion that is [id number], red with clean borders, underlying fluctuance and no apparent drainage  Ext: Warm, well perfused. B/L knee pain  Skin: no rash, no lesions  Lines: no phlebitis    TESTS & MEASUREMENTS:                        9.1    8.14  )-----------( 259      ( 09 Sep 2021 21:19 )             27.6         133<L>  |  98  |  26<H>  ----------------------------<  119<H>  4.6   |  22  |  0.9    Ca    9.2      09 Sep 2021 21:19    TPro  5.8<L>  /  Alb  3.3<L>  /  TBili  0.3  /  DBili  x   /  AST  15  /  ALT  16  /  AlkPhos  56      eGFR if Non African American: 59 mL/min/1.73M2 (21 @ 21:19)  eGFR if : 68 mL/min/1.73M2 (21 @ 21:19)  eGFR if Non African American: 52 mL/min/1.73M2 (21 @ 09:47)  eGFR if : 60 mL/min/1.73M2 (21 @ 09:47)    LIVER FUNCTIONS - ( 09 Sep 2021 21:19 )  Alb: 3.3 g/dL / Pro: 5.8 g/dL / ALK PHOS: 56 U/L / ALT: 16 U/L / AST: 15 U/L / GGT: x           Urinalysis Basic - ( 09 Sep 2021 13:39 )    Color: Light Yellow / Appearance: Clear / S.013 / pH: x  Gluc: x / Ketone: Negative  / Bili: Negative / Urobili: <2 mg/dL   Blood: x / Protein: Negative / Nitrite: Negative   Leuk Esterase: Negative / RBC: x / WBC x   Sq Epi: x / Non Sq Epi: x / Bacteria: x          Blood Gas Venous - Lactate: 1.20 mmol/L (21 @ 20:57)  Lactate, Blood: 3.3 mmol/L (21 @ 09:47)      INFECTIOUS DISEASES TESTING      RADIOLOGY & ADDITIONAL TESTS:  I have personally reviewed the last available Chest xray  CXR      CT  CT Abdomen and Pelvis w/ IV Cont:   EXAM:  CT ABDOMEN AND PELVIS IC            PROCEDURE DATE:  2021            INTERPRETATION:  CLINICAL HISTORY / REASON FOR EXAM: Left flank pain, fever.    TECHNIQUE: Contiguous axial CT images were obtained from the lower chest to the pubic symphysis following the administration of 100 mL Omnipaque 350 intravenous contrast. Oral contrast was not administered. Reformatted images in the coronal and sagittal planes were acquired.    COMPARISON CT: CT abdomen and pelvis with oral and IV contrast performed on 2021.    FINDINGS:    LOWER CHEST: Mild bibasilar atelectasis.    HEPATOBILIARY: Cholelithiasis.    SPLEEN: Unremarkable.    PANCREAS: Unremarkable.    ADRENAL GLANDS: Unremarkable.    KIDNEYS: Symmetric pattern of renal enhancement. No evidence of a mass, hydronephrosis or hydroureter.    ABDOMINOPELVIC NODES: No enlarged abdominal or pelvic lymph nodes.    PELVIC ORGANS: Calcified uterine fibroids.    PERITONEUM/MESENTERY/BOWEL: Redemonstrated is a large right anterolateral abdominal wall hernia containing multiple loops of nonobstructed small and large bowel and an additional slightly more inferior narrowed necked hernia containing a short segment of nonobstructed bowel.  New 9.4 x 3.7 x 6.2 cm subcutaneous collectionof fluid and air along the anterior abdominal wall just inferior to the large right anterolateral hernia sac, with adjacent cutaneous thickening and subcutaneous inflammatory stranding (5/64-67.)    Additional overall unchanged smaller left paramidline ventral abdominal hernia contains fat and a short segment of nonobstructed small bowel.    BONES/SOFT TISSUES: Degenerative changes of the spine.. Redemonstrated right-sided fat containing ventral hernia.    VASCULAR: Atherosclerotic calcifications.      IMPRESSION:    1. Since 2021, new 9.4 cm subcutaneous collection of fluid and air along the anterior abdominal wall just inferior to the large right anterolateral hernia sac, with adjacent cutaneous thickening and subcutaneous inflammatory stranding, compatible with cellulitis.    2. No significant change in large right anterolateral abdominal wall hernia sac with nonobstructed loops of small and large bowel; no significant change in an additional slightly more inferior narrow necked hernia containing a short segment of nonobstructed bowel.    3. Cholelithiasis.    --- End of Report ---            GALILEA ZAMARRIPA MD; Resident Radiologist  This document has been electronically signed.  KODAK PRIDE MD; Attending Radiologist  This document has been electronically signed. Sep  9 2021  4:21PM (21 @ 14:11)      CARDIOLOGY TESTING  12 Lead ECG:   Ventricular Rate 79 BPM    Atrial Rate 79 BPM    P-R Interval 234 ms    QRS Duration 76 ms    Q-T Interval 354 ms    QTC Calculation(Bazett) 405 ms    P Axis 62 degrees    R Axis 113 degrees    T Axis 34 degrees    Diagnosis Line Sinus rhythm with 1st degree A-V block  Right axis deviation  Low voltage QRS  Cannot rule out Anteroseptal infarct , age undetermined  Abnormal ECG    Confirmed by Sohail Pitt ([id number]) on 2021 10:51:44 PM (21 @ 21:04)      All available historical records have been reviewed    MEDICATIONS  allopurinol 100  aspirin enteric coated 81  ciprofloxacin   IVPB   ciprofloxacin   IVPB 400  enalapril 20  enoxaparin Injectable 40  isosorbide   mononitrate ER Tablet (IMDUR) 30  metoprolol tartrate 25  metroNIDAZOLE  IVPB   metroNIDAZOLE  IVPB 500  pantoprazole  Injectable 40  polyethylene glycol 3350 17  pregabalin 200  senna 2  sodium chloride 0.9%. 1000      ANTIBIOTICS:  ciprofloxacin   IVPB 400 milliGRAM(s) IV Intermittent x 1 dose 9/10   metroNIDAZOLE  IVPB 500 milliGRAM(s) IV Intermittent x 1 dose 9/10 (active)  piperacillin/tazobactam IVPB 3.375 Gram(s) IV Intermittent x 1 dose 9/10      All available historical data has been reviewed    ASSESSMENT  85 y/o F w/ PMHx of Colon Ca s/p Resection, CAD, CM, COVID infection, HTN c/o fever for past 4 days.   CTAP w/ PO Contrast redemonstration of an approximate 9 x 4 cm collection with air-fluid level in the subcutaneous fat situated between 2 ventral abdominal wall hernias and superior to the ventral pelvic wall hernia. Surrounding subcutaneous fat stranding and edema with overlying skin thickening.     IMPRESSION  # likely abdominal abscess associated with overlying skin wound  # UA grossly negative, unlikely UTI    RECOMMENDATIONS  - f/u pending urine cultures, although likely negative. Order BCx  - d/c metronidazole  - Unasyn 3g q6h for now, if BCx negative, can de-escalate Abx to Augmentin 875mg q12h for 10 days      This is a pended note. All final recommendations to follow pending discussion with ID Attending    NIKKY FRASER  86y, Female  Allergy: No Known Allergies    CHIEF COMPLAINT:   HPI:  HPI:  87 y/o F w/ PMHx of Colon Ca s/p Resection, CAD, CM, COVID infection, HTN c/o fever for past 4 days. Patient's daughter came back from her trip and visited patient at her home, had fever up to 100.4F, given tylenol as needed, last dose yesterday. Daughter took patient to  as she suspected COVID  which came back negative. Daughter than spoke to PCP which prescribed Levofloxacin however patient remained febrile prompting visit to ED. Denies chills, chest pain, SOB, cough, abdominal pain, n/v/d. Notes some sharp left sided flank pain, moderate to severe, new, radiating to front of abdomen, constant. Also notes some b/l increased leg swelling.    In ED, Febrile to 101.2, remainder of VS wnl, Labs remarkable for Hg 9.1, Na 131. EKG NSR w/ 1st degree AV Block. CTAP w/ IVC; Since 2021, new 9.4 cm subcutaneous collection of fluid and air along the anterior abdominal wall just inferior to the large right anterolateral hernia sac, with adjacent cutaneous thickening and subcutaneous inflammatory stranding, compatible with cellulitis. No significant change in large right anterolateral abdominal wall hernia sac with non-obstructed loops of small and large bowel; no significant change in an additional slightly more inferior narrow necked hernia containing a short segment of non-obstructed bowel. Cholelithiasis. CTAP w/ PO Contrast redemonstration of an approximate 9 x 4 cm collection with air-fluid level in the subcutaneous fat situated between 2 ventral abdominal wall hernias and superior to the ventral pelvic wall hernia. Surrounding subcutaneous fat stranding and edema with overlying skin thickening. Surgery consulted, no surgical intervention, consider IR consult for possible image-guided aspiration/drain placement. IR consult placed. Received 1 dose of Zosyn, also received 1L of NS and continued on  cc/hr. Admitted to medicine  (10 Sep 2021 04:20)      Infectious Diseases History:  Patient is [place] speaking, evaluation was performed with assistance of , Bita ID# [id number]  Patient began experiencing fevers about 1 week ago, eventually called PCP who prescribed Levofloxacin 500mg daily for likely UTI as source of infection. Patient took one dose on  and was brought in to the hospital on  by daughter, [place].   Patient states that she has chronic skin wounds that come and go overlying her hernia, and that the current state is an improvement over prior wounds. Patient denies pain at the wound site. Nurse comes to clean wound dressing, daughter states that nurse will put excessive pressure on the wound sometimes and cause pain.       Old Micro Data/Cultures:     FAMILY HISTORY:  No pertinent family history in first degree relatives      PAST MEDICAL & SURGICAL HISTORY:  HTN (hypertension)    Diabetes mellitus    CAD (coronary artery disease)    Primary osteoarthritis of both knees    Colorectal cancer  Remote hx; s/p colon resection    Hyperuricemia    H/O hernia repair    History of bowel resection    History of coronary artery stent placement        SOCIAL HISTORY  Social History:  no smoking, alcohol drinking or use of recreational drugs, lives with her daughter and has home aid (2021 15:11)      Recent Travel:  Other Exposures:     ROS  General: Endorses fevers, chills, fatigue  HEENT: Denies headache, rhinorrhea  CV: Denies CP, palpitations  PULM: Denies wheezing, cough  GI: Denies nausea, vomiting, diarrhea  : Denies discharge, hematuria  MSK: Endorses pain in b/l knees  SKIN: Denies rash, lesions    VITALS:  T(F): 100.5, Max: 101.2 (21 @ 22:04)  HR: 87  BP: 136/59  RR: 18Vital Signs Last 24 Hrs  T(C): 38.1 (10 Sep 2021 05:00), Max: 38.4 (09 Sep 2021 22:04)  T(F): 100.5 (10 Sep 2021 05:00), Max: 101.2 (09 Sep 2021 22:04)  HR: 87 (10 Sep 2021 05:00) (60 - 87)  BP: 136/59 (10 Sep 2021 05:00) (117/55 - 164/72)  BP(mean): --  RR: 18 (10 Sep 2021 05:00) (16 - 18)  SpO2: 98% (09 Sep 2021 21:24) (96% - 98%)    PHYSICAL EXAM:  Gen: NAD, resting in bed  HEENT: Normocephalic, atraumatic  Neck: supple, no lymphadenopathy  CV: RRR, S1+S2, no MRG  Lungs: CTAB, unlabored respirations  Abdomen: Soft, BS present, nontender, significant right sided hernia with overlying skin changes, purple-white discoloration, one lesion that is 3kgu6cm, red with clean borders, underlying fluctuance and no apparent drainage  Ext: Warm, well perfused. B/L knee pain  Skin: no rash, no lesions  Lines: no phlebitis    TESTS & MEASUREMENTS:                        9.1    8.14  )-----------( 259      ( 09 Sep 2021 21:19 )             27.6         133<L>  |  98  |  26<H>  ----------------------------<  119<H>  4.6   |  22  |  0.9    Ca    9.2      09 Sep 2021 21:19    TPro  5.8<L>  /  Alb  3.3<L>  /  TBili  0.3  /  DBili  x   /  AST  15  /  ALT  16  /  AlkPhos  56      eGFR if Non African American: 59 mL/min/1.73M2 (21 @ 21:19)  eGFR if : 68 mL/min/1.73M2 (21 @ 21:19)  eGFR if Non African American: 52 mL/min/1.73M2 (21 @ 09:47)  eGFR if : 60 mL/min/1.73M2 (21 @ 09:47)    LIVER FUNCTIONS - ( 09 Sep 2021 21:19 )  Alb: 3.3 g/dL / Pro: 5.8 g/dL / ALK PHOS: 56 U/L / ALT: 16 U/L / AST: 15 U/L / GGT: x           Urinalysis Basic - ( 09 Sep 2021 13:39 )    Color: Light Yellow / Appearance: Clear / S.013 / pH: x  Gluc: x / Ketone: Negative  / Bili: Negative / Urobili: <2 mg/dL   Blood: x / Protein: Negative / Nitrite: Negative   Leuk Esterase: Negative / RBC: x / WBC x   Sq Epi: x / Non Sq Epi: x / Bacteria: x          Blood Gas Venous - Lactate: 1.20 mmol/L (21 @ 20:57)  Lactate, Blood: 3.3 mmol/L (21 @ 09:47)      INFECTIOUS DISEASES TESTING      RADIOLOGY & ADDITIONAL TESTS:  I have personally reviewed the last available Chest xray  CXR      CT  CT Abdomen and Pelvis w/ IV Cont:   EXAM:  CT ABDOMEN AND PELVIS IC            PROCEDURE DATE:  2021            INTERPRETATION:  CLINICAL HISTORY / REASON FOR EXAM: Left flank pain, fever.    TECHNIQUE: Contiguous axial CT images were obtained from the lower chest to the pubic symphysis following the administration of 100 mL Omnipaque 350 intravenous contrast. Oral contrast was not administered. Reformatted images in the coronal and sagittal planes were acquired.    COMPARISON CT: CT abdomen and pelvis with oral and IV contrast performed on 2021.    FINDINGS:    LOWER CHEST: Mild bibasilar atelectasis.    HEPATOBILIARY: Cholelithiasis.    SPLEEN: Unremarkable.    PANCREAS: Unremarkable.    ADRENAL GLANDS: Unremarkable.    KIDNEYS: Symmetric pattern of renal enhancement. No evidence of a mass, hydronephrosis or hydroureter.    ABDOMINOPELVIC NODES: No enlarged abdominal or pelvic lymph nodes.    PELVIC ORGANS: Calcified uterine fibroids.    PERITONEUM/MESENTERY/BOWEL: Redemonstrated is a large right anterolateral abdominal wall hernia containing multiple loops of nonobstructed small and large bowel and an additional slightly more inferior narrowed necked hernia containing a short segment of nonobstructed bowel.  New 9.4 x 3.7 x 6.2 cm subcutaneous collectionof fluid and air along the anterior abdominal wall just inferior to the large right anterolateral hernia sac, with adjacent cutaneous thickening and subcutaneous inflammatory stranding (5/64-67.)    Additional overall unchanged smaller left paramidline ventral abdominal hernia contains fat and a short segment of nonobstructed small bowel.    BONES/SOFT TISSUES: Degenerative changes of the spine.. Redemonstrated right-sided fat containing ventral hernia.    VASCULAR: Atherosclerotic calcifications.      IMPRESSION:    1. Since 2021, new 9.4 cm subcutaneous collection of fluid and air along the anterior abdominal wall just inferior to the large right anterolateral hernia sac, with adjacent cutaneous thickening and subcutaneous inflammatory stranding, compatible with cellulitis.    2. No significant change in large right anterolateral abdominal wall hernia sac with nonobstructed loops of small and large bowel; no significant change in an additional slightly more inferior narrow necked hernia containing a short segment of nonobstructed bowel.    3. Cholelithiasis.    --- End of Report ---            GALILEA ZAMARRIPA MD; Resident Radiologist  This document has been electronically signed.  KODAK PRIDE MD; Attending Radiologist  This document has been electronically signed. Sep  9 2021  4:21PM (21 @ 14:11)      CARDIOLOGY TESTING  12 Lead ECG:   Ventricular Rate 79 BPM    Atrial Rate 79 BPM    P-R Interval 234 ms    QRS Duration 76 ms    Q-T Interval 354 ms    QTC Calculation(Bazett) 405 ms    P Axis 62 degrees    R Axis 113 degrees    T Axis 34 degrees    Diagnosis Line Sinus rhythm with 1st degree A-V block  Right axis deviation  Low voltage QRS  Cannot rule out Anteroseptal infarct , age undetermined  Abnormal ECG    Confirmed by Sohail Pitt ([id number]) on 2021 10:51:44 PM (21 @ 21:04)      All available historical records have been reviewed    MEDICATIONS  allopurinol 100  aspirin enteric coated 81  ciprofloxacin   IVPB   ciprofloxacin   IVPB 400  enalapril 20  enoxaparin Injectable 40  isosorbide   mononitrate ER Tablet (IMDUR) 30  metoprolol tartrate 25  metroNIDAZOLE  IVPB   metroNIDAZOLE  IVPB 500  pantoprazole  Injectable 40  polyethylene glycol 3350 17  pregabalin 200  senna 2  sodium chloride 0.9%. 1000      ANTIBIOTICS:  ciprofloxacin   IVPB 400 milliGRAM(s) IV Intermittent x 1 dose 9/10   metroNIDAZOLE  IVPB 500 milliGRAM(s) IV Intermittent x 1 dose 9/10 (active)  piperacillin/tazobactam IVPB 3.375 Gram(s) IV Intermittent x 1 dose 9/10      All available historical data has been reviewed    ASSESSMENT  87 y/o F w/ PMHx of Colon Ca s/p Resection, CAD, CM, COVID infection, HTN c/o fever for past 4 days.   CTAP w/ PO Contrast redemonstration of an approximate 9 x 4 cm collection with air-fluid level in the subcutaneous fat situated between 2 ventral abdominal wall hernias and superior to the ventral pelvic wall hernia. Surrounding subcutaneous fat stranding and edema with overlying skin thickening.     IMPRESSION  # likely abdominal abscess associated with overlying skin wound  # UA grossly negative, unlikely UTI    RECOMMENDATIONS  - f/u pending urine cultures, although likely negative. Order BCx  - d/c metronidazole  - Unasyn 3g q6h for now, if BCx negative, can de-escalate Abx to Augmentin 875mg q12h for 10 days  recall prn please

## 2021-09-10 NOTE — H&P ADULT - NSHPLABSRESULTS_GEN_ALL_CORE
9.1    8.14  )-----------( 259      ( 09 Sep 2021 21:19 )             27.6           133<L>  |  98  |  26<H>  ----------------------------<  119<H>  4.6   |  22  |  0.9    Ca    9.2      09 Sep 2021 21:19    TPro  5.8<L>  /  Alb  3.3<L>  /  TBili  0.3  /  DBili  x   /  AST  15  /  ALT  16  /  AlkPhos  56                Urinalysis Basic - ( 09 Sep 2021 13:39 )    Color: Light Yellow / Appearance: Clear / S.013 / pH: x  Gluc: x / Ketone: Negative  / Bili: Negative / Urobili: <2 mg/dL   Blood: x / Protein: Negative / Nitrite: Negative   Leuk Esterase: Negative / RBC: x / WBC x   Sq Epi: x / Non Sq Epi: x / Bacteria: x                  CAPILLARY BLOOD GLUCOSE

## 2021-09-10 NOTE — CONSULT NOTE ADULT - SUBJECTIVE AND OBJECTIVE BOX
INTERVENTIONAL RADIOLOGY CONSULT:     Procedure Requested: Image guided drainage of abdominal wall fluid collection.    HPI:  87 y/o F w/ PMHx of Colon Ca s/p Resection, CAD, CM, COVID infection, HTN c/o fever for past 4 days. Patient's daughter came back from her trip and visited patient at her home, had fever up to 100.4F, given tylenol as needed, last dose yesterday. Daughter took patient to  as she suspected COVID  which came back negative. Daughter than spoke to PCP which prescribed Levofloxacin however patient remained febrile prompting visit to ED. Denies chills, chest pain, SOB, cough, abdominal pain, n/v/d. Notes some sharp left sided flank pain, moderate to severe, new, radiating to front of abdomen, constant. Also notes some b/l increased leg swelling.    In ED, Febrile to 101.2, remainder of VS wnl, Labs remarkable for Hg 9.1, Na 131. EKG NSR w/ 1st degree AV Block. CTAP w/ IVC; Since January 27, 2021, new 9.4 cm subcutaneous collection of fluid and air along the anterior abdominal wall just inferior to the large right anterolateral hernia sac, with adjacent cutaneous thickening and subcutaneous inflammatory stranding, compatible with cellulitis. No significant change in large right anterolateral abdominal wall hernia sac with non-obstructed loops of small and large bowel; no significant change in an additional slightly more inferior narrow necked hernia containing a short segment of non-obstructed bowel. Cholelithiasis. CTAP w/ PO Contrast redemonstration of an approximate 9 x 4 cm collection with air-fluid level in the subcutaneous fat situated between 2 ventral abdominal wall hernias and superior to the ventral pelvic wall hernia. Surrounding subcutaneous fat stranding and edema with overlying skin thickening. Surgery consulted, no surgical intervention, consider IR consult for possible image-guided aspiration/drain placement. IR consult placed. Received 1 dose of Zosyn, also received 1L of NS and continued on  cc/hr. Admitted to medicine  (10 Sep 2021 04:20)      PAST MEDICAL & SURGICAL HISTORY:  HTN (hypertension)  Diabetes mellitus  CAD (coronary artery disease)  Primary osteoarthritis of both knees  Colorectal cancer  Remote hx; s/p colon resection  Hyperuricemia  H/O hernia repair  History of bowel resection  History of coronary artery stent placement    MEDICATIONS  (STANDING):  allopurinol 100 milliGRAM(s) Oral daily  aspirin enteric coated 81 milliGRAM(s) Oral daily  ciprofloxacin   IVPB 400 milliGRAM(s) IV Intermittent every 12 hours  ciprofloxacin   IVPB      enalapril 20 milliGRAM(s) Oral daily  enoxaparin Injectable 40 milliGRAM(s) SubCutaneous daily  isosorbide   mononitrate ER Tablet (IMDUR) 30 milliGRAM(s) Oral daily  metoprolol tartrate 25 milliGRAM(s) Oral two times a day  metroNIDAZOLE  IVPB 500 milliGRAM(s) IV Intermittent every 8 hours  metroNIDAZOLE  IVPB      pantoprazole  Injectable 40 milliGRAM(s) IV Push once  polyethylene glycol 3350 17 Gram(s) Oral two times a day  pregabalin 200 milliGRAM(s) Oral two times a day  senna 2 Tablet(s) Oral at bedtime  sodium chloride 0.9%. 1000 milliLiter(s) (100 mL/Hr) IV Continuous <Continuous>    Allergies  No Known Allergies    FAMILY HISTORY:  No pertinent family history in first degree relatives    Physical Exam:   Vital Signs Last 24 Hrs  T(C): 38.1 (10 Sep 2021 05:00), Max: 38.4 (09 Sep 2021 22:04)  T(F): 100.5 (10 Sep 2021 05:00), Max: 101.2 (09 Sep 2021 22:04)  HR: 87 (10 Sep 2021 05:00) (60 - 87)  BP: 136/59 (10 Sep 2021 05:00) (117/55 - 164/72)  RR: 18 (10 Sep 2021 05:00) (16 - 18)  SpO2: 98% (09 Sep 2021 21:24) (96% - 98%)    Labs:                         9.1    8.14  )-----------( 259      ( 09 Sep 2021 21:19 )             27.6     09-09    133<L>  |  98  |  26<H>  ----------------------------<  119<H>  4.6   |  22  |  0.9    Ca    9.2      09 Sep 2021 21:19    TPro  5.8<L>  /  Alb  3.3<L>  /  TBili  0.3  /  DBili  x   /  AST  15  /  ALT  16  /  AlkPhos  56  09-09    Radiology & Additional Studies:   Radiology imaging reviewed.     ASSESSMENT/ PLAN:   87 yo F with large ventral abdominal wall hernia and subcutaneous fluid collection. IR consulted for image guided drainage.     No drainable fluid collection  Continue medical management  Thank you for the courtesy of this consult, please call [phone] with any further questions.    INTERVENTIONAL RADIOLOGY CONSULT:     Procedure Requested: Image guided drainage of abdominal wall fluid collection.    HPI:  85 y/o F w/ PMHx of Colon Ca s/p Resection, CAD, CM, COVID infection, HTN c/o fever for past 4 days. Patient's daughter came back from her trip and visited patient at her home, had fever up to 100.4F, given tylenol as needed, last dose yesterday. Daughter took patient to  as she suspected COVID  which came back negative. Daughter than spoke to PCP which prescribed Levofloxacin however patient remained febrile prompting visit to ED. Denies chills, chest pain, SOB, cough, abdominal pain, n/v/d. Notes some sharp left sided flank pain, moderate to severe, new, radiating to front of abdomen, constant. Also notes some b/l increased leg swelling.    In ED, Febrile to 101.2, remainder of VS wnl, Labs remarkable for Hg 9.1, Na 131. EKG NSR w/ 1st degree AV Block. CTAP w/ IVC; Since January 27, 2021, new 9.4 cm subcutaneous collection of fluid and air along the anterior abdominal wall just inferior to the large right anterolateral hernia sac, with adjacent cutaneous thickening and subcutaneous inflammatory stranding, compatible with cellulitis. No significant change in large right anterolateral abdominal wall hernia sac with non-obstructed loops of small and large bowel; no significant change in an additional slightly more inferior narrow necked hernia containing a short segment of non-obstructed bowel. Cholelithiasis. CTAP w/ PO Contrast redemonstration of an approximate 9 x 4 cm collection with air-fluid level in the subcutaneous fat situated between 2 ventral abdominal wall hernias and superior to the ventral pelvic wall hernia. Surrounding subcutaneous fat stranding and edema with overlying skin thickening. Surgery consulted, no surgical intervention, consider IR consult for possible image-guided aspiration/drain placement. IR consult placed. Received 1 dose of Zosyn, also received 1L of NS and continued on  cc/hr. Admitted to medicine  (10 Sep 2021 04:20)      PAST MEDICAL & SURGICAL HISTORY:  HTN (hypertension)  Diabetes mellitus  CAD (coronary artery disease)  Primary osteoarthritis of both knees  Colorectal cancer  Remote hx; s/p colon resection  Hyperuricemia  H/O hernia repair  History of bowel resection  History of coronary artery stent placement    MEDICATIONS  (STANDING):  allopurinol 100 milliGRAM(s) Oral daily  aspirin enteric coated 81 milliGRAM(s) Oral daily  ciprofloxacin   IVPB 400 milliGRAM(s) IV Intermittent every 12 hours  ciprofloxacin   IVPB      enalapril 20 milliGRAM(s) Oral daily  enoxaparin Injectable 40 milliGRAM(s) SubCutaneous daily  isosorbide   mononitrate ER Tablet (IMDUR) 30 milliGRAM(s) Oral daily  metoprolol tartrate 25 milliGRAM(s) Oral two times a day  metroNIDAZOLE  IVPB 500 milliGRAM(s) IV Intermittent every 8 hours  metroNIDAZOLE  IVPB      pantoprazole  Injectable 40 milliGRAM(s) IV Push once  polyethylene glycol 3350 17 Gram(s) Oral two times a day  pregabalin 200 milliGRAM(s) Oral two times a day  senna 2 Tablet(s) Oral at bedtime  sodium chloride 0.9%. 1000 milliLiter(s) (100 mL/Hr) IV Continuous <Continuous>    Allergies  No Known Allergies    FAMILY HISTORY:  No pertinent family history in first degree relatives    Physical Exam:   Vital Signs Last 24 Hrs  T(C): 38.1 (10 Sep 2021 05:00), Max: 38.4 (09 Sep 2021 22:04)  T(F): 100.5 (10 Sep 2021 05:00), Max: 101.2 (09 Sep 2021 22:04)  HR: 87 (10 Sep 2021 05:00) (60 - 87)  BP: 136/59 (10 Sep 2021 05:00) (117/55 - 164/72)  RR: 18 (10 Sep 2021 05:00) (16 - 18)  SpO2: 98% (09 Sep 2021 21:24) (96% - 98%)    Labs:                         9.1    8.14  )-----------( 259      ( 09 Sep 2021 21:19 )             27.6     09-09    133<L>  |  98  |  26<H>  ----------------------------<  119<H>  4.6   |  22  |  0.9    Ca    9.2      09 Sep 2021 21:19    TPro  5.8<L>  /  Alb  3.3<L>  /  TBili  0.3  /  DBili  x   /  AST  15  /  ALT  16  /  AlkPhos  56  09-09    Radiology & Additional Studies:   Radiology imaging reviewed.     ASSESSMENT/ PLAN:   87 yo F with large ventral abdominal wall hernia and subcutaneous fluid collection. IR consulted for image guided drainage.     No drainable fluid collection   Continue medical management  Thank you for the courtesy of this consult, please call [phone] with any further questions.

## 2021-09-10 NOTE — CONSULT NOTE ADULT - ATTENDING COMMENTS
Counseled patient about diagnostic testing and treatment plan. All questions answered.
86F w/ PMHx including HTN, DM, CAD, OA, colorectal cancer with colon resection in the past, as well as multiple SBO that required surgery at [facility] with bowel resection who presented to ED today with daughter because patient has been having fevers at home for the past 5-6 days. While in the ED, patient had a CT scan done which showed concern for a 9+cm abscess in proximity to the patient's very large hernia containing bowel. Of note, this area over hernia with   small wound , no drainage.     Surgery consulted for evaluation of this abscess for possible surgical drainage.        obese abdomen with extremely large ventral hernia containing all abdominal contents, area of old healed wound on right side. No obvious areas of induration, fluctuance or infection.    There was extensive discussion with daughter and patient about how if this requires surgery how this would be an extensive surgery if there bowel is involved, or if there is a fistula tract responsible for this abscess. Repeat CT A/P w/ PO contrast shows no evidence of contrast in abscess/air-fluid collection. Per discussion with radiology, abscess appear extraperitoneal.     PLAN:  - no acute surgical intervention  - would recommend admission for IV antibiotics  - abscess not able to be drained from skin without risk of injuring bowel. patient afebrile in hospital, wbc normal, no skin changes

## 2021-09-10 NOTE — CONSULT NOTE ADULT - CONSULT REASON
CTAP: new 9.4 cm subcutaneous collection of fluid and air along the anterior abdominal wall just inferior to the large right anterolateral hernia sac, with adjacent cutaneous thickening and subcutaneous inflammatory stranding, compatible with cellulitis.  Febrile, No leukocytosis
IR guided drainage of abdominal fluid collection
Abscess surrounding large abdominal hernia

## 2021-09-10 NOTE — CHART NOTE - NSCHARTNOTEFT_GEN_A_CORE
case discussed with Dr Wick at [place]  given HD stability and plan for nonsurgical management with antibiotics they do not think that a transfer is warranted  should the patient's condition decline, can discuss transfer at that time  otherwise, patient can follow up with them as outpatient

## 2021-09-10 NOTE — H&P ADULT - NSHPPHYSICALEXAM_GEN_ALL_CORE
PHYSICAL EXAM:  GENERAL: NAD, lying in bed comfortably  HEAD:  Atraumatic, Normocephalic  EYES: EOMI, PERRLA, conjunctiva and sclera clear  ENT: Moist mucous membranes  NECK: Supple, No JVD  CHEST/LUNG: Clear to auscultation bilaterally; No rales, rhonchi, wheezing, or rubs. Unlabored respirations  HEART: Regular rate and rhythm; No murmurs, rubs, or gallops  ABDOMEN: Abd wall hernia present, Nontender, No hepatomegaly  EXTREMITIES:  2+ Peripheral Pulses, brisk capillary refill. No clubbing, cyanosis, or edema  NERVOUS SYSTEM:  Alert & Oriented X3, speech clear. No deficits   MSK: FROM all 4 extremities, full and equal strength  SKIN: No rashes or lesions PHYSICAL EXAM:  GENERAL: NAD, lying in bed comfortably, American speaking  HEAD:  Atraumatic, Normocephalic  EYES: EOMI, PERRLA, conjunctiva and sclera clear  ENT: Moist mucous membranes  NECK: Supple, No JVD  CHEST/LUNG: Clear to auscultation bilaterally; No rales, rhonchi, wheezing, or rubs. Unlabored respirations  HEART: Regular rate and rhythm; No murmurs, rubs, or gallops  ABDOMEN: Abd wall hernia present, Nontender, No hepatomegaly  EXTREMITIES:  2+ Peripheral Pulses, brisk capillary refill. No clubbing, cyanosis, or edema  NERVOUS SYSTEM:  Alert & Oriented X3, speech clear. No deficits   MSK: FROM all 4 extremities, full and equal strength  SKIN: No rashes or lesions

## 2021-09-10 NOTE — H&P ADULT - ASSESSMENT
87 y/o F w/ PMHx of Colon Ca s/p Resection, CAD, CM, COVID infection, HTN c/o fever for past 4 days    #Abdominal Wall Abscess  #Abdominal Wall Hernia  - In ED, Febrile to 101.2  - CTAP w/ IVC; Since January 27, 2021, new 9.4 cm subcutaneous collection of fluid and air along the anterior abdominal wall just inferior to the large right anterolateral hernia sac, with adjacent cutaneous thickening and subcutaneous inflammatory stranding, compatible with cellulitis. No significant change in large right anterolateral abdominal wall hernia sac with non-obstructed loops of small and large bowel; no significant change in an additional slightly more inferior narrow necked hernia containing a short segment of non-obstructed bowel. Cholelithiasis  - CTAP w/ PO Contrast redemonstration of an approximate 9 x 4 cm collection with air-fluid level in the subcutaneous fat situated between 2 ventral abdominal wall hernias and superior to the ventral pelvic wall hernia. Surrounding subcutaneous fat stranding and edema with overlying skin thickening  - Received 1 dose of Zosyn  - Surgery consulted, no surgical intervention  - IR consult placed for possible image-guided aspiration/drain placement as per Surgery Recc  - Will start on Cipro and Flagyl for now  - f/u ID consult    #HTN  - Currently controlled  - c/w enalapril, toprol, lasix      DM II  - FS controlled off insulin  - start insulin if FS persistently >180    CAD s/p PCI  - c/w Aspirin 81mg QD, Toprol, Lipitor 40mg QD, Imdur    History of Colon Cancer  - S/P resection  - Currently in remission      Others  - DVT Prophylaxis: Lovenox 40mg   - GI Prophylaxis: PPI  - Diet: clears  - Code Status: Full Code  - Dispo: transfer to [facility].   85 y/o F w/ PMHx of Colon Ca s/p Resection, CAD, CM, COVID infection, HTN c/o fever for past 4 days    #Abdominal Wall Abscess  #Abdominal Wall Hernia  - In ED, Febrile to 101.2  - CTAP w/ IVC; Since January 27, 2021, new 9.4 cm subcutaneous collection of fluid and air along the anterior abdominal wall just inferior to the large right anterolateral hernia sac, with adjacent cutaneous thickening and subcutaneous inflammatory stranding, compatible with cellulitis. No significant change in large right anterolateral abdominal wall hernia sac with non-obstructed loops of small and large bowel; no significant change in an additional slightly more inferior narrow necked hernia containing a short segment of non-obstructed bowel. Cholelithiasis  - CTAP w/ PO Contrast redemonstration of an approximate 9 x 4 cm collection with air-fluid level in the subcutaneous fat situated between 2 ventral abdominal wall hernias and superior to the ventral pelvic wall hernia. Surrounding subcutaneous fat stranding and edema with overlying skin thickening  - Received 1 dose of Zosyn  - Surgery consulted, no surgical intervention  - IR consult placed for possible image-guided aspiration/drain placement as per Surgery Recc  - Will start on Cipro and Flagyl for now  - f/u ID consult    #HTN  - Currently controlled  - c/w Enalapril 20 mg PO qD, Toprol 25 mg PO BID    #DM II  - Home Meds: Metformin, Glyburide, Januvia  - FS controlled off Insulin  - start insulin if FS persistently >180    #CAD s/p PCI  - c/w Aspirin 81mg QD, Toprol 25 mg PO BID, Imdur 30 qD    #History of Colon Cancer  - S/P resection  - Currently in Remission    #Diet: DASH/CARB  #DVT pro: Lovenox   #GI pro: Protonix  #Dispo: f/u IR, c/w ABx

## 2021-09-10 NOTE — H&P ADULT - ATTENDING COMMENTS
85 y/o F w/ PMHx of Colon Ca s/p Resection, CAD, CM, COVID infection, HTN c/o fever for past 4 days. Patient's daughter came back from her trip and visited patient at her home, had fever up to 100.4F, given tylenol as needed, patient tested negative for COVID as OP and patient's PCP prescribed Levaquin which patient took for 1 day. Her fevers persisted, so daughter brought patient in.    Abdominal wall abscess/Abdominal hernia  history of colon CA s/p resection  Sepsis  not POA  CTAP w/ PO Contrast redemonstration of an approximate 9 x 4 cm collection with air-fluid level in the subcutaneous fat situated between 2 ventral abdominal wall hernias  wound examined, no erythema, small skin tear, + induration  as per lakia Vides at bedside, this is the best the wound has looked in 3 years   SUrgery and IR following, not a candidate for surgical intervention, high risk of bowel injury  ID recommending Unasyn 3g Q6H  daughter wishes to transfer patient to [facility], currently looking for accepting physician    Plan of care discussed with lakia Vides [phone] multiple times throughout the day

## 2021-09-10 NOTE — PROGRESS NOTE ADULT - SUBJECTIVE AND OBJECTIVE BOX
GENERAL SURGERY PROGRESS NOTE    Patient: NIKKY FRASER , 86y ([birth year])Female   MRN: [id number]  Location: [address]  Visit: 21 Inpatient  Date: 09-10-21 @ 10:39    Hospital Day #: 1  Post-Op Day #:    Procedure/Dx/Injuries: Suspected abscess next to large ventral hernia    Events of past 24 hours: Patient had fever to 100.5 this morning, no bowel movement.    PAST MEDICAL & SURGICAL HISTORY:  HTN (hypertension)    Diabetes mellitus    CAD (coronary artery disease)    Primary osteoarthritis of both knees    Colorectal cancer  Remote hx; s/p colon resection    Hyperuricemia    H/O hernia repair    History of bowel resection    History of coronary artery stent placement        Vitals:   T(F): 100.5 (09-10-21 @ 05:00), Max: 101.2 (21 @ 22:04)  HR: 87 (09-10-21 @ 05:00)  BP: 136/59 (09-10-21 @ 05:00)  RR: 18 (09-10-21 @ 05:00)  SpO2: 98% (21 @ 21:24)      Diet, Soft:   Fiber/Residue Restricted  DASH/TLC Sodium & Cholesterol Restricted      Fluids: sodium chloride 0.9%.: Solution, 1000 milliLiter(s) infuse at 100 mL/Hr  Provider's Contact #: [phone]      I & O's:    Bowel Movement: : [] YES [] NO  Flatus: : [] YES [] NO    PHYSICAL EXAM:  General: NAD, AAOx3, calm and cooperative  HEENT: NCAT, GONZALEZ, EOMI, Trachea ML, Neck supple  Cardiac: RRR S1, S2, no Murmurs, rubs or gallops  Respiratory: CTAB, normal respiratory effort, breath sounds equal BL, no wheeze, rhonchi or crackles  Abdomen: Soft, large hernia no palpable abscess    MEDICATIONS  (STANDING):  allopurinol 100 milliGRAM(s) Oral daily  aspirin enteric coated 81 milliGRAM(s) Oral daily  ciprofloxacin   IVPB 400 milliGRAM(s) IV Intermittent every 12 hours  ciprofloxacin   IVPB      enalapril 20 milliGRAM(s) Oral daily  enoxaparin Injectable 40 milliGRAM(s) SubCutaneous daily  isosorbide   mononitrate ER Tablet (IMDUR) 30 milliGRAM(s) Oral daily  metoprolol tartrate 25 milliGRAM(s) Oral two times a day  metroNIDAZOLE  IVPB      metroNIDAZOLE  IVPB 500 milliGRAM(s) IV Intermittent every 8 hours  pantoprazole  Injectable 40 milliGRAM(s) IV Push once  polyethylene glycol 3350 17 Gram(s) Oral two times a day  pregabalin 200 milliGRAM(s) Oral two times a day  senna 2 Tablet(s) Oral at bedtime  sodium chloride 0.9%. 1000 milliLiter(s) (100 mL/Hr) IV Continuous <Continuous>    MEDICATIONS  (PRN):  acetaminophen   Tablet .. 650 milliGRAM(s) Oral every 6 hours PRN Temp greater or equal to 38C (100.4F), Mild Pain (1 - 3)      DVT PROPHYLAXIS: enoxaparin Injectable 40 milliGRAM(s) SubCutaneous daily    GI PROPHYLAXIS: pantoprazole  Injectable 40 milliGRAM(s) IV Push once    ANTICOAGULATION:   ANTIBIOTICS:  ciprofloxacin   IVPB 400 milliGRAM(s)  ciprofloxacin   IVPB    metroNIDAZOLE  IVPB    metroNIDAZOLE  IVPB 500 milliGRAM(s)            LAB/STUDIES:  Labs:  CAPILLARY BLOOD GLUCOSE                              8.4    7.15  )-----------( 244      ( 10 Sep 2021 08:43 )             26.1       Auto Neutrophil %: 81.6 % (09-10-21 @ 08:43)  Auto Immature Granulocyte %: 0.8 % (09-10-21 @ 08:43)    09-10    135  |  104  |  22<H>  ----------------------------<  128<H>  4.5   |  20  |  0.8      Calcium, Total Serum: 8.9 mg/dL (09-10-21 @ 08:43)      LFTs:             5.8  | 0.3  | 15       ------------------[56      ( 09 Sep 2021 21:19 )  3.3  | x    | 16          Lipase:x      Amylase:x         Blood Gas Venous - Lactate: 1.20 mmol/L (21 @ 20:57)  Lactate, Blood: 3.3 mmol/L (21 @ 09:47)      Coags:            Urinalysis Basic - ( 09 Sep 2021 13:39 )    Color: Light Yellow / Appearance: Clear / S.013 / pH: x  Gluc: x / Ketone: Negative  / Bili: Negative / Urobili: <2 mg/dL   Blood: x / Protein: Negative / Nitrite: Negative   Leuk Esterase: Negative / RBC: x / WBC x   Sq Epi: x / Non Sq Epi: x / Bacteria: x                IMAGING:      ACCESS/ DEVICES:  [X] Peripheral IV

## 2021-09-11 LAB
A1C WITH ESTIMATED AVERAGE GLUCOSE RESULT: 6.9 % — HIGH (ref 4–5.6)
ALBUMIN SERPL ELPH-MCNC: 3.1 G/DL — LOW (ref 3.5–5.2)
ALP SERPL-CCNC: 66 U/L — SIGNIFICANT CHANGE UP (ref 30–115)
ALT FLD-CCNC: 26 U/L — SIGNIFICANT CHANGE UP (ref 0–41)
ANION GAP SERPL CALC-SCNC: 12 MMOL/L — SIGNIFICANT CHANGE UP (ref 7–14)
AST SERPL-CCNC: 31 U/L — SIGNIFICANT CHANGE UP (ref 0–41)
BASOPHILS # BLD AUTO: 0.02 K/UL — SIGNIFICANT CHANGE UP (ref 0–0.2)
BASOPHILS NFR BLD AUTO: 0.3 % — SIGNIFICANT CHANGE UP (ref 0–1)
BILIRUB SERPL-MCNC: 0.3 MG/DL — SIGNIFICANT CHANGE UP (ref 0.2–1.2)
BUN SERPL-MCNC: 15 MG/DL — SIGNIFICANT CHANGE UP (ref 10–20)
CALCIUM SERPL-MCNC: 8.9 MG/DL — SIGNIFICANT CHANGE UP (ref 8.5–10.1)
CHLORIDE SERPL-SCNC: 100 MMOL/L — SIGNIFICANT CHANGE UP (ref 98–110)
CO2 SERPL-SCNC: 20 MMOL/L — SIGNIFICANT CHANGE UP (ref 17–32)
CREAT SERPL-MCNC: 0.8 MG/DL — SIGNIFICANT CHANGE UP (ref 0.7–1.5)
EOSINOPHIL # BLD AUTO: 0.16 K/UL — SIGNIFICANT CHANGE UP (ref 0–0.7)
EOSINOPHIL NFR BLD AUTO: 2.7 % — SIGNIFICANT CHANGE UP (ref 0–8)
ESTIMATED AVERAGE GLUCOSE: 151 MG/DL — HIGH (ref 68–114)
GLUCOSE BLDC GLUCOMTR-MCNC: 219 MG/DL — HIGH (ref 70–99)
GLUCOSE BLDC GLUCOMTR-MCNC: 247 MG/DL — HIGH (ref 70–99)
GLUCOSE BLDC GLUCOMTR-MCNC: 270 MG/DL — HIGH (ref 70–99)
GLUCOSE BLDC GLUCOMTR-MCNC: 293 MG/DL — HIGH (ref 70–99)
GLUCOSE SERPL-MCNC: 267 MG/DL — HIGH (ref 70–99)
HCT VFR BLD CALC: 27.3 % — LOW (ref 37–47)
HGB BLD-MCNC: 8.7 G/DL — LOW (ref 12–16)
IMM GRANULOCYTES NFR BLD AUTO: 1.8 % — HIGH (ref 0.1–0.3)
LYMPHOCYTES # BLD AUTO: 0.77 K/UL — LOW (ref 1.2–3.4)
LYMPHOCYTES # BLD AUTO: 12.8 % — LOW (ref 20.5–51.1)
MAGNESIUM SERPL-MCNC: 1.3 MG/DL — LOW (ref 1.8–2.4)
MCHC RBC-ENTMCNC: 26.9 PG — LOW (ref 27–31)
MCHC RBC-ENTMCNC: 31.9 G/DL — LOW (ref 32–37)
MCV RBC AUTO: 84.3 FL — SIGNIFICANT CHANGE UP (ref 81–99)
MONOCYTES # BLD AUTO: 0.37 K/UL — SIGNIFICANT CHANGE UP (ref 0.1–0.6)
MONOCYTES NFR BLD AUTO: 6.1 % — SIGNIFICANT CHANGE UP (ref 1.7–9.3)
NEUTROPHILS # BLD AUTO: 4.59 K/UL — SIGNIFICANT CHANGE UP (ref 1.4–6.5)
NEUTROPHILS NFR BLD AUTO: 76.3 % — HIGH (ref 42.2–75.2)
NRBC # BLD: 0 /100 WBCS — SIGNIFICANT CHANGE UP (ref 0–0)
PLATELET # BLD AUTO: 243 K/UL — SIGNIFICANT CHANGE UP (ref 130–400)
POTASSIUM SERPL-MCNC: 4.4 MMOL/L — SIGNIFICANT CHANGE UP (ref 3.5–5)
POTASSIUM SERPL-SCNC: 4.4 MMOL/L — SIGNIFICANT CHANGE UP (ref 3.5–5)
PROT SERPL-MCNC: 5.3 G/DL — LOW (ref 6–8)
RBC # BLD: 3.24 M/UL — LOW (ref 4.2–5.4)
RBC # FLD: 13.3 % — SIGNIFICANT CHANGE UP (ref 11.5–14.5)
SODIUM SERPL-SCNC: 132 MMOL/L — LOW (ref 135–146)
WBC # BLD: 6.02 K/UL — SIGNIFICANT CHANGE UP (ref 4.8–10.8)
WBC # FLD AUTO: 6.02 K/UL — SIGNIFICANT CHANGE UP (ref 4.8–10.8)

## 2021-09-11 PROCEDURE — 99231 SBSQ HOSP IP/OBS SF/LOW 25: CPT

## 2021-09-11 PROCEDURE — 99233 SBSQ HOSP IP/OBS HIGH 50: CPT

## 2021-09-11 RX ORDER — AMPICILLIN SODIUM AND SULBACTAM SODIUM 250; 125 MG/ML; MG/ML
3 INJECTION, POWDER, FOR SUSPENSION INTRAMUSCULAR; INTRAVENOUS ONCE
Refills: 0 | Status: DISCONTINUED | OUTPATIENT
Start: 2021-09-11 | End: 2021-09-11

## 2021-09-11 RX ORDER — AMPICILLIN SODIUM AND SULBACTAM SODIUM 250; 125 MG/ML; MG/ML
3 INJECTION, POWDER, FOR SUSPENSION INTRAMUSCULAR; INTRAVENOUS EVERY 6 HOURS
Refills: 0 | Status: DISCONTINUED | OUTPATIENT
Start: 2021-09-11 | End: 2021-09-11

## 2021-09-11 RX ORDER — MAGNESIUM SULFATE 500 MG/ML
2 VIAL (ML) INJECTION ONCE
Refills: 0 | Status: COMPLETED | OUTPATIENT
Start: 2021-09-11 | End: 2021-09-11

## 2021-09-11 RX ORDER — AMPICILLIN SODIUM AND SULBACTAM SODIUM 250; 125 MG/ML; MG/ML
INJECTION, POWDER, FOR SUSPENSION INTRAMUSCULAR; INTRAVENOUS
Refills: 0 | Status: DISCONTINUED | OUTPATIENT
Start: 2021-09-11 | End: 2021-09-11

## 2021-09-11 RX ADMIN — Medication 3: at 08:52

## 2021-09-11 RX ADMIN — Medication 3: at 12:43

## 2021-09-11 RX ADMIN — ENOXAPARIN SODIUM 40 MILLIGRAM(S): 100 INJECTION SUBCUTANEOUS at 12:46

## 2021-09-11 RX ADMIN — Medication 25 MILLIGRAM(S): at 05:21

## 2021-09-11 RX ADMIN — Medication 100 MILLIGRAM(S): at 05:14

## 2021-09-11 RX ADMIN — SODIUM CHLORIDE 100 MILLILITER(S): 9 INJECTION INTRAMUSCULAR; INTRAVENOUS; SUBCUTANEOUS at 12:43

## 2021-09-11 RX ADMIN — Medication 25 MILLIGRAM(S): at 20:15

## 2021-09-11 RX ADMIN — ISOSORBIDE MONONITRATE 30 MILLIGRAM(S): 60 TABLET, EXTENDED RELEASE ORAL at 12:15

## 2021-09-11 RX ADMIN — Medication 1 TABLET(S): at 21:32

## 2021-09-11 RX ADMIN — Medication 200 MILLIGRAM(S): at 05:14

## 2021-09-11 RX ADMIN — SODIUM CHLORIDE 100 MILLILITER(S): 9 INJECTION INTRAMUSCULAR; INTRAVENOUS; SUBCUTANEOUS at 01:27

## 2021-09-11 RX ADMIN — POLYETHYLENE GLYCOL 3350 17 GRAM(S): 17 POWDER, FOR SOLUTION ORAL at 05:21

## 2021-09-11 RX ADMIN — SENNA PLUS 2 TABLET(S): 8.6 TABLET ORAL at 23:02

## 2021-09-11 RX ADMIN — INSULIN GLARGINE 10 UNIT(S): 100 INJECTION, SOLUTION SUBCUTANEOUS at 22:09

## 2021-09-11 RX ADMIN — Medication 100 MILLIGRAM(S): at 12:45

## 2021-09-11 RX ADMIN — Medication 200 MILLIGRAM(S): at 05:20

## 2021-09-11 RX ADMIN — Medication 50 GRAM(S): at 12:44

## 2021-09-11 RX ADMIN — Medication 20 MILLIGRAM(S): at 05:20

## 2021-09-11 RX ADMIN — Medication 81 MILLIGRAM(S): at 12:45

## 2021-09-11 NOTE — PROGRESS NOTE ADULT - SUBJECTIVE AND OBJECTIVE BOX
GENERAL SURGERY PROGRESS NOTE    Patient: NIKKY FRASER , 86y ([birth year])Female   MRN: [id number]  Location: [address]12 [address]  Visit: 21 Inpatient  Date: 21 @ 02:54    Hospital Day #: 2  Post-Op Day #:    Procedure/Dx/Injuries: 9 cm abscess in proximity to large ventral hernia    Events of past 24 hours: Patient spoken to via  Kathya [id number]. Patient temperature to 100.3 at 6:30 PM on 9/10. Patient does not have any pain, denies any nausea or vomiting, but has not passed any gas or had a bowel movement in 3 days. Patient reported history of cellulitis over her hernia with similar symptoms 3 years ago and expressed that she did not want to undergo surgery if possible.    PAST MEDICAL & SURGICAL HISTORY:  HTN (hypertension)    Diabetes mellitus    CAD (coronary artery disease)    Primary osteoarthritis of both knees    Colorectal cancer  Remote hx; s/p colon resection    Hyperuricemia    H/O hernia repair    History of bowel resection    History of coronary artery stent placement        Vitals:   T(F): 96.1 (09-10-21 @ 21:35), Max: 100.5 (09-10-21 @ 05:00)  HR: 90 (09-10-21 @ 21:35)  BP: 125/57 (09-10-21 @ 21:35)  RR: 18 (09-10-21 @ 21:35)  SpO2: --      Diet, Soft:   Fiber/Residue Restricted  DASH/TLC Sodium & Cholesterol Restricted      Fluids:     I & O's:      Bowel Movement: : [] YES [x] NO  Flatus: : [] YES [x] NO    PHYSICAL EXAM:  General: NAD, AAOx3, calm and cooperative  HEENT: NCAT, GONZALEZ, EOMI, Trachea ML, Neck supple  Cardiac: RRR S1, S2, no Murmurs, rubs or gallops  Respiratory: CTAB, normal respiratory effort, breath sounds equal BL, no wheeze, rhonchi or crackles  Abdomen: Soft, non-distended, non-tender, large ventral hernia    MEDICATIONS  (STANDING):  allopurinol 100 milliGRAM(s) Oral daily  aspirin enteric coated 81 milliGRAM(s) Oral daily  ciprofloxacin   IVPB 400 milliGRAM(s) IV Intermittent every 12 hours  ciprofloxacin   IVPB      dextrose 40% Gel 15 Gram(s) Oral once  dextrose 5%. 1000 milliLiter(s) (50 mL/Hr) IV Continuous <Continuous>  dextrose 5%. 1000 milliLiter(s) (100 mL/Hr) IV Continuous <Continuous>  dextrose 50% Injectable 25 Gram(s) IV Push once  dextrose 50% Injectable 12.5 Gram(s) IV Push once  dextrose 50% Injectable 25 Gram(s) IV Push once  enalapril 20 milliGRAM(s) Oral daily  enoxaparin Injectable 40 milliGRAM(s) SubCutaneous daily  glucagon  Injectable 1 milliGRAM(s) IntraMuscular once  insulin glargine Injectable (LANTUS) 10 Unit(s) SubCutaneous at bedtime  insulin lispro (ADMELOG) corrective regimen sliding scale   SubCutaneous three times a day before meals  isosorbide   mononitrate ER Tablet (IMDUR) 30 milliGRAM(s) Oral daily  metoprolol tartrate 25 milliGRAM(s) Oral two times a day  metroNIDAZOLE  IVPB 500 milliGRAM(s) IV Intermittent every 8 hours  metroNIDAZOLE  IVPB      polyethylene glycol 3350 17 Gram(s) Oral two times a day  pregabalin 200 milliGRAM(s) Oral two times a day  senna 2 Tablet(s) Oral at bedtime  sodium chloride 0.9%. 1000 milliLiter(s) (100 mL/Hr) IV Continuous <Continuous>    MEDICATIONS  (PRN):  acetaminophen   Tablet .. 650 milliGRAM(s) Oral every 6 hours PRN Temp greater or equal to 38C (100.4F), Mild Pain (1 - 3)      DVT PROPHYLAXIS: enoxaparin Injectable 40 milliGRAM(s) SubCutaneous daily    GI PROPHYLAXIS:   ANTICOAGULATION:   ANTIBIOTICS:  ciprofloxacin   IVPB 400 milliGRAM(s)  ciprofloxacin   IVPB    metroNIDAZOLE  IVPB 500 milliGRAM(s)  metroNIDAZOLE  IVPB              LAB/STUDIES:  Labs:  CAPILLARY BLOOD GLUCOSE      POCT Blood Glucose.: 272 mg/dL (10 Sep 2021 21:35)                          9.0    7.61  )-----------( 233      ( 10 Sep 2021 12:20 )             28.1       Auto Neutrophil %: 76.2 % (09-10-21 @ 12:20)  Auto Immature Granulocyte %: 0.7 % (09-10-21 @ 12:20)  Auto Neutrophil %: 81.6 % (09-10-21 @ 08:43)  Auto Immature Granulocyte %: 0.8 % (09-10-21 @ 08:43)    09-10    136  |  105  |  20  ----------------------------<  116<H>  4.7   |  21  |  0.7      Calcium, Total Serum: 9.3 mg/dL (09-10-21 @ 12:20)      LFTs:             5.6  | 0.3  | 19       ------------------[58      ( 10 Sep 2021 12:20 )  3.1  | x    | 16          Lipase:x      Amylase:x         Blood Gas Venous - Lactate: 1.20 mmol/L (21 @ 20:57)  Lactate, Blood: 3.3 mmol/L (21 @ 09:47)      Coags:            Urinalysis Basic - ( 09 Sep 2021 13:39 )    Color: Light Yellow / Appearance: Clear / S.013 / pH: x  Gluc: x / Ketone: Negative  / Bili: Negative / Urobili: <2 mg/dL   Blood: x / Protein: Negative / Nitrite: Negative   Leuk Esterase: Negative / RBC: x / WBC x   Sq Epi: x / Non Sq Epi: x / Bacteria: x        Culture - Urine (collected 09 Sep 2021 13:39)  Source: Clean Catch Clean Catch (Midstream)  Final Report (10 Sep 2021 17:42):    <10,000 CFU/mL Normal Urogenital Chanelle              IMAGING:    < from: CT Abdomen and Pelvis w/ Oral Cont (21 @ 21:49) >    IMPRESSION:    Since prior exam at 1:52 PM:    Redemonstration of an approximate 9 x 4 cm collection with air-fluid level in the subcutaneous fat situated between 2 ventral abdominal wall hernias and superior to the ventral pelvic wall hernia. Surrounding subcutaneous fat stranding and edema with overlying skin thickening.    No evidence forbowel obstruction as oral contrast reaches the cecum.    --- End of Report ---    < end of copied text >        ACCESS/ DEVICES:  [X] Peripheral IV

## 2021-09-11 NOTE — CHART NOTE - NSCHARTNOTEFT_GEN_A_CORE
Called by RN for infiltrated IV. Replaced with 18g via US, good blood return. Called back to bedside, IV infiltrated again. Midline attempted with senior, after 1 attempt, pt refused further attempts. Explained she needed for abx, still refused.     As per ID note, IV Unasyn could be changed to PO Augmentin pending negative Bcx. Since pt refusing IV at this time, switched to PO Augmentin pending further attempts at obtaining access.

## 2021-09-11 NOTE — PROGRESS NOTE ADULT - ATTENDING COMMENTS
ACS Attending  Note Attestation    Patient is examined and evaluated at the bedside with the residents/PAs. Treatment plan discussed with the team, nurses, and consulting physicians and consulting teams. Medications, radiological studies and all other relevant studies reviewed.     NIKKY FRASER Patient is a 86y old  Female who presents with a chief complaint of Fever and weakness. was found to have large incisional hernia with multiple loops of bowel without obstruction.  Patient tolerates diet      Vital Signs Last 24 Hrs  T(C): 36 (11 Sep 2021 05:00), Max: 37.9 (10 Sep 2021 18:24)  T(F): 96.8 (11 Sep 2021 05:00), Max: 100.3 (10 Sep 2021 18:24)  HR: 82 (11 Sep 2021 05:00) (78 - 90)  BP: 144/67 (11 Sep 2021 05:00) (125/57 - 144/67)  BP(mean): --  RR: 18 (11 Sep 2021 05:00) (18 - 18)  SpO2: --                        8.7    6.02  )-----------( 243      ( 11 Sep 2021 04:30 )             27.3     09-11    132<L>  |  100  |  15  ----------------------------<  267<H>  4.4   |  20  |  0.8    Ca    8.9      11 Sep 2021 04:30  Mg     1.3     09-11    TPro  5.3<L>  /  Alb  3.1<L>  /  TBili  0.3  /  DBili  x   /  AST  31  /  ALT  26  /  AlkPhos  66  09-11      Diagnosis: Incisionalo hernia    Plan:  - no intervention at this time from surgery stand point  - continue care as per primary team	  - supportive care  - GI/DVT prophylaxis  - pain management  - incentive spirometer    - follow up consults  - repeat studies as needed  - replace electrolytes  - case management evaluation     America Ovalle MD, FACS  Trauma/ACS/Surgical Critical Care Attending
obese abdomen with extremely large ventral hernia containing all abdominal contents, area of old healed wound on right side. No obvious areas of induration, fluctuance or infection.    There was extensive discussion with daughter and patient about how if this requires surgery how this would be an extensive surgery if there bowel is involved, or if there is a fistula tract responsible for this abscess. Repeat CT A/P w/ PO contrast shows no evidence of contrast in abscess/air-fluid collection. Per discussion with radiology, abscess appear extraperitoneal.     PLAN:  - no acute surgical intervention  - would recommend admission for IV antibiotics  - abscess not able to be drained from skin without risk of injuring bowel. patient afebrile in hospital, wbc normal, no skin changes       NO changes from yesterday still afebrile. Same management

## 2021-09-11 NOTE — PROGRESS NOTE ADULT - SUBJECTIVE AND OBJECTIVE BOX
NIKKY FRASER  86y Female    CHIEF COMPLAINT:    Patient is a 86y old  Female who presents with a chief complaint of Fever and weakness (11 Sep 2021 02:53)      INTERVAL HPI/OVERNIGHT EVENTS:    Patient seen and examined. No acute events overnight. afebrile overnight, feels better     ROS: All other systems are negative.    Vital Signs:    T(F): 98.3 (09-11-21 @ 13:45), Max: 100.3 (09-10-21 @ 18:24)  HR: 84 (09-11-21 @ 13:45) (82 - 90)  BP: 144/63 (09-11-21 @ 13:45) (125/57 - 144/67)  RR: 18 (09-11-21 @ 13:45) (18 - 18)    10 Sep 2021 07:01  -  11 Sep 2021 07:00  --------------------------------------------------------  IN: 210 mL / OUT: 0 mL / NET: 210 mL    POCT Blood Glucose.: 293 mg/dL (11 Sep 2021 12:03)  POCT Blood Glucose.: 270 mg/dL (11 Sep 2021 08:33)  POCT Blood Glucose.: 272 mg/dL (10 Sep 2021 21:35)    PHYSICAL EXAM:    GENERAL:  NAD  SKIN: No rashes or lesions  HEENT: Atraumatic. Normocephalic.   NECK: Supple, No JVD.   PULMONARY: CTA B/L. No wheezing. No rales  CVS: Normal S1, S2. Rate and Rhythm are regular.   ABDOMEN/GI: Soft, Nontender, distended, no erythema; BS present  MSK:  No clubbing or cyanosis   NEUROLOGIC: moves all extremities   PSYCH: Alert & oriented x 3     Consultant(s) Notes Reviewed:  [x ] YES  [ ] NO  Care Discussed with Consultants/Other Providers [ x] YES  [ ] NO    LABS:                        8.7    6.02  )-----------( 243      ( 11 Sep 2021 04:30 )             27.3     132<L>  |  100  |  15  ----------------------------<  267<H>  4.4   |  20  |  0.8    Ca    8.9      11 Sep 2021 04:30  Mg     1.3     09-11    TPro  5.3<L>  /  Alb  3.1<L>  /  TBili  0.3  /  DBili  x   /  AST  31  /  ALT  26  /  AlkPhos  66  09-11    Culture - Urine (collected 09 Sep 2021 13:39)  Source: Clean Catch Clean Catch (Midstream)  Final Report (10 Sep 2021 17:42):    <10,000 CFU/mL Normal Urogenital Chanelle    RADIOLOGY & ADDITIONAL TESTS:  Imaging or report Personally Reviewed:  [x] YES  [ ] NO  EKG reviewed: [x] YES  [ ] NO    Medications:  Standing  allopurinol 100 milliGRAM(s) Oral daily  ampicillin/sulbactam  IVPB      ampicillin/sulbactam  IVPB 3 Gram(s) IV Intermittent once  ampicillin/sulbactam  IVPB 3 Gram(s) IV Intermittent every 6 hours  aspirin enteric coated 81 milliGRAM(s) Oral daily  dextrose 40% Gel 15 Gram(s) Oral once  dextrose 5%. 1000 milliLiter(s) IV Continuous <Continuous>  dextrose 5%. 1000 milliLiter(s) IV Continuous <Continuous>  dextrose 50% Injectable 25 Gram(s) IV Push once  dextrose 50% Injectable 12.5 Gram(s) IV Push once  dextrose 50% Injectable 25 Gram(s) IV Push once  enalapril 20 milliGRAM(s) Oral daily  enoxaparin Injectable 40 milliGRAM(s) SubCutaneous daily  glucagon  Injectable 1 milliGRAM(s) IntraMuscular once  insulin glargine Injectable (LANTUS) 10 Unit(s) SubCutaneous at bedtime  insulin lispro (ADMELOG) corrective regimen sliding scale   SubCutaneous three times a day before meals  isosorbide   mononitrate ER Tablet (IMDUR) 30 milliGRAM(s) Oral daily  metoprolol tartrate 25 milliGRAM(s) Oral two times a day  polyethylene glycol 3350 17 Gram(s) Oral two times a day  pregabalin 200 milliGRAM(s) Oral two times a day  senna 2 Tablet(s) Oral at bedtime  sodium chloride 0.9%. 1000 milliLiter(s) IV Continuous <Continuous>    PRN Meds  acetaminophen   Tablet .. 650 milliGRAM(s) Oral every 6 hours PRN

## 2021-09-11 NOTE — PROGRESS NOTE ADULT - ASSESSMENT
ASSESSMENT:  85 y/o woman with pmh including HTN, DM, CAD, OA, colorectal cancer with colon resection in the past, as well as multiple SBO that required surgery at [facility] with bowel resection who presented to ED today with daughter because patient has been having fevers at home for the past 5-6 days and found to have possible abdominal abscess.    PLAN:  - Medical management per primary team  - F/U ID recommendations  - No drainable fluid collection per IR  - Will continue to follow    Lines/Tubes: PIV    Spectra: [phone]
ASSESSMENT:  86y F w/ PMHx of hernia repair, bowel resection, coronary artery stent placement on hospital day 2 admitted for management of 9 cm abscess in proximity to large ventral hernia.    PLAN:  - CT with oral contrast does not show contrast in abscess  - ID: f/u blood cultures, d/c flagyl, start Unasyn  - F/U Urine cultures  - IVF  - NPO  - monitor for bowel function  - no acute surgical intervention at this time    Lines/Tubes: PIV    Spectra: [phone]
85 y/o F w/ PMHx of Colon Ca s/p Resection, CAD, CM, COVID infection, HTN c/o fever for past 4 days. Patient's daughter came back from her trip and visited patient at her home, had fever up to 100.4F, given tylenol as needed, patient tested negative for COVID as OP and patient's PCP prescribed Levaquin which patient took for 1 day. Her fevers persisted, so daughter brought patient in.    Abdominal wall abscess/Abdominal hernia  history of colon CA s/p resection  Sepsis  not POA  afebrile since yesterday, no leukocytosis   CTAP w/ PO Contrast redemonstration of an approximate 9 x 4 cm collection with air-fluid level in the subcutaneous fat situated between 2 ventral abdominal wall hernias  wound examined, no erythema, small skin tear, + induration  Surgery and IR following, not a candidate for surgical intervention, high risk of bowel injury  ID recommending Unasyn 3g Q6H  f/u blood cx if negative, can switch to Augmentin  daughter wishes to transfer patient to [place], however [place] [facility] denied transfer  continue with diet and monitor BM, c/w bowel regimen    HTN  - Currently controlled  - c/w Enalapril 20 mg PO qD, Toprol 25 mg PO BID    DM II  - Home Meds: Metformin, Glyburide, Januvia  - on ISS, monitor FS    CAD s/p PCI  - c/w Aspirin 81mg QD, Toprol 25 mg PO BID, Imdur 30 qD    #Progress Note Handoff  Pending (specify): negative blood cx, clinical improvement     Family discussion: Plan of care discussed with patient and daughter [place]og[place], aware and agreeable, all questions answered   Disposition:  from home     Danielle Marcelino MD  s. [phone]

## 2021-09-12 ENCOUNTER — TRANSCRIPTION ENCOUNTER (OUTPATIENT)
Age: 86
End: 2021-09-12

## 2021-09-12 VITALS
SYSTOLIC BLOOD PRESSURE: 152 MMHG | TEMPERATURE: 99 F | DIASTOLIC BLOOD PRESSURE: 65 MMHG | RESPIRATION RATE: 18 BRPM | HEART RATE: 64 BPM

## 2021-09-12 LAB
ANION GAP SERPL CALC-SCNC: 11 MMOL/L — SIGNIFICANT CHANGE UP (ref 7–14)
BASOPHILS # BLD AUTO: 0.02 K/UL — SIGNIFICANT CHANGE UP (ref 0–0.2)
BASOPHILS NFR BLD AUTO: 0.3 % — SIGNIFICANT CHANGE UP (ref 0–1)
BUN SERPL-MCNC: 13 MG/DL — SIGNIFICANT CHANGE UP (ref 10–20)
CALCIUM SERPL-MCNC: 9.1 MG/DL — SIGNIFICANT CHANGE UP (ref 8.5–10.1)
CHLORIDE SERPL-SCNC: 99 MMOL/L — SIGNIFICANT CHANGE UP (ref 98–110)
CO2 SERPL-SCNC: 21 MMOL/L — SIGNIFICANT CHANGE UP (ref 17–32)
COVID-19 SPIKE DOMAIN AB INTERP: POSITIVE
COVID-19 SPIKE DOMAIN ANTIBODY RESULT: >250 U/ML — HIGH
CREAT SERPL-MCNC: 0.7 MG/DL — SIGNIFICANT CHANGE UP (ref 0.7–1.5)
EOSINOPHIL # BLD AUTO: 0.12 K/UL — SIGNIFICANT CHANGE UP (ref 0–0.7)
EOSINOPHIL NFR BLD AUTO: 1.9 % — SIGNIFICANT CHANGE UP (ref 0–8)
GLUCOSE BLDC GLUCOMTR-MCNC: 216 MG/DL — HIGH (ref 70–99)
GLUCOSE BLDC GLUCOMTR-MCNC: 219 MG/DL — HIGH (ref 70–99)
GLUCOSE BLDC GLUCOMTR-MCNC: 318 MG/DL — HIGH (ref 70–99)
GLUCOSE SERPL-MCNC: 295 MG/DL — HIGH (ref 70–99)
HCT VFR BLD CALC: 29.1 % — LOW (ref 37–47)
HGB BLD-MCNC: 9.5 G/DL — LOW (ref 12–16)
IMM GRANULOCYTES NFR BLD AUTO: 1.7 % — HIGH (ref 0.1–0.3)
LYMPHOCYTES # BLD AUTO: 0.94 K/UL — LOW (ref 1.2–3.4)
LYMPHOCYTES # BLD AUTO: 14.9 % — LOW (ref 20.5–51.1)
MAGNESIUM SERPL-MCNC: 1.2 MG/DL — LOW (ref 1.8–2.4)
MCHC RBC-ENTMCNC: 27.8 PG — SIGNIFICANT CHANGE UP (ref 27–31)
MCHC RBC-ENTMCNC: 32.6 G/DL — SIGNIFICANT CHANGE UP (ref 32–37)
MCV RBC AUTO: 85.1 FL — SIGNIFICANT CHANGE UP (ref 81–99)
MONOCYTES # BLD AUTO: 0.47 K/UL — SIGNIFICANT CHANGE UP (ref 0.1–0.6)
MONOCYTES NFR BLD AUTO: 7.5 % — SIGNIFICANT CHANGE UP (ref 1.7–9.3)
NEUTROPHILS # BLD AUTO: 4.63 K/UL — SIGNIFICANT CHANGE UP (ref 1.4–6.5)
NEUTROPHILS NFR BLD AUTO: 73.7 % — SIGNIFICANT CHANGE UP (ref 42.2–75.2)
NRBC # BLD: 0 /100 WBCS — SIGNIFICANT CHANGE UP (ref 0–0)
PLATELET # BLD AUTO: 261 K/UL — SIGNIFICANT CHANGE UP (ref 130–400)
POTASSIUM SERPL-MCNC: 4.3 MMOL/L — SIGNIFICANT CHANGE UP (ref 3.5–5)
POTASSIUM SERPL-SCNC: 4.3 MMOL/L — SIGNIFICANT CHANGE UP (ref 3.5–5)
RBC # BLD: 3.42 M/UL — LOW (ref 4.2–5.4)
RBC # FLD: 13.2 % — SIGNIFICANT CHANGE UP (ref 11.5–14.5)
SARS-COV-2 IGG+IGM SERPL QL IA: >250 U/ML — HIGH
SARS-COV-2 IGG+IGM SERPL QL IA: POSITIVE
SODIUM SERPL-SCNC: 131 MMOL/L — LOW (ref 135–146)
WBC # BLD: 6.29 K/UL — SIGNIFICANT CHANGE UP (ref 4.8–10.8)
WBC # FLD AUTO: 6.29 K/UL — SIGNIFICANT CHANGE UP (ref 4.8–10.8)

## 2021-09-12 PROCEDURE — 99239 HOSP IP/OBS DSCHRG MGMT >30: CPT

## 2021-09-12 RX ORDER — CYCLOBENZAPRINE HYDROCHLORIDE 10 MG/1
1 TABLET, FILM COATED ORAL
Qty: 0 | Refills: 0 | DISCHARGE

## 2021-09-12 RX ORDER — MAGNESIUM SULFATE 500 MG/ML
2 VIAL (ML) INJECTION
Refills: 0 | Status: DISCONTINUED | OUTPATIENT
Start: 2021-09-12 | End: 2021-09-12

## 2021-09-12 RX ORDER — MAGNESIUM OXIDE 400 MG ORAL TABLET 241.3 MG
1 TABLET ORAL
Qty: 90 | Refills: 0
Start: 2021-09-12 | End: 2021-10-11

## 2021-09-12 RX ORDER — MAGNESIUM OXIDE 400 MG ORAL TABLET 241.3 MG
1 TABLET ORAL
Qty: 0 | Refills: 0 | DISCHARGE

## 2021-09-12 RX ORDER — MAGNESIUM OXIDE 400 MG ORAL TABLET 241.3 MG
400 TABLET ORAL ONCE
Refills: 0 | Status: COMPLETED | OUTPATIENT
Start: 2021-09-12 | End: 2021-09-12

## 2021-09-12 RX ADMIN — ISOSORBIDE MONONITRATE 30 MILLIGRAM(S): 60 TABLET, EXTENDED RELEASE ORAL at 12:52

## 2021-09-12 RX ADMIN — POLYETHYLENE GLYCOL 3350 17 GRAM(S): 17 POWDER, FOR SOLUTION ORAL at 08:36

## 2021-09-12 RX ADMIN — Medication 1 TABLET(S): at 05:57

## 2021-09-12 RX ADMIN — Medication 4: at 12:19

## 2021-09-12 RX ADMIN — Medication 200 MILLIGRAM(S): at 17:49

## 2021-09-12 RX ADMIN — ENOXAPARIN SODIUM 40 MILLIGRAM(S): 100 INJECTION SUBCUTANEOUS at 12:23

## 2021-09-12 RX ADMIN — Medication 20 MILLIGRAM(S): at 05:58

## 2021-09-12 RX ADMIN — MAGNESIUM OXIDE 400 MG ORAL TABLET 400 MILLIGRAM(S): 241.3 TABLET ORAL at 15:56

## 2021-09-12 RX ADMIN — Medication 100 MILLIGRAM(S): at 12:22

## 2021-09-12 RX ADMIN — Medication 2: at 08:35

## 2021-09-12 RX ADMIN — POLYETHYLENE GLYCOL 3350 17 GRAM(S): 17 POWDER, FOR SOLUTION ORAL at 05:58

## 2021-09-12 RX ADMIN — Medication 1 TABLET(S): at 17:54

## 2021-09-12 RX ADMIN — Medication 25 MILLIGRAM(S): at 17:50

## 2021-09-12 RX ADMIN — Medication 81 MILLIGRAM(S): at 12:23

## 2021-09-12 RX ADMIN — Medication 25 MILLIGRAM(S): at 05:57

## 2021-09-12 NOTE — DISCHARGE NOTE PROVIDER - HOSPITAL COURSE
87 y/o F w/ PMHx of Colon Ca s/p Resection, CAD, CM, COVID infection, HTN c/o fever for past 4 days. Patient's daughter came back from her trip and visited patient at her home, had fever up to 100.4F, given tylenol as needed, patient tested negative for COVID as OP and patient's PCP prescribed Levaquin which patient took for 1 day. Her fevers persisted, so daughter brought patient in. Patient was found to have a 9x4cm collection,  with air-fluid level in the subcutaneous fat situated between 2 ventral abdominal wall hernias. Patient was seen by surgery and IR and no surgical intervention was offered. Patient improved during her stay, no fever x >48 hours and now leukocytosis. She currently feels well and requests to be discharged home to complete course of antibiotics and to follow up with her hernia surgeon at [place]. Daughter informed and is in agreement with the plan.     PHYSICAL EXAM:  GENERAL: NAD, speaks in full sentences, no signs of respiratory distress  HEAD:  Atraumatic, Normocephalic  EYES: EOMI, PERRLA, conjunctiva and sclera clear  NECK: Supple, No JVD  CHEST/LUNG: Clear to auscultation bilaterally; No wheeze; No crackles; No accessory muscles used  HEART: Regular rate and rhythm; No murmurs;   ABDOMEN: Soft, Nontender, distended, nontender; Bowel sounds present; No guarding  EXTREMITIES:  2+ Peripheral Pulses, No cyanosis or edema  PSYCH: AAOx3  NEUROLOGY: non-focal  SKIN: No rashes or lesions    Time spent >35 minutes       85 y/o F w/ PMHx of Colon Ca s/p Resection, CAD, CM, COVID infection, chronic hypomagnesemia,  HTN c/o fever for past 4 days. Patient's daughter came back from her trip and visited patient at her home, had fever up to 100.4F, given tylenol as needed, patient tested negative for COVID as OP and patient's PCP prescribed Levaquin which patient took for 1 day. Her fevers persisted, so daughter brought patient in. Patient was found to have a 9x4cm collection,  with air-fluid level in the subcutaneous fat situated between 2 ventral abdominal wall hernias. Patient was seen by surgery and IR and no surgical intervention was offered. Patient improved during her stay, no fever x >48 hours and now leukocytosis. She currently feels well and requests to be discharged home to complete course of antibiotics and to follow up with her hernia surgeon at [place]. Daughter informed and is in agreement with the plan.     PHYSICAL EXAM:  GENERAL: NAD, speaks in full sentences, no signs of respiratory distress  HEAD:  Atraumatic, Normocephalic  EYES: EOMI, PERRLA, conjunctiva and sclera clear  NECK: Supple, No JVD  CHEST/LUNG: Clear to auscultation bilaterally; No wheeze; No crackles; No accessory muscles used  HEART: Regular rate and rhythm; No murmurs;   ABDOMEN: Soft, Nontender, distended, nontender; Bowel sounds present; No guarding  EXTREMITIES:  2+ Peripheral Pulses, No cyanosis or edema  PSYCH: AAOx3  NEUROLOGY: non-focal  SKIN: No rashes or lesions    Time spent >35 minutes

## 2021-09-12 NOTE — DISCHARGE NOTE PROVIDER - CARE PROVIDER_API CALL
Maxx Ahuja  Surgery  Department of Surgery, [address] [place] [address]  Phone: [phone]  Fax: [phone])-  Follow Up Time:

## 2021-09-12 NOTE — DISCHARGE NOTE NURSING/CASE MANAGEMENT/SOCIAL WORK - PATIENT PORTAL LINK FT
You can access the FollowMyHealth Patient Portal offered by [facility] by registering at the following website: [email or web address]. By joining [employer]’s FollowMyHealth portal, you will also be able to view your health information using other applications (apps) compatible with our system.

## 2021-09-12 NOTE — DISCHARGE NOTE PROVIDER - NSDCCPGOAL_GEN_ALL_CORE_FT
[facility]  [facility] ([place])  [address]  Dept: [phone]    WELL CHILD EXAM    Katherin Belcher is a 10 y.o. female here for well child exam or sports physical exam.      Current Outpatient Medications   Medication Sig Dispense Refill    amphetamine-dextroamphetamine (ADDERALL XR) 10 MG extended release capsule Take 1 capsule by mouth daily for 30 days. 30 capsule 0    albuterol (PROVENTIL;VENTOLIN) 90 MCG/ACT inhaler Inhale 2 puffs into the lungs every 6 hours as needed. No current facility-administered medications for this visit. No Known Allergies    Well Child Assessment:  History was provided by the mother. Rigoberto Bermudez lives with her mother and father. (No concern)     Nutrition  Types of intake include cereals, cow's milk, eggs, fruits, juices, meats and vegetables. Dental  The patient has a dental home. The patient brushes teeth regularly. Last dental exam was more than a year ago. Elimination  Elimination problems do not include constipation, diarrhea or urinary symptoms. There is no bed wetting. Behavioral  Behavioral issues do not include misbehaving with peers or misbehaving with siblings. Disciplinary methods include taking away privileges and consistency among caregivers. Sleep  Average sleep duration is 12 hours. The patient does not snore. There are no sleep problems. Safety  There is smoking in the home. Home has working smoke alarms? yes. Home has working carbon monoxide alarms? yes. There is a gun in home (unloaded and locked). School  Current grade level is 5th. Current school district is [employer]. There are no signs of learning disabilities. Child is doing well in school. Screening  Immunizations are up-to-date. There are no risk factors for hearing loss. There are no risk factors for anemia. There are no risk factors for dyslipidemia. There are no risk factors for tuberculosis. Social  The caregiver enjoys the child. After school, the child is at home with a parent. Sibling interactions are good. PAST MEDICAL HISTORY   Past Medical History:   Diagnosis Date    ADHD (attention deficit hyperactivity disorder)     Allergic rhinitis     Allergic to cats     Anxiety     Asthma        SURGICAL HISTORY        Procedure Laterality Date    UMBILICAL HERNIA REPAIR         FAMILY HISTORY    Family History   Problem Relation Age of Onset    High Blood Pressure Mother     High Cholesterol Maternal Grandmother     Heart Disease Maternal Grandmother     Diabetes Maternal Grandmother        CHART ELEMENTS REVIEWED    Immunizations, Growth Chart, Labs, Screening tests        No question data found. VACCINES  Immunization History   Administered Date(s) Administered    DTaP (Infanrix) 05/26/2011, 04/03/2014    DTaP/Hib/IPV (Pentacel) 01/25/2010, 03/25/2010, 05/27/2010    HIB PRP-T (ActHIB, Hiberix) 01/04/2011    Hepatitis A Ped/Adol (Havrix, Vaqta) 07/15/2020    Hepatitis A Ped/Adol (Vaqta) 01/04/2011, 05/26/2011    Hepatitis B Ped/Adol (Recombivax HB) 2009, 01/25/2010, 10/15/2010    Influenza Virus Vaccine 12/06/2011, 09/29/2014, 10/19/2018    Influenza, Dimitry Feast, IM, PF (6 mo and older Fluzone, Flulaval, Fluarix, and 3 yrs and older Afluria) 10/19/2018    MMR 03/07/2011, 04/03/2014    Pneumococcal Conjugate 13-valent Margantoni Mathis) 01/25/2010, 04/20/2010, 05/27/2010, 01/04/2011    Polio IPV (IPOL) 05/26/2011, 04/03/2014    Rotavirus Pentavalent (RotaTeq) 01/25/2010, 03/25/2010, 05/27/2010    Varicella (Varivax) 03/07/2011, 04/03/2014     History of previous adverse reactions to immunizations? no      REVIEW OF SYSTEMS   Review of Systems   Constitutional: Negative for activity change, appetite change, fatigue and fever. HENT: Negative for congestion, ear pain, mouth sores, postnasal drip, rhinorrhea and sore throat. Eyes: Negative for pain, discharge and redness.    Respiratory: Negative for snoring, cough, To get better and follow your care plan as instructed. chest tightness and shortness of breath. Cardiovascular: Negative for chest pain, palpitations and leg swelling. Gastrointestinal: Negative for abdominal pain, constipation, diarrhea and vomiting. Endocrine: Negative for cold intolerance, heat intolerance, polydipsia, polyphagia and polyuria. Genitourinary: Negative for decreased urine volume, difficulty urinating, dysuria and vaginal discharge. Musculoskeletal: Negative for gait problem, joint swelling and myalgias. Skin: Negative for pallor and rash. Allergic/Immunologic: Negative for environmental allergies. Neurological: Negative for dizziness, tremors, weakness and headaches. Hematological: Negative for adenopathy. Does not bruise/bleed easily. Psychiatric/Behavioral: Negative for sleep disturbance. No history of SOB/CP/dizziness with activity. No fainting with activity. No family history of sudden death or heart attack before age 54. MENSES  Has started having periods? NO  Age at onset of menses? N/A  Last Menstrual Period ? N/A    /74   Pulse 105   Temp 97.6 °F (36.4 °C) (Temporal)   Ht 4' 5.74\" (1.365 m)   Wt 76 lb 3.2 oz (34.6 kg)   BMI 18.55 kg/m²     PHYSICAL EXAM   Wt Readings from Last 2 Encounters:   07/15/20 76 lb 3.2 oz (34.6 kg) (44 %, Z= -0.15)*   01/14/20 68 lb 9.6 oz (31.1 kg) (35 %, Z= -0.38)*     * Growth percentiles are based on CDC (Girls, 2-20 Years) data. Physical Exam  Vitals signs and nursing note reviewed. Exam conducted with a chaperone present (mother). Constitutional:       General: She is active. She is not in acute distress. Appearance: Normal appearance. She is well-developed. HENT:      Head: Normocephalic. Jaw: There is normal jaw occlusion. Right Ear: Tympanic membrane and ear canal normal.      Left Ear: Tympanic membrane and ear canal normal.      Nose: Nose normal. No rhinorrhea. Mouth/Throat:      Lips: Pink. No lesions.       Mouth: Mucous membranes are moist. No oral lesions. Dentition: No gum lesions. Tongue: No lesions. Palate: No lesions. Pharynx: Oropharynx is clear. Uvula midline. No posterior oropharyngeal erythema. Tonsils: No tonsillar exudate. Eyes:      General:         Right eye: No discharge. Left eye: No discharge. Extraocular Movements: Extraocular movements intact. Conjunctiva/sclera: Conjunctivae normal.      Pupils: Pupils are equal, round, and reactive to light. Comments: Sclera-normal   Neck:      Musculoskeletal: Normal range of motion and neck supple. No neck rigidity. Comments: Thyroid-non palpable  Cardiovascular:      Rate and Rhythm: Normal rate and regular rhythm. Pulses: Normal pulses. Heart sounds: Normal heart sounds, S1 normal and S2 normal. No murmur. Pulmonary:      Effort: Pulmonary effort is normal. No respiratory distress, nasal flaring or retractions. Breath sounds: Normal breath sounds and air entry. No decreased air movement. Abdominal:      General: Bowel sounds are normal.      Palpations: Abdomen is soft. There is no hepatomegaly, splenomegaly or mass. Tenderness: There is no abdominal tenderness. There is no guarding or rebound. Hernia: No hernia is present. Genitourinary:     Comments: deferred  Musculoskeletal: Normal range of motion. General: No swelling, tenderness or deformity. Comments: Back- no scolosis   Lymphadenopathy:      Cervical: No cervical adenopathy. Skin:     General: Skin is warm and moist.      Capillary Refill: Capillary refill takes less than 2 seconds. Coloration: Skin is not cyanotic or jaundiced. Findings: No rash. Neurological:      General: No focal deficit present. Mental Status: She is alert and oriented for age. Cranial Nerves: No cranial nerve deficit. Motor: No weakness or abnormal muscle tone.       Coordination: Coordination normal.      Gait: Gait normal. Deep Tendon Reflexes: Reflexes normal.   Psychiatric:         Mood and Affect: Mood normal. Mood is not anxious or depressed. Affect is not angry. Speech: Speech normal. Speech is not rapid and pressured. Behavior: Behavior normal. Behavior is cooperative. Thought Content: Thought content normal.           HEALTH MAINTENANCE   Health Maintenance   Topic Date Due    Flu vaccine (1) 09/01/2020    HPV vaccine (1 - 2-dose series) 11/25/2020    DTaP/Tdap/Td vaccine (6 - Tdap) 11/25/2020    Meningococcal (ACWY) vaccine (1 - 2-dose series) 11/25/2020    Hepatitis A vaccine  Completed    Hepatitis B vaccine  Completed    Hib vaccine  Completed    Polio vaccine  Completed    Measles,Mumps,Rubella (MMR) vaccine  Completed    Varicella vaccine  Completed    Pneumococcal 0-64 years Vaccine  Completed       No results found for this visit on 07/15/20. Hearing Screening    125Hz 250Hz 500Hz 1000Hz 2000Hz 3000Hz 4000Hz 6000Hz 8000Hz   Right ear:   20 20 20  20     Left ear:   20 20 20  20          TYMPANOGRAM- passed both ears      No results found for: CHOL  Lab Results   Component Value Date    TRIG 101 08/07/2019     Lab Results   Component Value Date    HDL 42 08/07/2019     Lab Results   Component Value Date    LDLCHOLESTEROL 86 08/07/2019     Lab Results   Component Value Date    VLDL 106 08/07/2019     Lab Results   Component Value Date    CHOLHDLRATIO 3.5 08/07/2019          IMPRESSION   Diagnosis Orders   1. Encounter for routine child health examination without abnormal findings     2. Encounter for hearing screening without abnormal findings  SD TYMPANOMETRY AND REFLEX THRESHOLD MEASUREMENTS    20217 - SD PURE TONE HEARING TEST, AIR   3.  Need for hepatitis A immunization  Hep A Vaccine Ped/Adol (VAQTA)         PLAN WITH ANTICIPATORY GUIDANCE      Cholesterol screening: no    Immunization:due today   Immunizations given today: yes - Hep A   SE and Benefits of the vaccination and it's component discussed with caregiver and patient. They understand and agree. Anticipatory guidance discussed or covered in handout given to family:      [x] Nutrition/feeding- eat 5 fruits/veg daily, limit fried foods, fast food, junk food and sugary drinks, Drink water or fat free milk (20-24 ounces daily to get recommended calcium)   [x]  Participate in > 1 hour of physical activity or active play daily   [x]  Avoid directsunlight, sun protective clothing, sunscreen   [x]  Safety in the car: Seatbelt use, never enter car if  is under the influence of alcohol ordrugs, once one earns their license: never using phone/texting while driving   [x]  Bicycle helmet use   [x]  Importance of caring/supportive relationships with family and friends   [x]  Importance ofreporting bullying, stalking, abuse, and any threat to one's safety ASAP   [x]  Importance of appropriate sleep amount and sleep hygiene   [x]  Importance of responsibility with school work; impact on one's future   [x] Proper dental care. [x]  Signs of depression and anxiety; Importance of reaching out for help if one ever develops these signs   [x] Age/experience appropriate counseling concerning sexual, STD and pregnancy prevention, peer pressure, drug/alcohol/tobacco use, prevention strategy: to preventmaking decisions one will later regret          Orders:  Orders Placed This Encounter   Procedures    Hep A Vaccine Ped/Adol (VAQTA)    MS TYMPANOMETRY AND REFLEX THRESHOLD MEASUREMENTS    10113 - MS PURE TONE HEARING TEST, AIR     Medications:  No orders of the defined types were placed in this encounter. Return in about 1 year (around 7/15/2021) for for check up.     Electronically signed by Lazara Gaitan MD on 7/15/2020

## 2021-09-12 NOTE — DISCHARGE NOTE PROVIDER - NSDCCPCAREPLAN_GEN_ALL_CORE_FT
PRINCIPAL DISCHARGE DIAGNOSIS  Diagnosis: Abdominal wall abscess  Assessment and Plan of Treatment: You were found to have a 9cm abdominal abscess, you were seen by surgical and IR teams and no surgery was offered. Please complete your antibiotics as instructed and follow up with your surgeon promptly on discharge       PRINCIPAL DISCHARGE DIAGNOSIS  Diagnosis: Abdominal wall abscess  Assessment and Plan of Treatment: You were found to have a 9cm abdominal abscess, you were seen by surgical and IR teams and no surgery was offered. Please complete your antibiotics as instructed and follow up with your surgeon promptly on discharge      SECONDARY DISCHARGE DIAGNOSES  Diagnosis: Hypomagnesemia  Assessment and Plan of Treatment: please take magnesium supplements three times a day and see your doctor to repeat blood work in a few days to monitor magnesium levels. Please continue with magnesium infusions as scheduled

## 2021-09-12 NOTE — CHART NOTE - NSCHARTNOTEFT_GEN_A_CORE
plan of care discussed with daughter, Mahogany  since patient continues to improve and no surgical intervention offered at this time, she would like to take patient home to complete abx course and will follow up with their surgeon, Dr Ahuja as outpatient. plan of care discussed with daughter, Mahogany  Patient with chronic hypomagenesemia on supplements at home, currently refusing IV access attempts to replete IV. Will Rx PO and advise to take Mg supplements PO q12H at home  since patient continues to improve and no surgical intervention offered at this time, she would like to take patient home to complete abx course and will follow up with their surgeon, Dr Ahuja as outpatient. plan of care discussed with daughter, Mahogany  Patient with chronic hypomagnesemia on supplements at home as well as outpatient Mg infusions, currently refusing IV access attempts to replete IV. will give Mg PO, Advised daughter to administer Magnesium supplements q12H at home and to follow up with her PMD for Mg infusions as scheduled.   since patient continues to improve and no surgical intervention offered at this time, daughter would like to take patient home to complete abx course and will follow up with their surgeon, Dr Ahuja as outpatient. plan of care discussed with daughter, Mahogany  Patient with chronic hypomagnesemia on supplements at home as well as outpatient Mg infusions, currently refusing IV access attempts to replete IV. will give Mg PO, Advised daughter to administer Magnesium supplements q8H at home and to follow up with her PMD for Mg infusions as scheduled and to repeat levels in a few days.   since patient continues to improve and no surgical intervention offered at this time, daughter would like to take patient home to complete abx course and will follow up with their surgeon, Dr Ahuja as outpatient.

## 2021-09-12 NOTE — DISCHARGE NOTE PROVIDER - NSDCMRMEDTOKEN_GEN_ALL_CORE_FT
acetaminophen 325 mg oral tablet: 2 tab(s) orally every 6 hours, As needed, Temp greater or equal to 38C (100.4F), Mild Pain (1 - 3)  allopurinol 100 mg oral tablet: 1 tab(s) orally once a day  amoxicillin-clavulanate 875 mg-125 mg oral tablet: 1 tab(s) orally every 12 hours  bisacodyl 10 mg rectal suppository: 1 suppository(ies) rectal once a day, As needed, Constipation  Colace 100 mg oral capsule: 1 cap(s) orally 2 times a day  cyclobenzaprine 5 mg oral tablet: 1 tab(s) orally 3 times a day, As needed, Muscle Spasm  enalapril 20 mg oral tablet: 1 tab(s) orally once a day  Glucophage 850 mg oral tablet: 1 tab(s) orally 3 times a day  glyBURIDE 5 mg oral tablet: 1 tab(s) orally once a day  isosorbide mononitrate 30 mg oral tablet, extended release: 1 tab(s) orally once a day (in the morning)  Januvia 100 mg oral tablet: 1 tab(s) orally once a day  lidocaine 4% topical film: 1 patch topically once a day  Lipitor 40 mg oral tablet: 1 tab(s) orally once a day  Lyrica 200 mg oral capsule: 1 tab(s) orally 2 times a day  magnesium oxide 400 mg oral tablet: 1 tab(s) orally once a day  metoprolol succinate 25 mg oral tablet, extended release: 1 tab(s) orally 2 times a day  nystatin 100,000 units/g topical cream: 1 application topically 2 times a day  polyethylene glycol 3350 oral powder for reconstitution: 17 gram(s) orally 2 times a day  senna oral tablet: 2 tab(s) orally once a day (at bedtime)  senna oral tablet: 2 tab(s) orally once a day (at bedtime)  Sodium Chloride 1 g oral tablet: 1 tab(s) orally 2 times a day   Vascepa: 2 gram(s) orally 2 times a day   acetaminophen 325 mg oral tablet: 2 tab(s) orally every 6 hours, As needed, Temp greater or equal to 38C (100.4F), Mild Pain (1 - 3)  allopurinol 100 mg oral tablet: 1 tab(s) orally once a day  amoxicillin-clavulanate 875 mg-125 mg oral tablet: 1 tab(s) orally every 12 hours  bisacodyl 10 mg rectal suppository: 1 suppository(ies) rectal once a day, As needed, Constipation  Colace 100 mg oral capsule: 1 cap(s) orally 2 times a day  cyclobenzaprine 5 mg oral tablet: 1 tab(s) orally 3 times a day, As needed, Muscle Spasm  enalapril 20 mg oral tablet: 1 tab(s) orally once a day  Glucophage 850 mg oral tablet: 1 tab(s) orally 3 times a day  glyBURIDE 5 mg oral tablet: 1 tab(s) orally once a day  isosorbide mononitrate 30 mg oral tablet, extended release: 1 tab(s) orally once a day (in the morning)  Januvia 100 mg oral tablet: 1 tab(s) orally once a day  lidocaine 4% topical film: 1 patch topically once a day  Lipitor 40 mg oral tablet: 1 tab(s) orally once a day  Lyrica 200 mg oral capsule: 1 tab(s) orally 2 times a day  magnesium oxide 400 mg oral tablet: 1 tab(s) orally 2 times a day  metoprolol succinate 25 mg oral tablet, extended release: 1 tab(s) orally 2 times a day  nystatin 100,000 units/g topical cream: 1 application topically 2 times a day  polyethylene glycol 3350 oral powder for reconstitution: 17 gram(s) orally 2 times a day  senna oral tablet: 2 tab(s) orally once a day (at bedtime)  senna oral tablet: 2 tab(s) orally once a day (at bedtime)  Sodium Chloride 1 g oral tablet: 1 tab(s) orally 2 times a day   Vascepa: 2 gram(s) orally 2 times a day   acetaminophen 325 mg oral tablet: 2 tab(s) orally every 6 hours, As needed, Temp greater or equal to 38C (100.4F), Mild Pain (1 - 3)  allopurinol 100 mg oral tablet: 1 tab(s) orally once a day  amoxicillin-clavulanate 875 mg-125 mg oral tablet: 1 tab(s) orally every 12 hours  bisacodyl 10 mg rectal suppository: 1 suppository(ies) rectal once a day, As needed, Constipation  Colace 100 mg oral capsule: 1 cap(s) orally 2 times a day  cyclobenzaprine 5 mg oral tablet: 1 tab(s) orally 3 times a day, As needed, Muscle Spasm  enalapril 20 mg oral tablet: 1 tab(s) orally once a day  Glucophage 850 mg oral tablet: 1 tab(s) orally 3 times a day  glyBURIDE 5 mg oral tablet: 1 tab(s) orally once a day  isosorbide mononitrate 30 mg oral tablet, extended release: 1 tab(s) orally once a day (in the morning)  Januvia 100 mg oral tablet: 1 tab(s) orally once a day  lidocaine 4% topical film: 1 patch topically once a day  Lipitor 40 mg oral tablet: 1 tab(s) orally once a day  Lyrica 200 mg oral capsule: 1 tab(s) orally 2 times a day  magnesium oxide 400 mg oral tablet: 1 tab(s) orally 3 times a day  metoprolol succinate 25 mg oral tablet, extended release: 1 tab(s) orally 2 times a day  nystatin 100,000 units/g topical cream: 1 application topically 2 times a day  polyethylene glycol 3350 oral powder for reconstitution: 17 gram(s) orally 2 times a day  senna oral tablet: 2 tab(s) orally once a day (at bedtime)  senna oral tablet: 2 tab(s) orally once a day (at bedtime)  Sodium Chloride 1 g oral tablet: 1 tab(s) orally 2 times a day   Vascepa: 2 gram(s) orally 2 times a day

## 2021-09-17 LAB
CULTURE RESULTS: SIGNIFICANT CHANGE UP
SPECIMEN SOURCE: SIGNIFICANT CHANGE UP

## 2021-09-20 DIAGNOSIS — K43.2 INCISIONAL HERNIA WITHOUT OBSTRUCTION OR GANGRENE: ICD-10-CM

## 2021-09-20 DIAGNOSIS — Z53.09 PROCEDURE AND TREATMENT NOT CARRIED OUT BECAUSE OF OTHER CONTRAINDICATION: ICD-10-CM

## 2021-09-20 DIAGNOSIS — I10 ESSENTIAL (PRIMARY) HYPERTENSION: ICD-10-CM

## 2021-09-20 DIAGNOSIS — R50.9 FEVER, UNSPECIFIED: ICD-10-CM

## 2021-09-20 DIAGNOSIS — Z86.16 PERSONAL HISTORY OF COVID-19: ICD-10-CM

## 2021-09-20 DIAGNOSIS — Z79.84 LONG TERM (CURRENT) USE OF ORAL HYPOGLYCEMIC DRUGS: ICD-10-CM

## 2021-09-20 DIAGNOSIS — Z85.038 PERSONAL HISTORY OF OTHER MALIGNANT NEOPLASM OF LARGE INTESTINE: ICD-10-CM

## 2021-09-20 DIAGNOSIS — L02.211 CUTANEOUS ABSCESS OF ABDOMINAL WALL: ICD-10-CM

## 2021-09-20 DIAGNOSIS — E83.42 HYPOMAGNESEMIA: ICD-10-CM

## 2021-09-20 DIAGNOSIS — I25.10 ATHEROSCLEROTIC HEART DISEASE OF NATIVE CORONARY ARTERY WITHOUT ANGINA PECTORIS: ICD-10-CM

## 2021-09-20 DIAGNOSIS — Z95.5 PRESENCE OF CORONARY ANGIOPLASTY IMPLANT AND GRAFT: ICD-10-CM

## 2021-09-20 DIAGNOSIS — E11.9 TYPE 2 DIABETES MELLITUS WITHOUT COMPLICATIONS: ICD-10-CM

## 2021-09-20 DIAGNOSIS — M19.90 UNSPECIFIED OSTEOARTHRITIS, UNSPECIFIED SITE: ICD-10-CM

## 2021-10-05 ENCOUNTER — APPOINTMENT (OUTPATIENT)
Dept: WOUND CARE | Facility: CLINIC | Age: 86
End: 2021-10-05

## 2021-10-22 ENCOUNTER — APPOINTMENT (OUTPATIENT)
Dept: CARDIOLOGY | Facility: CLINIC | Age: 86
End: 2021-10-22

## 2021-12-07 NOTE — DISCHARGE NOTE ADULT - NSCORESITESY/N_GEN_A_CORE_RD
No Scc Spindle Histology Text: There are irregular masses of epidermal cells in the upper dermis.  Within the tumor masses, there are varying proportions of normal squamous cells and anaplastic squamous cells.  The anaplastic cells have variation in size and shape with hyperplasia and hyperchromasia of the nuclei, absence of intracellular bridges and varying degrees of keratinization.  A spindle cell pattern is noted.

## 2022-03-16 ENCOUNTER — LABORATORY RESULT (OUTPATIENT)
Age: 87
End: 2022-03-16

## 2022-03-16 ENCOUNTER — APPOINTMENT (OUTPATIENT)
Dept: CARDIOLOGY | Facility: CLINIC | Age: 87
End: 2022-03-16
Payer: MEDICARE

## 2022-03-16 VITALS
HEART RATE: 68 BPM | OXYGEN SATURATION: 98 % | WEIGHT: 185 LBS | SYSTOLIC BLOOD PRESSURE: 135 MMHG | BODY MASS INDEX: 32.78 KG/M2 | RESPIRATION RATE: 18 BRPM | DIASTOLIC BLOOD PRESSURE: 75 MMHG | TEMPERATURE: 96.6 F | HEIGHT: 63 IN

## 2022-03-16 PROCEDURE — 99214 OFFICE O/P EST MOD 30 MIN: CPT | Mod: 25

## 2022-03-16 PROCEDURE — 93000 ELECTROCARDIOGRAM COMPLETE: CPT

## 2022-03-16 PROCEDURE — 93306 TTE W/DOPPLER COMPLETE: CPT

## 2022-03-16 NOTE — REASON FOR VISIT
[Symptom and Test Evaluation] : symptom and test evaluation [Cardiac Failure] : cardiac failure [Arrhythmia/ECG Abnorrmalities] : arrhythmia/ECG abnormalities [Coronary Artery Disease] : coronary artery disease [Family Member] : family member

## 2022-03-16 NOTE — HISTORY OF PRESENT ILLNESS
[FreeTextEntry1] : 88 y/o female with history of HTN, DL, DM type 2, obesity, hernia, diastolic CHF, presenting for f/u. No anginal pains, but has palpitation, fluttering sensation on the left chest, which was associated with non-exertional pain. + dyspnea, + leg holden - worse since the last visit.  No syncope.  No chest pain. She reports compliance with medical therapy. Off Lasix.  Reports her FS are controlled. + fatigue. Walks with assistance.

## 2022-03-16 NOTE — PHYSICAL EXAM
[Normal Appearance] : normal appearance [Well Groomed] : well groomed [No Deformities] : no deformities [General Appearance - In No Acute Distress] : no acute distress [Normal Conjunctiva] : the conjunctiva exhibited no abnormalities [Eyelids - No Xanthelasma] : the eyelids demonstrated no xanthelasmas [Heart Rate And Rhythm] : heart rate and rhythm were normal [Heart Sounds] : normal S1 and S2 [Murmurs] : no murmurs present [Respiration, Rhythm And Depth] : normal respiratory rhythm and effort [Exaggerated Use Of Accessory Muscles For Inspiration] : no accessory muscle use [Auscultation Breath Sounds / Voice Sounds] : lungs were clear to auscultation bilaterally [Bowel Sounds] : normal bowel sounds [Abdomen Soft] : soft [Abdomen Tenderness] : non-tender [Abnormal Walk] : normal gait [FreeTextEntry1] : walks with a cane [Nail Clubbing] : no clubbing of the fingernails [Cyanosis, Localized] : no localized cyanosis [Skin Color & Pigmentation] : normal skin color and pigmentation [] : no rash [Oriented To Time, Place, And Person] : oriented to person, place, and time [Affect] : the affect was normal

## 2022-03-16 NOTE — ASSESSMENT
[FreeTextEntry1] : Diastolic CHF\par HTN\par DL\par DM type 2\par Abdominal wound.\par Palpitations\par Chest pain\par \par Plan:\par \par Repeat 2D echo, given worsening edema.\par Negative event monitor\par C/w BP control.\par C/w BB\par Diuretics as needed - will give 3 days of Lasix, then change to c/w TIW\par DM control. F/u with endocrine. Labs as scheduled.\par C/w statin.\par Wound care, f/u with surgery\par Weight loss.\par Labs - check TSH, lytes, BUN/Cr, CBC\par \par \par F/u in 3 months.

## 2022-03-16 NOTE — REVIEW OF SYSTEMS
[Feeling Fatigued] : feeling fatigued [Abdominal Pain] : abdominal pain [Negative] : Heme/Lymph [FreeTextEntry5] : see HPI [FreeTextEntry9] : walks with a walker [de-identified] : see HPI [de-identified] : shingles pain

## 2022-06-09 NOTE — ED ADULT TRIAGE NOTE - HEIGHT IN CM
Pt here for lac to rt index finger apprx 10 mins ago states he cut on a piece of metal under his lawnmower seat. [phone]

## 2022-06-27 NOTE — PATIENT PROFILE ADULT. - FUNCTIONAL SCREEN CURRENT LEVEL: SWALLOWING (IF SCORE 2 OR MORE FOR ANY ITEM, CONSULT REHAB SERVICES), MLM)
Requested medication(s) are due for refill today: Yes  Patient has already received a courtesy refill: No  Other reason request has been forwarded to provider: failed protocol
(0) swallows foods/liquids without difficulty

## 2022-10-24 NOTE — ED ADULT NURSE NOTE - CHIEF COMPLAINT
Called patient with  services. Patient agreeable to  prescription for antibiotic. She will have her son pick it up tonight.      ----- Message from Wilman Husain MD sent at 10/24/2022  6:44 AM CDT -----  Please begin ciprofloxacin 500 mg PO BID x 7 days, thank you  ----- Message -----  From: Cassy Valadez RN  Sent: 10/21/2022   6:26 PM CDT  To: Wilman Husain MD MD visit       The patient is a 82y Female complaining of

## 2023-04-18 ENCOUNTER — RX RENEWAL (OUTPATIENT)
Age: 88
End: 2023-04-18

## 2023-04-18 RX ORDER — NIFEDIPINE 30 MG/1
30 TABLET, EXTENDED RELEASE ORAL
Qty: 30 | Refills: 5 | Status: ACTIVE | COMMUNITY
Start: 2021-02-02 | End: 1900-01-01

## 2023-05-26 ENCOUNTER — APPOINTMENT (OUTPATIENT)
Dept: CARDIOLOGY | Facility: CLINIC | Age: 88
End: 2023-05-26
Payer: MEDICARE

## 2023-05-26 VITALS
WEIGHT: 187 LBS | HEIGHT: 63 IN | DIASTOLIC BLOOD PRESSURE: 80 MMHG | SYSTOLIC BLOOD PRESSURE: 124 MMHG | BODY MASS INDEX: 33.13 KG/M2 | TEMPERATURE: 97.1 F

## 2023-05-26 DIAGNOSIS — E78.5 HYPERLIPIDEMIA, UNSPECIFIED: ICD-10-CM

## 2023-05-26 DIAGNOSIS — I10 ESSENTIAL (PRIMARY) HYPERTENSION: ICD-10-CM

## 2023-05-26 DIAGNOSIS — R07.9 CHEST PAIN, UNSPECIFIED: ICD-10-CM

## 2023-05-26 DIAGNOSIS — E11.9 TYPE 2 DIABETES MELLITUS W/OUT COMPLICATIONS: ICD-10-CM

## 2023-05-26 PROCEDURE — 93000 ELECTROCARDIOGRAM COMPLETE: CPT

## 2023-05-26 PROCEDURE — 99214 OFFICE O/P EST MOD 30 MIN: CPT

## 2023-05-26 NOTE — REVIEW OF SYSTEMS
[Feeling Fatigued] : feeling fatigued [Abdominal Pain] : abdominal pain [Negative] : Heme/Lymph [FreeTextEntry5] : see HPI [FreeTextEntry9] : walks with a walker [de-identified] : see HPI [de-identified] : shingles pain

## 2023-05-26 NOTE — ASSESSMENT
[FreeTextEntry1] : Diastolic CHF\par HTN\par DL\par DM type 2\par Abdominal wound.\par Palpitations\par Chest pain\par \par Plan:\par \par last 2D echo reviewed with the patient.\par Obtian event monitor\par C/w BP control. If elevated BP - increase Nifedipine. If edema with nifedipine - change to Clonidine patch\par C/w BB - increase to BID\par Diuretics as needed \par DM control. F/u with endocrine for TSH evaluation, need for methimazole. Labs as scheduled.\par C/w statin.\par Wound care, f/u with surgery\par Weight loss.\par Labs - check TSH, lytes, BUN/Cr, CBC\par \par \par F/u in 3 months.

## 2023-05-26 NOTE — HISTORY OF PRESENT ILLNESS
[FreeTextEntry1] : 89 y/o female with history of HTN, DL, DM type 2, obesity, hernia, diastolic CHF, presenting for f/u. No anginal pains, but has palpitation, fluttering sensation on the left chest, which was associated with non-exertional pain. + dyspnea, + leg holden - worse since the last visit.  No syncope.  No chest pain. She reports compliance with medical therapy. Off Lasix.  Reports her FS are controlled. + fatigue. Walks with assistance. Episodes of elevated BP. Was found to have low TSH - has appointment with endocrine pending.

## 2023-06-21 DIAGNOSIS — I50.32 CHRONIC DIASTOLIC (CONGESTIVE) HEART FAILURE: ICD-10-CM

## 2023-06-21 DIAGNOSIS — R00.2 PALPITATIONS: ICD-10-CM

## 2023-11-13 NOTE — PROVIDER CONTACT NOTE (MEDICATION) - BACKGROUND
Pt. s/p left hernia repair 3 weeks ago. Pt. admitted for SBO due to ventral hernia.
Pt. s/p left hernia repair. Pt. admitted for SBO and right ventral hernia/
Vaginal Delivery

## 2023-11-24 NOTE — ED ADULT NURSE NOTE - NS ED NURSE DISCH DISPOSITION
Physical Therapy Visit    Visit Type: Daily Treatment Note  Visit: 15  Referring Provider: Mal Degroot MD  Medical Diagnosis (from order): M17.11 - Primary osteoarthritis of right knee     SUBJECTIVE                                                                                                               Patient states that she was able to reciprocally descend the stairs however, it was fairly uncomfortable. Patient states that she can go up the stairs more comfortably but still requires the use of the handrail.       OBJECTIVE                                                                                                                                       Treatment     Therapeutic Exercise  - Rec bike - Seat 9, 8 minutes, and level 5.0 for knee range of motion & subjective taken during this time.  -Gastroc stretch 45s x2  - knee flexion chair x10, right  - Hamstring stretch on stair  - leg press 3x10 75#, right  - quad loading from high chair x15            Manual Therapy   Tibial AP/PA mobs, right  Tempering su, hamstring and gastroc in prone    Skilled input: verbal instruction/cues, tactile instruction/cues and demonstration    Writer verbally educated and received verbal consent for hand placement, positioning of patient, and techniques to be performed today from patient for hand placement and palpation for techniques as described above and how they are pertinent to the patient's plan of care.  Home Exercise Program  Access Code: [email or web address]  URL: [email or web address]  Date: 10/02/2023  Prepared by: Lauren Kayser    Exercises  - Supine Heel Slide  - 3-4 x daily - 7 x weekly - 1 sets - 10 reps - 5 hold  - Supine Short Arc Quad  - 3-4 x daily - 7 x weekly - 1 sets - 10 reps - 3-5 hold  - Seated Long Arc Quad  - 3-4 x daily - 7 x weekly - 1 sets - 10 reps - 5 hold  - Sitting Heel Slide with Towel  - 3 x daily - 7 x weekly - 10 reps - 5-10 seconds hold  - Seated Knee Extension  Stretch with Chair  - 3 x daily - 7 x weekly - up to 4 minutes hold  - Knee bending  - 2-3 x daily - 7 x weekly - 10 reps - 2-3 second hold hold  - Standing Terminal Knee Extension at Wall with Ball  - 1 x daily - 7 x weekly - 3 sets - 10 reps  - Side Stepping with Counter Support  - 1 x daily - 7 x weekly        ASSESSMENT                                                                                                            Progressed right knee mobility and quad control this visit to improve walking tolerance and functional mobility tolerance. Patient will benefit from continued PT intervention with a focus on right knee mobility and stability in order to progress stair navigation tolerance. Right knee flexion, PROM: 115 degrees.   Education:   - Results of above outlined education: Verbalizes understanding    PLAN                                                                                                                           Suggestions for next session as indicated: Progress per plan of care       Therapy procedure time and total treatment time can be found documented on the Time Entry flowsheet     Admitted

## 2023-11-28 NOTE — PROGRESS NOTE ADULT - OPHTHALMOLOGIC
CHIEF COMPLAINT:    John Salcido is a 61 y.o. male who presents today for Kidney Stones.     HISTORY OF PRESENTING ILLINESS:    John Salcido is a 61 y.o. male new to our Urology Clinic. This is a new patient to for me. I personally reviewed their recent medical records as well as their outside medical, surgical, family, & social history.   Does not have a family history of Prostate Cancer, Bladder Cancer, or Kidney Cancer.   His local PCP checks his PSA/Prostate.     He is here today due to kidney stones noted on a Ct of Ab during a GI workup for Abdominal Pain.     10/17/2023 CTA:   - Bilateral nephrolithiasis measuring up to 8 mm upper pole right and 7 mm midpole left kidney.    - No hydronephrosis.     He has a history of kidney stones, but he did not know of these two.   Has had ESWL; laser litho in the past 09/13/2018 with Dr. Pena.   His stones have been Ca Stones    Ok with urination.   Reports some issues with ED. Issues with maintaining.   Had tried and failed Cialis & Viagra; he was unable to tolerate due to headaches.  Last night was the first time he experienced blood in his ejaculation.   It was painless.  Has never seen blood in urine urine.         REVIEW OF SYSTEMS:  Review of Systems   Constitutional: Negative.  Negative for chills and fever.   Eyes:  Negative for double vision.   Respiratory:  Negative for cough and shortness of breath.    Cardiovascular:  Negative for chest pain.   Gastrointestinal:  Negative for abdominal pain, constipation, diarrhea, nausea and vomiting.   Genitourinary: Negative.  Negative for dysuria, flank pain and hematuria.        Ok with urination     Neurological:  Negative for dizziness and seizures.   Endo/Heme/Allergies:  Negative for polydipsia.         PATIENT HISTORY:    Past Medical History:   Diagnosis Date    High cholesterol     Hypertension     Kidney stones        Past Surgical History:   Procedure Laterality Date    CYSTOSCOPY W/ RETROGRADES Bilateral  9/13/2018    Procedure: CYSTOSCOPY WITH RETROGRADE PYELOGRAM;  Surgeon: Francisco Pena MD;  Location: [facility] OR;  Service: Urology;  Laterality: Bilateral;    LASER LITHOTRIPSY Left 9/13/2018    Procedure: LITHOTRIPSY-LASER;  Surgeon: Francisco Pena MD;  Location: [facility] OR;  Service: Urology;  Laterality: Left;    URETERAL STENT PLACEMENT Left 9/13/2018    Procedure: INSERTION, STENT, URETER;  Surgeon: Francisco Pena MD;  Location: [facility] OR;  Service: Urology;  Laterality: Left;    URETEROSCOPIC REMOVAL OF URETERIC CALCULUS Left 9/13/2018    Procedure: EXTRACTION-STONE-URETEROSCOPY WITH BASKET;  Surgeon: Francisco Pena MD;  Location: [facility] OR;  Service: Urology;  Laterality: Left;    URETEROSCOPY Left 9/13/2018    Procedure: URETEROSCOPY;  Surgeon: Francisco Pena MD;  Location: [facility] OR;  Service: Urology;  Laterality: Left;       Family History   Problem Relation Age of Onset    Cirrhosis Father        Social History     Socioeconomic History    Marital status:    Tobacco Use    Smoking status: Never    Smokeless tobacco: Never   Substance and Sexual Activity    Alcohol use: Yes    Drug use: Never     Social Determinants of Health     Financial Resource Strain: High Risk (11/25/2023)    Overall Financial Resource Strain (CARDIA)     Difficulty of Paying Living Expenses: Hard   Food Insecurity: No Food Insecurity (11/25/2023)    Hunger Vital Sign     Worried About Running Out of Food in the Last Year: Never true     Ran Out of Food in the Last Year: Never true   Transportation Needs: No Transportation Needs (11/25/2023)    PRAPARE - Transportation     Lack of Transportation (Medical): No     Lack of Transportation (Non-Medical): No   Physical Activity: Sufficiently Active (11/25/2023)    Exercise Vital Sign     Days of Exercise per Week: 5 days     Minutes of Exercise per Session: 90 min   Stress: Stress Concern Present (11/25/2023)    [place] [employer] - Occupational Stress  Questionnaire     Feeling of Stress : To some extent   Social Connections: Unknown (11/25/2023)    Social Connection and Isolation Panel [NHANES]     Frequency of Communication with Friends and Family: Once a week     Frequency of Social Gatherings with Friends and Family: Once a week     Active Member of Clubs or Organizations: No     Attends Club or Organization Meetings: Never     Marital Status:    Housing Stability: Low Risk  (11/25/2023)    Housing Stability Vital Sign     Unable to Pay for Housing in the Last Year: No     Number of Places Lived in the Last Year: 1     Unstable Housing in the Last Year: No       Allergies:  Patient has no known allergies.    Medications:    Current Outpatient Medications:     allopurinol (ZYLOPRIM) 300 MG tablet, Take 300 mg by mouth once daily., Disp: , Rfl:     dexlansoprazole (DEXILANT) 60 mg capsule, Take 1 capsule by mouth every morning., Disp: , Rfl:     metoprolol succinate (TOPROL-XL) 50 MG 24 hr tablet, Take 50 mg by mouth 2 (two) times daily., Disp: , Rfl:     simvastatin (ZOCOR) 20 MG tablet, Take 20 mg by mouth once daily., Disp: , Rfl:     PHYSICAL EXAMINATION:  Physical Exam  Vitals and nursing note reviewed.   Constitutional:       General: He is awake.      Appearance: Normal appearance.   HENT:      Head: Normocephalic.      Right Ear: External ear normal.      Left Ear: External ear normal.      Nose: Nose normal.   Cardiovascular:      Rate and Rhythm: Normal rate.   Pulmonary:      Effort: Pulmonary effort is normal. No respiratory distress.   Abdominal:      Tenderness: There is no abdominal tenderness. There is no right CVA tenderness or left CVA tenderness.   Musculoskeletal:         General: Normal range of motion.      Cervical back: Normal range of motion.   Skin:     General: Skin is warm and dry.   Neurological:      General: No focal deficit present.      Mental Status: He is alert and oriented to person, place, and time.   Psychiatric:     "     Mood and Affect: Mood normal.         Behavior: Behavior is cooperative.           LABS:      In office UA today was clear of active infection and blood.      No results found for: "PSA", "PSADIAG", "PSATOTAL", "PHIND"    Lab Results   Component Value Date    CREATININE 0.78 (L) 10/24/2023    EGFRNORACEVR >60 10/24/2023             IMPRESSION:    Encounter Diagnoses   Name Primary?    Bilateral kidney stones Yes    Kidney stones     ED (erectile dysfunction) of organic origin     Hematospermia          Assessment:       1. Bilateral kidney stones    2. Kidney stones    3. ED (erectile dysfunction) of organic origin    4. Hematospermia        Plan:         I spent 45 minutes with the patient of which more than half was spent in direct consultation with the patient in regards to our treatment and plan.  We addressed the office findings and recent labs.   Education and recommendations of today's plan of care including home remedies and needed follow up with PCP.   We discussed the chief complaint; reviewed the LUTS and the possible contributory factors.  Reassurance with hematospermia; nothing to be concerned with; may never happen again.   Reassurance no infection and blood in his urine.   We discussed his ED and the contributory factors.  Follow up with PCP for underlying medical conditions causing ED.   We discussed the physiological as well as his psychological aspects that contribute to ED.  Reviewed the 1st, 2nd, & 3rd line therapies for ED.  The benefits, expectations risks, se of the different therapies.  He was informed that for the WHITNEY we have rep that comes to clinic for him discuss.  If interested in ICI therapy, medication comes from a compound pharmacy and the 1st dose has to be done under medical supervision.  This is done due to high risk for priapism.  Educational materials given.  Ok for now with stones  Reviewed management  He is wanting to get the stones removed.   Recommended lifestyle " modifications with a proper, healthy diet, good hydration 2 liters daily but during the day. Reducing bladder irritants.   Benefits of regular exercise.  Serum stone panel.   Consult Dr. Castillo for stone management.            details…

## 2024-04-05 NOTE — CONSULT NOTE ADULT - SUBJECTIVE AND OBJECTIVE BOX
Patient is a 85y old  Female who presents with a chief complaint of chest pain (2021 10:51)      HPI:    Pt is a 86 yo F with PMH CAD and stents, chronic abdominal wound BIBEMS from home reporting 2 days of intermittent chest pain starting in left shoulder radiating to left chest admitted for management and treatment  Currently pain free    PAST MEDICAL & SURGICAL HISTORY:  Hyperuricemia    Colorectal cancer  Remote hx; s/p colon resection    Primary osteoarthritis of both knees    CAD (coronary artery disease)    Diabetes mellitus    HTN (hypertension)    History of coronary artery stent placement    History of bowel resection    H/O hernia repair        Review of Systems:   CONSTITUTIONAL: No fever, weight loss, or fatigue  EYES: No eye pain, visual disturbances, or discharge  ENMT:  No difficulty hearing, tinnitus, vertigo; No sinus or throat pain  NECK: No pain or stiffness  RESPIRATORY: No cough, wheezing, chills or hemoptysis; No shortness of breath  CARDIOVASCULAR: + chest pain, palpitations, dizziness, leg swelling or sob  GASTROINTESTINAL: No abdominal pain. No nausea, vomiting, diarrhea or constipation  GENITOURINARY: No dysuria, frequency, hematuria, or incontinence  NEUROLOGICAL: No headaches, memory loss, loss of strength, numbness, or tremors  SKIN: No itching, burning, rashes, or lesions   LYMPH NODES: No enlarged glands  ENDOCRINE: No heat or cold intolerance; No hair loss  MSK: left rib pain    Allergies    No Known Allergies    Intolerances        Social History: non smoker  no IVDA  no ETOH abuse   lives with family     FAMILY HISTORY:  No pertinent family history in first degree relatives        MEDICATIONS  (STANDING):  allopurinol 100 milliGRAM(s) Oral daily  aspirin enteric coated 81 milliGRAM(s) Oral daily  atorvastatin 40 milliGRAM(s) Oral at bedtime  dextrose 40% Gel 15 Gram(s) Oral once  dextrose 5%. 1000 milliLiter(s) (50 mL/Hr) IV Continuous <Continuous>  dextrose 5%. 1000 milliLiter(s) (100 mL/Hr) IV Continuous <Continuous>  dextrose 50% Injectable 25 Gram(s) IV Push once  dextrose 50% Injectable 12.5 Gram(s) IV Push once  dextrose 50% Injectable 25 Gram(s) IV Push once  enalapril 20 milliGRAM(s) Oral daily  enoxaparin Injectable 40 milliGRAM(s) SubCutaneous daily  glucagon  Injectable 1 milliGRAM(s) IntraMuscular once  insulin lispro (ADMELOG) corrective regimen sliding scale   SubCutaneous three times a day before meals  insulin lispro (ADMELOG) corrective regimen sliding scale   SubCutaneous at bedtime  isosorbide   mononitrate ER Tablet (IMDUR) 30 milliGRAM(s) Oral daily  lidocaine   Patch 1 Patch Transdermal daily  metoprolol succinate ER 25 milliGRAM(s) Oral daily  pantoprazole    Tablet 40 milliGRAM(s) Oral before breakfast  senna 2 Tablet(s) Oral at bedtime    MEDICATIONS  (PRN):  acetaminophen   Tablet .. 650 milliGRAM(s) Oral every 6 hours PRN Temp greater or equal to 38C (100.4F), Mild Pain (1 - 3)  cyclobenzaprine 5 milliGRAM(s) Oral three times a day PRN Muscle Spasm  morphine  - Injectable 2 milliGRAM(s) IV Push every 6 hours PRN moderate to severe pain  ondansetron Injectable 4 milliGRAM(s) IV Push every 6 hours PRN Nausea      CAPILLARY BLOOD GLUCOSE      POCT Blood Glucose.: 150 mg/dL (2021 08:29)  POCT Blood Glucose.: 108 mg/dL (10 Hermilo 2021 21:04)  POCT Blood Glucose.: 118 mg/dL (10 Hermilo 2021 18:04)  POCT Blood Glucose.: 299 mg/dL (10 Hermilo 2021 12:29)    I&O's Summary      24hrs Vital:  T(C): 36.7 (21 @ 11:27), Max: 36.8 (01-10-21 @ 13:37)  HR: 70 (21 @ 11:27) (66 - 73)  BP: 180/81 (21 @ 11:27) (129/60 - 180/81)  RR: 18 (21 @ 11:27) (18 - 18)  SpO2: 97% (21 @ 11:27) (97% - 100%)    PHYSICAL EXAM:  GENERAL: NAD,  HEAD:  Atraumatic, Normocephalic  EYES: EOMI, PERRLA, conjunctiva and sclera clear  NECK: Supple, No JVD  CHEST/LUNG: Clear to auscultation bilaterally; No wheeze  HEART: S1S2; No rubs, or gallops, no murmurs  ABDOMEN: Soft, Nontender; RLQ abdominal nondraining wound  EXTREMITIES:  + Peripheral Pulses, No clubbing or cyanosis, no edema  PSYCH: AO x 3,   NEUROLOGY: Alert, no focal motor or sensory deficits  SKIN: No rashes or lesions    LABS:                        11.1   5.52  )-----------( 207      ( 2021 07:08 )             34.1     11    136  |  97<L>  |  15  ----------------------------<  148<H>  4.3   |  26  |  0.72    Ca    10.7<H>      2021 07:08  Phos  3.1       Mg     1.4         TPro  6.9  /  Alb  4.0  /  TBili  0.4  /  DBili  x   /  AST  15  /  ALT  16  /  AlkPhos  71      PT/INR - ( 2021 23:23 )   PT: 13.2 sec;   INR: 1.17 ratio         PTT - ( 2021 23:23 )  PTT:34.2 sec      Urinalysis Basic - ( 10 Hermilo 2021 03:48 )    Color: Light Yellow / Appearance: Clear / S.017 / pH: x  Gluc: x / Ketone: Negative  / Bili: Negative / Urobili: <2 mg/dL   Blood: x / Protein: Negative / Nitrite: Negative   Leuk Esterase: Negative / RBC: x / WBC x   Sq Epi: x / Non Sq Epi: x / Bacteria: x        RADIOLOGY & ADDITIONAL TESTS:    Consultant(s) Notes Reviewed:      Care Discussed with Consultants/Other Providers:   without difficulty

## 2024-04-24 NOTE — ED ADULT NURSE NOTE - TEMPLATE
General ,DirectAddress_Unknown,[email or web address] ,[email or web address],DirectAddress_Unknown

## 2024-06-21 NOTE — PROGRESS NOTE ADULT - I WAS PHYSICALLY PRESENT FOR THE KEY PORTIONS OF THE EVALUATION AND MANAGEMENT (E/M) SERVICE PROVIDED.  I AGREE WITH THE ABOVE HISTORY, PHYSICAL, AND PLAN WHICH I HAVE REVIEWED AND EDITED WHERE APPROPRIATE
Statement Selected
encouraged while awake.     Longstanding persistent atrial fibrillation (HCC) - Rate controlled, continue Coreg and Eliquis    Chronic combined systolic (congestive) and diastolic (congestive) heart failure (HCC) - A 2D Echocardiogram on 08/27/2021 shows an EF of 45-50% and grade II diastolic dysfunction. Continue home Lasix. Monitor strict I&Os and daily weights.     Diabetes mellitus II - SSI and carb control diet    Essential (primary) hypertension - continue home meds and monitor blood pressure    Hyperlipidemia - No current evidence of Rhabdomyolysis or other adverse effects. Continue statin therapy while in the hospital    Non-morbid obesity due to excess calories (Body mass index is 32.47 kg/m².) - Complicating assessment and treatment. Placing patient at risk for multiple co-morbidities as well as early death and contributing to the patient's presentation.          Code Status: Full Code  Diet: Diet NPO  DVT Prophylaxis: Eliquis    (Advanced care planning has been discussed with patient and/or responsible family member and is reflected in the code status. Further orders associated with this have been entered if appropriate)    Disposition: Anticipate that patient will remain in the hospital for 2 or more midnights depending on further evaluation and clinical course         Due to the high probability of clinically significant life threating deterioration of the patient's condition that required my urgent intervention, a total critical care time 55 minutes was used. This includes but not limited to examining patient, collaborating with other physicians, monitoring vital signs, telemetry, continuous pulse oximety, and clinical response to IV medications; documentation time, review and interpretation of laboratory and radiological data, review of nursing notes and old record review. This time excludes any time that may have been spent performing procedures for life threatening organ failure.       Reji JAIN

## 2024-08-14 ENCOUNTER — APPOINTMENT (OUTPATIENT)
Dept: CARDIOLOGY | Facility: CLINIC | Age: 89
End: 2024-08-14
Payer: MEDICARE

## 2024-08-14 DIAGNOSIS — I10 ESSENTIAL (PRIMARY) HYPERTENSION: ICD-10-CM

## 2024-08-14 DIAGNOSIS — E78.5 HYPERLIPIDEMIA, UNSPECIFIED: ICD-10-CM

## 2024-08-14 DIAGNOSIS — R00.2 PALPITATIONS: ICD-10-CM

## 2024-08-14 DIAGNOSIS — E11.9 TYPE 2 DIABETES MELLITUS W/OUT COMPLICATIONS: ICD-10-CM

## 2024-08-14 DIAGNOSIS — I50.32 CHRONIC DIASTOLIC (CONGESTIVE) HEART FAILURE: ICD-10-CM

## 2024-08-14 PROCEDURE — 93000 ELECTROCARDIOGRAM COMPLETE: CPT

## 2024-08-14 PROCEDURE — G2211 COMPLEX E/M VISIT ADD ON: CPT

## 2024-08-14 PROCEDURE — 93306 TTE W/DOPPLER COMPLETE: CPT

## 2024-08-14 PROCEDURE — 99214 OFFICE O/P EST MOD 30 MIN: CPT

## 2024-08-14 NOTE — ASSESSMENT
[FreeTextEntry1] : Diastolic CHF HTN DL DM type 2 Abdominal wound - non-healing. Palpitations Chest pain  Plan:  last 2D echo reviewed with the patient. negative event monitor C/w BP control. If elevated BP - increase Nifedipine. If edema with nifedipine - change to Clonidine patch C/w BB - increase to BID Diuretics as needed  DM control. F/u with endocrine for TSH evaluation, need for methimazole. Labs as scheduled. C/w statin. Wound care, f/u with surgery Weight loss. Labs - check TSH, lytes, BUN/Cr, CBC   F/u in 3 months.

## 2024-08-14 NOTE — REVIEW OF SYSTEMS
[Feeling Fatigued] : feeling fatigued [Abdominal Pain] : abdominal pain [Negative] : Heme/Lymph [FreeTextEntry5] : see HPI [FreeTextEntry9] : walks with a walker [de-identified] : see HPI [de-identified] : shingles pain

## 2024-08-14 NOTE — HISTORY OF PRESENT ILLNESS
[FreeTextEntry1] : 89 y/o female with history of HTN, DL, DM type 2, obesity, hernia, diastolic CHF, presenting for f/u. No anginal pains, but has palpitation, fluttering sensation on the left chest, which was associated with non-exertional pain. + dyspnea, + leg edema - worse since the last visit.  No syncope.  No chest pain. She reports compliance with medical therapy. Off Lasix.  Reports her FS are controlled. + fatigue. Walks with assistance. Episodes of elevated BP. Was found to have low TSH - has appointment with endocrine pending.

## 2024-09-26 NOTE — H&P ADULT - NSICDXNOFAMILYHX_GEN_ALL_CORE
Pt recd awake/upright in bed, A&A Ox1 via nonverbal yes/no, minimally verbal/responsive, appears withdrawn, reduced cognition, breakfast tray present, Pt noted with R buccal pocketing of regular solid consistency, removal by SLP prior to assessment. Pt tolerating O2 via NC NAD.
<-- Click to add NO pertinent Family History

## 2024-12-26 ENCOUNTER — OUTPATIENT (OUTPATIENT)
Dept: OUTPATIENT SERVICES | Facility: HOSPITAL | Age: 89
LOS: 1 days | End: 2024-12-26
Payer: MEDICARE

## 2024-12-26 DIAGNOSIS — Z95.5 PRESENCE OF CORONARY ANGIOPLASTY IMPLANT AND GRAFT: Chronic | ICD-10-CM

## 2024-12-26 DIAGNOSIS — Z92.89 PERSONAL HISTORY OF OTHER MEDICAL TREATMENT: Chronic | ICD-10-CM

## 2024-12-26 DIAGNOSIS — Z98.890 OTHER SPECIFIED POSTPROCEDURAL STATES: Chronic | ICD-10-CM

## 2024-12-26 DIAGNOSIS — E11.21 TYPE 2 DIABETES MELLITUS WITH DIABETIC NEPHROPATHY: ICD-10-CM

## 2024-12-26 PROCEDURE — 76705 ECHO EXAM OF ABDOMEN: CPT | Mod: XU

## 2024-12-26 PROCEDURE — 76770 US EXAM ABDO BACK WALL COMP: CPT | Mod: XU

## 2024-12-26 PROCEDURE — 76705 ECHO EXAM OF ABDOMEN: CPT | Mod: 26

## 2024-12-26 PROCEDURE — 93975 VASCULAR STUDY: CPT | Mod: 26

## 2024-12-26 PROCEDURE — 93976 VASCULAR STUDY: CPT

## 2024-12-26 PROCEDURE — 76770 US EXAM ABDO BACK WALL COMP: CPT | Mod: 26

## 2024-12-27 DIAGNOSIS — E11.21 TYPE 2 DIABETES MELLITUS WITH DIABETIC NEPHROPATHY: ICD-10-CM

## 2025-02-24 NOTE — ED PROVIDER NOTE - MUSCULOSKELETAL, MLM
Please review blood sugars Spine appears normal, range of motion is not limited, no muscle or joint tenderness

## 2025-02-24 NOTE — ED PROVIDER NOTE - WET READ LAUNCH FT
Followed protocol There are no Wet Read(s) to document. There are 2 Wet Read(s) to document. There is 1 Wet Read(s) to document.

## 2025-03-19 NOTE — PHYSICAL THERAPY INITIAL EVALUATION ADULT - PATIENT/FAMILY AGREES WITH PLAN
-- Labs prior to follow up   -- A1c at goal <7.5%.  -- Medications discussed:  MFM   GLP1-DPP4   OSORIO   SGLT2   Reviewed potential adverse effects of SGLT-2 inhibitors, including genital mycotic infections, slightly increased risk of UTI, hypersensitivity, hypotension, and hyperkalemia. Advised to maintain water intake of 8-10 cups per day. Advised we need to check chemistry panel at baseline and 2 weeks after starting. Discussed FDA warning reports of ketoacidosis associated with SGLT-2 inhibitors. Advised to seek immediate medical attention and stop the medication if symptoms such as difficulty breathing, nausea, vomiting, abdominal pain, confusion, and unusual fatigue/sleepiness. Discussed possible precipitating factors including major illness/reduced food and fluid intake (advised to stop under these circumstances), and reduced insulin dose. Discussed possible effects of increased fracture risk/decreased bone density. Discussed reports of increased risk of leg and foot amputations with canagliflozin and need to seek urgent care if developed new pain or tenderness, sores or ulcers, or infections in legs/feet.   Insulin   -- Reviewed logs/CGM:  Patient not currently on Dexcom x2 days since he broke his phone.  Instructions provided on connecting back to clinic once he gets a replacement phone.  Once connected instructed to reach out to clinic for Dexcom review.    Per [facility]  Fastins - 190s  Pre prandial 130s  Post prandial:  160s 2 hours after eating  Before bed: unknown  -- Current Regimen:  Continue:  Metformin 1000 mg daily  Continue:  Mounjaro 12.5 mg weekly   Increase Toujeo 48 units daily   Continue Fiasp 10 units plus correction scale before meals only  Add correction scale as needed.  Blood sugar 150 to 200 add 1 units  Blood sugar 201 to 250 add 2 units  Blood sugar 251 to 300 add 3 units  Blood sugar 301 to 350 add 4 units  Blood sugar greater than 350 add 5 units    -- Reviewed goals of therapy are  to get the best control we can without hypoglycemia.  -- Reviewed patient's current insulin regimen. Clarified proper insulin dose and timing in relation to meals, etc. Insulin injection sites and proper rotation instructed.    -- Advised frequent self blood glucose monitoring.  Patient encouraged to document glucose results and bring them to every clinic visit.  -- Hypoglycemia precautions discussed.  -- Close adherence to lifestyle changes recommended.   -- Periodic follow ups for eye evaluations, foot care and dental care suggested.   yes

## 2025-04-10 NOTE — PHYSICAL THERAPY INITIAL EVALUATION ADULT - PHYSICAL ASSIST/NONPHYSICAL ASSIST: SUPINE/SIT, REHAB EVAL
Briana returns to the office for reevaluation of the left foot.  The patient relates following the instructions given at the last visit with noted overall continued pain and minimal improvement in function of the left foot.   The patient relates no other problems.      Lower Extremity Physical Exam:      Neurovascular status remains unchanged.  Muscular exam is within normal limits to major muscle groups.  Integument is intact.      Noted pain on palpation of the posterior tibial tendon on the left.         Assessment:     ICD-10-CM    1. Posterior tibial tendonitis, left  M76.822           Plan:    I have explained to Briana about the progress of the conditions.  At this time, the patient was referred to physical therapy for fascial tooling of the posterior tibial tendon on the left.  The patient will return in 4 to 6 weeks for reevaluation.    Briana verbalized agreement with and understanding of the rational for the diagnosis and treatment plan.  All questions were answered to best of my ability and the patient's satisfaction. The patient was advised to contact the clinic with any questions that may arise after the clinic visit.      Disclaimer: This note consists of symbols derived from keyboarding, dictation and/or voice recognition software. As a result, there may be errors in the script that have gone undetected. Please consider this when interpreting information found in this chart.       LUCAS Goldstein D.P.M., F.A.C.F.A.S.     verbal cues/1 person assist

## 2025-06-16 ENCOUNTER — APPOINTMENT (OUTPATIENT)
Dept: SURGERY | Facility: CLINIC | Age: 89
End: 2025-06-16
Payer: MEDICARE

## 2025-06-16 ENCOUNTER — OUTPATIENT (OUTPATIENT)
Dept: OUTPATIENT SERVICES | Facility: HOSPITAL | Age: 89
LOS: 1 days | End: 2025-06-16
Payer: MEDICARE

## 2025-06-16 VITALS
HEIGHT: 63 IN | SYSTOLIC BLOOD PRESSURE: 149 MMHG | BODY MASS INDEX: 31.18 KG/M2 | WEIGHT: 176 LBS | OXYGEN SATURATION: 98 % | RESPIRATION RATE: 18 BRPM | DIASTOLIC BLOOD PRESSURE: 77 MMHG | HEART RATE: 62 BPM | TEMPERATURE: 97.2 F

## 2025-06-16 DIAGNOSIS — Z00.00 ENCOUNTER FOR GENERAL ADULT MEDICAL EXAMINATION WITHOUT ABNORMAL FINDINGS: ICD-10-CM

## 2025-06-16 PROCEDURE — 99203 OFFICE O/P NEW LOW 30 MIN: CPT

## 2025-06-16 PROCEDURE — G0463: CPT

## 2025-06-16 RX ORDER — CLOBETASOL PROPIONATE CREAM USP, 0.05% 0.5 MG/G
0.05 CREAM TOPICAL TWICE DAILY
Qty: 1 | Refills: 2 | Status: ACTIVE | COMMUNITY
Start: 2025-06-16 | End: 1900-01-01

## 2025-06-20 DIAGNOSIS — I50.32 CHRONIC DIASTOLIC (CONGESTIVE) HEART FAILURE: ICD-10-CM

## 2025-06-20 DIAGNOSIS — L98.492 NON-PRESSURE CHRONIC ULCER OF SKIN OF OTHER SITES WITH FAT LAYER EXPOSED: ICD-10-CM

## 2025-06-20 DIAGNOSIS — S31.109A UNSPECIFIED OPEN WOUND OF ABDOMINAL WALL, UNSPECIFIED QUADRANT WITHOUT PENETRATION INTO PERITONEAL CAVITY, INITIAL ENCOUNTER: ICD-10-CM

## 2025-07-09 ENCOUNTER — OUTPATIENT (OUTPATIENT)
Dept: OUTPATIENT SERVICES | Facility: HOSPITAL | Age: 89
LOS: 1 days | End: 2025-07-09
Payer: MEDICARE

## 2025-07-09 ENCOUNTER — APPOINTMENT (OUTPATIENT)
Dept: SURGERY | Facility: CLINIC | Age: 89
End: 2025-07-09

## 2025-07-09 VITALS
HEART RATE: 82 BPM | OXYGEN SATURATION: 97 % | BODY MASS INDEX: 31.18 KG/M2 | HEIGHT: 63 IN | RESPIRATION RATE: 18 BRPM | SYSTOLIC BLOOD PRESSURE: 117 MMHG | DIASTOLIC BLOOD PRESSURE: 66 MMHG | TEMPERATURE: 97 F | WEIGHT: 176 LBS

## 2025-07-09 DIAGNOSIS — Z00.00 ENCOUNTER FOR GENERAL ADULT MEDICAL EXAMINATION WITHOUT ABNORMAL FINDINGS: ICD-10-CM

## 2025-07-09 PROCEDURE — 99213 OFFICE O/P EST LOW 20 MIN: CPT

## 2025-07-09 PROCEDURE — G0463: CPT

## 2025-07-18 DIAGNOSIS — L98.491 NON-PRESSURE CHRONIC ULCER OF SKIN OF OTHER SITES LIMITED TO BREAKDOWN OF SKIN: ICD-10-CM

## 2025-07-23 ENCOUNTER — APPOINTMENT (OUTPATIENT)
Dept: PODIATRY | Facility: CLINIC | Age: 89
End: 2025-07-23
Payer: MEDICARE

## 2025-07-23 ENCOUNTER — OUTPATIENT (OUTPATIENT)
Dept: OUTPATIENT SERVICES | Facility: HOSPITAL | Age: 89
LOS: 1 days | End: 2025-07-23
Payer: MEDICARE

## 2025-07-23 VITALS
DIASTOLIC BLOOD PRESSURE: 76 MMHG | SYSTOLIC BLOOD PRESSURE: 120 MMHG | WEIGHT: 176 LBS | HEART RATE: 92 BPM | RESPIRATION RATE: 18 BRPM | HEIGHT: 63 IN | TEMPERATURE: 97.6 F | BODY MASS INDEX: 31.18 KG/M2 | OXYGEN SATURATION: 96 %

## 2025-07-23 VITALS
HEIGHT: 63 IN | WEIGHT: 176 LBS | TEMPERATURE: 97.2 F | BODY MASS INDEX: 31.18 KG/M2 | HEART RATE: 80 BPM | RESPIRATION RATE: 18 BRPM | OXYGEN SATURATION: 99 % | DIASTOLIC BLOOD PRESSURE: 72 MMHG | SYSTOLIC BLOOD PRESSURE: 107 MMHG

## 2025-07-23 DIAGNOSIS — Z00.00 ENCOUNTER FOR GENERAL ADULT MEDICAL EXAMINATION WITHOUT ABNORMAL FINDINGS: ICD-10-CM

## 2025-07-23 DIAGNOSIS — L03.119 CELLULITIS OF UNSPECIFIED PART OF LIMB: ICD-10-CM

## 2025-07-23 DIAGNOSIS — L03.012 CELLULITIS OF LEFT FINGER: ICD-10-CM

## 2025-07-23 PROCEDURE — 99203 OFFICE O/P NEW LOW 30 MIN: CPT

## 2025-07-23 PROCEDURE — G0463: CPT

## 2025-07-23 RX ORDER — CEPHALEXIN 250 MG/1
250 TABLET ORAL
Qty: 14 | Refills: 0 | Status: ACTIVE | COMMUNITY
Start: 2025-07-23 | End: 1900-01-01

## 2025-07-29 ENCOUNTER — APPOINTMENT (OUTPATIENT)
Dept: PODIATRY | Facility: CLINIC | Age: 89
End: 2025-07-29
Payer: MEDICARE

## 2025-07-29 ENCOUNTER — OUTPATIENT (OUTPATIENT)
Dept: OUTPATIENT SERVICES | Facility: HOSPITAL | Age: 89
LOS: 1 days | End: 2025-07-29
Payer: MEDICARE

## 2025-07-29 VITALS
TEMPERATURE: 97.3 F | SYSTOLIC BLOOD PRESSURE: 108 MMHG | HEIGHT: 63 IN | WEIGHT: 176 LBS | DIASTOLIC BLOOD PRESSURE: 69 MMHG | HEART RATE: 65 BPM | BODY MASS INDEX: 31.18 KG/M2 | OXYGEN SATURATION: 97 %

## 2025-07-29 DIAGNOSIS — Z92.89 PERSONAL HISTORY OF OTHER MEDICAL TREATMENT: Chronic | ICD-10-CM

## 2025-07-29 DIAGNOSIS — Z00.00 ENCOUNTER FOR GENERAL ADULT MEDICAL EXAMINATION WITHOUT ABNORMAL FINDINGS: ICD-10-CM

## 2025-07-29 DIAGNOSIS — Z98.890 OTHER SPECIFIED POSTPROCEDURAL STATES: Chronic | ICD-10-CM

## 2025-07-29 PROCEDURE — 99213 OFFICE O/P EST LOW 20 MIN: CPT

## 2025-07-29 PROCEDURE — G0463: CPT

## 2025-07-30 ENCOUNTER — APPOINTMENT (OUTPATIENT)
Dept: SURGERY | Facility: CLINIC | Age: 89
End: 2025-07-30
Payer: MEDICARE

## 2025-07-30 ENCOUNTER — OUTPATIENT (OUTPATIENT)
Dept: OUTPATIENT SERVICES | Facility: HOSPITAL | Age: 89
LOS: 1 days | End: 2025-07-30
Payer: MEDICARE

## 2025-07-30 VITALS
BODY MASS INDEX: 31.18 KG/M2 | OXYGEN SATURATION: 97 % | TEMPERATURE: 97.3 F | HEART RATE: 70 BPM | HEIGHT: 63 IN | DIASTOLIC BLOOD PRESSURE: 66 MMHG | WEIGHT: 176 LBS | SYSTOLIC BLOOD PRESSURE: 112 MMHG

## 2025-07-30 DIAGNOSIS — L98.492 NON-PRESSURE CHRONIC ULCER OF SKIN OF OTHER SITES WITH FAT LAYER EXPOSED: ICD-10-CM

## 2025-07-30 DIAGNOSIS — I89.0 LYMPHEDEMA, NOT ELSEWHERE CLASSIFIED: ICD-10-CM

## 2025-07-30 DIAGNOSIS — I87.2 VENOUS INSUFFICIENCY (CHRONIC) (PERIPHERAL): ICD-10-CM

## 2025-07-30 DIAGNOSIS — Z95.5 PRESENCE OF CORONARY ANGIOPLASTY IMPLANT AND GRAFT: Chronic | ICD-10-CM

## 2025-07-30 DIAGNOSIS — I50.32 CHRONIC DIASTOLIC (CONGESTIVE) HEART FAILURE: ICD-10-CM

## 2025-07-30 DIAGNOSIS — Z00.00 ENCOUNTER FOR GENERAL ADULT MEDICAL EXAMINATION WITHOUT ABNORMAL FINDINGS: ICD-10-CM

## 2025-07-30 DIAGNOSIS — Z92.89 PERSONAL HISTORY OF OTHER MEDICAL TREATMENT: Chronic | ICD-10-CM

## 2025-07-30 DIAGNOSIS — Z98.890 OTHER SPECIFIED POSTPROCEDURAL STATES: Chronic | ICD-10-CM

## 2025-07-30 PROCEDURE — G0463: CPT

## 2025-07-30 PROCEDURE — 99213 OFFICE O/P EST LOW 20 MIN: CPT

## 2025-08-04 DIAGNOSIS — I50.32 CHRONIC DIASTOLIC (CONGESTIVE) HEART FAILURE: ICD-10-CM

## 2025-08-04 DIAGNOSIS — L98.492 NON-PRESSURE CHRONIC ULCER OF SKIN OF OTHER SITES WITH FAT LAYER EXPOSED: ICD-10-CM

## 2025-08-04 DIAGNOSIS — L98.491 NON-PRESSURE CHRONIC ULCER OF SKIN OF OTHER SITES LIMITED TO BREAKDOWN OF SKIN: ICD-10-CM

## 2025-08-07 ENCOUNTER — APPOINTMENT (OUTPATIENT)
Age: 89
End: 2025-08-07
Payer: MEDICARE

## 2025-08-07 VITALS
WEIGHT: 179 LBS | HEIGHT: 62 IN | SYSTOLIC BLOOD PRESSURE: 133 MMHG | HEART RATE: 72 BPM | DIASTOLIC BLOOD PRESSURE: 81 MMHG | OXYGEN SATURATION: 99 % | BODY MASS INDEX: 32.94 KG/M2

## 2025-08-07 DIAGNOSIS — G89.29 PAIN IN LEFT ANKLE AND JOINTS OF LEFT FOOT: ICD-10-CM

## 2025-08-07 DIAGNOSIS — M25.572 PAIN IN LEFT ANKLE AND JOINTS OF LEFT FOOT: ICD-10-CM

## 2025-08-07 PROCEDURE — 99213 OFFICE O/P EST LOW 20 MIN: CPT

## 2025-08-12 ENCOUNTER — RX RENEWAL (OUTPATIENT)
Age: 89
End: 2025-08-12

## 2025-08-13 ENCOUNTER — EMERGENCY (EMERGENCY)
Facility: HOSPITAL | Age: 89
LOS: 1 days | End: 2025-08-13
Attending: EMERGENCY MEDICINE
Payer: MEDICARE

## 2025-08-13 VITALS — HEART RATE: 64 BPM | SYSTOLIC BLOOD PRESSURE: 107 MMHG | DIASTOLIC BLOOD PRESSURE: 68 MMHG

## 2025-08-13 VITALS
WEIGHT: 176.59 LBS | SYSTOLIC BLOOD PRESSURE: 118 MMHG | HEIGHT: 63 IN | TEMPERATURE: 98 F | DIASTOLIC BLOOD PRESSURE: 81 MMHG | RESPIRATION RATE: 18 BRPM | HEART RATE: 72 BPM | OXYGEN SATURATION: 95 %

## 2025-08-13 LAB
ALBUMIN SERPL ELPH-MCNC: 3.9 G/DL — SIGNIFICANT CHANGE UP (ref 3.5–5)
ALP SERPL-CCNC: 60 U/L — SIGNIFICANT CHANGE UP (ref 40–120)
ALT FLD-CCNC: 30 U/L DA — SIGNIFICANT CHANGE UP (ref 10–60)
ANION GAP SERPL CALC-SCNC: 4 MMOL/L — LOW (ref 5–17)
AST SERPL-CCNC: 21 U/L — SIGNIFICANT CHANGE UP (ref 10–40)
BASOPHILS # BLD AUTO: 0.02 K/UL — SIGNIFICANT CHANGE UP (ref 0–0.2)
BASOPHILS NFR BLD AUTO: 0.2 % — SIGNIFICANT CHANGE UP (ref 0–2)
BILIRUB SERPL-MCNC: 0.2 MG/DL — SIGNIFICANT CHANGE UP (ref 0.2–1.2)
BUN SERPL-MCNC: 17 MG/DL — SIGNIFICANT CHANGE UP (ref 7–18)
CALCIUM SERPL-MCNC: 9 MG/DL — SIGNIFICANT CHANGE UP (ref 8.4–10.5)
CHLORIDE SERPL-SCNC: 112 MMOL/L — HIGH (ref 96–108)
CO2 SERPL-SCNC: 25 MMOL/L — SIGNIFICANT CHANGE UP (ref 22–31)
CREAT SERPL-MCNC: 0.9 MG/DL — SIGNIFICANT CHANGE UP (ref 0.5–1.3)
EGFR: 68 ML/MIN/1.73M2 — SIGNIFICANT CHANGE UP
EGFR: 68 ML/MIN/1.73M2 — SIGNIFICANT CHANGE UP
EOSINOPHIL # BLD AUTO: 0.1 K/UL — SIGNIFICANT CHANGE UP (ref 0–0.5)
EOSINOPHIL NFR BLD AUTO: 0.8 % — SIGNIFICANT CHANGE UP (ref 0–6)
GLUCOSE SERPL-MCNC: 127 MG/DL — HIGH (ref 70–99)
HCT VFR BLD CALC: 36.7 % — SIGNIFICANT CHANGE UP (ref 34.5–45)
HCV AB S/CO SERPL IA: 0.42 S/CO — SIGNIFICANT CHANGE UP (ref 0–0.79)
HCV AB SERPL-IMP: SIGNIFICANT CHANGE UP
HGB BLD-MCNC: 11.7 G/DL — SIGNIFICANT CHANGE UP (ref 11.5–15.5)
HIV 1 & 2 AB SERPL IA.RAPID: SIGNIFICANT CHANGE UP
IMM GRANULOCYTES NFR BLD AUTO: 0.5 % — SIGNIFICANT CHANGE UP (ref 0–0.9)
LIDOCAIN IGE QN: 25 U/L — SIGNIFICANT CHANGE UP (ref 13–75)
LYMPHOCYTES # BLD AUTO: 1.31 K/UL — SIGNIFICANT CHANGE UP (ref 1–3.3)
LYMPHOCYTES # BLD AUTO: 10.8 % — LOW (ref 13–44)
MCHC RBC-ENTMCNC: 25.9 PG — LOW (ref 27–34)
MCHC RBC-ENTMCNC: 31.9 G/DL — LOW (ref 32–36)
MCV RBC AUTO: 81.4 FL — SIGNIFICANT CHANGE UP (ref 80–100)
MONOCYTES # BLD AUTO: 0.93 K/UL — HIGH (ref 0–0.9)
MONOCYTES NFR BLD AUTO: 7.7 % — SIGNIFICANT CHANGE UP (ref 2–14)
NEUTROPHILS # BLD AUTO: 9.73 K/UL — HIGH (ref 1.8–7.4)
NEUTROPHILS NFR BLD AUTO: 80 % — HIGH (ref 43–77)
NRBC BLD AUTO-RTO: 0 /100 WBCS — SIGNIFICANT CHANGE UP (ref 0–0)
PLATELET # BLD AUTO: 230 K/UL — SIGNIFICANT CHANGE UP (ref 150–400)
POTASSIUM SERPL-MCNC: 4.3 MMOL/L — SIGNIFICANT CHANGE UP (ref 3.5–5.3)
POTASSIUM SERPL-SCNC: 4.3 MMOL/L — SIGNIFICANT CHANGE UP (ref 3.5–5.3)
PROT SERPL-MCNC: 6.7 G/DL — SIGNIFICANT CHANGE UP (ref 6–8.3)
RBC # BLD: 4.51 M/UL — SIGNIFICANT CHANGE UP (ref 3.8–5.2)
RBC # FLD: 15.5 % — HIGH (ref 10.3–14.5)
SODIUM SERPL-SCNC: 141 MMOL/L — SIGNIFICANT CHANGE UP (ref 135–145)
WBC # BLD: 12.15 K/UL — HIGH (ref 3.8–10.5)
WBC # FLD AUTO: 12.15 K/UL — HIGH (ref 3.8–10.5)

## 2025-08-13 PROCEDURE — 36415 COLL VENOUS BLD VENIPUNCTURE: CPT

## 2025-08-13 PROCEDURE — 74176 CT ABD & PELVIS W/O CONTRAST: CPT

## 2025-08-13 PROCEDURE — 96374 THER/PROPH/DIAG INJ IV PUSH: CPT

## 2025-08-13 PROCEDURE — 99284 EMERGENCY DEPT VISIT MOD MDM: CPT

## 2025-08-13 PROCEDURE — 93005 ELECTROCARDIOGRAM TRACING: CPT

## 2025-08-13 PROCEDURE — 96375 TX/PRO/DX INJ NEW DRUG ADDON: CPT

## 2025-08-13 PROCEDURE — 74176 CT ABD & PELVIS W/O CONTRAST: CPT | Mod: 26

## 2025-08-13 PROCEDURE — 99285 EMERGENCY DEPT VISIT HI MDM: CPT | Mod: 25

## 2025-08-13 PROCEDURE — 83690 ASSAY OF LIPASE: CPT

## 2025-08-13 PROCEDURE — 96376 TX/PRO/DX INJ SAME DRUG ADON: CPT

## 2025-08-13 PROCEDURE — 85025 COMPLETE CBC W/AUTO DIFF WBC: CPT

## 2025-08-13 PROCEDURE — 86703 HIV-1/HIV-2 1 RESULT ANTBDY: CPT

## 2025-08-13 PROCEDURE — 86803 HEPATITIS C AB TEST: CPT

## 2025-08-13 PROCEDURE — 80053 COMPREHEN METABOLIC PANEL: CPT

## 2025-08-13 RX ORDER — METOCLOPRAMIDE HCL 10 MG
10 TABLET ORAL ONCE
Refills: 0 | Status: COMPLETED | OUTPATIENT
Start: 2025-08-13 | End: 2025-08-13

## 2025-08-13 RX ORDER — AZITHROMYCIN 250 MG
500 CAPSULE ORAL ONCE
Refills: 0 | Status: COMPLETED | OUTPATIENT
Start: 2025-08-13 | End: 2025-08-13

## 2025-08-13 RX ORDER — AZITHROMYCIN 250 MG
1 CAPSULE ORAL
Qty: 3 | Refills: 0
Start: 2025-08-13 | End: 2025-08-15

## 2025-08-13 RX ADMIN — Medication 1000 MILLILITER(S): at 05:05

## 2025-08-13 RX ADMIN — Medication 4 MILLIGRAM(S): at 05:04

## 2025-08-13 RX ADMIN — Medication 10 MILLIGRAM(S): at 08:25

## 2025-08-13 RX ADMIN — Medication 500 MILLIGRAM(S): at 08:25

## 2025-08-13 RX ADMIN — Medication 10 MILLIGRAM(S): at 05:05

## 2025-08-22 ENCOUNTER — NON-APPOINTMENT (OUTPATIENT)
Age: 89
End: 2025-08-22

## 2025-08-22 ENCOUNTER — APPOINTMENT (OUTPATIENT)
Dept: VASCULAR SURGERY | Facility: CLINIC | Age: 89
End: 2025-08-22
Payer: MEDICARE

## 2025-08-22 VITALS
HEIGHT: 63 IN | SYSTOLIC BLOOD PRESSURE: 114 MMHG | DIASTOLIC BLOOD PRESSURE: 58 MMHG | HEART RATE: 73 BPM | OXYGEN SATURATION: 97 %

## 2025-08-22 DIAGNOSIS — Z56.0 UNEMPLOYMENT, UNSPECIFIED: ICD-10-CM

## 2025-08-22 DIAGNOSIS — Z63.4 DISAPPEARANCE AND DEATH OF FAMILY MEMBER: ICD-10-CM

## 2025-08-22 DIAGNOSIS — Z86.39 PERSONAL HISTORY OF OTHER ENDOCRINE, NUTRITIONAL AND METABOLIC DISEASE: ICD-10-CM

## 2025-08-22 PROCEDURE — 99204 OFFICE O/P NEW MOD 45 MIN: CPT

## 2025-08-22 RX ORDER — ASPIRIN 81 MG
81 TABLET, DELAYED RELEASE (ENTERIC COATED) ORAL
Refills: 0 | Status: ACTIVE | COMMUNITY

## 2025-08-22 SDOH — ECONOMIC STABILITY - INCOME SECURITY: UNEMPLOYMENT, UNSPECIFIED: Z56.0

## 2025-08-22 SDOH — SOCIAL STABILITY - SOCIAL INSECURITY: DISSAPEARANCE AND DEATH OF FAMILY MEMBER: Z63.4

## 2025-09-08 ENCOUNTER — APPOINTMENT (OUTPATIENT)
Dept: SURGERY | Facility: CLINIC | Age: 89
End: 2025-09-08

## 2025-09-08 VITALS
RESPIRATION RATE: 18 BRPM | HEIGHT: 63 IN | WEIGHT: 176 LBS | SYSTOLIC BLOOD PRESSURE: 129 MMHG | TEMPERATURE: 97.2 F | HEART RATE: 64 BPM | BODY MASS INDEX: 31.18 KG/M2 | OXYGEN SATURATION: 99 % | DIASTOLIC BLOOD PRESSURE: 81 MMHG

## 2025-09-08 DIAGNOSIS — K43.9 VENTRAL HERNIA W/OUT OBSTRUCTION OR GANGRENE: ICD-10-CM
